# Patient Record
Sex: MALE | Race: OTHER | NOT HISPANIC OR LATINO | Employment: PART TIME | URBAN - METROPOLITAN AREA
[De-identification: names, ages, dates, MRNs, and addresses within clinical notes are randomized per-mention and may not be internally consistent; named-entity substitution may affect disease eponyms.]

---

## 2017-01-18 ENCOUNTER — APPOINTMENT (OUTPATIENT)
Dept: LAB | Facility: HOSPITAL | Age: 62
End: 2017-01-18
Attending: INTERNAL MEDICINE
Payer: COMMERCIAL

## 2017-01-18 DIAGNOSIS — B20 HUMAN IMMUNODEFICIENCY VIRUS (HIV) DISEASE (HCC): ICD-10-CM

## 2017-01-18 LAB
ALBUMIN SERPL BCP-MCNC: 3.8 G/DL (ref 3.5–5)
ALP SERPL-CCNC: 45 U/L (ref 46–116)
ALT SERPL W P-5'-P-CCNC: 40 U/L (ref 12–78)
ANION GAP SERPL CALCULATED.3IONS-SCNC: 8 MMOL/L (ref 4–13)
AST SERPL W P-5'-P-CCNC: 23 U/L (ref 5–45)
BASOPHILS # BLD AUTO: 0.03 THOUSANDS/ΜL (ref 0–0.1)
BASOPHILS NFR BLD AUTO: 1 % (ref 0–1)
BILIRUB SERPL-MCNC: 0.88 MG/DL (ref 0.2–1)
BUN SERPL-MCNC: 17 MG/DL (ref 5–25)
CALCIUM SERPL-MCNC: 8.8 MG/DL (ref 8.3–10.1)
CHLORIDE SERPL-SCNC: 104 MMOL/L (ref 100–108)
CO2 SERPL-SCNC: 28 MMOL/L (ref 21–32)
CREAT SERPL-MCNC: 1.07 MG/DL (ref 0.6–1.3)
EOSINOPHIL # BLD AUTO: 0.05 THOUSAND/ΜL (ref 0–0.61)
EOSINOPHIL NFR BLD AUTO: 1 % (ref 0–6)
ERYTHROCYTE [DISTWIDTH] IN BLOOD BY AUTOMATED COUNT: 12 % (ref 11.6–15.1)
GFR SERPL CREATININE-BSD FRML MDRD: >60 ML/MIN/1.73SQ M
GLUCOSE SERPL-MCNC: 86 MG/DL (ref 65–140)
HCT VFR BLD AUTO: 43.8 % (ref 36.5–49.3)
HGB BLD-MCNC: 16.3 G/DL (ref 12–17)
LYMPHOCYTES # BLD AUTO: 2.18 THOUSANDS/ΜL (ref 0.6–4.47)
LYMPHOCYTES NFR BLD AUTO: 47 % (ref 14–44)
MCH RBC QN AUTO: 35.4 PG (ref 26.8–34.3)
MCHC RBC AUTO-ENTMCNC: 37.2 G/DL (ref 31.4–37.4)
MCV RBC AUTO: 95 FL (ref 82–98)
MONOCYTES # BLD AUTO: 0.25 THOUSAND/ΜL (ref 0.17–1.22)
MONOCYTES NFR BLD AUTO: 5 % (ref 4–12)
NEUTROPHILS # BLD AUTO: 2.15 THOUSANDS/ΜL (ref 1.85–7.62)
NEUTS SEG NFR BLD AUTO: 46 % (ref 43–75)
NRBC BLD AUTO-RTO: 0 /100 WBCS
PLATELET # BLD AUTO: 110 THOUSANDS/UL (ref 149–390)
PMV BLD AUTO: 11.2 FL (ref 8.9–12.7)
POTASSIUM SERPL-SCNC: 4.2 MMOL/L (ref 3.5–5.3)
PROT SERPL-MCNC: 6.7 G/DL (ref 6.4–8.2)
RBC # BLD AUTO: 4.6 MILLION/UL (ref 3.88–5.62)
SODIUM SERPL-SCNC: 140 MMOL/L (ref 136–145)
WBC # BLD AUTO: 4.66 THOUSAND/UL (ref 4.31–10.16)

## 2017-01-18 PROCEDURE — 80053 COMPREHEN METABOLIC PANEL: CPT

## 2017-01-18 PROCEDURE — 85025 COMPLETE CBC W/AUTO DIFF WBC: CPT

## 2017-01-18 PROCEDURE — 36415 COLL VENOUS BLD VENIPUNCTURE: CPT

## 2017-02-16 ENCOUNTER — GENERIC CONVERSION - ENCOUNTER (OUTPATIENT)
Dept: OTHER | Facility: OTHER | Age: 62
End: 2017-02-16

## 2017-02-20 ENCOUNTER — GENERIC CONVERSION - ENCOUNTER (OUTPATIENT)
Dept: OTHER | Facility: OTHER | Age: 62
End: 2017-02-20

## 2017-02-20 ENCOUNTER — ALLSCRIPTS OFFICE VISIT (OUTPATIENT)
Dept: OTHER | Facility: CLINIC | Age: 62
End: 2017-02-20

## 2017-03-13 ENCOUNTER — APPOINTMENT (OUTPATIENT)
Dept: LAB | Facility: HOSPITAL | Age: 62
End: 2017-03-13
Payer: COMMERCIAL

## 2017-03-13 DIAGNOSIS — R19.7 DIARRHEA: ICD-10-CM

## 2017-03-13 PROCEDURE — 89055 LEUKOCYTE ASSESSMENT FECAL: CPT

## 2017-03-13 PROCEDURE — 87329 GIARDIA AG IA: CPT

## 2017-03-13 PROCEDURE — 87899 AGENT NOS ASSAY W/OPTIC: CPT

## 2017-03-13 PROCEDURE — 87045 FECES CULTURE AEROBIC BACT: CPT

## 2017-03-13 PROCEDURE — 87046 STOOL CULTR AEROBIC BACT EA: CPT

## 2017-03-13 PROCEDURE — 87493 C DIFF AMPLIFIED PROBE: CPT

## 2017-03-13 PROCEDURE — 87206 SMEAR FLUORESCENT/ACID STAI: CPT

## 2017-03-13 PROCEDURE — 87177 OVA AND PARASITES SMEARS: CPT

## 2017-03-13 PROCEDURE — 87209 SMEAR COMPLEX STAIN: CPT

## 2017-03-13 PROCEDURE — 87015 SPECIMEN INFECT AGNT CONCNTJ: CPT

## 2017-03-14 LAB — C DIFF TOX GENS STL QL NAA+PROBE: NORMAL

## 2017-03-16 LAB
BACTERIA STL CULT: NORMAL
BACTERIA STL CULT: NORMAL

## 2017-03-17 LAB
CRYPTOSP STL QL ACID FAST STN: NORMAL
G LAMBLIA AG STL QL IA: NEGATIVE
I BELLI SPEC QL ACID FAST MOD KINY STN: NORMAL

## 2017-03-18 LAB — O+P STL CONC: NORMAL

## 2017-03-20 ENCOUNTER — ALLSCRIPTS OFFICE VISIT (OUTPATIENT)
Dept: OTHER | Facility: CLINIC | Age: 62
End: 2017-03-20

## 2017-03-24 LAB — WBC SPEC QL GRAM STN: NORMAL

## 2017-04-18 ENCOUNTER — APPOINTMENT (OUTPATIENT)
Dept: LAB | Facility: HOSPITAL | Age: 62
End: 2017-04-18
Attending: UROLOGY
Payer: COMMERCIAL

## 2017-04-18 ENCOUNTER — TRANSCRIBE ORDERS (OUTPATIENT)
Dept: ADMINISTRATIVE | Facility: HOSPITAL | Age: 62
End: 2017-04-18

## 2017-04-18 DIAGNOSIS — N40.0 BENIGN PROSTATIC HYPERPLASIA, PRESENCE OF LOWER URINARY TRACT SYMPTOMS UNSPECIFIED, UNSPECIFIED MORPHOLOGY: ICD-10-CM

## 2017-04-18 DIAGNOSIS — N20.9 URINARY CALCULUS, UNSPECIFIED: Primary | ICD-10-CM

## 2017-04-18 DIAGNOSIS — N20.9 URINARY CALCULUS, UNSPECIFIED: ICD-10-CM

## 2017-04-18 LAB
BUN SERPL-MCNC: 14 MG/DL (ref 5–25)
CREAT SERPL-MCNC: 0.94 MG/DL (ref 0.6–1.3)
GFR SERPL CREATININE-BSD FRML MDRD: >60 ML/MIN/1.73SQ M
PSA SERPL-MCNC: 2.4 NG/ML (ref 0–4)

## 2017-04-18 PROCEDURE — 82565 ASSAY OF CREATININE: CPT

## 2017-04-18 PROCEDURE — G0103 PSA SCREENING: HCPCS

## 2017-04-18 PROCEDURE — 84520 ASSAY OF UREA NITROGEN: CPT

## 2017-04-18 PROCEDURE — 36415 COLL VENOUS BLD VENIPUNCTURE: CPT

## 2017-04-25 ENCOUNTER — HOSPITAL ENCOUNTER (OUTPATIENT)
Dept: RADIOLOGY | Facility: HOSPITAL | Age: 62
Discharge: HOME | End: 2017-04-25
Attending: UROLOGY
Payer: COMMERCIAL

## 2017-04-25 ENCOUNTER — TRANSCRIBE ORDERS (OUTPATIENT)
Dept: RADIOLOGY | Facility: HOSPITAL | Age: 62
End: 2017-04-25

## 2017-04-25 DIAGNOSIS — N40.0 BENIGN PROSTATIC HYPERPLASIA, PRESENCE OF LOWER URINARY TRACT SYMPTOMS UNSPECIFIED, UNSPECIFIED MORPHOLOGY: Primary | ICD-10-CM

## 2017-04-25 DIAGNOSIS — N40.0 BENIGN PROSTATIC HYPERPLASIA, PRESENCE OF LOWER URINARY TRACT SYMPTOMS UNSPECIFIED, UNSPECIFIED MORPHOLOGY: ICD-10-CM

## 2017-04-25 PROCEDURE — 74000 HB X-RAY EXAM OF ABDOMEN (SINGLE ANTEROPOSTERIOR VIEW): CPT

## 2017-04-26 ENCOUNTER — APPOINTMENT (OUTPATIENT)
Dept: LAB | Facility: HOSPITAL | Age: 62
End: 2017-04-26
Payer: COMMERCIAL

## 2017-04-26 DIAGNOSIS — B20 HUMAN IMMUNODEFICIENCY VIRUS (HIV) DISEASE (HCC): ICD-10-CM

## 2017-04-26 LAB
ALBUMIN SERPL BCP-MCNC: 3.9 G/DL (ref 3.5–5)
ALP SERPL-CCNC: 44 U/L (ref 46–116)
ALT SERPL W P-5'-P-CCNC: 43 U/L (ref 12–78)
ANION GAP SERPL CALCULATED.3IONS-SCNC: 5 MMOL/L (ref 4–13)
AST SERPL W P-5'-P-CCNC: 28 U/L (ref 5–45)
BASOPHILS # BLD AUTO: 0.01 THOUSANDS/ΜL (ref 0–0.1)
BASOPHILS NFR BLD AUTO: 0 % (ref 0–1)
BILIRUB SERPL-MCNC: 0.84 MG/DL (ref 0.2–1)
BUN SERPL-MCNC: 15 MG/DL (ref 5–25)
CALCIUM SERPL-MCNC: 8.9 MG/DL (ref 8.3–10.1)
CHLORIDE SERPL-SCNC: 105 MMOL/L (ref 100–108)
CO2 SERPL-SCNC: 29 MMOL/L (ref 21–32)
CREAT SERPL-MCNC: 1.08 MG/DL (ref 0.6–1.3)
EOSINOPHIL # BLD AUTO: 0.05 THOUSAND/ΜL (ref 0–0.61)
EOSINOPHIL NFR BLD AUTO: 1 % (ref 0–6)
ERYTHROCYTE [DISTWIDTH] IN BLOOD BY AUTOMATED COUNT: 12.3 % (ref 11.6–15.1)
GFR SERPL CREATININE-BSD FRML MDRD: >60 ML/MIN/1.73SQ M
GLUCOSE SERPL-MCNC: 87 MG/DL (ref 65–140)
HCT VFR BLD AUTO: 43.7 % (ref 36.5–49.3)
HGB BLD-MCNC: 16.3 G/DL (ref 12–17)
LYMPHOCYTES # BLD AUTO: 1.87 THOUSANDS/ΜL (ref 0.6–4.47)
LYMPHOCYTES NFR BLD AUTO: 50 % (ref 14–44)
MCH RBC QN AUTO: 35.3 PG (ref 26.8–34.3)
MCHC RBC AUTO-ENTMCNC: 37.3 G/DL (ref 31.4–37.4)
MCV RBC AUTO: 95 FL (ref 82–98)
MONOCYTES # BLD AUTO: 0.26 THOUSAND/ΜL (ref 0.17–1.22)
MONOCYTES NFR BLD AUTO: 7 % (ref 4–12)
NEUTROPHILS # BLD AUTO: 1.59 THOUSANDS/ΜL (ref 1.85–7.62)
NEUTS SEG NFR BLD AUTO: 42 % (ref 43–75)
NRBC BLD AUTO-RTO: 0 /100 WBCS
PLATELET # BLD AUTO: 114 THOUSANDS/UL (ref 149–390)
PMV BLD AUTO: 12.4 FL (ref 8.9–12.7)
POTASSIUM SERPL-SCNC: 4.3 MMOL/L (ref 3.5–5.3)
PROT SERPL-MCNC: 6.7 G/DL (ref 6.4–8.2)
RBC # BLD AUTO: 4.62 MILLION/UL (ref 3.88–5.62)
SODIUM SERPL-SCNC: 139 MMOL/L (ref 136–145)
WBC # BLD AUTO: 3.78 THOUSAND/UL (ref 4.31–10.16)

## 2017-04-26 PROCEDURE — 85025 COMPLETE CBC W/AUTO DIFF WBC: CPT

## 2017-04-26 PROCEDURE — 36415 COLL VENOUS BLD VENIPUNCTURE: CPT

## 2017-04-26 PROCEDURE — 80053 COMPREHEN METABOLIC PANEL: CPT

## 2017-05-09 ENCOUNTER — GENERIC CONVERSION - ENCOUNTER (OUTPATIENT)
Dept: OTHER | Facility: OTHER | Age: 62
End: 2017-05-09

## 2017-05-15 ENCOUNTER — GENERIC CONVERSION - ENCOUNTER (OUTPATIENT)
Dept: OTHER | Facility: OTHER | Age: 62
End: 2017-05-15

## 2017-05-17 ENCOUNTER — ALLSCRIPTS OFFICE VISIT (OUTPATIENT)
Dept: OTHER | Facility: CLINIC | Age: 62
End: 2017-05-17

## 2017-07-16 DIAGNOSIS — B20 HUMAN IMMUNODEFICIENCY VIRUS (HIV) DISEASE (HCC): ICD-10-CM

## 2017-07-16 DIAGNOSIS — Z11.3 ENCOUNTER FOR SCREENING FOR INFECTIONS WITH PREDOMINANTLY SEXUAL MODE OF TRANSMISSION: ICD-10-CM

## 2017-09-25 ENCOUNTER — GENERIC CONVERSION - ENCOUNTER (OUTPATIENT)
Dept: OTHER | Facility: OTHER | Age: 62
End: 2017-09-25

## 2017-10-16 DIAGNOSIS — Z11.3 ENCOUNTER FOR SCREENING FOR INFECTIONS WITH PREDOMINANTLY SEXUAL MODE OF TRANSMISSION: ICD-10-CM

## 2017-10-16 DIAGNOSIS — I10 ESSENTIAL (PRIMARY) HYPERTENSION: ICD-10-CM

## 2017-10-16 DIAGNOSIS — B20 HUMAN IMMUNODEFICIENCY VIRUS (HIV) DISEASE (HCC): ICD-10-CM

## 2017-12-11 ENCOUNTER — GENERIC CONVERSION - ENCOUNTER (OUTPATIENT)
Dept: OTHER | Facility: OTHER | Age: 62
End: 2017-12-11

## 2018-01-10 ENCOUNTER — TRANSCRIBE ORDERS (OUTPATIENT)
Dept: ADMINISTRATIVE | Facility: HOSPITAL | Age: 63
End: 2018-01-10

## 2018-01-10 DIAGNOSIS — R07.9 CHEST PAIN, UNSPECIFIED TYPE: Primary | ICD-10-CM

## 2018-01-10 NOTE — PROGRESS NOTES
Assessment    1  Routine screening for STI (sexually transmitted infection) (V74 5) (Z11 3)   2  HIV disease (042) (B20)    Plan    1  (1) CBC/PLT/DIFF; Status:Active - Retrospective Authorization; Requested for:03Dig6007;    2  (1) COMPREHENSIVE METABOLIC PANEL; Status:Active - Retrospective Authorization; Requested for:96Qdu7457;    3  (1) HEP C ANTIBODY; Status:Active - Retrospective Authorization; Requested   for:78Xfw6558;    4  (1) HIV-1 RNA QUANTITATIVE; [Do Not Release]; Status:Active - Retrospective   Authorization; Requested for:10Yvh7178;    5  (1) RPR; Status:Active - Retrospective Authorization; Requested for:63Mnx3851;    6  (1) T LYMPH SUBSET (CD4); Status:Active - Retrospective Authorization; Requested   for:82Ufj3841;    7  (1) URINALYSIS (will reflex a microscopy if leukocytes, occult blood, protein or nitrites are   not within normal limits); Status:Active - Retrospective Authorization; Requested   for:15Pkx2937;    8  Follow-up visit in 3 months Evaluation and Treatment  Follow-up  Status: Hold For -   Scheduling  Requested for: 23TFI1117    9  (1) LIPID PANEL, FASTING; Status:Active - Retrospective Authorization; Requested   for:52Msj3409;     10  (1) CHLAMYDIA/GC AMPLIFIED DNA, PCR; Source:Urine, Unspecified Source;    Status:Active - Retrospective Authorization; Requested for:94Tfv5275;     Discussion/Summary    HIV-doing well on Tivicay and Truvada with an undetectable viral load, and a CD4 count over 700  If able to make the transition by his insurance, we'll transition the Truvada to [place]  Otherwise continue ART, recheck labs in 2 months and follow-up in 3 months  Likely transition to every 6 month follow-up if undetectable next visit  Education   general HIV education  adherence  prevention care  Chief Complaint  Patient here for a f/u visit  History of Present Illness  Routine follow-up for HIV  Doing well on Tivicay injury about with 100% adherence   The patient feels well      Pain Assessment   the patient states they do not have pain  Abuse And Domestic Violence Screen    Yes, the patient is safe at home  The patient states no one is hurting them  Depression And Suicide Screen  No, the patient has not had thoughts of hurting themself  No, the patient has not felt depressed in the past 7 days  no fever no night sweats no cough no shortness of breath no thrush no nausea no vomiting no diarrhea      Active Problems    1  Arthritis (716 90) (M19 90)   2  GERD (gastroesophageal reflux disease) (530 81) (K21 9)   3  HIV disease (042) (B20)   4  Hyperlipidemia (272 4) (E78 5)   5  Hypertension (401 9) (I10)   6  Kidney stones (592 0) (N20 0)   7  Routine screening for STI (sexually transmitted infection) (V74 5) (Z11 3)   8  Skin macule (709 8) (L98 8)   9  Skin rash (782 1) (R21)   10  Viral syndrome (079 99) (B34 9)    Past Medical History    1  History of Bipolar disorder (296 80) (F31 9)   2  History of fatigue (V13 89) (Z87 898)   3  History of vertigo (V12 49) (Z87 898)   4  History of HIV positive (V08) (Z23)    Family History  Mother    1  No pertinent family history    Social History    · Former smoker (V15 82) (D49 840)   · No alcohol use   · No drug use   · Non-smoker (V49 89) (Z78 9)    Current Meds   1  Aspirin 81 MG Oral Tablet Delayed Release; Take 1 tablet daily; Therapy: 60FGG6994 to (Evaluate:13Jun2016)  Requested for: 25BGS5296; Last   Rx:15Mar2016 Ordered   2  Fluticasone Propionate 50 MCG/ACT Nasal Suspension; Use 1 spray in each nostril   twice a day; Therapy: 71KTX9270 to (Evaluate:14Jan2016)  Requested for: 76Zji9457; Last   Rx:16Oct2015 Ordered   3  Lisinopril 20 MG Oral Tablet; Take 1 tablet by mouth  daily; Therapy: 02EQJ8455 to (Evaluate:06Jun2016)  Requested for: 14GFZ6821; Last   Rx:08Mar2016 Ordered   4  Metoprolol Tartrate 50 MG Oral Tablet; Take 1 tablet by mouth  twice a day;    Therapy: 93NNK1832 to (Evaluate:75Ral4957)  Requested for: 31BVW7463; Last   Rx:08Mar2016 Ordered   5  Omeprazole 20 MG Oral Capsule Delayed Release; Take 1 capsule by mouth  daily; Therapy: 78OSD9053 to (Evaluate:06Jun2016)  Requested for: 74OGY2439; Last   Rx:08Mar2016 Ordered   6  Pravastatin Sodium 80 MG Oral Tablet; Take 1 tablet by mouth  daily; Therapy: 17VBK7897 to (Evaluate:06Jun2016)  Requested for: 75XZU9081; Last   Rx:08Mar2016 Ordered   7  Tivicay 50 MG Oral Tablet; Take 1 tablet daily; Therapy: 89NYY7094 to (Evaluate:06Jun2016)  Requested for: 69MQR2147; Last   Rx:08Mar2016 Ordered   8  Truvada 200-300 MG Oral Tablet; TAKE 1 TABLET DAILY; Therapy: 69LAP5986 to (Evaluate:06Jun2016)  Requested for: 80HHG0615; Last   Rx:08Mar2016 Ordered   9  Vitamin D 1000 UNIT Oral Tablet; Therapy: (Recorded:63Puv8403) to Recorded    Allergies    1  Sulfa Drugs    Vitals  Signs [Data Includes: Current Encounter]   Recorded: 14JOK1117 10:18AM   Temperature: 97 2 F  Heart Rate: 58  Respiration: 18  Systolic: 150  Diastolic: 70  Height: 6 ft 2 in  Weight: 214 lb 4 58 oz  BMI Calculated: 27 51  BSA Calculated: 2 24  O2 Saturation: 98  Pain Scale: 0    Physical Exam    Constitutional   General appearance: No acute distress, well appearing and well nourished  Ears, Nose, Mouth, and Throat   Oropharynx: Normal with no erythema, edema, exudate or lesions  Pulmonary   Respiratory effort: No increased work of breathing or signs of respiratory distress  Auscultation of lungs: Clear to auscultation  Cardiovascular   Auscultation of heart: Normal rate and rhythm, normal S1 and S2, without murmurs  Examination of extremities for edema and/or varicosities: Normal     Abdomen   Abdomen: Non-tender, no masses  Liver and spleen: No hepatomegaly or splenomegaly  Lymphatic   Palpation of lymph nodes in neck: No lymphadenopathy         Future Appointments    Date/Time Provider Specialty Site   06/27/2016 02:00 PM Robinson Cordero[address]  [facility]     Signatures   Electronically signed by : Tyler Aquino MD; May  9 2016 10:52AM EST                       (Author)

## 2018-01-10 NOTE — PROGRESS NOTES
Assessment    1  HIV disease (042) (B20)   2  Encounter for preventive health examination (V70 0) (Z00 00)    Plan    1  Descovy 200-25 MG Oral Tablet; Take 1 tablet daily   2  (1) CBC/PLT/DIFF; Status:Active; Requested for:16Oct2017;    3  (1) COMPREHENSIVE METABOLIC PANEL; Status:Active; Requested for:16Oct2017;    4  (1) HIV-1 RNA QUANTITATIVE; [Do Not Release]; Status:Active; Requested for:16Oct2017;    5  (1) RPR; Status:Active; Requested for:16Oct2017;    6  (1) T LYMPH SUBSET (CD4); Status:Active; Requested for:16Oct2017;    7  (1) URINALYSIS (will reflex a microscopy if leukocytes, occult blood, protein or nitrites are not within   normal limits); Status:Active; Requested for:16Oct2017;    8  Follow-up visit in 3 months Evaluation and Treatment  Follow-up  Status: Complete  Done:   80WPZ6960    9  (1) LIPID PANEL, FASTING; Status:Active; Requested for:16Oct2017;     10  (1) CHLAMYDIA/GC AMPLIFIED DNA, PCR; Source:Urine, Unspecified Source; Status:Active; Requested for:16Oct2017;     Discussion/Summary    Intervention Diet Prescription   Energy 2100 kcal   24kcal /88kg   Protein 90g   1 1g /88kg   Fluid 2100ml   24ml /88kg 2g Na  Estimated Intake: 2000kcal 85g Protein   His current intake is meeting estimated nutrition needs  Goal #1 - Improve/Maintain  healthy eating for HIV  Goal initiated  Time Frame For Accomplishment: By next follow-up  Intervention Nutrition Education Provided  Person Educated: patient   Topics Discussed: healthy eating for HIV   Barriers To Learning: none  Readiness to Learn: Receptive  Teaching Method: verbal   Evaluation Of Learning: verbalized/demonstrated understanding       History of Present Illness  Assessment: Clinical Data/Client History HIV - Yes  His socio-economic status includes  cooks and eats out  [facility]  Living Environment - He has access to refrigerator, stove and microwave     His functional status is  ambulatory, able to food shop and prepares own meals  His activity level is  regular exercise and pt  walks on a almost daily basis  He eats breakfast at  Home made Smoothie  He eats lunch at  [employer] or salad, water  He eats dinner at  Home cooked meal (low fat, low cholesterol), water  He snacks Fruit  His appetite is  good   He does not take supplements  He has problems with  Reviewed Oral Health ?s w/ pt  he has been to the [address] [place] [address] w/in the past year, and has f/u appt  next month, no problems reported  Nutrition Diagnosis   Problem related to no nutrition diagnosis at this time  Pt  has lost ~ 10 kg w/in the past year, good po intake & appetitie, Alb  3 9  Active Problems    1  Arthritis (716 90) (M19 90)   2  Constipation (564 00) (K59 00)   3  Encounter for screening colonoscopy (V76 51) (Z12 11)   4  GERD without esophagitis (530 81) (K21 9)   5  HIV disease (042) (B20)   6  Hyperlipidemia (272 4) (E78 5)   7  Hypertension (401 9) (I10)   8  Kidney stones (592 0) (N20 0)   9  Routine screening for STI (sexually transmitted infection) (V74 5) (Z11 3)   10  Screening for cancer (V76 9) (Z12 9)    Past Medical History    1  History of Bipolar disorder (296 80) (F31 9)   2  History of diarrhea (V12 79) (Z87 898)   3  History of fatigue (V13 89) (Z87 898)   4  History of vertigo (V12 49) (Z87 898)   5  History of viral infection (V12 09) (Z86 19)   6  History of HIV positive (V08) (Z21)   7  History of Skin macule (709 8) (L98 8)   8  History of Skin rash (782 1) (R21)    Family History  Mother    1  No pertinent family history    Social History    · Former smoker (V15 82) (K38 508)   · No alcohol use   · No drug use   · Non-smoker (V49 89) (Z78 9)    Current Meds   1  Aspirin 81 MG Oral Tablet Delayed Release; Take 1 tablet daily; Therapy: 84GRU5505 to (Evaluate:88Vvv2126)  Requested for: 20Mar2017; Last Rx:13Jun2016   Ordered   2  Famotidine 20 MG Oral Tablet; TAKE 1 TABLET AT BEDTIME;    Therapy: 67OPR0514 to (Magdi Short)  Requested for: 20Mar2017; Last Rx:85Btz3174   Ordered   3  Fluticasone Propionate 50 MCG/ACT Nasal Suspension; Use 1 spray in each nostril twice a day; Therapy: 38DFW1029 to (Evaluate:18Jun2017)  Requested for: 20Mar2017; Last Rx:20Mar2017   Ordered   4  Lisinopril 20 MG Oral Tablet; Take 1 tablet by mouth  daily; Therapy: 46MHD4334 to (Wil Conner)  Requested for: 20Mar2017; Last Rx:77Juq4072   Ordered   5  Metoprolol Succinate ER 25 MG Oral Tablet Extended Release 24 Hour; Take 1 tablet by mouth  daily; Therapy: 32HVP2362 to (Wil Conner)  Requested for: 20Mar2017; Last Rx:11Rfv2059   Ordered   6  Pravastatin Sodium 80 MG Oral Tablet; Take 1 tablet by mouth  daily; Therapy: 34CML2933 to (Evaluate:18Jun2017)  Requested for: 20Mar2017; Last Rx:20Mar2017   Ordered   7  Tivicay 50 MG Oral Tablet; Take 1 tablet by mouth  daily; Therapy: 22RHZ6009 to (Evaluate:92Xkb6974)  Requested for: 20Mar2017; Last Rx:12Oct2016   Ordered   8  Urocit-K 15 15 MEQ (1620 MG) Oral Tablet Extended Release (Potassium Citrate ER); Therapy: 22BWC1616 to Recorded   9  Vitamin D 1000 UNIT Oral Tablet; Therapy: (Recorded:20Mar2017) to Recorded    Allergies    1   Sulfa Drugs    Vitals  Signs   Recorded: 27OZL7855 04:26PM   Temperature: 98 2 F  Heart Rate: 67  Respiration: 18  Systolic: 326  Diastolic: 78  Height: 6 ft 2 in  Weight: 195 lb 5 22 oz  BMI Calculated: 25 08  BSA Calculated: 2 15  O2 Saturation: 98  Vitals   Recorded: 82MMX5396 62:05PJ   Systolic: 763  Diastolic: 78  Temperature: 98 2 F  Heart Rate: 67  Respiration: 18  Height: 6 ft 2 in  Weight: 195 lb 5 22 oz  BMI Calculated: 25 08  BSA Calculated: 2 15  O2 Saturation: 98  Recorded: 52DMR2751 29:92XY   Systolic: 684  Diastolic: 69  Temperature: 97 9 F  Heart Rate: 67  Respiration: 18  Height: 6 ft 2 in  Weight: 201 lb 7 98 oz  BMI Calculated: 25 87  BSA Calculated: 2 18  O2 Saturation: 98  Recorded: 13Omj3191 09:58AM Systolic: 721  Diastolic: 76  Temperature: 97 6 F  Heart Rate: 68  Respiration: 18  Height: 6 ft 2 in  Weight: 199 lb 8 24 oz  BMI Calculated: 25 62  BSA Calculated: 2 17  O2 Saturation: 98  Recorded: 14XRS0798 08:89OZ   Systolic: 394  Diastolic: 78  Temperature: 97 5 F  Heart Rate: 65  Respiration: 18  Height: 6 ft 2 in  Weight: 199 lb   BMI Calculated: 25 55  BSA Calculated: 2 17  O2 Saturation: 98  Recorded: 28EBF0942 25:22WN   Systolic: 606  Diastolic: 79  Temperature: 98 5 F  Heart Rate: 70  Respiration: 18  Height: 6 ft 2 in  Weight: 188 lb   BMI Calculated: 24 14  BSA Calculated: 2 12  Recorded: 74GPR0614 26:75MM   Systolic: 977  Diastolic: 78  Temperature: 97 6 F  Heart Rate: 67  Respiration: 18  Height: 6 ft 2 in  Weight: 188 lb 11 41 oz  BMI Calculated: 24 23  BSA Calculated: 2 12  O2 Saturation: 98  Recorded: 51URW2845 37:66SF   Systolic: 847  Diastolic: 79  Temperature: 97 5 F  Heart Rate: 81  Respiration: 18  Height: 6 ft 2 in  Weight: 145 lb 0 99 oz  BMI Calculated: 18 62  BSA Calculated: 1 9  Pain Scale: 0  O2 Saturation: 98  Recorded: 93XNC8771 05:78PU   Systolic: 734  Diastolic: 70  Temperature: 97 2 F  Heart Rate: 58  Respiration: 18  Height: 6 ft 2 in  Weight: 214 lb 4 58 oz  BMI Calculated: 27 51  BSA Calculated: 2 24  Pain Scale: 0  O2 Saturation: 98  Recorded: 48QVB5355 25:57VE   Systolic: 611  Diastolic: 78  Temperature: 96 2 F  Heart Rate: 68  Respiration: 18  Height: 6 ft 2 in  Weight: 96 lb 2 98 oz  BMI Calculated: 12 35  BSA Calculated: 1 59  Pain Scale: 0  O2 Saturation: 98  Recorded: 32TXX6454 54:26ZQ   Systolic: 098  Diastolic: 64  Temperature: 98 F  Heart Rate: 59  Respiration: 18  Height: 6 ft 2 in  Weight: 211 lb 3 18 oz  BMI Calculated: 27 12  BSA Calculated: 2 22  Pain Scale: 0  O2 Saturation: 98  Recorded: 49OWY3578 21:80CJ   Systolic: 005  Diastolic: 76  Temperature: 97 6 F  Heart Rate: 62  Respiration: 18  Height: 6 ft 2 in  Weight: 216 lb   BMI Calculated: 27 73  BSA Calculated: 2 25  Pain Scale: 0  O2 Saturation: 98  Recorded: 98EOR3806 50:02JU   Systolic: 636  Diastolic: 60  Temperature: 98 5 F  Heart Rate: 68  Respiration: 20  Height: 6 ft 2 in  Weight: 210 lb 6 oz  BMI Calculated: 27 01  BSA Calculated: 2 22  O2 Saturation: 98  Recorded: 00TRP3752 63:73LN   Systolic: 647  Diastolic: 68  Heart Rate: 74  Height: 6 ft 2 in  Weight: 214 lb   BMI Calculated: 27 48  BSA Calculated: 2 24  Pain Scale: 7 5  O2 Saturation: 98  Recorded: 62JDV1882 97:62CO   Systolic: 730  Diastolic: 76  Temperature: 96 8 F  Heart Rate: 98  Respiration: 18  Height: 6 ft 2 in  Weight: 214 lb   BMI Calculated: 27 48  BSA Calculated: 2 24  O2 Saturation: 98  Recorded: 23XLT8374 11:78HS   Systolic: 025  Diastolic: 56  Temperature: 100 8 F  Heart Rate: 111  Respiration: 18  Height: 6 ft 2 in  Weight: 214 lb 6 oz  BMI Calculated: 27 52  BSA Calculated: 2 24  O2 Saturation: 95    Future Appointments    Date/Time Provider Specialty Site   08/16/2017 04:30 PM Lamont Connolly MD Infectious Disease [facility] AT [place]   09/18/2017 01:30 PM Monisha Santiago, [address] AT [address] [facility]   Electronically signed by : LORENZO Alfred,BCO,BUBBA; May 18 2017  9:03AM EST                       (Author)

## 2018-01-12 NOTE — PROGRESS NOTES
Assessment   1  HIV disease (042) (B20)    Plan   1  Renew: Truvada 200-300 MG Oral Tablet; Take 1 tablet by mouth  daily  2  (1) CBC/PLT/DIFF; Status:Active; Requested for:04Ufv9108;   3  (1) COMPREHENSIVE METABOLIC PANEL; Status:Active; Requested for:43Nst6271;   4  (1) HIV-1 RNA QUANTITATIVE; [Do Not Release]; Status:Active; Requested for:92Zvc5041;     5  (1) T LYMPH SUBSET (CD4); Status:Active; Requested for:89Xvo4757;   6  (1) URINALYSIS (will reflex a microscopy if leukocytes, occult blood, protein or nitrites are   not within normal limits); Status:Active; Requested for:27Zmi7456;    7  (1) LIPID PANEL, FASTING; Status:Active; Requested for:91Aqm0041;    8  (1) CHLAMYDIA/GC AMPLIFIED DNA, PCR; Source:Urine, Unspecified Source;   Status:Active; Requested for:81Cnf3682;     Discussion/Summary    HIV-patient now with another lip and his viral load  CD4 count remains very high in the 700s  I discussed with the patient importance of adherence, but the patient insists she is not missing doses  I discussed with him in detail taking other supplements with unknown drug interactions  I recommend he stop the new supplement, and to move all his other medications to the morning, and continue the ART in the evening  Recheck labs in 2 months and follow-up in 3 months  If the viral load keeps rising we'll check a HIV genotype  Possible side effects of new medications were reviewed with the patient/guardian today  The treatment plan was reviewed with the patient/guardian  The patient/guardian understands and agrees with the treatment plan     Education   general HIV education  adherence  prevention care  Chief Complaint  Patient here for an HIV f/u  Patient needs refills      History of Present Illness  Routine follow-up for HIV  Patient claims 100% adherence with Tivicay and Truvada  He denies any notable side effects  He recently started a new supplement that is a route from the [facility]       Pain Assessment   the patient states they do not have pain  Abuse And Domestic Violence Screen    Yes, the patient is safe at home  The patient states no one is hurting them  Depression And Suicide Screen  No, the patient has not had thoughts of hurting themself  No, the patient has not felt depressed in the past 7 days  no fever no lethargy no depression no night sweats no weight loss no cough no shortness of breath no thrush no nausea no vomiting no diarrhea      Active Problems   1  Arthritis (716 90) (M19 90)  2  Constipation (564 00) (K59 00)  3  Encounter for screening colonoscopy (V76 51) (Z12 11)  4  GERD without esophagitis (530 81) (K21 9)  5  HIV disease (042) (B20)  6  Hyperlipidemia (272 4) (E78 5)  7  Hypertension (401 9) (I10)  8  Kidney stones (592 0) (N20 0)  9  Routine screening for STI (sexually transmitted infection) (V74 5) (Z11 3)  10  Screening for cancer (V76 9) (Z12 9)  11  Skin macule (709 8) (L98 8)  12  Skin rash (782 1) (R21)  13  Viral syndrome (079 99) (B34 9)    Past Medical History   1  History of Bipolar disorder (296 80) (F31 9)  2  History of fatigue (V13 89) (Z87 898)  3  History of vertigo (V12 49) (Z87 898)  4  History of HIV positive (V08) (Z23)    Family History  Mother   1  No pertinent family history    Social History    · Former smoker (V15 82) (K79 881)   · No alcohol use   · No drug use   · Non-smoker (V49 89) (Z78 9)    Current Meds  1  Aspirin 81 MG Oral Tablet Delayed Release; Take 1 tablet daily; Therapy: 95VBI1927 to (Evaluate:64Hqa6085)  Requested for: 60Imd5333; Last   Rx:13Jun2016 Ordered  2  Famotidine 20 MG Oral Tablet; TAKE 1 TABLET AT BEDTIME; Therapy: 15MDM3847 to (Sherly Coker)  Requested for: 02Ezb1570; Last   Rx:50Dpc0314 Ordered  3  Fluticasone Propionate 50 MCG/ACT Nasal Suspension; Use 1 spray in each nostril   twice a day; Therapy: 09JHD4714 to (Evaluate:14Jan2016)  Requested for: 66Pqy1340; Last   Rx:16Oct2015 Ordered  4   Lisinopril 20 MG Oral Tablet; Take 1 tablet by mouth  daily; Therapy: 79TQD6035 to (Evaluate:18Dbc9984)  Requested for: 06NPU0254; Last   Rx:21Nov2016; Status: ACTIVE - Renewal Denied Ordered  5  Metoprolol Succinate ER 25 MG Oral Tablet Extended Release 24 Hour; Take 1 tablet by   mouth  daily; Therapy: 92GIA5648 to (22-85-39-05)  Requested for: 86GYQ8434; Last   Rx:86Nci0976 Ordered  6  Pravastatin Sodium 80 MG Oral Tablet; Take 1 tablet by mouth  daily; Therapy: 24MFK0107 to (Allison Mckenzie)  Requested for: 89TMB5807; Last   Rx:87Enh3235 Ordered  7  Tivicay 50 MG Oral Tablet; Take 1 tablet by mouth  daily; Therapy: 22YSF4748 to (Gisela Goldberg)  Requested for: 12Oct2016; Last   Rx:12Oct2016 Ordered  8  Truvada 200-300 MG Oral Tablet; Take 1 tablet by mouth  daily; Therapy: 04JLK6481 to (Evaluate:10Jan2017)  Requested for: 12Oct2016; Last   Rx:12Oct2016 Ordered  9  Vitamin D 1000 UNIT Oral Tablet; Therapy: (Recorded:10Cei9652) to Recorded    Allergies   1  Sulfa Drugs    Vitals  Signs   Recorded: 20Feb2017 09:58AM   Temperature: 97 6 F  Heart Rate: 68  Respiration: 18  Systolic: 860  Diastolic: 76  Height: 6 ft 2 in  Weight: 199 lb 8 24 oz  BMI Calculated: 25 62  BSA Calculated: 2 17  O2 Saturation: 98    Physical Exam    Constitutional   General appearance: No acute distress, well appearing and well nourished  Ears, Nose, Mouth, and Throat   Oropharynx: Normal with no erythema, edema, exudate or lesions  Pulmonary   Respiratory effort: No increased work of breathing or signs of respiratory distress  Auscultation of lungs: Clear to auscultation  Cardiovascular   Auscultation of heart: Normal rate and rhythm, normal S1 and S2, without murmurs  Examination of extremities for edema and/or varicosities: Normal     Abdomen   Abdomen: Non-tender, no masses  Liver and spleen: No hepatomegaly or splenomegaly  Lymphatic   Palpation of lymph nodes in neck: No lymphadenopathy         Future Appointments    Date/Time Provider Specialty Site   03/20/2017 01:30 PM Jesus Alberto Chavez, [facility] AT [facility]     Signatures   Electronically signed by : Ondina Rodriguez MD; Feb 20 2017 10:44AM EST                       (Author)    Electronically signed by : Ondina Rodriguez MD; Feb 20 2017 10:45AM EST                       (Author)

## 2018-01-12 NOTE — PROGRESS NOTES
Patient Health Assessment    Date:            09/25/2017  Blood Pressure:  131/81  Pulse:           66  Age:             61  Weight:          200 lbs  Height/Length:   6' 2"  Body Mass Index: 25 7  Provider:        Jeanine_CYNTHIA_RIVERA  Clinic:          [address]: Allergies    Tobacco User    High Blood Pressure    Mental Disorders    Alcohol/Drug Abuse  Medications: Aspirin    METOPROLOL   TAB 25MG ER 25    Omeprazole    Pravastatin Sodium    Vitamin D    Truvada    Tivicay    Urocit  Allergies:      Sulfa Drugs  Since Last Visit: Medical Alert: No Change    Medications: No Change    Allergies:        No Change  Pain Scale Type: Numeric Pain ScalePain Level: 5  Description: Patient states hes been in pain for the past two weeks  Sensitivity to cold and hot and pressure  No throbbing  Not taken any pain  meds  MDR  Location: ()Tooth    ()Other  Quality:  ()Dull    ()Sharp    ()Throbbing    ()Unable to assess  Duration: ()< 24 hours    ()> 24 hours    ()1-3 days    ()4-6 days    ()7-14 days    ()14-21 days    ()21-30 days    ()Over 30 days      S:Pt presented as emergency with sensitivity to cold/hot and pain on biting  on the back molars  Pt not sure if its #18 or 19  O:Took PA  No clear pathology noted  Clinical exam shows recession of roots  on #19 existing crown  Old amalgam filling on #18 and craze/fracture line  starting on the distal groove and extending all the way to the distal  margin/wall of the tooth  Percussion/palpation was normal for all teeth  Endo ice for #18 was normal  Tooth sloth used and #18 was painful on biting on the distal lingual and mesial   lingual cusps of the tooth leading to crack tooth symptoms  Pt said it was the   same pain he feels when chewing  A: #18 distal craze/fracture    P:Explained to pt that we could removed the amalgam filling, trace the  craze/fracture and possible restore with composite   Explained that depending on   how far the fracture extends the tooth might need rc, crown or it might not  even be restorable  Pt agrees with the treatment plan  Tx plan signed  Pt  left in good health  NV: #18 MODBEAU    J  Cornelia Healy    ----- Signed on Monday, September 25, 2017 at 10:29:23 AM  -----  ----- Provider: Elías Diamond Dentist -- Clinic: [facility] -----

## 2018-01-12 NOTE — PROGRESS NOTES
Patient Health Assessment    Date:            12/05/2016  Blood Pressure:  110/73  Pulse:           66  Age:             61  Weight:          208 lbs  Height/Length:   6' 2"  Body Mass Index: 26 7  Provider:        [email or web address]  Clinic:          [address] [place] [address]  Medical Alert:  Allergies    Tobacco User    High Blood Pressure    Mental Disorders    Alcohol/Drug Abuse  Medications: Aspirin    METOPROLOL   TAB 25MG ER 25    Omeprazole    Pravastatin Sodium    Vitamin D    Truvada    Tivicay    Urocit  Allergies:      Sulfa Drugs  Since Last Visit: Medical Alert: No Change    Medications: Change    Allergies:        No Change  Pain Scale Type: Numeric Pain ScalePain Level: 0  Description: pt reports Left cheek dark spot present  scaled, cavitroned, polished and flossed  some moderate calculus lower anteriors and upper molars/ moderate bleeding  moved to room 5 for dr Aylin Díaz exam    ----- Signed on Monday, December 05, 2016 at 11:44:19 AM  -----  ----- Provider: [id number] - Juanis Tucker RDH -- Clinic: [facility] -----

## 2018-01-13 VITALS
HEART RATE: 68 BPM | RESPIRATION RATE: 18 BRPM | BODY MASS INDEX: 25.61 KG/M2 | OXYGEN SATURATION: 98 % | SYSTOLIC BLOOD PRESSURE: 129 MMHG | TEMPERATURE: 97.6 F | DIASTOLIC BLOOD PRESSURE: 76 MMHG | HEIGHT: 74 IN | WEIGHT: 199.52 LBS

## 2018-01-13 NOTE — PROGRESS NOTES
History of Present Illness  [facility]:   He is being seen in follow-up  The patient is being seen regarding wellness screener   Support/Coping: AA/NA meetings several times per week  [facility]'s:         1  I like who I am  Yes, describes me exactly (12)   2  I am not an easy person to get along with    No, doesn't describe me at all (22)   3  I am basically a healthy person    Yes, describes me exactly (32)   4  I give up to easily    No, doesn't describe me at all (42)   5  I have difficulty concentrating    No, doesn't describe me at all (52)   6  I am happy with my family relationships    Yes, describes me exactly (62)   7  I am comfortable being around people    Yes, describes me exactly (72)     8  Walking up a flight of stairs    None (82)   9  Running the length of a football field    None (92)     10  Sleeping    Some (101)   11  Hurting or aching in any part of your body    Some (111)   12  Getting tired easily    None (122)   13  Feeling depressed or sad    None (132)   14  Nervousness    None (142)     15  Socialize with other people (talk or visit with friends or relatives A Lot ()   12  Take part in social, Sikh, or recreation activities (meetings, Sikh, movies, sports, parties)    Cade Manifold A Lot (162)   17   Stay in your home, nursing home, or hospital because of sickness, injury, or other health problem None (172)   [address]     8 = 2   9 = 2   10 = 1   11 = 1   12 = 2   Sum = 8   PHYSICAL HEALTH SCORE 80      1 = 2   4 = 2   5 = 2   13 = 2   14 = 2   Sum = 10   MENTAL HEALTH SCORE 100      2 = 2   6 = 2   7 = 2   15 = 2   16 = 2 Sum = 10   SOCIAL HEALTH SCORE 100  Physical Health score = 80   Mental Health score = 100   Social Health score = 100   Sum = 280   GENERAL HEALTH SCORE 93 333      3 = 2   PERCEIVED HEALTH SCORE 100      1 = 2   2 = 2   4 = 2   6 = 2   7 = 2   Sum = 10   SELF-ESTEEM SCORE 100            2 = 2 and 0   5 = 2 and 0   7 = 2 and 0   10 = 1 and 1   12 = 2 and 0   14 = 2 and 0   Sum = 1   ANXIETY SCORE 8 333      4 = 2 and 0   5 = 2 and 0   10 = 1 and 1   12 = 2 and 0   13 = 2 and 0   Sum = 1   DEPRESSION SCORE 10      4 = 2, 0, 2 and 0   7 = 2 and 0   10 = 1 and 1   12 = 2 and 0   13 = 2 and 0   14 = 2 and 0   Sum = 1   ANXIETY-DEPRESSION ([facility]-AD) SCORE 7 143      11 = 1 and 1   PAIN SCORE 50      17 = 2 and 0   DISABILITY SCORE 0  Physical Exam    Objective: Orientation: oriented to person, oriented to place and oriented to time  Appearance: well developed, well nourished and appearance reflects stated age  Observed mood and affect: appropriate  Harm to self or others: None reported or observed  Substance abuse: None reported or observed  Assessment    1  Routine screening for STI (sexually transmitted infection) (V74 5) (Z11 3)   2  HIV disease (042) (B20)    Plan    1  (1) CBC/PLT/DIFF; Status:Active - Retrospective Authorization; Requested for:04Brt8567;    2  (1) COMPREHENSIVE METABOLIC PANEL; Status:Active - Retrospective Authorization; Requested for:37Xkc0770;    3  (1) HEP C ANTIBODY; Status:Active - Retrospective Authorization; Requested   for:84Sjy3111;    4  (1) HIV-1 RNA QUANTITATIVE; [Do Not Release]; Status:Active - Retrospective   Authorization; Requested for:32Shv2550;    5  (1) RPR; Status:Active - Retrospective Authorization; Requested for:80Ldq5670;    6  (1) T LYMPH SUBSET (CD4); Status:Active - Retrospective Authorization;  Requested   for:73Lkd9766;    7  (1) URINALYSIS (will reflex a microscopy if leukocytes, occult blood, protein or nitrites are   not within normal limits); Status:Active - Retrospective Authorization; Requested   for:32Hhg2604;    8  Follow-up visit in 3 months Evaluation and Treatment  Follow-up  Status: Hold For -   Scheduling  Requested for: 78UJV5903    9  (1) LIPID PANEL, FASTING; Status:Active - Retrospective Authorization; Requested   for:45Lpx1659;     10  (1) CHLAMYDIA/GC AMPLIFIED DNA, PCR; Source:Urine, Unspecified Source;    Status:Active - Retrospective Authorization; Requested for:88Prn2002;     [address] [facility] [facility]: Today, patient presents with no major concerns  Patient will likely benefit from continuing to attend weekly AA/NA meetings for support  The stage of change is maintenance  Behavioral recommendations: 1  F/U with [facility] [employer] [facility] as needed  2  Continue to go to AA/NA meetings for support   Discussion Summary [facility]:   PT was seen for a behavioral health consultation with [facility] [employer] [facility] [employer] [facility] services were reviewed  PT completed his yearly [facility] screener  PT reports that he is still going to AA/NA meetings several times a week  He is close with his sponsor and is also a sponsor himself  As of February, he was 9 yrs sober  PT states that he enjoys sober living but recognizes that he needs to continue going to meetings to maintain his sobriety  [facility] [employer] [facility] supported PT in his decision and encouraged him to reach out to his sponsor or [facility] if any issues arise in the future        Future Appointments    Date/Time Provider Specialty Site   08/01/2016 10:15 AM Radha Greer MD Internal Medicine [facility]   06/27/2016 02:00 PM Nabil Mckeon [facility] Family Medicine [facility]     Signatures   Electronically signed by : Yoly Otto MS; May  9 2016  2:04PM EST                       (Author)

## 2018-01-13 NOTE — PROGRESS NOTES
History of Present Illness    Assessment:  Met w/pt  for f/u  His weight is stable, pt  reports good appetite& po intake  REviewed Oral Health ?s  He goes to [address] [place] [address] regularly & has a f/u appt  He does not have a   Offered info about Exercise classes, pt  declined, stating he prefers walking for exercise  He is also asking about "Mei powder", explained to him that i do not know & he needs to be careful not to take any supplements that would interfere with his meds  Nutrition Diagnosis Continue Previous Nutrition Diagnosis   Intervention Continue Current Regimen   Monitor And Evaluation Goal #1 - , Goal #1 is extended  Reviewed Oral Health ?s, pt  has no OH problems, up to date with his dental appts  Active Problems    1  Arthritis (716 90) (M19 90)   2  Constipation (564 00) (K59 00)   3  Encounter for screening colonoscopy (V76 51) (Z12 11)   4  GERD without esophagitis (530 81) (K21 9)   5  HIV disease (042) (B20)   6  Hyperlipidemia (272 4) (E78 5)   7  Hypertension (401 9) (I10)   8  Kidney stones (592 0) (N20 0)   9  Routine screening for STI (sexually transmitted infection) (V74 5) (Z11 3)   10  Screening for cancer (V76 9) (Z12 9)   11  Skin macule (709 8) (L98 8)   12  Skin rash (782 1) (R21)   13  Viral syndrome (079 99) (B34 9)    Past Medical History    1  History of Bipolar disorder (296 80) (F31 9)   2  History of fatigue (V13 89) (Z87 898)   3  History of vertigo (V12 49) (Z87 898)   4  History of HIV positive (V08) (Z23)    Family History  Mother    1  No pertinent family history    Social History    · Former smoker (V15 82) (G49 142)   · No alcohol use   · No drug use   · Non-smoker (V49 89) (Z78 9)    Current Meds   1  Aspirin 81 MG Oral Tablet Delayed Release; Take 1 tablet daily; Therapy: 11LMO9145 to (Evaluate:22Qeh9557)  Requested for: 65Bwc5104; Last Rx:13Jun2016   Ordered   2  Famotidine 20 MG Oral Tablet; TAKE 1 TABLET AT BEDTIME;    Therapy: 18Bxm7391 to (Evaluate:02Sep2016)  Requested for: 86Nau8306; Last Rx:01Brd3109   Ordered   3  Fluticasone Propionate 50 MCG/ACT Nasal Suspension; Use 1 spray in each nostril twice a day; Therapy: 51UUX3346 to (Evaluate:14Jan2016)  Requested for: 12Sep2016; Last Rx:16Oct2015   Ordered   4  Lisinopril 20 MG Oral Tablet; Take 1 tablet by mouth  daily; Therapy: 76YAU7521 to (Evaluate:60Jhx1037)  Requested for: 44SFK8555; Last Rx:21Nov2016;   Status: ACTIVE - Renewal Denied Ordered   5  Metoprolol Succinate ER 25 MG Oral Tablet Extended Release 24 Hour; Take 1 tablet by mouth  daily; Therapy: 10YEE5122 to (Maral Stanford)  Requested for: 48CBP8705; Last Rx:30Nov2016   Ordered   6  Pravastatin Sodium 80 MG Oral Tablet; Take 1 tablet by mouth  daily; Therapy: 47OQB7392 to (Shar Long)  Requested for: 04BRP0885; Last Rx:85Bsy0841   Ordered   7  Tivicay 50 MG Oral Tablet; Take 1 tablet by mouth  daily; Therapy: 80UZN2859 to ([phone])  Requested for: 12Oct2016; Last Rx:12Oct2016   Ordered   8  Truvada 200-300 MG Oral Tablet; Take 1 tablet by mouth  daily; Therapy: 72JYG5817 to (Evaluate:26Khm0437)  Requested for: 25Jyv4248; Last Rx:44Kmu3836   Ordered   9  Vitamin D 1000 UNIT Oral Tablet; Therapy: (Recorded:12Sep2016) to Recorded    Allergies    1   Sulfa Drugs    Future Appointments    Date/Time Provider Specialty Site   05/17/2017 04:15 PM Jazz Campa MD Internal Medicine [facility] AT [facility]   03/20/2017 01:30 PM Nabil De La Torre [facility] ACS AT [facility]     Signatures   Electronically signed by : JOELLE Nieto,ROSE,BAUTISTA; Feb 20 2017  2:31PM EST                       (Author)

## 2018-01-14 VITALS
OXYGEN SATURATION: 98 % | WEIGHT: 195.33 LBS | DIASTOLIC BLOOD PRESSURE: 78 MMHG | HEIGHT: 74 IN | BODY MASS INDEX: 25.07 KG/M2 | HEART RATE: 67 BPM | SYSTOLIC BLOOD PRESSURE: 119 MMHG | TEMPERATURE: 98.2 F | RESPIRATION RATE: 18 BRPM

## 2018-01-14 NOTE — PROGRESS NOTES
Assessment    1  HIV disease (042) (B20)    Plan    1  Follow-up visit in 6 months Evaluation and Treatment  Follow-up  Status: Hold For -   Scheduling  Requested for: 29Vkd5586   2  (1) CBC/PLT/DIFF; Status:Active; Requested for:17Jbb9276;    3  (1) COMPREHENSIVE METABOLIC PANEL; Status:Active; Requested for:82Trb6219;    4  (1) HIV-1 RNA QUANTITATIVE; [Do Not Release]; Status:Active; Requested   for:09Ikv9534;    5  (1) HIV-1 RNA QUANTITATIVE; Status:Canceled;    6  (1) T LYMPH SUBSET (CD4); Status:Active; Requested for:80Cfe9945;    7  (1) T LYMPH SUBSET (CD4); Status:Canceled; Discussion/Summary    HIV-doing well on Tivicay and Truvada with an undetectable viral load and CD4 count of greater than 500  We'll recheck labs in 5 months and follow-up in 6 months  Continue current ART for now  Will likely transition to Triumeq or change the Truvada 2 Descovy when the insurance approves  Syphilis-status post treatment with penicillin Ã3 doses  Follow-up RPR nonreactive  Education   general HIV education  adherence  prevention care  Chief Complaint  Patient here for a f/u visit  History of Present Illness  Routine follow-up for HIV  Patient claims 100% with Tivicay and Truvada  He feels well overall with no notable side effects to medications  Pain Assessment   the patient states they do not have pain  Abuse And Domestic Violence Screen    Yes, the patient is safe at home  The patient states no one is hurting them  Depression And Suicide Screen  No, the patient has not had thoughts of hurting themself  No, the patient has not felt depressed in the past 7 days  no fever no depression no night sweats no weight loss no cough no shortness of breath no thrush no nausea no vomiting no diarrhea      Active Problems    1  Arthritis (716 90) (M19 90)   2  GERD without esophagitis (530 81) (K21 9)   3  HIV disease (042) (B20)   4  Hyperlipidemia (272 4) (E78 5)   5   Hypertension (401 9) (I10)   6  Kidney stones (592 0) (N20 0)   7  Routine screening for STI (sexually transmitted infection) (V74 5) (Z11 3)   8  Skin macule (709 8) (L98 8)   9  Skin rash (782 1) (R21)   10  Viral syndrome (079 99) (B34 9)    Past Medical History    1  History of Bipolar disorder (296 80) (F31 9)   2  History of fatigue (V13 89) (Z87 898)   3  History of vertigo (V12 49) (Z87 898)   4  History of HIV positive (V08) (Z23)    Family History  Mother    1  No pertinent family history    Social History    · Former smoker (V15 82) (G39 967)   · No alcohol use   · No drug use   · Non-smoker (V49 89) (Z78 9)    Current Meds   1  Aspirin 81 MG Oral Tablet Delayed Release; Take 1 tablet daily; Therapy: 29EMX7157 to (Evaluate:11Sep2016)  Requested for: 73GYO7782; Last   Rx:13Jun2016 Ordered   2  Fluticasone Propionate 50 MCG/ACT Nasal Suspension; Use 1 spray in each nostril   twice a day; Therapy: 15FJI4249 to (Evaluate:14Jan2016)  Requested for: 07HCT4101; Last   Rx:16Oct2015 Ordered   3  Lisinopril 20 MG Oral Tablet; Take 1 tablet by mouth  daily; Therapy: 12AAC3703 to (Evaluate:06Jun2016)  Requested for: 25ZON3228; Last   Rx:08Mar2016 Ordered   4  Metoprolol Succinate ER 50 MG Oral Tablet Extended Release 24 Hour; take 1 tablet by   mouth daily; Therapy: 10YVZ1822 to (Evaluate:98Ifa8477)  Requested for: 01JLG0170; Last   Rx:92Ylq0055 Ordered   5  Pravastatin Sodium 80 MG Oral Tablet; Take 1 tablet by mouth  daily; Therapy: 52WZC7918 to (Evaluate:06Jun2016)  Requested for: 45KYF1315; Last   Rx:08Mar2016 Ordered   6  Tivicay 50 MG Oral Tablet; Take 1 tablet by mouth  daily; Therapy: 01GTJ3098 to (Evaluate:00Rho2617)  Requested for: 81PKF9201; Last   Rx:30Aof1126 Ordered   7  Truvada 200-300 MG Oral Tablet; Take 1 tablet by mouth  daily; Therapy: 23DXB3013 to (Evaluate:40Dbw5449)  Requested for: 01QPR2484; Last   Rx:62Hon6406 Ordered   8  Vitamin D 1000 UNIT Oral Tablet;    Therapy: (Recorded:82Ldx7046) to Recorded    Allergies    1  Sulfa Drugs    Vitals  Signs   Recorded: 37WXB6111 49:04XA   Systolic: 683  Diastolic: 78  Heart Rate: 67  Respiration: 18  Temperature: 97 6 F  O2 Saturation: 98  Height: 6 ft 2 in  Weight: 188 lb 11 41 oz  BMI Calculated: 24 23  BSA Calculated: 2 12    Physical Exam    Constitutional   General appearance: No acute distress, well appearing and well nourished  Ears, Nose, Mouth, and Throat   Oropharynx: Normal with no erythema, edema, exudate or lesions  Pulmonary   Respiratory effort: No increased work of breathing or signs of respiratory distress  Auscultation of lungs: Clear to auscultation  Cardiovascular   Auscultation of heart: Normal rate and rhythm, normal S1 and S2, without murmurs  Examination of extremities for edema and/or varicosities: Normal     Abdomen   Abdomen: Non-tender, no masses  Liver and spleen: No hepatomegaly or splenomegaly  Lymphatic   Palpation of lymph nodes in neck: No lymphadenopathy         Future Appointments    Date/Time Provider Specialty Site   01/30/2017 11:30 AM Elías Murphy, [facility] Family Medicine [facility] AT [facility]     Signatures   Electronically signed by : Juhi Lopez MD; Aug  1 2016 10:47AM EST                       (Author)

## 2018-01-14 NOTE — PROGRESS NOTES
Assessment    1  HIV disease (042) (B20)   2  GERD without esophagitis (530 81) (K21 9)   3  Hypertension (401 9) (I10)   4  Hyperlipidemia (272 4) (E78 5)    Plan  Health Maintenance    · Aspirin 81 MG Oral Tablet Delayed Release; Take 1 tablet daily   · Fluticasone Propionate 50 MCG/ACT Nasal Suspension; Use 1 spray in each  nostril twice a day  HIV disease    · Tivicay 50 MG Oral Tablet; Take 1 tablet by mouth  daily   · Truvada 200-300 MG Oral Tablet; Take 1 tablet by mouth  daily   · 1 - Nasir Campa MD  (Infectious Disease) Physician Referral  Consult  Status: Hold For  - Scheduling  Requested for: [id number]  Care Summary provided  : Yes  Hyperlipidemia    · Pravastatin Sodium 80 MG Oral Tablet; Take 1 tablet by mouth  daily  Hypertension    · Metoprolol Succinate ER 50 MG Oral Tablet Extended Release 24 Hour; take 1  tablet by mouth daily   · Lisinopril 20 MG Oral Tablet; Take 1 tablet by mouth  daily  Unlinked    · Vitamin D 1000 UNIT Oral Tablet    Discussion/Summary    1  HIV - Last CD4 stable, viral load undetectable  Patient to continue Tivicay/Truvada  Stressed adherence and safe sex  Patient to continue routine f/u with Dr Milton Durham  2  GERD - Patient is ok to stop Omeprazole for now  Patient is well educated on GERD prevention and trigger foods and is making great strides with his healthy eating to avoid these habits  Discussed how patient can start Famotidine instead of Omeprazole if his GERD returns  Patient will keep clinic informed of his symptoms  Also reminded patient that he can meet with RD at anytime to discuss his nutrition further  3  HTN - Patient should not stop BP medications abruptly but can work on decreasing the dose to see how his BP responds  WIll start by changing from Metoprolol Tartrate 50mg BID to Metoprolol Succinate 50mg daily  Will try to reduce to Succinate 25mg next month and see how BP is affected   Patient has home arm cuff and will keep a log for CRNP to review in August  He will bring the log to his follow up for Dr Kim Easley as he refuses routine BP checks at clinic due to high co-pays  Reviewed s/s of MI/CVA and instructed patient to seek medical care immediately if these or red flag headache symptoms occur  Patient verbalized understanding  4  Hyperlipidemia - stable on Pravastatin 80mg  Will consider dose reduction with next lipid panel which is due now  Patient is completing next week after he returns from vacation  Possible side effects of new medications were reviewed with the patient/guardian today  The treatment plan was reviewed with the patient/guardian  The patient/guardian understands and agrees with the treatment plan   The patient was counseled regarding diagnostic results, instructions for management, risk factor reductions, prognosis, patient and family education, impressions, risks and benefits of treatment options, importance of compliance with treatment  total time of encounter was 40 minutes and 30 minutes was spent counseling  Counseling   Patient reports there are no people in their life who should be tested for HIV  Education   general HIV education  adherence  prevention care  Chief Complaint  Patient here for a f/u visit  Patient is here today for follow up of chronic conditions described in HPI  History of Present Illness  The patient presents for routine follow up with ARINA  He reports that he is relieved that his viral load is finally suppressed again  Reports that he is staying with q3 month follow up with Dr Kim Easley because of his recent viral blips  He is wondering if he would be able to get off the Omeprazole  He reports that he's lost 20 lbs now that he is eating healthier and walking on a routine basis  He feels that with his healthier lifestyle he's cut back on a lot of the "bad" foods he used to enjoy and feels that his acid is probably no longer an issue   He also heard that recent research showed Omeprazole could cause dementia and that has him worried  He would also like to know if he could cut back on his BP medication for the same reason  This makes him nervous but he feels like taking fewer medications would be better for him  Pain Assessment   the patient states they do not have pain  Abuse And Domestic Violence Screen    Yes, the patient is safe at home  The patient states no one is hurting them  Depression And Suicide Screen  No, the patient has not had thoughts of hurting themself  No, the patient has not felt depressed in the past 7 days  The patient is being seen for a routine clinic follow-up of HIV infection  The patient is currently asymptomatic  No associated symptoms are reported  Current treatment includes antiretroviral regimen  By report, there is good compliance with treatment, good tolerance of treatment and good symptom control  (Tivicay/Truvada - no missed or late doses)     CD4: 852  VL: <20  Time spent: 10 minutes  Strength: no side effects  Weakness: patient dislikes overall pill burden  Plan of Action: continue to look at new meds as they become available and simplify HAART as able  SUBSTANCE ABUSE: not using ETOH  not using drugs  SMOKING: He is not a current smoker  SEXUALLY ACTIVE: He is not sexually active  HOUSING: He has stable housing  There are 2 people living in the household (including children)  The household income is $2,900  HEALTH MAINTENANCE: His last eye exam was 2/5/2015  Review of Systems    Constitutional: No fever or chills, feels well, no tiredness, no recent weight gain or weight loss  Eyes: No complaints of eye pain, no red eyes, no discharge from eyes, no itchy eyes  ENT: no complaints of earache, no hearing loss, no nosebleeds, no nasal discharge, no sore throat, no hoarseness  Cardiovascular: No complaints of slow heart rate, no fast heart rate, no chest pain, no palpitations, no leg claudication, no lower extremity  Respiratory: No complaints of shortness of breath, no wheezing, no cough, no SOB on exertion, no orthopnea or PND  Gastrointestinal: No complaints of abdominal pain, no constipation, no nausea or vomiting, no diarrhea or bloody stools  Genitourinary: elevated PSA - being followed by Dr [facility] his urologist, but as noted in HPI  Musculoskeletal: No complaints of arthralgia, no myalgias, no joint swelling or stiffness, no limb pain or swelling  Integumentary: No complaints of skin rash or skin lesions, no itching, no skin wound, no dry skin  Neurological: No compliants of headache, no confusion, no convulsions, no numbness or tingling, no dizziness or fainting, no limb weakness, no difficulty walking  Psychiatric: Is not suicidal, no sleep disturbances, no anxiety or depression, no change in personality, no emotional problems  Endocrine: No complaints of proptosis, no hot flashes, no muscle weakness, no erectile dysfunction, no deepening of the voice, no feelings of weakness  Hematologic/Lymphatic: No complaints of swollen glands, no swollen glands in the neck, does not bleed easily, no easy bruising  ROS reviewed  Active Problems    1  Arthritis (716 90) (M19 90)   2  GERD without esophagitis (530 81) (K21 9)   3  HIV disease (042) (B20)   4  Hyperlipidemia (272 4) (E78 5)   5  Hypertension (401 9) (I10)   6  Kidney stones (592 0) (N20 0)   7  Routine screening for STI (sexually transmitted infection) (V74 5) (Z11 3)   8  Skin macule (709 8) (L98 8)   9  Skin rash (782 1) (R21)   10  Viral syndrome (079 99) (B34 9)    Past Medical History    1  History of Bipolar disorder (296 80) (F31 9)   2  History of fatigue (V13 89) (Z87 898)   3  History of vertigo (V12 49) (Z87 898)   4  History of HIV positive (V08) (Z21)    The active problems and past medical history were reviewed and updated today  Surgical History    The surgical history was reviewed and updated today         Family History  Mother    1  No pertinent family history    The family history was reviewed and updated today  Social History    · Former smoker (V15 82) (Z30 904)   · No alcohol use   · No drug use   · Non-smoker (V49 89) (Z78 9)  The social history was reviewed and updated today  The social history was reviewed and is unchanged  Current Meds   1  Aspirin 81 MG Oral Tablet Delayed Release; Take 1 tablet daily; Therapy: 92ZPT1593 to (Evaluate:68Vkv2222)  Requested for: 13Jun2016; Last   Rx:13Jun2016 Ordered   2  Fluticasone Propionate 50 MCG/ACT Nasal Suspension; Use 1 spray in each nostril   twice a day; Therapy: 52KLT1313 to (Evaluate:14Jan2016)  Requested for: 56Dzu9606; Last   Rx:16Oct2015 Ordered   3  Lisinopril 20 MG Oral Tablet; Take 1 tablet by mouth  daily; Therapy: 16UBO0483 to (Evaluate:06Jun2016)  Requested for: 46JXB6956; Last   Rx:08Mar2016 Ordered   4  Pravastatin Sodium 80 MG Oral Tablet; Take 1 tablet by mouth  daily; Therapy: 93YWG1995 to (Evaluate:06Jun2016)  Requested for: 28HJJ1396; Last   Rx:08Mar2016 Ordered   5  Tivicay 50 MG Oral Tablet; Take 1 tablet by mouth  daily; Therapy: 04POJ9569 to (Evaluate:08Aug2016)  Requested for: 46IDA0827; Last   Rx:95Ftx0454 Ordered   6  Truvada 200-300 MG Oral Tablet; Take 1 tablet by mouth  daily; Therapy: 39KZW1252 to (Evaluate:08Aug2016)  Requested for: 77EWV1229; Last   Rx:81Ktm2237 Ordered   7  Vitamin D 1000 UNIT Oral Tablet; Therapy: (Recorded:34Yzy6242) to Recorded    The medication list was reviewed and updated today  Allergies    1   Sulfa Drugs    Vitals  Signs [Data Includes: Current Encounter]   Recorded: 51FRZ2468 12:17PM   Temperature: 97 5 F  Heart Rate: 81  Respiration: 18  Systolic: 328  Diastolic: 79  Height: 6 ft 2 in  Weight: 145 lb 0 99 oz  BMI Calculated: 18 62  BSA Calculated: 1 9  O2 Saturation: 98  Pain Scale: 0    Physical Exam    Constitutional   General appearance: No acute distress, well appearing and well nourished  Pulmonary   Respiratory effort: No increased work of breathing or signs of respiratory distress  Auscultation of lungs: Clear to auscultation  Cardiovascular   Auscultation of heart: Normal rate and rhythm, normal S1 and S2, without murmurs  Abdomen   Abdomen: Non-tender, no masses  Liver and spleen: No hepatomegaly or splenomegaly  Lymphatic   Palpation of lymph nodes in neck: No lymphadenopathy  Psychiatric   Orientation to person, place, and time: Normal     Mood and affect: Normal        Attending Note  Collaborating Physician: I agree with the Advanced Practitioner note        Future Appointments    Date/Time Provider Specialty Site   08/01/2016 10:15 AM Shane Madera MD [facility] [address] [place] [address]     Signatures   Electronically signed by : Joel Gomez; Jul 8 2016  4:03PM EST                       (Author)    Electronically signed by : De Barker DO; Jul 11 2016 11:17AM EST                       (Author)

## 2018-01-14 NOTE — PROGRESS NOTES
Assessment    1  HIV disease (042) (B20)   2  Hypertension (401 9) (I10)   3  Hyperlipidemia (272 4) (E78 5)   4  GERD without esophagitis (530 81) (K21 9)   5  Kidney stones (592 0) (N20 0)   6  History of diarrhea (V12 79) (Z87 898)   7  History of viral infection (V12 09) (Z86 19)    Plan  GERD without esophagitis    · Famotidine 20 MG Oral Tablet; TAKE 1 TABLET AT BEDTIME  Health Maintenance    · Aspirin 81 MG Oral Tablet Delayed Release; Take 1 tablet daily   · Fluticasone Propionate 50 MCG/ACT Nasal Suspension; Use 1 spray in each  nostril twice a day  HIV disease    · Tivicay 50 MG Oral Tablet; Take 1 tablet by mouth  daily   · Truvada 200-300 MG Oral Tablet; Take 1 tablet by mouth  daily  Hyperlipidemia    · Pravastatin Sodium 80 MG Oral Tablet; Take 1 tablet by mouth  daily  Hypertension    · Lisinopril 20 MG Oral Tablet; Take 1 tablet by mouth  daily   · Metoprolol Succinate ER 25 MG Oral Tablet Extended Release 24 Hour; Take 1  tablet by mouth  daily  Unlinked    · Vitamin D 1000 UNIT Oral Tablet    Discussion/Summary    1  HIV - Reviewed recent January 2017 lab results with the patient  His CD4 was stable and VL = 110  Patient will continue Tivicay/Truvada  Had good conversation with patient about his concerns for the viral blip  Encouraged him to continue his dedication to living a healthy lifestyle and completing his lab work on time for monitoring  Encouraged him to continue to be adherent to his medication  Patient is due for follow up labs in early May so that the results are received in time for his 5/17/17 specialty visit  Patient will continue routine f/u with Dr Andrea Su  2  Hyperlipidemia - stable, Patient is due for follow up lipid panel in 3 months  Recommended to patient that we decrease his dose of Pravastatin to 40mg if next lipid panel is stable  He's made excellent dietary changes and has increased his physical activity which is likely contributing to his improved lipids   Patient agreed, verbalized understanding  3  HTN - stable  CRNP reviewed patient's home blood pressure log which showed stable results at patient goal of <140/90  Patient will continue Lisinopril and Metoprolol  4  GERD - stable  Patient to continue Famotidine  5  Kidney Stones- stable  Patient to continue f/u with urologist PRN  6  Diarrhea/Abdominal Pain - Resolved  All stool study results received and all were negative  Patient continues to eat light and stay hydrated while he recovers from a stomach virus  He has not had a fever in 7 days  He will continue to report changes in his symptoms to CRNP  Possible side effects of new medications were reviewed with the patient/guardian today  The treatment plan was reviewed with the patient/guardian  The patient/guardian understands and agrees with the treatment plan   The patient was counseled regarding diagnostic results, instructions for management, risk factor reductions, prognosis, patient and family education, impressions, risks and benefits of treatment options, importance of compliance with treatment  total time of encounter was 60 minutes and 30 minutes was spent counseling  Counseling   Patient reports there are no people in their life who should be tested for HIV  Education   general HIV education  adherence  prevention care  Chief Complaint  Patient here for a f/u visit  Patient is here today for follow up of chronic conditions described in HPI  History of Present Illness  The patient presents for routine primary care follow up with ARINA  He reports that since his last visit he has been doing well  He reports 100% adherence to HAART with no missed or late doses  He denies side effects  He has been having some issues with the finance of all his medication refills due to having to meet a deductible but he's worked through this issue and has not gone without new bottles       He reports that he continues to monitor his blood pressure and is pleased to have more consistent results  He continues to follow a more healthy diet as well and has been trying to increase his exercise  He reports that his GERD has been stable since changing from Omeprazole to Famotidine  The patient admits that his only frustration has been his low viral load blips  He is very committed to his HIV treatment and is not missing doses  He wonders what other factors, such as stress and diet, could be contributing to this number  He also reports that he just got over a stomach virus  He had abdominal pain, fever, nausea, and diarrhea for 4 days  He had called [facility] to notify and was instructed to rest, use the BRAT diet and to stay hydrated  He followed all recommendations and is feeling much better today  He also competed stool studies and will review the results with ARINA today  Pain Assessment   the patient states they do not have pain  Abuse And Domestic Violence Screen    Yes, the patient is safe at home  The patient states no one is hurting them  Depression And Suicide Screen  No, the patient has not had thoughts of hurting themself  No, the patient has not felt depressed in the past 7 days  The patient is being seen for a routine clinic follow-up of HIV infection  The patient is currently asymptomatic  No associated symptoms are reported  Current treatment includes antiretroviral regimen  By report, there is good compliance with treatment, good tolerance of treatment and good symptom control  (tivicay/truvada - no missed or late doses)     CD4: 750  VL: 110  Time spent: 5 minutes  Strength: no side effects  Weakness: admits to high stress  Plan of Action: work on managing stress and avoiding stressful situations  SUBSTANCE ABUSE: not using ETOH  not using drugs  SMOKING: He is not a current smoker  SEXUALLY ACTIVE: He is not sexually active  HOUSING: He has stable housing   There are 2 people living in the household (including children)  The household income is $$60,000  HEALTH MAINTENANCE: His last dental exam was 12/1/2016  His last eye exam was 6/1/2016  Review of Systems    Constitutional: No fever or chills, feels well, no tiredness, no recent weight gain or weight loss  Eyes: wears glasses, last eye exam in 6/2016, but No complaints of eye pain, no red eyes, no discharge from eyes, no itchy eyes  ENT: no complaints of earache, no hearing loss, no nosebleeds, no nasal discharge, no sore throat, no hoarseness  Cardiovascular: No complaints of slow heart rate, no fast heart rate, no chest pain, no palpitations, no leg claudication, no lower extremity  Respiratory: No complaints of shortness of breath, no wheezing, no cough, no SOB on exertion, no orthopnea or PND  Gastrointestinal: recent stomach virus with abdominal pain, nausea, and diarrhea is resolved, but No complaints of abdominal pain, no constipation, no nausea or vomiting, no diarrhea or bloody stools  Genitourinary: No complaints of dysuria, no incontinence, no hesitancy, no nocturia, no genital lesion, no testicular pain  Musculoskeletal: No complaints of arthralgia, no myalgias, no joint swelling or stiffness, no limb pain or swelling  Integumentary: No complaints of skin rash or skin lesions, no itching, no skin wound, no dry skin  Neurological: No compliants of headache, no confusion, no convulsions, no numbness or tingling, no dizziness or fainting, no limb weakness, no difficulty walking  Psychiatric: increased work stress, but Is not suicidal, no sleep disturbances, no anxiety or depression, no change in personality, no emotional problems  Endocrine: No complaints of proptosis, no hot flashes, no muscle weakness, no erectile dysfunction, no deepening of the voice, no feelings of weakness  Hematologic/Lymphatic: No complaints of swollen glands, no swollen glands in the neck, does not bleed easily, no easy bruising       ROS reviewed  Active Problems    1  Arthritis (716 90) (M19 90)   2  Constipation (564 00) (K59 00)   3  Encounter for screening colonoscopy (V76 51) (Z12 11)   4  GERD without esophagitis (530 81) (K21 9)   5  HIV disease (042) (B20)   6  Hyperlipidemia (272 4) (E78 5)   7  Hypertension (401 9) (I10)   8  Kidney stones (592 0) (N20 0)   9  Routine screening for STI (sexually transmitted infection) (V74 5) (Z11 3)   10  Screening for cancer (V76 9) (Z12 9)    Past Medical History    1  History of Bipolar disorder (296 80) (F31 9)   2  History of diarrhea (V12 79) (Z87 898)   3  History of fatigue (V13 89) (Z87 898)   4  History of vertigo (V12 49) (Z87 898)   5  History of viral infection (V12 09) (Z86 19)   6  History of HIV positive (V08) (Z21)   7  History of Skin macule (709 8) (L98 8)   8  History of Skin rash (782 1) (R21)    The active problems and past medical history were reviewed and updated today  Surgical History    The surgical history was reviewed and updated today  Family History  Mother    1  No pertinent family history    The family history was reviewed and updated today  Social History    · Former smoker (V15 82) (X32 382)   · No alcohol use   · No drug use   · Non-smoker (V49 89) (Z78 9)  The social history was reviewed and updated today  The social history was reviewed and is unchanged  Current Meds   1  Aspirin 81 MG Oral Tablet Delayed Release; Take 1 tablet daily; Therapy: 91ARW6904 to (Evaluate:43Xdn7221)  Requested for: 54Mag8413; Last   Rx:13Jun2016 Ordered   2  Famotidine 20 MG Oral Tablet; TAKE 1 TABLET AT BEDTIME; Therapy: 39QDW0447 to (Luanne Holiday)  Requested for: 44Iik4521; Last   Rx:22Ech5372 Ordered   3  Fluticasone Propionate 50 MCG/ACT Nasal Suspension; Use 1 spray in each nostril   twice a day; Therapy: 62XEE3757 to (Evaluate:14Jan2016)  Requested for: 96Taj6966; Last   Rx:69Yqo8284 Ordered   4  Lisinopril 20 MG Oral Tablet;  Take 1 tablet by mouth  daily; Therapy: 03UNZ2954 to (Nia Boye)  Requested for: 35Svq4062; Last   Rx:90Nim2695 Ordered   5  Metoprolol Succinate ER 25 MG Oral Tablet Extended Release 24 Hour; Take 1 tablet by   mouth  daily; Therapy: 87RID6271 to (Nia Boast)  Requested for: 31Ylf8626; Last   Rx:83Awi6611 Ordered   6  Pravastatin Sodium 80 MG Oral Tablet; Take 1 tablet by mouth  daily; Therapy: 87DCR4291 to (Gage Batters)  Requested for: 32UTN4697; Last   Rx:25Aql0364 Ordered   7  Tivicay 50 MG Oral Tablet; Take 1 tablet by mouth  daily; Therapy: 69JQW7050 to ([facility])  Requested for: 12Oct2016; Last   Rx:12Oct2016 Ordered   8  Truvada 200-300 MG Oral Tablet; Take 1 tablet by mouth  daily; Therapy: 21PYA4410 to (Evaluate:38Tcq9264)  Requested for: 20Feb2017; Last   Rx:81Ede5144 Ordered   9  Vitamin D 1000 UNIT Oral Tablet; Therapy: (Recorded:20Mar2017) to Recorded    The medication list was reviewed and updated today  Allergies    1  Sulfa Drugs    Vitals  Signs   Recorded: 20Mar2017 01:37PM   Temperature: 97 9 F  Heart Rate: 67  Respiration: 18  Systolic: 185  Diastolic: 69  Height: 6 ft 2 in  Weight: 201 lb 7 98 oz  BMI Calculated: 25 87  BSA Calculated: 2 18  O2 Saturation: 98    Physical Exam    Constitutional   General appearance: No acute distress, well appearing and well nourished  Ears, Nose, Mouth, and Throat   Oropharynx: Normal with no erythema, edema, exudate or lesions  Pulmonary   Respiratory effort: No increased work of breathing or signs of respiratory distress  Auscultation of lungs: Clear to auscultation  Cardiovascular   Auscultation of heart: Normal rate and rhythm, normal S1 and S2, without murmurs  Examination of extremities for edema and/or varicosities: Normal     Musculoskeletal   Gait and station: Normal     Neurologic   Cranial nerves: Cranial nerves 2-12 intact      Psychiatric   Orientation to person, place, and time: Normal     Mood and affect: Normal        Results/Data  (1) [place] justin, STOOL [id number] 04:41PM [facility] Order Number: [id number]     Test Name Result Flag Reference   CRYPT None seen  None seen   ISOSPORA SMEAR, STOOL None seen  None seen   Performed at:  [address] [place] [address]  [id number]  : Juanis Alberto MD, Phone:  [phone] [id number] 04:41PM [address] [place] [address] Order Number: [id number]     Test Name Result Flag Reference   GIARDIA LAMBLIA Negative  Negative   Performed at:  [address] [place] [address]  [id number]  : Juanis Alberto MD, Phone:  [phone]     (1) OVA AND PARASITES EXAM 31AQU7612 04:41PM [address] [place] [address] Order Number: [id number]     Test Name Result Flag Reference   O&P CONC  EXAM      No ova, cysts, or parasites seen     One negative specimen does not rule out the possibility of a  parasitic infection  Performed at:  [address] [place] [address]  [id number]  : Juanis Alberto MD, Phone:  [phone]     (1) STOOL CULTURE [id number] 04:41PM [address] [place] [address] Order Number: [id number]     Test Name Result Flag Reference   CLINICAL REPORT (Report)     Test:        Stool culture  Specimen Type:   Stool  Specimen Date:   3/13/2017 4:41 PM  Result Date:    3/16/2017 8:16 AM  Result Status:   Final result  Resulting Lab:   [place] [address]            Tel: [phone]      CULTURE                                       ------------------                                   No Salmonella, Shigella or Campylobacter isolated      Shiga Toxin 1 NOT detected, Shiga Toxin 2 NOT detected     (1) C  DIFFICILE TOXIN BY PCR [id number] 03:23PM [facility] Order Number: [id number]     Test Name Result Flag Reference   C  DIFFICILE TOXIN BY PCR   NEGATIVE for C difficle toxin by PCR  NEGATIVE for C difficle toxin by PCR  (1) CBC/PLT/DIFF 57OCL1823 10:56AM [place] Order Number: [id number]  TW Order Number: [id number]     Test Name Result Flag Reference   WBC COUNT 4 66 Thousand/uL  4 31-10 16   RBC COUNT 4 60 Million/uL  3 88-5 62   HEMOGLOBIN 16 3 g/dL  12 0-17 0   HEMATOCRIT 43 8 %  36 5-49 3   MCV 95 fL  82-98   MCH 35 4 pg H 26 8-34 3   MCHC 37 2 g/dL  31 4-37 4   RDW 12 0 %  11 6-15 1   MPV 11 2 fL  8 9-12 7   PLATELET COUNT 153 Thousands/uL L 149-390   nRBC AUTOMATED 0 /100 WBCs     NEUTROPHILS RELATIVE PERCENT 46 %  43-75   LYMPHOCYTES RELATIVE PERCENT 47 % H 14-44   MONOCYTES RELATIVE PERCENT 5 %  4-12   EOSINOPHILS RELATIVE PERCENT 1 %  0-6   BASOPHILS RELATIVE PERCENT 1 %  0-1   NEUTROPHILS ABSOLUTE COUNT 2 15 Thousands/?L  1 85-7 62   LYMPHOCYTES ABSOLUTE COUNT 2 18 Thousands/?L  0 60-4 47   MONOCYTES ABSOLUTE COUNT 0 25 Thousand/?L  0 17-1 22   EOSINOPHILS ABSOLUTE COUNT 0 05 Thousand/?L  0 00-0 61   BASOPHILS ABSOLUTE COUNT 0 03 Thousands/?L  0 00-0 10   - Patient Instructions: This bloodwork is non-fasting  Please drink two glasses of water morning of bloodwork  - Patient Instructions: This bloodwork is non-fasting  Please drink two glasses of water morning of bloodwork  - Patient Instructions: This bloodwork is non-fasting  Please drink two glasses of water morning of bloodwork  - Patient Instructions: This bloodwork is non-fasting  Please drink two glasses of water morning of bloodwork  (1) COMPREHENSIVE METABOLIC PANEL 76FQP8526 94:85LX [place] Order Number: [id number]  TW Order Number: [id number]     Test Name Result Flag Reference   GLUCOSE,RANDM 86 mg/dL     If the patient is fasting, the ADA then defines impaired fasting glucose as > 100 mg/dL and diabetes as > or equal to 123 mg/dL     SODIUM 140 mmol/L  136-145 POTASSIUM 4 2 mmol/L  3 5-5 3   CHLORIDE 104 mmol/L  100-108   CARBON DIOXIDE 28 mmol/L  21-32   ANION GAP (CALC) 8 mmol/L  4-13   BLOOD UREA NITROGEN 17 mg/dL  5-25   CREATININE 1 07 mg/dL  0 60-1 30   Standardized to IDMS reference method   CALCIUM 8 8 mg/dL  8 3-10 1   BILI, TOTAL 0 88 mg/dL  0 20-1 00   ALK PHOSPHATAS 45 U/L L    ALT (SGPT) 40 U/L  12-78   AST(SGOT) 23 U/L  5-45   ALBUMIN 3 8 g/dL  3 5-5 0   TOTAL PROTEIN 6 7 g/dL  6 4-8 2   eGFR Non-African American      >60 0 ml/min/1 73sq [facility] Disease Education Program recommendations are as follows:  GFR calculation is accurate only with a steady state creatinine  Chronic Kidney disease less than 60 ml/min/1 73 sq  meters  Kidney failure less than 15 ml/min/1 73 sq  meters  (1) LIPID PANEL, FASTING [id number] 07:51AM Gayathri Benavides Order Number: [id number]   Order Number: [id number] Order Number: [id number]     Test Name Result Flag Reference   CHOLESTEROL 118 mg/dL     HDL,DIRECT 42 mg/dL  40-60   Specimen collection should occur prior to Metamizole administration due to the potential for falsely depressed results  LDL CHOLESTEROL CALCULATED 66 mg/dL  0-100   Triglyceride:         Normal              <150 mg/dl       Borderline High    150-199 mg/dl       High               200-499 mg/dl       Very High          >499 mg/dl  Cholesterol:         Desirable        <200 mg/dl      Borderline High  200-239 mg/dl      High             >239 mg/dl  HDL Cholesterol:        High    >59 mg/dL      Low     <41 mg/dL  LDL CALCULATED:    This screening LDL is a calculated result  It does not have the accuracy of the Direct Measured LDL in the monitoring of patients with hyperlipidemia and/or statin therapy  Direct Measure LDL (QVS639) must be ordered separately in these patients     TRIGLYCERIDES 50 mg/dL  <=150   Specimen collection should occur prior to N-Acetylcysteine or Metamizole administration due to the potential for falsely depressed results  Attending Note  Collaborating Physician: I agree with the Advanced Practitioner note        Future Appointments    Date/Time Provider Specialty Site   05/17/2017 04:15 PM Lamonte Pitts MD Infectious Disease [facility] AT [facility]   09/18/2017 01:30 PM Nabil Velez [facility] AT [address] [facility]   Electronically signed by : Josiah Kennedy; Mar 21 2017  8:19AM EST                       (Author)    Electronically signed by : Michelle Cobos DO; Mar 21 2017 11:07AM EST                       (Author)

## 2018-01-15 NOTE — PROGRESS NOTES
Assessment    1  HIV disease (042) (B20)    Plan    1  Follow-up visit in 3 months Evaluation and Treatment  Follow-up  Status: Hold For -   Scheduling  Requested for: 22XCK3518   2  (1) CBC/PLT/DIFF; Status:Active; Requested for:16Oct2017;    3  (1) COMPREHENSIVE METABOLIC PANEL; Status:Active; Requested for:16Oct2017;    4  (1) HIV-1 RNA QUANTITATIVE; [Do Not Release]; Status:Active; Requested for:16Oct2017;      5  (1) RPR; Status:Active; Requested for:16Oct2017;    6  (1) T LYMPH SUBSET (CD4); Status:Active; Requested for:16Oct2017;    7  (1) URINALYSIS (will reflex a microscopy if leukocytes, occult blood, protein or nitrites are   not within normal limits); Status:Active; Requested for:16Oct2017;     8  (1) LIPID PANEL, FASTING; Status:Active; Requested for:16Oct2017;     9  (1) CHLAMYDIA/GC AMPLIFIED DNA, PCR; Source:Urine, Unspecified Source;   Status:Active; Requested for:16Oct2017;     Discussion/Summary    HIV - patient with VL from 4/29/17 was 30 copies/mL from 110 in January  CD4 count remains very high in the 700s  Pt without missed doses of his tivicay and truvada  Recheck labs in 2 months and follow-up in 3 months  Given the improvement in VL with grossly unchanged CD4, continue and recheck values in 2 months with 3 month f/u  Discussed with the patient the possibility of changing the Truvada to Descovy  We'll proceed with this change  If the viral load increases by the next visit, we'll change to Triumeq  Possible side effects of new medications were reviewed with the patient/guardian today  The treatment plan was reviewed with the patient/guardian  The patient/guardian understands and agrees with the treatment plan   The patient was counseled regarding diagnostic results, instructions for management, prognosis, risks and benefits of treatment options, importance of compliance with treatment  Education   general HIV education  adherence  prevention care        Chief Complaint  Patient here for an HIV f/u  Patient c/o of allergies  Patient SNPS #16  Patient needs refills  History of Present Illness  intentional weight loss caused by increased exercise (walking) and eating better  He's taking truvada and tivicay without any missed doses  He explains he is no longer taking any supplements such as from the [address] [place] [address] Simparel as previously taking  Pain Assessment   the patient states they do not have pain  Abuse And Domestic Violence Screen    Yes, the patient is safe at home  The patient states no one is hurting them  Depression And Suicide Screen  No, the patient has not had thoughts of hurting themself  No, the patient has not felt depressed in the past 7 days  The patient is being seen for a routine clinic follow-up of HIV infection  no fever no lethargy no depression no night sweats weight loss no decreased appetite   The patient presents with complaints of cough  His symptoms are reportedly caused by allergen contact  no shortness of breath no skin lesions no mouth sores no thrush no nausea no vomiting no diarrhea no headache no visual changes Associated symptoms:  no skin erythema, no gum irritation, no decreased libido, no difficulty concentrating, no decreased memory, no dyspepsia, no paresthesias, no muscle weakness and no enlarging lymph nodes  Review of Systems    Constitutional: recent weight loss, but no fever, no recent weight gain and no chills  Eyes: no eyesight problems  Cardiovascular: no chest pain and no palpitations  Respiratory: cough and dry cough  , but no shortness of breath  Gastrointestinal: no nausea and no vomiting  Genitourinary: no dysuria, no genital lesions and no incontinence  Integumentary: no rashes and no skin lesions  Neurological: no headache, no numbness and no tingling  Active Problems    1  Arthritis (716 90) (M19 90)   2  Constipation (564 00) (K59 00)   3  Encounter for screening colonoscopy (V76 51) (Z12 11)   4   GERD without esophagitis (530 81) (K21 9)   5  HIV disease (042) (B20)   6  Hyperlipidemia (272 4) (E78 5)   7  Hypertension (401 9) (I10)   8  Kidney stones (592 0) (N20 0)   9  Routine screening for STI (sexually transmitted infection) (V74 5) (Z11 3)   10  Screening for cancer (V76 9) (Z12 9)    Past Medical History    1  History of Bipolar disorder (296 80) (F31 9)   2  History of diarrhea (V12 79) (Z87 898)   3  History of fatigue (V13 89) (Z87 898)   4  History of vertigo (V12 49) (Z87 898)   5  History of viral infection (V12 09) (Z86 19)   6  History of HIV positive (V08) (Z21)   7  History of Skin macule (709 8) (L98 8)   8  History of Skin rash (782 1) (R21)    Family History  Mother    1  No pertinent family history    Social History    · Former smoker (V15 82) (K32 375)   · No alcohol use   · No drug use   · Non-smoker (V49 89) (Z78 9)    Current Meds   1  Aspirin 81 MG Oral Tablet Delayed Release; Take 1 tablet daily; Therapy: 37EAF0721 to (Evaluate:11Vkw8009)  Requested for: 20Mar2017; Last   Rx:13Jun2016 Ordered   2  Famotidine 20 MG Oral Tablet; TAKE 1 TABLET AT BEDTIME; Therapy: 53NYJ4810 to (Bucky Avina)  Requested for: 20Mar2017; Last   Rx:90Esl0750 Ordered   3  Fluticasone Propionate 50 MCG/ACT Nasal Suspension; Use 1 spray in each nostril   twice a day; Therapy: 58TNM1371 to (Evaluate:18Jun2017)  Requested for: 20Mar2017; Last   Rx:20Mar2017 Ordered   4  Lisinopril 20 MG Oral Tablet; Take 1 tablet by mouth  daily; Therapy: 81WLM7373 to (Derik Howard)  Requested for: 20Mar2017; Last   Rx:37Xhp1505 Ordered   5  Metoprolol Succinate ER 25 MG Oral Tablet Extended Release 24 Hour; Take 1 tablet by   mouth  daily; Therapy: 74DVT4833 to (Derik Howard)  Requested for: 20Mar2017; Last   Rx:32Vjl0044 Ordered   6  Pravastatin Sodium 80 MG Oral Tablet; Take 1 tablet by mouth  daily; Therapy: 37ONF6174 to (Evaluate:18Jun2017)  Requested for: 20Mar2017; Last   Rx:20Mar2017 Ordered   7  Tivicay 50 MG Oral Tablet; Take 1 tablet by mouth  daily; Therapy: 51XGP3704 to (Evaluate:65Flf4839)  Requested for: 20Mar2017; Last   Rx:12Oct2016 Ordered   8  Truvada 200-300 MG Oral Tablet; Take 1 tablet by mouth  daily; Therapy: 67NNH2105 to (Evaluate:94Hsj3084)  Requested for: 20Mar2017; Last   Rx:00Kvq6878 Ordered   9  Urocit-K 15 15 MEQ (1620 MG) Oral Tablet Extended Release; Therapy: 26TYI7511 to Recorded   10  Vitamin D 1000 UNIT Oral Tablet; Therapy: (Recorded:20Mar2017) to Recorded    Allergies    1  Sulfa Drugs    Vitals  Signs   Recorded: 36UDO7227 04:26PM   Temperature: 98 2 F  Heart Rate: 67  Respiration: 18  Systolic: 140  Diastolic: 78  Height: 6 ft 2 in  Weight: 195 lb 5 22 oz  BMI Calculated: 25 08  BSA Calculated: 2 15  O2 Saturation: 98    Physical Exam    Constitutional   General appearance: No acute distress, well appearing and well nourished  Eyes   Pupils and irises: Equal, round and reactive to light  Ears, Nose, Mouth, and Throat   Oropharynx: Normal with no erythema, edema, exudate or lesions  multiple teeth fillings  Pulmonary   Respiratory effort: No increased work of breathing or signs of respiratory distress  Auscultation of lungs: Clear to auscultation  Cardiovascular   Auscultation of heart: Normal rate and rhythm, normal S1 and S2, without murmurs  Examination of extremities for edema and/or varicosities: Normal     Abdomen   Abdomen: Non-tender, no masses  Lymphatic   Palpation of lymph nodes in neck: No lymphadenopathy  Skin   Skin and subcutaneous tissue: Normal without rashes or lesions      Psychiatric   Orientation to person, place, and time: Normal     Mood and affect: Normal        Attending Note  Attending Note: I interviewed and examined the patient, the staff discussed the patient on the day of the visit, I discussed the case with the Resident and reviewed the Resident's note, I supervised the Resident and I agree with the Resident management plan as it was presented to me  Level of Participation: I was present in clinic and examined the patient  I agree with the Resident's note        Future Appointments    Date/Time Provider Specialty Site   09/18/2017 01:30 PM Varinder Kaufman, [address] [place] [address] AT [facility]     Signatures   Electronically signed by : Jacob Mena MD; May 17 2017  4:59PM EST                       (Author)

## 2018-01-16 NOTE — PROGRESS NOTES
Patient Health Assessment    Date:            05/23/2016  Blood Pressure:  109/75  Pulse:           55  Age:             60  Weight:          208 lbs  Height/Length:   6' 2"  Body Mass Index: 26 7  Provider:        [email or web address]  Clinic[facility]          [address]  Medical Alert:  Allergies    Tobacco User    High Blood Pressure    Mental Disorders    Alcohol/Drug Abuse  Medications: Aspirin    Metoprolol Tartrate    Omeprazole    Pravastatin Sodium    Vitamin D    Truvada    Tivicay    Urocit  Allergies:      Sulfa Drugs  Since Last Visit: Medical Alert: No Change    Medications: No Change    Allergies:        No Change  Pain Scale Type: Numeric Pain ScalePain Level: 0  Description: no dental pain  scaled, cavitroned, polished and flossed- light to moderate calculus  dr Wan Sin exam=  nv= 6 mth    ----- Signed on Monday, May 23, 2016 at 12:27:45 PM  -----  ----- Provider: [id number] - Silvana Stephenson RDH -- Clinic: [facility] -----

## 2018-01-16 NOTE — PROGRESS NOTES
Assessment    1  HIV disease (042) (B20)   2  GERD without esophagitis (530 81) (K21 9)   3  Hypertension (401 9) (I10)   4  Constipation (564 00) (K59 00)    Plan    1  Famotidine 20 MG Oral Tablet; TAKE 1 TABLET AT BEDTIME    2  Metoprolol Succinate ER 25 MG Oral Tablet Extended Release 24 Hour; TAKE 1   TABLET DAILY    Discussion/Summary  Discussion Summary:   1  HIV - Last CD4 stable, viral load undetectable  Patient to continue Tivicay/Truvada  Stressed adherence and safe sex  Patient to continue routine f/u with Dr Hetal Christianson  2  HTN - CRNP reviewed blood glucose log which is now scanned into patient's miscellaneous folder  Will reduce Metoprolol Tartrate 50mg to Metoprolol Tartrate 25mg as many of the patient's SBP are <120  Patient will continue daily BP checks and will continue to keep log  He will return in one month for CRNP to review  He hopes to get off BP medication completely but at this point it is likely he will need to stay on some kind of antihypertensive  He will continue to eat healthier and avoid salt and will continue to increase his exercise  Patient will seek medical care immediately for s/s of MI/CVA and red flag headaches  3  GERD - Will start patient on Famotidine and trial for 1 month  If improved with order 90-day supply from pharmacy  If patient doesn't feel relief he would like to go back to Omeprazole  4  Constipation - Encouraged patient to meet with RD as he is eating a lot of high fiber food and may need to work on balancing fiber  Patient is ok to utilize Colace as needed but encouraged patient to avoid excess enemas  Patient feels that it is time he pursue a colonoscopy, will place order today  Will also refer to GI if problem is ongoing  Patient will return in one month to discuss further       Counseling Documentation With Imm: The patient was counseled regarding diagnostic results, instructions for management, risk factor reductions, patient and family education, risks and benefits of treatment options, importance of compliance with treatment  total time of encounter was 30 minutes and 25 minutes was spent counseling  Medication SE Review and Pt Understands Tx: Possible side effects of new medications were reviewed with the patient/guardian today  The treatment plan was reviewed with the patient/guardian  The patient/guardian understands and agrees with the treatment plan      Chief Complaint  Chief Complaint Free Text Note Form: Patient here concerning his bp medication  History of Present Illness  HPI: The patient presents for blood pressure follow up  He brought his blood pressure log for CRNP to review  He has been taking Metoprolol Tartrate 50mg and would like to decrease the dose  He also reports that stopping his Omeprazole was a mistake  He is interested in restarting and is willing to try and H2 blocker instead of a PPI  He also reports that lately he's had significant constipation  He has been taking Colace TID but admits that he's needed several enemas  He cannot identify a reason why this is happening, as his new diet is very healthy and high in fruits and vegetables  He doesn't know what food may be causing it  Hospital Based Practices Required Assessment:   Pain Assessment   the patient states they do not have pain  Abuse And Domestic Violence Screen    Yes, the patient is safe at home  The patient states no one is hurting them  Depression And Suicide Screen  No, the patient has not had thoughts of hurting themself  No, the patient has not felt depressed in the past 7 days  Review of Systems  Focused-Male:   Constitutional: no fever or chills, feels well, no tiredness, no recent weight loss or weight gain  Cardiovascular: HTN, but as noted in HPI     Gastrointestinal: GERD, but as noted in HPI    The patient presents with complaints of gradual onset of intermittent episodes of moderate constipation His symptoms are possibly caused by dietary change (Worse in the past 3 months)  ROS Reviewed:   ROS reviewed  Active Problems    1  Arthritis (716 90) (M19 90)   2  GERD without esophagitis (530 81) (K21 9)   3  HIV disease (042) (B20)   4  Hyperlipidemia (272 4) (E78 5)   5  Hypertension (401 9) (I10)   6  Kidney stones (592 0) (N20 0)   7  Routine screening for STI (sexually transmitted infection) (V74 5) (Z11 3)   8  Skin macule (709 8) (L98 8)   9  Skin rash (782 1) (R21)   10  Viral syndrome (079 99) (B34 9)    Past Medical History    1  History of Bipolar disorder (296 80) (F31 9)   2  History of fatigue (V13 89) (Z87 898)   3  History of vertigo (V12 49) (Z87 898)   4  History of HIV positive (V08) (Z21)  Active Problems And Past Medical History Reviewed: The active problems and past medical history were reviewed and updated today  Family History  Mother    1  No pertinent family history  Family History Reviewed: The family history was reviewed and updated today  Social History    · Former smoker (V15 82) (E68 895)   · No alcohol use   · No drug use   · Non-smoker (V49 89) (Z78 9)  Social History Reviewed: The social history was reviewed and updated today  The social history was reviewed and is unchanged  Surgical History  Surgical History Reviewed: The surgical history was reviewed and updated today  Current Meds   1  Aspirin 81 MG Oral Tablet Delayed Release; Take 1 tablet daily; Therapy: 17FNJ0577 to (Evaluate:11Sep2016)  Requested for: 17YWP1043; Last   Rx:13Jun2016 Ordered   2  Fluticasone Propionate 50 MCG/ACT Nasal Suspension; Use 1 spray in each nostril   twice a day; Therapy: 42LNK1334 to (Evaluate:14Jan2016)  Requested for: 52SDF2776; Last   Rx:16Oct2015 Ordered   3  Lisinopril 20 MG Oral Tablet; Take 1 tablet by mouth  daily; Therapy: 29QNY0467 to (Evaluate:06Jun2016)  Requested for: 09LQI9379; Last   Rx:08Mar2016 Ordered   4   Metoprolol Succinate ER 50 MG Oral Tablet Extended Release 24 Hour; take 1 tablet by   mouth daily; Therapy: 93LGI5201 to (Evaluate:05Aug2016)  Requested for: 29PJJ0975; Last   Rx:07Xea4277 Ordered   5  Pravastatin Sodium 80 MG Oral Tablet; Take 1 tablet by mouth  daily; Therapy: 67SJG6746 to (Evaluate:06Jun2016)  Requested for: 78QIL0796; Last   Rx:08Mar2016 Ordered   6  Tivicay 50 MG Oral Tablet; Take 1 tablet by mouth  daily; Therapy: 80AJU8337 to (Evaluate:08Aug2016)  Requested for: 39DLZ6159; Last   Rx:00Hnc5801 Ordered   7  Truvada 200-300 MG Oral Tablet; Take 1 tablet by mouth  daily; Therapy: 25FIR8470 to (Evaluate:08Aug2016)  Requested for: 15CKY9008; Last   Rx:80Dsh2092 Ordered   8  Vitamin D 1000 UNIT Oral Tablet; Therapy: (Recorded:08Jul2016) to Recorded  Medication List Reviewed: The medication list was reviewed and updated today  Allergies    1  Sulfa Drugs    Vitals  Signs   Recorded: 83IQX7139 57:75KN   Systolic: 375  Diastolic: 79  Heart Rate: 70  Respiration: 18  Temperature: 98 5 F  Height: 6 ft 2 in  Weight: 188 lb   BMI Calculated: 24 14  BSA Calculated: 2 12    Physical Exam    Constitutional   General appearance: No acute distress, well appearing and well nourished  Pulmonary   Respiratory effort: No increased work of breathing or signs of respiratory distress  Auscultation of lungs: Clear to auscultation  Cardiovascular   Auscultation of heart: Normal rate and rhythm, normal S1 and S2, without murmurs      Examination of extremities for edema and/or varicosities: Normal     Psychiatric   Orientation to person, place, and time: Normal     Mood and affect: Normal        Results/Data  (1) CBC/PLT/DIFF 53JGW1523 07:[facility] Order Number: [id number]   Order Number: [id number]     Test Name Result Flag Reference   WBC COUNT 3 92 Thousand/uL L 4 31-10 16   RBC COUNT 4 84 Million/uL  3 88-5 62   HEMOGLOBIN 16 7 g/dL  12 0-17 0   HEMATOCRIT 46 2 %  36 5-49 3   MCV 96 fL  82-98   MCH 34 5 pg H 26 8-34 3 MCHC 36 1 g/dL  31 4-37 4   RDW 12 5 %  11 6-15 1   MPV 12 4 fL  8 9-12 7   PLATELET COUNT 331 Thousands/uL L 149-390   nRBC AUTOMATED 0 /100 WBCs     NEUTROPHILS RELATIVE PERCENT 43 %  43-75   LYMPHOCYTES RELATIVE PERCENT 47 % H 14-44   MONOCYTES RELATIVE PERCENT 7 %  4-12   EOSINOPHILS RELATIVE PERCENT 2 %  0-6   BASOPHILS RELATIVE PERCENT 1 %  0-1   NEUTROPHILS ABSOLUTE COUNT 1 68 Thousands/?L L 1 85-7 62   LYMPHOCYTES ABSOLUTE COUNT 1 84 Thousands/?L  0 60-4 47   MONOCYTES ABSOLUTE COUNT 0 28 Thousand/?L  0 17-1 22   EOSINOPHILS ABSOLUTE COUNT 0 09 Thousand/?L  0 00-0 61   BASOPHILS ABSOLUTE COUNT 0 03 Thousands/?L  0 00-0 10     (1) COMPREHENSIVE METABOLIC PANEL 83PMQ1059 22:07XM [facility] Order Number: [id number]  TW Order Number: [id number] Order Number: [id number]     Test Name Result Flag Reference   GLUCOSE,RANDM 99 mg/dL     If the patient is fasting, the ADA then defines impaired fasting glucose as > 100 mg/dL and diabetes as > or equal to 123 mg/dL  SODIUM 144 mmol/L  136-145   POTASSIUM 4 0 mmol/L  3 5-5 3   CHLORIDE 106 mmol/L  100-108   CARBON DIOXIDE 34 mmol/L H 21-32   ANION GAP (CALC) 4 mmol/L  4-13   BLOOD UREA NITROGEN 12 mg/dL  5-25   CREATININE 1 12 mg/dL  0 60-1 30   Standardized to IDMS reference method   CALCIUM 9 1 mg/dL  8 3-10 1   BILI, TOTAL 0 74 mg/dL  0 20-1 00   ALK PHOSPHATAS 44 U/L L    ALT (SGPT) 53 U/L  12-78   AST(SGOT) 31 U/L  5-45   ALBUMIN 3 9 g/dL  3 5-5 0   TOTAL PROTEIN 6 9 g/dL  6 4-8 2   eGFR Non-African American      >60 0 ml/min/1 73sq m   [facility] Disease Education Program recommendations are as follows:  GFR calculation is accurate only with a steady state creatinine  Chronic Kidney disease less than 60 ml/min/1 73 sq  meters  Kidney failure less than 15 ml/min/1 73 sq  meters  Attending Note  Collaborating Physician Note: Collaborating Physician: I agree with the Advanced Practitioner note  Future Appointments    Date/Time Provider Specialty Site   02/06/2017 10:00 AM Raul Cassidy MD Internal Medicine [facility] AT [facility]   09/12/2016 01:30 PM Jany Robles, [address] [place] [address] ACS AT [facility]   01/30/2017 11:30 AM Jany Robles, [address] [facility] AT [address] [place] [id number]   Electronically signed by : Garrett Delgado;  Aug  3 2016  4:54PM EST                       (Author)    Electronically signed by : Gaston Pearl DO; Aug  3 2016  7:38PM EST                       (Author)

## 2018-01-16 NOTE — PROGRESS NOTES
Assessment    1  Screening for cancer (V76 9) (Z12 9)   2  Encounter for screening colonoscopy (V76 51) (Z12 11)   3  HIV disease (042) (B20)    Plan    1  COLONOSCOPY; Status:Active; Requested for:68Lds5776;     2  Famotidine 20 MG Oral Tablet; TAKE 1 TABLET AT BEDTIME    3  Aspirin 81 MG Oral Tablet Delayed Release; Take 1 tablet daily   4  Fluticasone Propionate 50 MCG/ACT Nasal Suspension; Use 1 spray in each   nostril twice a day    5  Tivicay 50 MG Oral Tablet; Take 1 tablet by mouth  daily   6  Truvada 200-300 MG Oral Tablet; Take 1 tablet by mouth  daily   7  1 - Ender MD, Sherryle Forester  (Infectious Disease) Physician Referral  Consult  Status: Hold For   - Scheduling  Requested for: 13Sep2016  Care Summary provided  : Yes    8  Pravastatin Sodium 80 MG Oral Tablet; Take 1 tablet by mouth  daily    9  Lisinopril 20 MG Oral Tablet; Take 1 tablet by mouth  daily   10  Metoprolol Succinate ER 25 MG Oral Tablet Extended Release 24 Hour; TAKE    1 TABLET DAILY    11  Vitamin D 1000 UNIT Oral Tablet    Discussion/Summary  Discussion Summary:   1  HIV - Last CD4 stable, viral load undetectable  Patient to continue Tivicay/Truvada  He hopes to move to [facility] next year but he reports that his insurance told him it isn't on formulary yet  Stressed adherence  Patient to continue routine f/u with Dr Gerardo Moss  2  HTN - well controlled on Lisinopril 20mg PO daily and Metoprolol Succinate 25mg PO daily  Stressed continued adherence and his ongoing focus on healthy lifestyle choices  Reminded patient to seek medical care immediately for s/s of MI/CVA or for red flag headache symptoms  Patient verbalized understanding     - Order placed for patient's screening colonoscopy to screen for colo-rectal cancer  He will work with the patient navigator on setting this up      Counseling Documentation With Imm: The patient was counseled regarding diagnostic results, instructions for management, risk factor reductions, prognosis, patient and family education, impressions, risks and benefits of treatment options, importance of compliance with treatment  total time of encounter was 20 minutes and 15 minutes was spent counseling  Medication SE Review and Pt Understands Tx: Possible side effects of new medications were reviewed with the patient/guardian today  The treatment plan was reviewed with the patient/guardian  The patient/guardian understands and agrees with the treatment plan      Chief Complaint  Chief Complaint Free Text Note Form: Patient here for a bp f/u      History of Present Illness  HPI: The patient presents for follow up on blood pressure  A few months ago he wanted to try to get off of or down to one antihypertensive  He was previously taking Metoprolol Tartrate 50mg BID and Lisinopril 20mg daily  He was able to get down to Metoprolol Succinate 25mg PO daily and Lisinopril 20mg PO daily  He has continued to keep a detailed blood pressure log and feel that he is stable at this time  He doesn't want to make any additional changes for now and plans to continue working on healthy eating and exercise  He also reports that he is ready for a follow up colonoscopy as he hasn't had one in a long time  Hospital Based Practices Required Assessment:   Pain Assessment   the patient states they do not have pain  Abuse And Domestic Violence Screen    Yes, the patient is safe at home  The patient states no one is hurting them  Depression And Suicide Screen  No, the patient has not had thoughts of hurting themself  No, the patient has not felt depressed in the past 7 days  Review of Systems  Focused-Male:   Constitutional: no fever or chills, feels well, no tiredness, no recent weight loss or weight gain  Cardiovascular: HTN, well controlled on Lisinopril and Metoprolol Succinate, but as noted in HPI  ROS Reviewed:   ROS reviewed  Active Problems    1  Arthritis (716 90) (M19 90)   2  Constipation (564 00) (K59 00)   3  GERD without esophagitis (530 81) (K21 9)   4  HIV disease (042) (B20)   5  Hyperlipidemia (272 4) (E78 5)   6  Hypertension (401 9) (I10)   7  Kidney stones (592 0) (N20 0)   8  Routine screening for STI (sexually transmitted infection) (V74 5) (Z11 3)   9  Skin macule (709 8) (L98 8)   10  Skin rash (782 1) (R21)   11  Viral syndrome (079 99) (B34 9)    Past Medical History    1  History of Bipolar disorder (296 80) (F31 9)   2  History of fatigue (V13 89) (Z87 898)   3  History of vertigo (V12 49) (Z87 898)   4  History of HIV positive (V08) (Z21)  Active Problems And Past Medical History Reviewed: The active problems and past medical history were reviewed and updated today  Family History  Mother    1  No pertinent family history  Family History Reviewed: The family history was reviewed and updated today  Social History    · Former smoker (V15 82) (P57 742)   · No alcohol use   · No drug use   · Non-smoker (V49 89) (Z78 9)  Social History Reviewed: The social history was reviewed and updated today  The social history was reviewed and is unchanged  Surgical History  Surgical History Reviewed: The surgical history was reviewed and updated today  Current Meds   1  Aspirin 81 MG Oral Tablet Delayed Release; Take 1 tablet daily; Therapy: 27FYQ5412 to (Evaluate:11Usz5567)  Requested for: 97ACP1627; Last   Rx:13Jun2016 Ordered   2  Famotidine 20 MG Oral Tablet; TAKE 1 TABLET AT BEDTIME; Therapy: 58AYC1434 to (Dahiana Hopkins)  Requested for: 31Ueo8158; Last   Rx:62Zvz3627 Ordered   3  Fluticasone Propionate 50 MCG/ACT Nasal Suspension; Use 1 spray in each nostril   twice a day; Therapy: 76HZE9909 to (Evaluate:14Jan2016)  Requested for: 65MTG9023; Last   Rx:16Oct2015 Ordered   4  Lisinopril 20 MG Oral Tablet; Take 1 tablet by mouth  daily; Therapy: 28VXX0108 to (Selin Soda)  Requested for: 85LMQ1460; Last   Rx:78Jlx0531 Ordered   5   Metoprolol Succinate ER 25 MG Oral Tablet Extended Release 24 Hour; TAKE 1 TABLET   DAILY; Therapy: 19VXN1881 to (Prema Primaddy)  Requested for: 24Qcl1476; Last   Rx:04Tpn9376 Ordered   6  Pravastatin Sodium 80 MG Oral Tablet; Take 1 tablet by mouth  daily; Therapy: 60KWJ1844 to (Raphael Blanton)  Requested for: 98EYM8621; Last   Rx:13Ngp9006 Ordered   7  Tivicay 50 MG Oral Tablet; Take 1 tablet by mouth  daily; Therapy: 09EWG2050 to (Marlinda Hien)  Requested for: 19Fvh2319; Last   Rx:20Dpx5552 Ordered   8  Truvada 200-300 MG Oral Tablet; Take 1 tablet by mouth  daily; Therapy: 95RDV6726 to (Marlinda Hien)  Requested for: 10Aug2016; Last   Rx:96Phf3863 Ordered   9  Vitamin D 1000 UNIT Oral Tablet; Therapy: (Recorded:11Qix9457) to Recorded  Medication List Reviewed: The medication list was reviewed and updated today  Allergies    1  Sulfa Drugs    Vitals  Signs   Recorded: 51BTI8519 11:84JY   Systolic: 489  Diastolic: 78  Heart Rate: 65  Respiration: 18  Temperature: 97 5 F  O2 Saturation: 98  Height: 6 ft 2 in  Weight: 199 lb   BMI Calculated: 25 55  BSA Calculated: 2 17          Physical Exam    Constitutional   General appearance: No acute distress, well appearing and well nourished      Psychiatric   Orientation to person, place, and time: Normal     Mood and affect: Normal        Results/Data  (1) CBC/PLT/DIFF 14CGA8787 07:51AM EnderLamar Severe Order Number: [id number]   Order Number: [id number]     Test Name Result Flag Reference   WBC COUNT 3 92 Thousand/uL L 4 31-10 16   RBC COUNT 4 84 Million/uL  3 88-5 62   HEMOGLOBIN 16 7 g/dL  12 0-17 0   HEMATOCRIT 46 2 %  36 5-49 3   MCV 96 fL  82-98   MCH 34 5 pg H 26 8-34 3   MCHC 36 1 g/dL  31 4-37 4   RDW 12 5 %  11 6-15 1   MPV 12 4 fL  8 9-12 7   PLATELET COUNT 908 Thousands/uL L 149-390   nRBC AUTOMATED 0 /100 WBCs     NEUTROPHILS RELATIVE PERCENT 43 %  43-75   LYMPHOCYTES RELATIVE PERCENT 47 % H 14-44   MONOCYTES RELATIVE PERCENT 7 %  4-12 EOSINOPHILS RELATIVE PERCENT 2 %  0-6   BASOPHILS RELATIVE PERCENT 1 %  0-1   NEUTROPHILS ABSOLUTE COUNT 1 68 Thousands/?L L 1 85-7 62   LYMPHOCYTES ABSOLUTE COUNT 1 84 Thousands/?L  0 60-4 47   MONOCYTES ABSOLUTE COUNT 0 28 Thousand/?L  0 17-1 22   EOSINOPHILS ABSOLUTE COUNT 0 09 Thousand/?L  0 00-0 61   BASOPHILS ABSOLUTE COUNT 0 03 Thousands/?L  0 00-0 10     (1) COMPREHENSIVE METABOLIC PANEL 95NJC7802 67:63GT Gayathri Benavides Order Number: [id number]   Order Number: [id number] Order Number: [id number]     Test Name Result Flag Reference   GLUCOSE,RANDM 99 mg/dL     If the patient is fasting, the ADA then defines impaired fasting glucose as > 100 mg/dL and diabetes as > or equal to 123 mg/dL  SODIUM 144 mmol/L  136-145   POTASSIUM 4 0 mmol/L  3 5-5 3   CHLORIDE 106 mmol/L  100-108   CARBON DIOXIDE 34 mmol/L H 21-32   ANION GAP (CALC) 4 mmol/L  4-13   BLOOD UREA NITROGEN 12 mg/dL  5-25   CREATININE 1 12 mg/dL  0 60-1 30   Standardized to IDMS reference method   CALCIUM 9 1 mg/dL  8 3-10 1   BILI, TOTAL 0 74 mg/dL  0 20-1 00   ALK PHOSPHATAS 44 U/L L    ALT (SGPT) 53 U/L  12-78   AST(SGOT) 31 U/L  5-45   ALBUMIN 3 9 g/dL  3 5-5 0   TOTAL PROTEIN 6 9 g/dL  6 4-8 2   eGFR Non-African American      >60 0 ml/min/1 73sq [facility] Disease Education Program recommendations are as follows:  GFR calculation is accurate only with a steady state creatinine  Chronic Kidney disease less than 60 ml/min/1 73 sq  meters  Kidney failure less than 15 ml/min/1 73 sq  meters  Attending Note  Collaborating Physician Note: Collaborating Physician: I agree with the Advanced Practitioner note        Future Appointments    Date/Time Provider Specialty Site   02/06/2017 10:00 AM David Rinaldi MD Internal Medicine [facility]   03/20/2017 01:30 PM Manjit De La Fuente, [address] [place] [address] ACS AT [facility]     Signatures   Electronically signed by : Alexy Patton Lissa Romero; Sep 13 2016  8:19AM EST                       (Author)    Electronically signed by : Farrah Peña DO; Sep 13 2016 12:28PM EST                       (Author)

## 2018-01-16 NOTE — PROGRESS NOTES
Assessment    1  HIV disease (042) (B20)   2  Hyperlipidemia (272 4) (E78 5)   3  Hypertension (401 9) (I10)    Discussion/Summary    Intervention Diet Prescription   Energy 2150 kcal   25kcal /86kg   Protein 90g   1 1g /86kg   Fluid 2150ml   25ml /86kg 2g Na   Snack/Supplement Recommendations: avoid chocolate, ice cream Estimated Intake: 2200kcal 85g Protein   His current intake is meeting estimated nutrition needs  Goal initiated  Time Frame For Accomplishment: By next follow-up  Intervention Nutrition Education Provided  Person Educated: patient   Topics Discussed: healthy eating for HIV, continue to avoid fats & fried foods   Barriers To Learning: none  Readiness to Learn: Receptive  Teaching Method: verbal   Evaluation Of Learning: verbalized/demonstrated understanding       History of Present Illness  Assessment: Clinical Data/Client History HIV - Yes  His socio-economic status includes  cooks and eats out  He lives in [facility] house  Living Environment - He has access to refrigerator, stove and microwave  His functional status is  able to food shop, prepares own meals and & his partner cooks, they also go out to eat  His activity level is  normal and Pt  belongs to gym, knows he needs to go more often  He eats breakfast at  [facility]: fresh green veggies, fruit, nuts, flaxseed meal, "healthy"  He eats lunch at  [facility], water  He eats dinner at  [employer] or chicken, water  He snacks Mostly fruit, admits he loves sweets: chocolate, ice cream    His appetite is  good   He does not take supplements  Nutrition Diagnosis   Problem related to no nutrition diagnosis at this time  Pt  eating much more healthy, weight steady, Alb  3 9  Active Problems    1  Arthritis (716 90) (M19 90)   2  GERD (gastroesophageal reflux disease) (530 81) (K21 9)   3  HIV disease (042) (B20)   4  Hyperlipidemia (272 4) (E78 5)   5  Hypertension (401 9) (I10)   6   Kidney stones (592 0) (N20 0)   7  Routine screening for STI (sexually transmitted infection) (V74 5) (Z11 3)   8  Skin macule (709 8) (L98 8)   9  Skin rash (782 1) (R21)   10  Viral syndrome (079 99) (B34 9)    Past Medical History    1  History of Bipolar disorder (296 80) (F31 9)   2  History of fatigue (V13 89) (Z87 898)   3  History of vertigo (V12 49) (Z87 898)   4  History of HIV positive (V08) (Z23)    Family History  Mother    1  No pertinent family history    Social History    · Former smoker (V15 82) (L03 922)   · No alcohol use   · No drug use   · Non-smoker (V49 89) (Z78 9)    Current Meds   1  Aspirin 81 MG Oral Tablet Delayed Release; Take 1 tablet daily; Therapy: 70ARO9770 to (Evaluate:13Jun2016)  Requested for: 32UML7125; Last Rx:15Mar2016   Ordered   2  Fluticasone Propionate 50 MCG/ACT Nasal Suspension; Use 1 spray in each nostril twice a day; Therapy: 91PFU7999 to (Evaluate:14Jan2016)  Requested for: 19Zho8653; Last Rx:16Oct2015   Ordered   3  Lisinopril 20 MG Oral Tablet; Take 1 tablet by mouth  daily; Therapy: 72EAK7024 to (Evaluate:06Jun2016)  Requested for: 72YAF5935; Last Rx:08Mar2016   Ordered   4  Metoprolol Tartrate 50 MG Oral Tablet; Take 1 tablet by mouth  twice a day; Therapy: 29EUP0951 to (Evaluate:06Jun2016)  Requested for: 05YPH4620; Last Rx:08Mar2016   Ordered   5  Omeprazole 20 MG Oral Capsule Delayed Release; Take 1 capsule by mouth  daily; Therapy: 57IEJ9127 to (Evaluate:06Jun2016)  Requested for: 00NDA1675; Last Rx:08Mar2016   Ordered   6  Pravastatin Sodium 80 MG Oral Tablet; Take 1 tablet by mouth  daily; Therapy: 92OGT7725 to (Evaluate:06Jun2016)  Requested for: 48AQU5128; Last Rx:08Mar2016   Ordered   7  Tivicay 50 MG Oral Tablet; Take 1 tablet daily; Therapy: 45GLK6923 to (Evaluate:06Jun2016)  Requested for: 23MKD6913; Last Rx:08Mar2016   Ordered   8  Truvada 200-300 MG Oral Tablet; TAKE 1 TABLET DAILY;    Therapy: 73KEH8741 to (Evaluate:06Jun2016)  Requested for: 96BFX0317; Last Rx:08Mar2016   Ordered   9  Vitamin D 1000 UNIT Oral Tablet; Therapy: (Recorded:86Uyj3787) to Recorded    Allergies    1   Sulfa Drugs    Vitals  Vitals [Data Includes: All]   Recorded: 58WAF8115 88:92IZ   Systolic: 028  Diastolic: 70  Temperature: 97 2 F  Heart Rate: 58  Respiration: 18  Height: 6 ft 2 in  Weight: 214 lb 4 58 oz  BMI Calculated: 27 51  BSA Calculated: 2 24  Pain Scale: 0  O2 Saturation: 98  Recorded: 40ZKV6913 15:13WY   Systolic: 958  Diastolic: 78  Temperature: 96 2 F  Heart Rate: 68  Respiration: 18  Height: 6 ft 2 in  Weight: 96 lb 2 98 oz  BMI Calculated: 12 35  BSA Calculated: 1 59  Pain Scale: 0  O2 Saturation: 98  Recorded: 00MZU0161 45:35XL   Systolic: 726  Diastolic: 64  Temperature: 98 F  Heart Rate: 59  Respiration: 18  Height: 6 ft 2 in  Weight: 211 lb 3 18 oz  BMI Calculated: 27 12  BSA Calculated: 2 22  Pain Scale: 0  O2 Saturation: 98  Recorded: 26MHY4194 54:88BB   Systolic: 981  Diastolic: 76  Temperature: 97 6 F  Heart Rate: 62  Respiration: 18  Height: 6 ft 2 in  Weight: 216 lb   BMI Calculated: 27 73  BSA Calculated: 2 25  Pain Scale: 0  O2 Saturation: 98  Recorded: 84TUV6123 25:50YS   Systolic: 678  Diastolic: 60  Temperature: 98 5 F  Heart Rate: 68  Respiration: 20  Height: 6 ft 2 in  Weight: 210 lb 6 oz  BMI Calculated: 27 01  BSA Calculated: 2 22  O2 Saturation: 98  Recorded: 54JSU2354 89:06PF   Systolic: 121  Diastolic: 68  Heart Rate: 74  Height: 6 ft 2 in  Weight: 214 lb   BMI Calculated: 27 48  BSA Calculated: 2 24  Pain Scale: 7 5  O2 Saturation: 98  Recorded: 33VAL6711 61:82PA   Systolic: 513  Diastolic: 76  Temperature: 96 8 F  Heart Rate: 98  Respiration: 18  Height: 6 ft 2 in  Weight: 214 lb   BMI Calculated: 27 48  BSA Calculated: 2 24  O2 Saturation: 98  Recorded: 78LND8567 79:97YP   Systolic: 095  Diastolic: 56  Temperature: 100 8 F  Heart Rate: 111  Respiration: 18  Height: 6 ft 2 in  Weight: 214 lb 6 oz  BMI Calculated: 27 52  BSA Calculated: 2 24  O2 Saturation: 95    Future Appointments    Date/Time Provider Specialty Site   08/01/2016 10:15 AM Maranda Stephenson MD Internal Medicine [facility]   06/27/2016 02:00 PM Aleida Mora, [address]     Signatures   Electronically signed by : KARLIE KhanKIQSHELBY,TPG,PATRIC; May  9 2016 11:25AM EST                       (Author)

## 2018-01-18 NOTE — PROGRESS NOTES
History of Present Illness  [facility]:   He is being seen in follow-up  The patient is being seen regarding wellness check in   Support/Coping: going to [employer] regularly  Physical Exam    Objective: Orientation: oriented to person, oriented to place and oriented to time  Appearance: well developed, well nourished and appearance reflects stated age  Observed mood and affect: euthymic, but appropriate  Harm to self or others: None reported or observed  Substance abuse: None reported or observed  Assessment    1  HIV disease (042) (B20)    Plan    1  Truvada 200-300 MG Oral Tablet; Take 1 tablet by mouth  daily   2  (1) CBC/PLT/DIFF; Status:Active; Requested for:66Zct8302;    3  (1) COMPREHENSIVE METABOLIC PANEL; Status:Active; Requested for:63Ryk3466;    4  (1) HIV-1 RNA QUANTITATIVE; [Do Not Release]; Status:Active; Requested for:44Jak7249;      5  (1) T LYMPH SUBSET (CD4); Status:Active; Requested for:86Kqu1402;    6  (1) URINALYSIS (will reflex a microscopy if leukocytes, occult blood, protein or nitrites are   not within normal limits); Status:Active; Requested for:85Vnm2620;     7  (1) LIPID PANEL, FASTING; Status:Active; Requested for:11Ind6414;     8  (1) CHLAMYDIA/GC AMPLIFIED DNA, PCR; Source:Urine, Unspecified Source;   Status:Active; Requested for:10Grb5530;     Discussion/Summary  [facility]: Today, patient presents with no major concerns  Patient will likely benefit from continued maintenance of wellbeing  The stage of change is maintenance  Behavioral recommendations: 1  F/U with [facility] as needed  2  Continue with maintenance of wellbeing  Discussion Summary [facility]:   PT was seen for his behavioral health consultation with [facility] services were reviewed  PT denies any major concerns  He was happy to tell [facility] that he is celebrating 10 years of sobriety on 2/28/17  [facility] encouraged PT to continue using positive coping skills to maintain his wellness  Future Appointments    Date/Time Provider Specialty Site   05/17/2017 04:15 PM Maddy Ventura MD Internal Medicine [facility] AT [facility]   03/20/2017 01:30 PM Ellis Sen [address] [place] [address] Family Medicine [facility] AT [facility]     Signatures   Electronically signed by : Benoit Salcedo Brendan 87; Feb 20 2017 11:57AM EST                       (Author)

## 2018-01-22 ENCOUNTER — HOSPITAL ENCOUNTER (OUTPATIENT)
Dept: NON INVASIVE DIAGNOSTICS | Facility: HOSPITAL | Age: 63
Discharge: HOME | End: 2018-01-22
Payer: COMMERCIAL

## 2018-01-22 VITALS
TEMPERATURE: 97.9 F | DIASTOLIC BLOOD PRESSURE: 69 MMHG | HEIGHT: 74 IN | BODY MASS INDEX: 25.86 KG/M2 | HEART RATE: 67 BPM | SYSTOLIC BLOOD PRESSURE: 109 MMHG | OXYGEN SATURATION: 98 % | WEIGHT: 201.5 LBS | RESPIRATION RATE: 18 BRPM

## 2018-01-22 DIAGNOSIS — R07.9 CHEST PAIN, UNSPECIFIED TYPE: ICD-10-CM

## 2018-01-22 PROCEDURE — 93017 CV STRESS TEST TRACING ONLY: CPT

## 2018-01-23 LAB
CHEST PAIN STATEMENT: NORMAL
MAX DIASTOLIC BP: 82 MMHG
MAX HEART RATE: 160 BPM
MAX PREDICTED HEART RATE: 159 BPM
MAX. SYSTOLIC BP: 174 MMHG
PROTOCOL NAME: NORMAL
TARGET HR FORMULA: NORMAL
TEST INDICATION: NORMAL
TIME IN EXERCISE PHASE: NORMAL

## 2018-01-23 NOTE — PROGRESS NOTES
Patient Health Assessment    Date:            12/11/2017  Blood Pressure:  124/75  Pulse:           71  Age:             62  Weight:          200 lbs  Height/Length:   6' 2"  Body Mass Index: 25 7  Provider:        MIGUELITO  Clinic:          [address]: Allergies    Tobacco User    High Blood Pressure    Mental Disorders    Alcohol/Drug Abuse    HIV/AIDS  Medications: Aspirin    METOPROLOL   TAB 25MG ER 25    Omeprazole    Pravastatin Sodium    Vitamin D    Truvada    Tivicay    Urocit  Allergies:      Sulfa Drugs  Since Last Visit: Medical Alert: No Change    Medications: No Change    Allergies:        No Change  Pain Scale Type: Numeric Pain ScalePain Level: 0  Description: Pt present for #18 f/u after sedative IRM restoration was placed  at [facility], no changes  Pt reports no symptoms regarding #18  IRM  restoration intact  assessed #18 clinically, restorable with good prognosis  Pt   informed that amalgam may be necessary to restore due to possible issues  with tooth isolation  Pt was reluctant towards amalgam due to concerns with  mercury, discussed the pro's and con's b/w composite and amalgam including  there is no scientific evidence that proves patients are harmed by amalgam   Pt informed that RCT and crown may still be necessary if crack is still  present  Pt understood  Pt does not want any epi in local anesthesia  Pt left  ambulatory and alert      NV: #18 MODB resin (1 Hour), Pt requested no epi in local anesthesia    BH/CELE    ----- Signed on Monday, December 11, 2017 at 12:11:47 PM  -----  ----- Provider: ELBA - Resident Two, Dentist -- Clinic: [facility] -----

## 2018-05-01 ENCOUNTER — TRANSCRIBE ORDERS (OUTPATIENT)
Dept: RADIOLOGY | Facility: HOSPITAL | Age: 63
End: 2018-05-01

## 2018-05-01 ENCOUNTER — HOSPITAL ENCOUNTER (OUTPATIENT)
Dept: RADIOLOGY | Facility: HOSPITAL | Age: 63
Discharge: HOME | End: 2018-05-01
Attending: UROLOGY
Payer: COMMERCIAL

## 2018-05-01 DIAGNOSIS — N20.0 CALCULUS OF KIDNEY: ICD-10-CM

## 2018-05-01 DIAGNOSIS — N40.0 BENIGN PROSTATIC HYPERPLASIA, UNSPECIFIED WHETHER LOWER URINARY TRACT SYMPTOMS PRESENT: ICD-10-CM

## 2018-05-01 DIAGNOSIS — N20.0 CALCULUS OF KIDNEY: Primary | ICD-10-CM

## 2018-05-01 PROCEDURE — 74018 RADEX ABDOMEN 1 VIEW: CPT

## 2019-04-30 ENCOUNTER — HOSPITAL ENCOUNTER (OUTPATIENT)
Dept: RADIOLOGY | Facility: HOSPITAL | Age: 64
Discharge: HOME | End: 2019-04-30
Payer: COMMERCIAL

## 2019-04-30 ENCOUNTER — TRANSCRIBE ORDERS (OUTPATIENT)
Dept: RADIOLOGY | Facility: HOSPITAL | Age: 64
End: 2019-04-30

## 2019-04-30 DIAGNOSIS — N20.0 CALCULUS OF KIDNEY: Primary | ICD-10-CM

## 2019-04-30 DIAGNOSIS — N20.0 CALCULUS OF KIDNEY: ICD-10-CM

## 2019-04-30 PROCEDURE — 74018 RADEX ABDOMEN 1 VIEW: CPT

## 2020-01-13 ENCOUNTER — TELEPHONE (OUTPATIENT)
Dept: GASTROENTEROLOGY | Facility: AMBULARY SURGERY CENTER | Age: 65
End: 2020-01-13

## 2020-01-13 NOTE — TELEPHONE ENCOUNTER
01/13/20  Screened by: Carline Nelson    Referring Provider     : If patient answers NO to medical questions, then schedule procedure  If patient answers YES to medical questions, then schedule office appointment  Previous Colonoscopy no  Date and Facility of last colonoscopy? [facility]  # [phone]      Pre- Screening: There is no height or weight on file to calculate BMI  Has patient been referred for a routine screening Colonoscopy? yes  Is the patient between 39-70 years old? yes    SCHEDULING STAFF   If the patient is between 45yrs-49yrs, please advise patient to confirm benefits/coverage with their insurance company for a routine screening colonoscopy, some insurance carriers will only cover at [phone] or older   If the patient is over 76years old, please schedule an office visit   If the patient had a previous colonoscopy send to the procedure  before continuing    Have you been diagnosed with a bleeding disorder or anemia? no    Do you take Other blood thinning medications no    Have you been diagnosed with Diabetes or are you taking any   Diabetic medications? no    Do you have any of the following symptoms? NO     Have you had a coronary or vascular stent within the last year? no    Have you had a heart attack or stroke in the last 6 months? no    Have you had intestinal surgery in the last 3 months? no    Do you have problems with: Sleep Apnea    Do you use: CPAP/BiPAP    Have you been hospitalized in the last Month? no    Have you had chest pain (angina) or breathing problems  (COPD) in the last 3 months? no    Do you have any difficulty walking up a flight of stairs? no    Have you had Kidney failure or insufficiency? no    Have you had heart valve surgery? no    Are you confined to a wheelchair?  no

## 2020-01-15 ENCOUNTER — PREP FOR PROCEDURE (OUTPATIENT)
Dept: GASTROENTEROLOGY | Facility: MEDICAL CENTER | Age: 65
End: 2020-01-15

## 2020-01-15 ENCOUNTER — TELEPHONE (OUTPATIENT)
Dept: GASTROENTEROLOGY | Facility: MEDICAL CENTER | Age: 65
End: 2020-01-15

## 2020-01-15 DIAGNOSIS — Z12.11 SCREENING FOR COLON CANCER: Primary | ICD-10-CM

## 2020-01-15 NOTE — TELEPHONE ENCOUNTER
Pt is scheduled with dr Cheyanne Vargas on 2/24/20 at [facility] for oa colon, I went over miralax prep with pt and mailed out to pt home  Patient is aware she will need a  to and from   She will get a call the day before with an exact time for arrival   Pt is  and has the phone number to call and set this up

## 2020-01-15 NOTE — TELEPHONE ENCOUNTER
Pt called info link-complaint about not getting through for 2 days, assured pt surgery scheduler will call this week

## 2020-01-16 ENCOUNTER — PREP FOR PROCEDURE (OUTPATIENT)
Dept: GASTROENTEROLOGY | Facility: MEDICAL CENTER | Age: 65
End: 2020-01-16

## 2020-01-16 DIAGNOSIS — Z12.11 SCREENING FOR COLON CANCER: Primary | ICD-10-CM

## 2020-01-30 ENCOUNTER — HOSPITAL ENCOUNTER (EMERGENCY)
Facility: HOSPITAL | Age: 65
Discharge: HOME | End: 2020-01-30
Attending: EMERGENCY MEDICINE | Admitting: EMERGENCY MEDICINE
Payer: COMMERCIAL

## 2020-01-30 VITALS
OXYGEN SATURATION: 99 % | TEMPERATURE: 97.6 F | HEART RATE: 78 BPM | DIASTOLIC BLOOD PRESSURE: 86 MMHG | RESPIRATION RATE: 18 BRPM | SYSTOLIC BLOOD PRESSURE: 142 MMHG | WEIGHT: 215.83 LBS | BODY MASS INDEX: 27.71 KG/M2

## 2020-01-30 DIAGNOSIS — F41.8 ANXIETY ABOUT HEALTH: Primary | ICD-10-CM

## 2020-01-30 DIAGNOSIS — R03.0 ELEVATED BLOOD PRESSURE READING: ICD-10-CM

## 2020-01-30 LAB
ATRIAL RATE: 77 BPM
P AXIS: 64 DEGREES
PR INTERVAL: 168 MS
QRS AXIS: 1 DEGREES
QRSD INTERVAL: 90 MS
QT INTERVAL: 350 MS
QTC INTERVAL: 396 MS
T WAVE AXIS: 23 DEGREES
VENTRICULAR RATE: 77 BPM

## 2020-01-30 PROCEDURE — 99283 EMERGENCY DEPT VISIT LOW MDM: CPT | Performed by: EMERGENCY MEDICINE

## 2020-01-30 PROCEDURE — 93010 ELECTROCARDIOGRAM REPORT: CPT | Performed by: INTERNAL MEDICINE

## 2020-01-30 PROCEDURE — 99283 EMERGENCY DEPT VISIT LOW MDM: CPT

## 2020-01-30 PROCEDURE — 93005 ELECTROCARDIOGRAM TRACING: CPT

## 2020-01-31 NOTE — ED ATTENDING ATTESTATION
1/30/2020  IRaul MD, saw and evaluated the patient  I have discussed the patient with the resident/non-physician practitioner and agree with the resident's/non-physician practitioner's findings, Plan of Care, and MDM as documented in the resident's/non-physician practitioner's note, except where noted  All available labs and Radiology studies were reviewed  I was present for key portions of any procedure(s) performed by the resident/non-physician practitioner and I was immediately available to provide assistance  At this point I agree with the current assessment done in the Emergency Department  I have conducted an independent evaluation of this patient a history and physical is as follows:    61 YO male with Hx of HTN states URI symptoms, took Claritin-D and was concerned that this increased his blood pressure  Pt states he has had palpitations and tachycardia which has resolved  Pt denies CP/SOB/F/C/N/V/D/C, no dysuria, burning on urination or blood in urine  Gen: Pt is in NAD  HEENT: Head is atraumatic, EOM's intact, neck has FROM  Chest: CTAB, non-tender  Heart: RRR  Abdomen: Soft, NT/ND  Musculoskeletal: FROM in all extremities  Skin: No rash, no ecchymosis  Neuro: Awake, alert, oriented x4; Cranial nerves II-XII intact  Psych: Normal affect    MDM - Pt with palpitations earlier, currently improved  Possibly reaction from Claritin-D  Will check ECG        ED Course         Critical Care Time  Procedures

## 2020-01-31 NOTE — ED PROVIDER NOTES
History  Chief Complaint   Patient presents with    Anxiety     States fatigue after work today, "anxiety" took home /97, states inadvertently took claritin D today "and I already have high blood pressure" reports feeling anxious, "breathing issues" shortness of breath  HPI    62 y o  M presents to the ED for evaluation of anxiety  Patient states he has had cold like symptoms for the past two weeks  He has had rhinorrhea, sore throat, which are now resolved (two days ago)  He says he has had a lot of sinus congestion states he took Claritin D with pseudoephendrine, took one dose, at 1:30 PM  States he felt exhausted anxious after work, palpitations, 4 PM, took BP at home and it was 147/97  States he was concerned so he came to ED for evaluation  He has no chest pain nausea vomiting headaches vision changes abd pain, changes to urine or sob  He otherwise feels like his normal self  Concerned pseudoephedrine caused hypertension and that he needs a reversal      None       Past Medical History:   Diagnosis Date    HIV (human immunodeficiency virus infection) ([facility] [address] )     Hyperlipidemia     Hypertension        Past Surgical History:   Procedure Laterality Date    HERNIA REPAIR         History reviewed  No pertinent family history  I have reviewed and agree with the history as documented  Social History     Tobacco Use    Smoking status: Never Smoker    Smokeless tobacco: Never Used   Substance Use Topics    Alcohol use: Not Currently    Drug use: Not Currently        Review of Systems   Constitutional: Negative for chills, fatigue and fever  HENT: Positive for congestion  Negative for nosebleeds and sore throat  Eyes: Negative for redness and visual disturbance  Respiratory: Negative for shortness of breath and wheezing  Cardiovascular: Negative for chest pain and palpitations  Gastrointestinal: Negative for abdominal pain and diarrhea  Endocrine: Negative for polyuria  Genitourinary: Negative for difficulty urinating and testicular pain  Musculoskeletal: Negative for back pain and neck stiffness  Skin: Negative for rash and wound  Neurological: Negative for seizures, speech difficulty and headaches  Psychiatric/Behavioral: Negative for dysphoric mood and hallucinations  All other systems reviewed and are negative  Physical Exam  ED Triage Vitals [01/30/20 1942]   Temperature Pulse Respirations Blood Pressure SpO2   97 6 °F (36 4 °C) 78 18 (!) 177/94 99 %      Temp Source Heart Rate Source Patient Position - Orthostatic VS BP Location FiO2 (%)   Oral Monitor Lying Right arm --      Pain Score       No Pain             Orthostatic Vital Signs  Vitals:    01/30/20 1942 01/30/20 2141   BP: (!) 177/94 142/86   Pulse: 78    Patient Position - Orthostatic VS: Lying        Physical Exam   Constitutional: He is oriented to person, place, and time  He appears well-developed and well-nourished  HENT:   Head: Normocephalic and atraumatic  Right Ear: External ear normal    Left Ear: External ear normal    Mouth/Throat: Oropharynx is clear and moist    Eyes: Conjunctivae and EOM are normal    Neck: Normal range of motion  Cardiovascular: Normal rate, regular rhythm, normal heart sounds and intact distal pulses  Pulmonary/Chest: Effort normal and breath sounds normal  He has no wheezes  He exhibits no tenderness  Abdominal: Soft  Bowel sounds are normal  There is no tenderness  There is no guarding  Musculoskeletal: Normal range of motion  Neurological: He is alert and oriented to person, place, and time  No cranial nerve deficit or sensory deficit  He exhibits normal muscle tone  Coordination normal    Skin: Skin is warm and dry  No rash noted  Psychiatric: He has a normal mood and affect  Nursing note and vitals reviewed        ED Medications  Medications - No data to display    Diagnostic Studies  Results Reviewed     None                 No orders to display Procedures  Procedures      ED Course  ED Course as of Jan 30 2359   Thu Jan 30, 2020   2152 ECG 12 Lead Documentation  Date/Time: today/date: 1/30/2020    ECG reviewed by me, the ED Provider: yes    Patient location:  ED   Previous ECG:  no  Rate:  ECG rate assessment: normal    Rhythm: sinus rhythm    Ectopy:  : none    QRS axis:  Normal  Intervals: normal   Q waves: None   ST segments:  Normal  T waves: normal      Impression: Normal EKG            MDM    20-year-old male who presents to ED for evaluation of anxiety about his elevated blood pressure  The patient this time does not have any signs of hypertensive emergency or even urgency  The patient is well-appearing, EKG was normal sinus rhythm  The patient is currently asymptomatic completely  The patient was discharged with primary care follow-up  The patient was instructed to follow up as documented  Strict return precautions were discussed with the patient and the patient was instructed to return to the emergency department immediately if symptoms worsen  The patient/patient family member acknowledged and were in agreement with plan  Disposition  Final diagnoses:   Anxiety about health   Elevated blood pressure reading     Time reflects when diagnosis was documented in both MDM as applicable and the Disposition within this note     Time User Action Codes Description Comment    1/30/2020  9:49 PM Alix Solar Add [F41 8] Anxiety about health     1/30/2020  9:49 PM Alix Solar Add [R03 0] Elevated blood pressure reading       ED Disposition     ED Disposition Condition Date/Time Comment    Discharge Stable Thu Jan 30, 2020  9:48 PM Angelica Ramirez discharge to home/self care              Follow-up Information     Follow up With Specialties Details Why [address] [place] [address] Nurse Practitioner Schedule an appointment as soon as possible for a visit in 3 days For follow up regarding your symptoms and recheck [address]  [address] [facility] 3  [phone]            There are no discharge medications for this patient  No discharge procedures on file  ED Provider  Attending physically available and evaluated Gregory Goodman I managed the patient along with the ED Attending      Electronically Signed by         Segun Mckenna MD  01/30/20 9322

## 2020-02-06 ENCOUNTER — TRANSCRIBE ORDERS (OUTPATIENT)
Dept: ADMINISTRATIVE | Facility: HOSPITAL | Age: 65
End: 2020-02-06

## 2020-02-06 DIAGNOSIS — Z13.820 SPECIAL SCREENING FOR OSTEOPOROSIS: Primary | ICD-10-CM

## 2020-02-12 ENCOUNTER — HOSPITAL ENCOUNTER (OUTPATIENT)
Dept: BONE DENSITY | Facility: MEDICAL CENTER | Age: 65
Discharge: HOME | End: 2020-02-12
Payer: COMMERCIAL

## 2020-02-12 DIAGNOSIS — B20 HIV DISEASE (HCC): ICD-10-CM

## 2020-02-12 DIAGNOSIS — Z13.820 SPECIAL SCREENING FOR OSTEOPOROSIS: ICD-10-CM

## 2020-02-12 PROCEDURE — 77080 DXA BONE DENSITY AXIAL: CPT

## 2020-02-24 ENCOUNTER — ANESTHESIA (OUTPATIENT)
Dept: GASTROENTEROLOGY | Facility: MEDICAL CENTER | Age: 65
End: 2020-02-24

## 2020-02-24 ENCOUNTER — ANESTHESIA EVENT (OUTPATIENT)
Dept: GASTROENTEROLOGY | Facility: MEDICAL CENTER | Age: 65
End: 2020-02-24

## 2020-02-24 ENCOUNTER — HOSPITAL ENCOUNTER (OUTPATIENT)
Dept: GASTROENTEROLOGY | Facility: MEDICAL CENTER | Age: 65
Setting detail: OUTPATIENT SURGERY
Discharge: HOME | End: 2020-02-24
Attending: INTERNAL MEDICINE | Admitting: INTERNAL MEDICINE
Payer: COMMERCIAL

## 2020-02-24 VITALS
TEMPERATURE: 97.6 F | HEART RATE: 62 BPM | RESPIRATION RATE: 18 BRPM | DIASTOLIC BLOOD PRESSURE: 73 MMHG | SYSTOLIC BLOOD PRESSURE: 133 MMHG | HEIGHT: 74 IN | BODY MASS INDEX: 25.48 KG/M2 | WEIGHT: 198.5 LBS | OXYGEN SATURATION: 99 %

## 2020-02-24 DIAGNOSIS — Z12.11 SCREENING FOR COLON CANCER: ICD-10-CM

## 2020-02-24 PROCEDURE — 88305 TISSUE EXAM BY PATHOLOGIST: CPT | Performed by: PATHOLOGY

## 2020-02-24 RX ORDER — ONDANSETRON 2 MG/ML
4 INJECTION INTRAMUSCULAR; INTRAVENOUS ONCE AS NEEDED
Status: DISCONTINUED | OUTPATIENT
Start: 2020-02-24 | End: 2020-02-28 | Stop reason: HOSPADM

## 2020-02-24 RX ORDER — LORATADINE 10 MG/1
10 TABLET ORAL DAILY
Status: ON HOLD | COMMUNITY
End: 2022-01-25

## 2020-02-24 RX ORDER — FLUTICASONE PROPIONATE 50 MCG
1 SPRAY, SUSPENSION (ML) NASAL 2 TIMES DAILY
COMMUNITY
Start: 2015-05-18

## 2020-02-24 RX ORDER — PRAVASTATIN SODIUM 80 MG/1
80 TABLET ORAL DAILY
COMMUNITY
Start: 2012-01-11

## 2020-02-24 RX ORDER — FAMOTIDINE 20 MG/1
1 TABLET, FILM COATED ORAL
COMMUNITY
Start: 2016-08-03

## 2020-02-24 RX ORDER — METOPROLOL TARTRATE 50 MG/1
50 TABLET, FILM COATED ORAL 2 TIMES DAILY
Status: ON HOLD | COMMUNITY
End: 2022-01-25

## 2020-02-24 RX ORDER — POTASSIUM CITRATE 15 MEQ/1
15 TABLET, EXTENDED RELEASE ORAL 2 TIMES DAILY
COMMUNITY
Start: 2017-05-05 | End: 2021-05-06 | Stop reason: SDUPTHER

## 2020-02-24 RX ORDER — ASPIRIN 81 MG/1
81 TABLET ORAL DAILY
Status: ON HOLD | COMMUNITY
Start: 2015-05-18 | End: 2022-01-25

## 2020-02-24 RX ORDER — PROPOFOL 10 MG/ML
INJECTION, EMULSION INTRAVENOUS AS NEEDED
Status: DISCONTINUED | OUTPATIENT
Start: 2020-02-24 | End: 2020-02-24 | Stop reason: SURG

## 2020-02-24 RX ORDER — LISINOPRIL 20 MG/1
20 TABLET ORAL DAILY
COMMUNITY
Start: 2015-05-18

## 2020-02-24 RX ORDER — SODIUM CHLORIDE 9 MG/ML
125 INJECTION, SOLUTION INTRAVENOUS CONTINUOUS
Status: DISCONTINUED | OUTPATIENT
Start: 2020-02-24 | End: 2020-02-28 | Stop reason: HOSPADM

## 2020-02-24 RX ORDER — OMEPRAZOLE 20 MG/1
20 CAPSULE, DELAYED RELEASE ORAL DAILY
Status: ON HOLD | COMMUNITY
End: 2020-10-13 | Stop reason: ALTCHOICE

## 2020-02-24 RX ORDER — METOPROLOL SUCCINATE 25 MG/1
1 TABLET, EXTENDED RELEASE ORAL DAILY
COMMUNITY
Start: 2016-07-06 | End: 2022-07-14

## 2020-02-24 RX ORDER — CHLORAL HYDRATE 500 MG
2 CAPSULE ORAL DAILY
COMMUNITY

## 2020-02-24 RX ADMIN — PROPOFOL 30 MG: 10 INJECTION, EMULSION INTRAVENOUS at 11:21

## 2020-02-24 RX ADMIN — PROPOFOL 50 MG: 10 INJECTION, EMULSION INTRAVENOUS at 11:14

## 2020-02-24 RX ADMIN — Medication 40 MG: at 11:30

## 2020-02-24 RX ADMIN — SODIUM CHLORIDE 125 ML/HR: 0.9 INJECTION, SOLUTION INTRAVENOUS at 09:51

## 2020-02-24 RX ADMIN — PROPOFOL 30 MG: 10 INJECTION, EMULSION INTRAVENOUS at 11:24

## 2020-02-24 RX ADMIN — LIDOCAINE HYDROCHLORIDE 40 MG: 20 INJECTION, SOLUTION INTRAVENOUS at 11:14

## 2020-02-24 RX ADMIN — PROPOFOL 30 MG: 10 INJECTION, EMULSION INTRAVENOUS at 11:28

## 2020-02-24 RX ADMIN — PROPOFOL 30 MG: 10 INJECTION, EMULSION INTRAVENOUS at 11:37

## 2020-02-24 RX ADMIN — PROPOFOL 30 MG: 10 INJECTION, EMULSION INTRAVENOUS at 11:18

## 2020-02-24 RX ADMIN — PROPOFOL 20 MG: 10 INJECTION, EMULSION INTRAVENOUS at 11:41

## 2020-02-24 RX ADMIN — PROPOFOL 30 MG: 10 INJECTION, EMULSION INTRAVENOUS at 11:32

## 2020-02-24 NOTE — H&P
History and Physical -  [facility]  Gregory Goodman 62 y o  male MRN: [id number]    HPI: Gregory Goodman is a 62y o  year old male who presents with screening colonoscopy - last 11 years ago  NO symptoms   No family h/o poyps/ cancer  Review of Systems    Historical Information   Past Medical History:   Diagnosis Date    HIV (human immunodeficiency virus infection) ([address] )     Hyperlipidemia     Hypertension     PONV (postoperative nausea and vomiting)      Past Surgical History:   Procedure Laterality Date    COLONOSCOPY      HERNIA REPAIR       Social History   Social History     Substance and Sexual Activity   Alcohol Use Not Currently     Social History     Substance and Sexual Activity   Drug Use Not Currently     Social History     Tobacco Use   Smoking Status Never Smoker   Smokeless Tobacco Never Used     History reviewed  No pertinent family history  Meds/Allergies       (Not in a hospital admission)    Allergies   Allergen Reactions    Sulfa Antibiotics        Objective     /80   Pulse 71   Temp 97 6 °F (36 4 °C) (Temporal)   Resp 18   Ht 6' 2" (1 88 m)   Wt 90 kg (198 lb 8 oz)   SpO2 98%   BMI 25 49 kg/m²       PHYSICAL EXAM    Gen: NAD  CV: RRR  CHEST: Clear  ABD: soft, NT/ND  EXT: no edema  Neuro: AAO      ASSESSMENT/PLAN:  This is a 62y o  year old male here for colonoscopy for screening  PLAN:   Procedure: colonoscoyp

## 2020-02-24 NOTE — DISCHARGE INSTRUCTIONS
Colonoscopy   WHAT YOU NEED TO KNOW:   A colonoscopy is a procedure to examine the inside of your colon (intestine) with a scope  Polyps or tissue growths may have been removed during your colonoscopy  It is normal to feel bloated and to have some abdominal discomfort  You should be passing gas  If you have hemorrhoids or you had polyps removed, you may have a small amount of bleeding  DISCHARGE INSTRUCTIONS:   Seek care immediately if:   · You have a large amount of bright red blood in your bowel movements  · Your abdomen is hard and firm and you have severe pain  · You have sudden trouble breathing  Contact your healthcare provider if:   · You develop a rash or hives  · You have a fever within 24 hours of your procedure  · You have not had a bowel movement for 3 days after your procedure  · You have questions or concerns about your condition or care  Activity:   · Do not lift, strain, or run  for 3 days after your procedure  · Rest after your procedure  You have been given medicine to relax you  Do not  drive or make important decisions until the day after your procedure  Return to your normal activity as directed  · Relieve gas and discomfort from bloating  by lying on your right side with a heating pad on your abdomen  You may need to take short walks to help the gas move out  Eat small meals until bloating is relieved  If you had polyps removed: For 7 days after your procedure:  · Do not  take aspirin  · Do not  go on long car rides  Help prevent constipation:   · Eat a variety of healthy foods  Healthy foods include fruit, vegetables, whole-grain breads, low-fat dairy products, beans, lean meat, and fish  Ask if you need to be on a special diet  Your healthcare provider may recommend that you eat high-fiber foods such as cooked beans  Fiber helps you have regular bowel movements  · Drink liquids as directed    Adults should drink between 9 and 13 eight-ounce cups of liquid every day  Ask what amount is best for you  For most people, good liquids to drink are water, juice, and milk  · Exercise as directed  Talk to your healthcare provider about the best exercise plan for you  Exercise can help prevent constipation, decrease your blood pressure and improve your health  Follow up with your healthcare provider as directed:  Write down your questions so you remember to ask them during your visits  © 2017 [address] Information is for End User's use only and may not be sold, redistributed or otherwise used for commercial purposes  All illustrations and images included in CareNotes® are the copyrighted property of [employer]  or [employer]  The above information is an  only  It is not intended as medical advice for individual conditions or treatments  Talk to your doctor, nurse or pharmacist before following any medical regimen to see if it is safe and effective for you  Colorectal Polyps   WHAT YOU NEED TO KNOW:   What are colorectal polyps? Colorectal polyps are small growths of tissue in the lining of the colon and rectum  Most polyps are hyperplastic polyps and are usually benign (noncancerous)  Certain types of polyps, called adenomatous polyps, may turn into cancer  What increases my risk of colorectal polyps? The exact cause of colorectal polyps is unknown  The following may increase your risk:  · Older age    · A diet of foods high in fat and low in fiber     · Family history of polyps    · Intestinal diseases, such as Crohn's disease or ulcerative colitis    · An unhealthy lifestyle, such as physical inactivity, smoking, or drinking alcohol    · Obesity  What are the signs and symptoms of colorectal polyps? · Blood in your bowel movement or bleeding from the rectum    · Change in bowel movement habits, such as diarrhea and constipation    · Abdominal pain  How are colorectal polyps diagnosed?   You should have fecal blood screening once a year for colorectal disease if you are over 48years old  You should be screened earlier if you have an intestinal disease or a family history of polyps or colorectal cancer  During this screening, a sample of your bowel movement is checked for blood, which may be an early sign of colorectal polyps or cancer  You may also need any of the following tests:  · Digital rectal exam:  Your healthcare provider will examine your anus and use a finger to check your rectum for polyps  · Barium enema: A barium enema is an x-ray of the colon  A tube is put into your anus, and a liquid called barium is put through the tube  Barium is used so that caregivers can see your colon better on the x-ray film  · Virtual colonoscopy: This is a CT scan that takes pictures of the inside of your colon and rectum  A small, flexible tube is put into your rectum and air or carbon dioxide (gas) is used to expand your colon  This lets healthcare providers clearly see your colon and any polyps on a monitor  · Colonoscopy or sigmoidoscopy: These procedures help your healthcare provider see the inside of your colon using a flexible tube with a small light and camera on the end  During a sigmoidoscopy, your healthcare provider will only look at rectum and lower colon  During a colonoscopy, healthcare providers will look at the full length of your colon  Healthcare providers may remove a small amount of tissue from the colon for a biopsy  How are colorectal polyps treated? · Polyp removal:  Polyps may be removed during your sigmoidoscopy or colonoscopy  · Polypectomy: This is surgery to remove your polyps  You may need laparoscopic or open surgery, depending on the type, size, and number of polyps that you have  Laparoscopy is done by inserting a small, flexible scope into incisions made on your abdomen  Open surgery is done by making a larger incision on your abdomen     What are the risks of colorectal polyps? You may bleed during a colonoscopy procedure  Your bowel may be perforated (torn) when polyps are removed  This may lead to an open abdominal surgery  During surgery, you may bleed too much or get an infection  Adenomatous polyps that are not removed may turn into cancer and become more difficult to treat  Where can I find support and more information? · [address] ([facility])  [address] , [place] [address]  Phone: [phone]  Web Address: Giselle Francisco  [facility] [employer]  When should I contact my healthcare provider? · You have a fever  · You have chills, a cough, or feel weak and achy  · You have abdominal pain that does not go away or gets worse after you take medicine  · Your abdomen is swollen  · You are losing weight without trying  · You have questions or concerns about your condition or care  When should I seek immediate care or call 911? · You have sudden shortness of breath  · You have a fast heart rate, fast breathing, or are too dizzy to stand up  · You have severe abdominal pain  · You see blood in your bowel movement  CARE AGREEMENT:   You have the right to help plan your care  Learn about your health condition and how it may be treated  Discuss treatment options with your caregivers to decide what care you want to receive  You always have the right to refuse treatment  The above information is an  only  It is not intended as medical advice for individual conditions or treatments  Talk to your doctor, nurse or pharmacist before following any medical regimen to see if it is safe and effective for you  © 2017 [address] Information is for End User's use only and may not be sold, redistributed or otherwise used for commercial purposes   All illustrations and images included in CareNotes® are the copyrighted property of A [employer]  or [employer]

## 2020-02-24 NOTE — ANESTHESIA PREPROCEDURE EVALUATION
Review of Systems/Medical History  Patient summary reviewed  Chart reviewed  History of anesthetic complications PONV    Cardiovascular  Hyperlipidemia, Hypertension ,    Pulmonary  Negative pulmonary ROS        GI/Hepatic    GERD , Bowel prep            Endo/Other  Negative endo/other ROS      GYN       Hematology    Immunocompromised state HIV/AIDS,    Musculoskeletal  Negative musculoskeletal ROS        Neurology  Negative neurology ROS      Psychology   Negative psychology ROS              Physical Exam    Airway    Mallampati score: II  TM Distance: >3 FB  Neck ROM: full     Dental   No notable dental hx     Cardiovascular  Rhythm: regular, Rate: normal, Cardiovascular exam normal    Pulmonary  Pulmonary exam normal Breath sounds clear to auscultation,     Other Findings        Anesthesia Plan  ASA Score- 3     Anesthesia Type- IV sedation with anesthesia with ASA Monitors  Additional Monitors:   Airway Plan:         Plan Factors-    Induction- intravenous  Postoperative Plan-     Informed Consent- Anesthetic plan and risks discussed with patient

## 2020-05-05 ENCOUNTER — HOSPITAL ENCOUNTER (OUTPATIENT)
Dept: RADIOLOGY | Facility: HOSPITAL | Age: 65
Discharge: HOME | End: 2020-05-05
Attending: UROLOGY
Payer: COMMERCIAL

## 2020-05-05 ENCOUNTER — TRANSCRIBE ORDERS (OUTPATIENT)
Dept: RADIOLOGY | Facility: HOSPITAL | Age: 65
End: 2020-05-05

## 2020-05-05 DIAGNOSIS — N20.0 CALCULUS OF KIDNEY: Primary | ICD-10-CM

## 2020-05-05 DIAGNOSIS — N40.1 ENLARGED PROSTATE WITH URINARY OBSTRUCTION: ICD-10-CM

## 2020-05-05 DIAGNOSIS — N20.0 CALCULUS OF KIDNEY: ICD-10-CM

## 2020-05-05 DIAGNOSIS — N13.8 ENLARGED PROSTATE WITH URINARY OBSTRUCTION: ICD-10-CM

## 2020-05-05 PROCEDURE — 74018 RADEX ABDOMEN 1 VIEW: CPT

## 2020-06-17 ENCOUNTER — TELEPHONE (OUTPATIENT)
Dept: UROLOGY | Facility: MEDICAL CENTER | Age: 65
End: 2020-06-17

## 2020-07-16 ENCOUNTER — TELEPHONE (OUTPATIENT)
Dept: UROLOGY | Facility: CLINIC | Age: 65
End: 2020-07-16

## 2020-07-16 ENCOUNTER — OFFICE VISIT (OUTPATIENT)
Dept: UROLOGY | Facility: CLINIC | Age: 65
End: 2020-07-16
Payer: COMMERCIAL

## 2020-07-16 VITALS
WEIGHT: 206 LBS | BODY MASS INDEX: 26.44 KG/M2 | DIASTOLIC BLOOD PRESSURE: 80 MMHG | SYSTOLIC BLOOD PRESSURE: 126 MMHG | HEIGHT: 74 IN | TEMPERATURE: 97.5 F

## 2020-07-16 DIAGNOSIS — N40.2 PROSTATE NODULE: Primary | ICD-10-CM

## 2020-07-16 DIAGNOSIS — Z80.42 FAMILY HISTORY OF PROSTATE CANCER IN FATHER: ICD-10-CM

## 2020-07-16 DIAGNOSIS — R97.20 PSA ELEVATION: ICD-10-CM

## 2020-07-16 DIAGNOSIS — B20 HIV INFECTION, UNSPECIFIED SYMPTOM STATUS (HCC): ICD-10-CM

## 2020-07-16 PROBLEM — Z21 HIV INFECTION (HCC): Status: ACTIVE | Noted: 2020-07-16

## 2020-07-16 PROCEDURE — 99245 OFF/OP CONSLTJ NEW/EST HI 55: CPT | Performed by: UROLOGY

## 2020-07-16 RX ORDER — ALPRAZOLAM 1 MG/1
1 TABLET ORAL ONCE
Qty: 1 TABLET | Refills: 0 | Status: SHIPPED | OUTPATIENT
Start: 2020-07-16 | End: 2020-07-17

## 2020-07-16 NOTE — TELEPHONE ENCOUNTER
Patient seen today 7/16/20 and is scheduled for an MRI on 8/3/20 and is asking to be prescribed Xanax  Pt can be reached at [phone]  Thanks

## 2020-07-16 NOTE — PROGRESS NOTES
UROLOGY NEW CONSULT NOTE     CHIEF COMPLAINT   Anastasia Beaver is a 62 y o  male with a complaint of   Chief Complaint   Patient presents with    Elevated PSA       History of Present Illness:     62 y o  male, partner of another patient of mine, who presents as a 2nd opinion  The patient has had a history of PSAs in the mid 2 range with PSA last year in the mid threes now with PSA of 4 7 and recheck of 4 8 in May and June of this year  He has a family history of prostate cancer in his father, who was treated with brachytherapy  He was recommended to undergo up from biopsy by his outside urologist   Documentation of his rectal exam demonstrates a normal 15 gland prostate  Patient denies urinary symptoms of concern  Denies gross hematuria, urinary frequency or urgency, urinary hesitancy, weak stream   He denies weight loss, fevers or chills, bony pain  He is an avid bicycle rider      Past Medical History:     Past Medical History:   Diagnosis Date    HIV (human immunodeficiency virus infection) ([facility] [address]     Hyperlipidemia     Hypertension     PONV (postoperative nausea and vomiting)        PAST SURGICAL HISTORY:     Past Surgical History:   Procedure Laterality Date    COLONOSCOPY      HERNIA REPAIR         CURRENT MEDICATIONS:     Current Outpatient Medications   Medication Sig Dispense Refill    aspirin (Aspirin 81) 81 mg EC tablet Take 81 mg by mouth daily      Cholecalciferol 25 MCG (1000 UT) CHEW Chew 1 tablet daily      cyanocobalamin (VITAMIN B-12) 500 MCG tablet Take 1,000 mcg by mouth daily      dolutegravir (TIVICAY) 50 MG TABS Take 50 mg by mouth daily      emtricitabine-tenofovir AF (Descovy) 200-25 MG tablet Take 25 mg by mouth daily      famotidine (PEPCID) 20 mg tablet Take 1 tablet by mouth      fluticasone (FLONASE) 50 mcg/act nasal spray 1 spray into each nostril 2 (two) times a day      lisinopril (ZESTRIL) 20 mg tablet Take 20 mg by mouth daily      loratadine (CLARITIN) 10 mg tablet Take 10 mg by mouth daily      metoprolol succinate (TOPROL-XL) 25 mg 24 hr tablet Take 1 tablet by mouth daily      metoprolol tartrate (LOPRESSOR) 50 mg tablet Take 50 mg by mouth 2 (two) times a day      Omega-3 1000 MG CAPS Take 2 g by mouth daily      omeprazole (PriLOSEC) 20 mg delayed release capsule Take 20 mg by mouth daily      Potassium Citrate ER 15 MEQ (1620 MG) TBCR Take 15 mEq by mouth 2 (two) times a day      pravastatin (PRAVACHOL) 80 mg tablet Take 80 mg by mouth daily       No current facility-administered medications for this visit          ALLERGIES:     Allergies   Allergen Reactions    Sulfa Antibiotics        SOCIAL HISTORY:     Social History     Socioeconomic History    Marital status: Significant Other     Spouse name: Not on file    Number of children: Not on file    Years of education: Not on file    Highest education level: Not on file   Occupational History    Not on file   Social Needs    Financial resource strain: Not on file    Food insecurity:     Worry: Not on file     Inability: Not on file    Transportation needs:     Medical: Not on file     Non-medical: Not on file   Tobacco Use    Smoking status: Never Smoker    Smokeless tobacco: Never Used   Substance and Sexual Activity    Alcohol use: Not Currently    Drug use: Not Currently    Sexual activity: Not on file   Lifestyle    Physical activity:     Days per week: Not on file     Minutes per session: Not on file    Stress: Not on file   Relationships    Social connections:     Talks on phone: Not on file     Gets together: Not on file     Attends Restorationism service: Not on file     Active member of club or organization: Not on file     Attends meetings of clubs or organizations: Not on file     Relationship status: Not on file    Intimate partner violence:     Fear of current or ex partner: Not on file     Emotionally abused: Not on file     Physically abused: Not on file     Forced sexual activity: Not on file   Other Topics Concern    Not on file   Social History Narrative    Not on file       SOCIAL HISTORY:   No family history on file  REVIEW OF SYSTEMS:     Review of Systems   Constitutional: Negative  Respiratory: Negative  Cardiovascular: Negative  Gastrointestinal: Negative  Genitourinary: Negative  Musculoskeletal: Negative  Skin: Negative  Psychiatric/Behavioral: Negative  PHYSICAL EXAM:     /80   Temp 97 5 °F (36 4 °C)   Ht 6' 2" (1 88 m)   Wt 93 4 kg (206 lb)   BMI 26 45 kg/m²     General:  Healthy appearing male in no acute distress  They have a normal affect  There is not appear to be any gross neurologic defects or abnormalities  HEENT:  Normocephalic, atraumatic  Neck is supple without any palpable lymphadenopathy  Cardiovascular:  Patient has normal palpable distal radial pulses  There is no significant peripheral edema  No JVD is noted  Respiratory:  Patient has unlabored respirations  There is no audible wheeze or rhonchi  Abdomen:  Abdomen is    With healed right inguinal hernia  Abdomen is soft and nontender  There is no tympany  Inguinal and umbilical hernia are not appreciated  Genitourinary: no penile lesions or discharge, no testicular masses or tenderness, no hernias,   45 g prostate with large 2 cm indurated nodule towards the right base    Musculoskeletal:  Patient does not have significant CVA tenderness in the  flank with palpation or percussion  They full range of motion in all 4 extremities  Strength in all 4 extremities appears congruent  Patient is able to ambulate without assistance or difficulty  Dermatologic:  Patient has no skin abnormalities or rashes        LABS:     CBC:   Lab Results   Component Value Date    WBC 3 78 (L) 04/26/2017    HGB 16 3 04/26/2017    HCT 43 7 04/26/2017    MCV 95 04/26/2017     (L) 04/26/2017       BMP:   Lab Results   Component Value Date    GLUCOSE 99 12/16/2015    CALCIUM 8 9 04/26/2017     12/16/2015    K 4 3 04/26/2017    CO2 29 04/26/2017     04/26/2017    BUN 15 04/26/2017    CREATININE 1 08 04/26/2017     Lab Results   Component Value Date    PSA 2 4 04/18/2017    PSA 2 9 07/13/2016    PSA 3 3 04/12/2016   PSA 5/2020 4 8    ASSESSMENT:     62 y o  male with   HIV, elevated PSA, family history of prostate cancer, prostate nodule    PLAN:      patient has an elevated PSA, family history of prostate cancer and a prostate nodule  He will certainly need to undergo a biopsy for tissue diagnosis at some point time  Given his immunocompromise, I think that pre biopsy information to target any concerns and help minimize the need for multiple procedures is extremely prudent  I have recommended upfront multiparametric MRI to assess the prostate and any occult lesions or concerns  Patient agrees  Once the MRI is complete, I will contact him we will decide how we will move forward with biopsy  Transrectal, transperineal, and office based fusion were all discussed  Patient's significant other, Lenore Phillips, was present for the discussion

## 2020-07-17 RX ORDER — ALPRAZOLAM 1 MG/1
1 TABLET ORAL ONCE
Qty: 1 TABLET | Refills: 0 | Status: SHIPPED | OUTPATIENT
Start: 2020-07-17 | End: 2020-10-13 | Stop reason: HOSPADM

## 2020-07-17 NOTE — TELEPHONE ENCOUNTER
Spoke with patient  Advised script per Dr Polly Melendez for Xanax sent to [facility][1]  Per patient, he does not use [facility] any more, requesting script go to [facility]  Please resend script to [facility]

## 2020-07-27 ENCOUNTER — APPOINTMENT (OUTPATIENT)
Dept: LAB | Facility: HOSPITAL | Age: 65
End: 2020-07-27
Attending: UROLOGY
Payer: COMMERCIAL

## 2020-07-27 DIAGNOSIS — N40.2 PROSTATE NODULE: ICD-10-CM

## 2020-07-27 DIAGNOSIS — R97.20 PSA ELEVATION: ICD-10-CM

## 2020-07-27 LAB
ANION GAP SERPL CALCULATED.3IONS-SCNC: 5 MMOL/L (ref 4–13)
BUN SERPL-MCNC: 16 MG/DL (ref 5–25)
CALCIUM SERPL-MCNC: 9.1 MG/DL (ref 8.3–10.1)
CHLORIDE SERPL-SCNC: 104 MMOL/L (ref 100–108)
CO2 SERPL-SCNC: 31 MMOL/L (ref 21–32)
CREAT SERPL-MCNC: 1.08 MG/DL (ref 0.6–1.3)
GFR SERPL CREATININE-BSD FRML MDRD: 73 ML/MIN/1.73SQ M
GLUCOSE SERPL-MCNC: 107 MG/DL (ref 65–140)
POTASSIUM SERPL-SCNC: 4.6 MMOL/L (ref 3.5–5.3)
SODIUM SERPL-SCNC: 140 MMOL/L (ref 136–145)

## 2020-07-27 PROCEDURE — 80048 BASIC METABOLIC PNL TOTAL CA: CPT

## 2020-07-27 PROCEDURE — 36415 COLL VENOUS BLD VENIPUNCTURE: CPT

## 2020-08-03 ENCOUNTER — HOSPITAL ENCOUNTER (OUTPATIENT)
Dept: RADIOLOGY | Age: 65
Discharge: HOME | End: 2020-08-03
Payer: COMMERCIAL

## 2020-08-03 DIAGNOSIS — R97.20 PSA ELEVATION: ICD-10-CM

## 2020-08-03 DIAGNOSIS — N40.2 PROSTATE NODULE: ICD-10-CM

## 2020-08-03 PROCEDURE — A9585 GADOBUTROL INJECTION: HCPCS | Performed by: UROLOGY

## 2020-08-03 PROCEDURE — 76377 3D RENDER W/INTRP POSTPROCES: CPT

## 2020-08-03 PROCEDURE — 72197 MRI PELVIS W/O & W/DYE: CPT

## 2020-08-03 RX ADMIN — GADOBUTROL 9 ML: 604.72 INJECTION INTRAVENOUS at 09:25

## 2020-08-07 ENCOUNTER — TELEPHONE (OUTPATIENT)
Dept: UROLOGY | Facility: MEDICAL CENTER | Age: 65
End: 2020-08-07

## 2020-08-07 NOTE — TELEPHONE ENCOUNTER
Pt managed by [facility], MRI prostate results with significant findings available for review

## 2020-08-19 ENCOUNTER — TELEPHONE (OUTPATIENT)
Dept: UROLOGY | Facility: CLINIC | Age: 65
End: 2020-08-19

## 2020-08-19 NOTE — TELEPHONE ENCOUNTER
Patient managed by Dr Jason Bowser at the [facility] office  Patient seen 07/16/2020 for history PSA  And family history of prostate cancer  Multiparametric MRI performed on 08/03/2020 demonstrating PI-RADSv2 Category 3  Dr Jason Bowser offered transrectal, transperineal or office based fusion biopsy  Patient contacted office today  stating he had conversation with Dr Jason Bowser about the biopsy instead of performing the biopsy through the rectum it could be done " through the front "  Patient states "[facility] does not perform the biopsy through the front and Dr Jason Bowser was going to investigate other hospitals who perform this procedure "  Discussed with patient will send encounter to Dr Jason Bowser then call patient back

## 2020-08-25 NOTE — TELEPHONE ENCOUNTER
Patient called and said he was talking to Dr Isabel Whitaker and he would like to discuss biopsy procedure  Patient is asking if Dr Isabel Whitaker could cherie him back when he has a chance  Please call patient back at [phone]

## 2020-08-26 ENCOUNTER — TELEPHONE (OUTPATIENT)
Dept: UROLOGY | Facility: CLINIC | Age: 65
End: 2020-08-26

## 2020-08-26 NOTE — TELEPHONE ENCOUNTER
I called the patient to review his options for trans perineal biopsy verses a fusion guided prostate biopsy given his recent PIRADS 3 lesion  Patient was not interested in further discussion with me and is only interested in Dr Omar Zhang recommendations at this time  Reports he is currently investigating a provider who can perform a transperineal fusion guided biopsy, which [facility] currently does not offer  Patient insistent on direct discussion with Dr Pebbles Mccann

## 2020-08-26 NOTE — TELEPHONE ENCOUNTER
Patient ready to schedule transperineal prostate biopsy  This is the preferred method given his HIV and immunocompromised  He has not been able to locate provider that would be able to perform a transperineal fusion style biopsy  We will saturate the abnormal area seen on MRI

## 2020-09-10 NOTE — TELEPHONE ENCOUNTER
Patient called back to speak with the MA, regarding codes for the procedure   Patient can be reached at [phone]

## 2020-09-14 ENCOUNTER — TELEPHONE (OUTPATIENT)
Dept: UROLOGY | Facility: MEDICAL CENTER | Age: 65
End: 2020-09-14

## 2020-09-14 NOTE — TELEPHONE ENCOUNTER
I spoke to the nicolan to scheduled his TransP BX at the [facility] 10/13/2020 with Dr Pebbles Mccann     -patient originally spoke to [facility]  Because the orders received were for MRI Fusion rather than the correct TransP orders     -instructions given verbally and mailed  -procedure codes included in mailing  -patient will stop his Aspirin 7 days prior  -HOP Multi Plan - per Severiano Holder at [facility] [phone] no authorization is required for CPT 31627 as an out patient hospital OR procedure   Ref# [id number]

## 2020-09-17 ENCOUNTER — TRANSCRIBE ORDERS (OUTPATIENT)
Dept: ADMINISTRATIVE | Facility: HOSPITAL | Age: 65
End: 2020-09-17

## 2020-09-17 ENCOUNTER — HOSPITAL ENCOUNTER (OUTPATIENT)
Dept: RADIOLOGY | Facility: HOSPITAL | Age: 65
Discharge: HOME | End: 2020-09-17
Payer: COMMERCIAL

## 2020-09-17 DIAGNOSIS — R76.12 POSITIVE QUANTIFERON-TB GOLD TEST: Primary | ICD-10-CM

## 2020-09-17 DIAGNOSIS — R76.12 POSITIVE QUANTIFERON-TB GOLD TEST: ICD-10-CM

## 2020-09-17 PROCEDURE — 71046 X-RAY EXAM CHEST 2 VIEWS: CPT

## 2020-10-12 ENCOUNTER — ANESTHESIA EVENT (OUTPATIENT)
Dept: GASTROENTEROLOGY | Facility: HOSPITAL | Age: 65
End: 2020-10-12
Payer: COMMERCIAL

## 2020-10-12 RX ORDER — SODIUM CHLORIDE 9 MG/ML
125 INJECTION, SOLUTION INTRAVENOUS CONTINUOUS
Status: CANCELLED | OUTPATIENT
Start: 2020-10-12

## 2020-10-12 NOTE — TELEPHONE ENCOUNTER
Patient called with questions on his procedure with [facility] help all questions answered  Patient wanted to know which schedule to follow and how long procedure would be more or less

## 2020-10-13 ENCOUNTER — ANESTHESIA (OUTPATIENT)
Dept: GASTROENTEROLOGY | Facility: HOSPITAL | Age: 65
End: 2020-10-13
Payer: COMMERCIAL

## 2020-10-13 ENCOUNTER — HOSPITAL ENCOUNTER (OUTPATIENT)
Facility: HOSPITAL | Age: 65
Setting detail: OUTPATIENT SURGERY
Discharge: HOME | End: 2020-10-13
Attending: UROLOGY | Admitting: UROLOGY
Payer: COMMERCIAL

## 2020-10-13 VITALS
OXYGEN SATURATION: 98 % | HEART RATE: 64 BPM | SYSTOLIC BLOOD PRESSURE: 117 MMHG | HEIGHT: 74 IN | RESPIRATION RATE: 18 BRPM | DIASTOLIC BLOOD PRESSURE: 74 MMHG | BODY MASS INDEX: 25.67 KG/M2 | WEIGHT: 200 LBS | TEMPERATURE: 98 F

## 2020-10-13 VITALS — HEART RATE: 70 BPM

## 2020-10-13 DIAGNOSIS — R97.20 ELEVATED PROSTATE SPECIFIC ANTIGEN (PSA): ICD-10-CM

## 2020-10-13 DIAGNOSIS — R97.20 PSA ELEVATION: ICD-10-CM

## 2020-10-13 DIAGNOSIS — N40.2 PROSTATE NODULE: ICD-10-CM

## 2020-10-13 DIAGNOSIS — Z80.42 FAMILY HISTORY OF PROSTATE CANCER IN FATHER: Primary | ICD-10-CM

## 2020-10-13 PROBLEM — E78.5 HYPERLIPIDEMIA: Status: ACTIVE | Noted: 2020-10-13

## 2020-10-13 PROBLEM — I10 HTN (HYPERTENSION): Status: ACTIVE | Noted: 2020-10-13

## 2020-10-13 PROCEDURE — 88344 IMHCHEM/IMCYTCHM EA MLT ANTB: CPT | Performed by: PATHOLOGY

## 2020-10-13 PROCEDURE — NC001 PR NO CHARGE: Performed by: UROLOGY

## 2020-10-13 PROCEDURE — 55700 PR BIOPSY OF PROSTATE,NEEDLE/PUNCH: CPT | Performed by: UROLOGY

## 2020-10-13 RX ORDER — SODIUM CHLORIDE 9 MG/ML
INJECTION, SOLUTION INTRAVENOUS CONTINUOUS PRN
Status: DISCONTINUED | OUTPATIENT
Start: 2020-10-13 | End: 2020-10-13

## 2020-10-13 RX ORDER — PHENAZOPYRIDINE HYDROCHLORIDE 100 MG/1
100 TABLET, FILM COATED ORAL 3 TIMES DAILY PRN
Qty: 20 TABLET | Refills: 0 | Status: SHIPPED | OUTPATIENT
Start: 2020-10-13

## 2020-10-13 RX ORDER — LIDOCAINE HYDROCHLORIDE 10 MG/ML
INJECTION, SOLUTION EPIDURAL; INFILTRATION; INTRACAUDAL; PERINEURAL AS NEEDED
Status: DISCONTINUED | OUTPATIENT
Start: 2020-10-13 | End: 2020-10-13

## 2020-10-13 RX ORDER — TRAMADOL HYDROCHLORIDE 50 MG/1
50 TABLET ORAL EVERY 6 HOURS PRN
Qty: 5 TABLET | Refills: 0 | Status: SHIPPED | OUTPATIENT
Start: 2020-10-13

## 2020-10-13 RX ORDER — TRAMADOL HYDROCHLORIDE 50 MG/1
50 TABLET ORAL EVERY 6 HOURS PRN
Status: DISCONTINUED | OUTPATIENT
Start: 2020-10-13 | End: 2020-10-13 | Stop reason: HOSPADM

## 2020-10-13 RX ORDER — METOPROLOL TARTRATE 50 MG/1
50 TABLET, FILM COATED ORAL ONCE
Status: COMPLETED | OUTPATIENT
Start: 2020-10-13 | End: 2020-10-13

## 2020-10-13 RX ORDER — LIDOCAINE HYDROCHLORIDE 10 MG/ML
INJECTION, SOLUTION EPIDURAL; INFILTRATION; INTRACAUDAL; PERINEURAL AS NEEDED
Status: DISCONTINUED | OUTPATIENT
Start: 2020-10-13 | End: 2020-10-13 | Stop reason: HOSPADM

## 2020-10-13 RX ORDER — PROPOFOL 10 MG/ML
INJECTION, EMULSION INTRAVENOUS AS NEEDED
Status: DISCONTINUED | OUTPATIENT
Start: 2020-10-13 | End: 2020-10-13

## 2020-10-13 RX ORDER — METOPROLOL TARTRATE 5 MG/5ML
INJECTION INTRAVENOUS AS NEEDED
Status: DISCONTINUED | OUTPATIENT
Start: 2020-10-13 | End: 2020-10-13

## 2020-10-13 RX ORDER — TRAMADOL HYDROCHLORIDE 50 MG/1
50 TABLET ORAL EVERY 6 HOURS PRN
Qty: 5 TABLET | Refills: 0 | Status: SHIPPED | OUTPATIENT
Start: 2020-10-13 | End: 2020-10-13 | Stop reason: SDUPTHER

## 2020-10-13 RX ORDER — KETAMINE HCL IN NACL, ISO-OSM 100MG/10ML
SYRINGE (ML) INJECTION AS NEEDED
Status: DISCONTINUED | OUTPATIENT
Start: 2020-10-13 | End: 2020-10-13

## 2020-10-13 RX ADMIN — PROPOFOL 50 MG: 10 INJECTION, EMULSION INTRAVENOUS at 11:50

## 2020-10-13 RX ADMIN — PROPOFOL 100 MG: 10 INJECTION, EMULSION INTRAVENOUS at 11:39

## 2020-10-13 RX ADMIN — Medication 30 MG: at 11:36

## 2020-10-13 RX ADMIN — PROPOFOL 50 MG: 10 INJECTION, EMULSION INTRAVENOUS at 11:59

## 2020-10-13 RX ADMIN — SODIUM CHLORIDE: 0.9 INJECTION, SOLUTION INTRAVENOUS at 11:33

## 2020-10-13 RX ADMIN — METOPROLOL TARTRATE 50 MG: 50 TABLET, FILM COATED ORAL at 11:30

## 2020-10-13 RX ADMIN — CEFTRIAXONE SODIUM 1000 MG: 10 INJECTION, POWDER, FOR SOLUTION INTRAVENOUS at 11:32

## 2020-10-13 RX ADMIN — METOROPROLOL TARTRATE 2.5 MG: 5 INJECTION, SOLUTION INTRAVENOUS at 11:47

## 2020-10-13 RX ADMIN — LIDOCAINE HYDROCHLORIDE 50 MG: 10 INJECTION, SOLUTION EPIDURAL; INFILTRATION; INTRACAUDAL; PERINEURAL at 11:39

## 2020-10-13 RX ADMIN — PROPOFOL 50 MG: 10 INJECTION, EMULSION INTRAVENOUS at 11:42

## 2020-10-13 RX ADMIN — PROPOFOL 50 MG: 10 INJECTION, EMULSION INTRAVENOUS at 11:45

## 2020-11-02 ENCOUNTER — OFFICE VISIT (OUTPATIENT)
Dept: UROLOGY | Facility: CLINIC | Age: 65
End: 2020-11-02
Payer: COMMERCIAL

## 2020-11-02 VITALS
HEIGHT: 74 IN | SYSTOLIC BLOOD PRESSURE: 130 MMHG | DIASTOLIC BLOOD PRESSURE: 80 MMHG | BODY MASS INDEX: 26.44 KG/M2 | WEIGHT: 206 LBS | TEMPERATURE: 97.1 F

## 2020-11-02 DIAGNOSIS — Z80.42 FAMILY HISTORY OF PROSTATE CANCER IN FATHER: ICD-10-CM

## 2020-11-02 DIAGNOSIS — N40.2 PROSTATE NODULE: Primary | ICD-10-CM

## 2020-11-02 DIAGNOSIS — R93.89 ABNORMAL MRI: ICD-10-CM

## 2020-11-02 DIAGNOSIS — C61 PROSTATE CANCER (HCC): ICD-10-CM

## 2020-11-02 PROBLEM — R97.20 PSA ELEVATION: Status: RESOLVED | Noted: 2020-07-16 | Resolved: 2020-11-02

## 2020-11-02 PROCEDURE — 99215 OFFICE O/P EST HI 40 MIN: CPT | Performed by: UROLOGY

## 2021-01-19 ENCOUNTER — IMMUNIZATIONS (OUTPATIENT)
Dept: FAMILY MEDICINE CLINIC | Facility: HOSPITAL | Age: 66
End: 2021-01-19

## 2021-01-19 DIAGNOSIS — Z23 ENCOUNTER FOR IMMUNIZATION: Primary | ICD-10-CM

## 2021-01-19 PROCEDURE — 0011A SARS-COV-2 / COVID-19 MRNA VACCINE (MODERNA) 100 MCG: CPT

## 2021-01-19 PROCEDURE — 91301 SARS-COV-2 / COVID-19 MRNA VACCINE (MODERNA) 100 MCG: CPT

## 2021-02-15 ENCOUNTER — IMMUNIZATIONS (OUTPATIENT)
Dept: FAMILY MEDICINE CLINIC | Facility: HOSPITAL | Age: 66
End: 2021-02-15

## 2021-02-15 DIAGNOSIS — Z23 ENCOUNTER FOR IMMUNIZATION: Primary | ICD-10-CM

## 2021-02-15 PROCEDURE — 91301 SARS-COV-2 / COVID-19 MRNA VACCINE (MODERNA) 100 MCG: CPT

## 2021-02-15 PROCEDURE — 0012A SARS-COV-2 / COVID-19 MRNA VACCINE (MODERNA) 100 MCG: CPT

## 2021-04-29 ENCOUNTER — APPOINTMENT (OUTPATIENT)
Dept: LAB | Facility: HOSPITAL | Age: 66
End: 2021-04-29
Attending: UROLOGY
Payer: COMMERCIAL

## 2021-04-29 DIAGNOSIS — C61 PROSTATE CANCER (HCC): ICD-10-CM

## 2021-04-29 DIAGNOSIS — Z80.42 FAMILY HISTORY OF PROSTATE CANCER IN FATHER: ICD-10-CM

## 2021-04-29 DIAGNOSIS — R93.89 ABNORMAL MRI: ICD-10-CM

## 2021-04-29 DIAGNOSIS — N40.2 PROSTATE NODULE: ICD-10-CM

## 2021-04-29 LAB — PSA SERPL-MCNC: 4.3 NG/ML (ref 0–4)

## 2021-04-29 PROCEDURE — 36415 COLL VENOUS BLD VENIPUNCTURE: CPT

## 2021-04-29 PROCEDURE — 84153 ASSAY OF PSA TOTAL: CPT

## 2021-05-05 DIAGNOSIS — N20.0 NEPHROLITHIASIS: Primary | ICD-10-CM

## 2021-05-05 NOTE — TELEPHONE ENCOUNTER
Patient's pharmacist left a message on the Medication Refill voice mail line requesting a new prescription for Potassium Citrate 15mEq to [facility]  Quantity was not specified

## 2021-05-06 RX ORDER — POTASSIUM CITRATE 15 MEQ/1
1 TABLET, EXTENDED RELEASE ORAL 2 TIMES DAILY WITH MEALS
Qty: 180 TABLET | Refills: 0 | Status: SHIPPED | OUTPATIENT
Start: 2021-05-06 | End: 2021-05-13

## 2021-05-06 NOTE — TELEPHONE ENCOUNTER
Script for the requested medication was queued and forwarded to the Advanced Practitioner covering the [address] location for approval

## 2021-05-07 ENCOUNTER — TRANSCRIBE ORDERS (OUTPATIENT)
Dept: ADMINISTRATIVE | Facility: HOSPITAL | Age: 66
End: 2021-05-07

## 2021-05-13 ENCOUNTER — OFFICE VISIT (OUTPATIENT)
Dept: UROLOGY | Facility: CLINIC | Age: 66
End: 2021-05-13
Payer: COMMERCIAL

## 2021-05-13 VITALS
SYSTOLIC BLOOD PRESSURE: 142 MMHG | BODY MASS INDEX: 25.93 KG/M2 | WEIGHT: 202 LBS | DIASTOLIC BLOOD PRESSURE: 80 MMHG | HEIGHT: 74 IN

## 2021-05-13 DIAGNOSIS — C61 PROSTATE CANCER (HCC): Primary | ICD-10-CM

## 2021-05-13 DIAGNOSIS — N20.0 NEPHROLITHIASIS: ICD-10-CM

## 2021-05-13 PROCEDURE — 99213 OFFICE O/P EST LOW 20 MIN: CPT | Performed by: UROLOGY

## 2021-05-13 RX ORDER — POTASSIUM CITRATE 10 MEQ/1
10 TABLET, EXTENDED RELEASE ORAL DAILY
Qty: 90 TABLET | Refills: 3 | Status: SHIPPED | OUTPATIENT
Start: 2021-05-13 | End: 2022-04-12 | Stop reason: SDUPTHER

## 2021-05-13 NOTE — PROGRESS NOTES
UROLOGY FOLLOWUP NOTE     CHIEF COMPLAINT   Svetlana Galan is a 60 y o  male with a complaint of   Chief Complaint   Patient presents with    Prostate Cancer       History of Present Illness:     60 y o  male, partner of another patient of mine, who presents as a 2nd opinion  The patient has had a history of PSAs in the mid 2 range with PSA last year in the mid threes now with PSA of 4 7 and recheck of 4 8 in May and June of this year  He has a family history of prostate cancer in his father, who was treated with brachytherapy  He was recommended to undergo up from biopsy by his outside urologist   Documentation of his rectal exam demonstrates a normal 15 gland prostate  He is an avid bicycle rider  Patient underwent initialy upfront multiparametric MRI of the prostate which demonstrated a right posterior lesion  Given immunocompromised/HIV status, the patient was very interested in perineal fusion  Unfortunate this could not be offered  Patient ultimately opted to undergo transperineal approach with saturation of the right side  Very low risk [place] 6 diagnosed, agreed to active surveillance  Returns at interval for PSA review      Lab Results   Component Value Date    PSA 4 3 (H) 04/29/2021    PSA 2 4 04/18/2017    PSA 2 9 07/13/2016   PSA 5/2020 4 8      Past Medical History:     Past Medical History:   Diagnosis Date    HIV (human immunodeficiency virus infection) ([facility] [address] )     Hyperlipidemia     Hypertension     PONV (postoperative nausea and vomiting)        PAST SURGICAL HISTORY:     Past Surgical History:   Procedure Laterality Date    COLONOSCOPY      HERNIA REPAIR      [place] BIOPSY OF PROSTATE,NEEDLE/PUNCH N/A 10/13/2020    Procedure: TRANSPERINEAL PROSTATE BIOPSY;  Surgeon: Stephenie Mesa MD;  [facility];  Service: Urology       CURRENT MEDICATIONS:     Current Outpatient Medications   Medication Sig Dispense Refill    aspirin (Aspirin 81) 81 mg EC tablet Take 81 mg by mouth daily      Cholecalciferol 25 MCG (1000 UT) CHEW Chew 1 tablet daily      cyanocobalamin (VITAMIN B-12) 500 MCG tablet Take 1,000 mcg by mouth daily      dolutegravir (TIVICAY) 50 MG TABS Take 50 mg by mouth daily      emtricitabine-tenofovir AF (Descovy) 200-25 MG tablet Take 25 mg by mouth daily      famotidine (PEPCID) 20 mg tablet Take 1 tablet by mouth      fluticasone (FLONASE) 50 mcg/act nasal spray 1 spray into each nostril 2 (two) times a day      lisinopril (ZESTRIL) 20 mg tablet Take 20 mg by mouth daily      loratadine (CLARITIN) 10 mg tablet Take 10 mg by mouth daily      metoprolol succinate (TOPROL-XL) 25 mg 24 hr tablet Take 1 tablet by mouth daily      metoprolol tartrate (LOPRESSOR) 50 mg tablet Take 50 mg by mouth 2 (two) times a day      Omega-3 1000 MG CAPS Take 2 g by mouth daily      phenazopyridine (PYRIDIUM) 100 mg tablet Take 1 tablet (100 mg total) by mouth 3 (three) times a day as needed for bladder spasms 20 tablet 0    pravastatin (PRAVACHOL) 80 mg tablet Take 80 mg by mouth daily      traMADol (ULTRAM) 50 mg tablet Take 1 tablet (50 mg total) by mouth every 6 (six) hours as needed for moderate pain for up to 5 doses 5 tablet 0    potassium citrate (Urocit-K 10) 10 mEq Take 1 tablet (10 mEq total) by mouth daily 90 tablet 3     No current facility-administered medications for this visit          ALLERGIES:     Allergies   Allergen Reactions    Sulfa Antibiotics        SOCIAL HISTORY:     Social History     Socioeconomic History    Marital status: Single     Spouse name: None    Number of children: None    Years of education: None    Highest education level: None   Occupational History    None   Social Needs    Financial resource strain: None    Food insecurity     Worry: None     Inability: None    Transportation needs     Medical: None     Non-medical: None   Tobacco Use    Smoking status: Never Smoker    Smokeless tobacco: Never Used   Substance and Sexual Activity    Alcohol use: Not Currently    Drug use: Not Currently    Sexual activity: None   Lifestyle    Physical activity     Days per week: None     Minutes per session: None    Stress: None   Relationships    Social connections     Talks on phone: None     Gets together: None     Attends Episcopal service: None     Active member of club or organization: None     Attends meetings of clubs or organizations: None     Relationship status: None    Intimate partner violence     Fear of current or ex partner: None     Emotionally abused: None     Physically abused: None     Forced sexual activity: None   Other Topics Concern    None   Social History Narrative    None       SOCIAL HISTORY:   History reviewed  No pertinent family history  REVIEW OF SYSTEMS:     Review of Systems   Constitutional: Negative  Respiratory: Negative  Cardiovascular: Negative  Gastrointestinal: Negative  Genitourinary: Positive for enuresis (Solitary episode of dribbling with cough, now resolved)  Musculoskeletal: Negative  Skin: Negative  Psychiatric/Behavioral: Negative  PHYSICAL EXAM:     /80   Ht 6' 2" (1 88 m)   Wt 91 6 kg (202 lb)   BMI 25 94 kg/m²     General:  Healthy appearing male in no acute distress  They have a normal affect  There is not appear to be any gross neurologic defects or abnormalities  HEENT:  Normocephalic, atraumatic  Neck is supple without any palpable lymphadenopathy  Cardiovascular:  Patient has normal palpable distal radial pulses  There is no significant peripheral edema  No JVD is noted  Respiratory:  Patient has unlabored respirations  There is no audible wheeze or rhonchi  Abdomen:  Abdomen is soft and nontender  There is no tympany  Inguinal and umbilical hernia are not appreciated    :   Circumcised phallus, orthotopic meatus, testicles are smooth and descended, rectal exam demonstrates some small amount of induration on the right side of the prostate consistent with prior cancer diagnosis  Musculoskeletal:  Patient does not have significant CVA tenderness in the  flank with palpation or percussion  They full range of motion in all 4 extremities  Strength in all 4 extremities appears congruent  Patient is able to ambulate without assistance or difficulty  Dermatologic:  Patient has no skin abnormalities or rashes  LABS:     CBC:   Lab Results   Component Value Date    WBC 3 78 (L) 2017    HGB 16 3 2017    HCT 43 7 2017    MCV 95 2017     (L) 2017       BMP:   Lab Results   Component Value Date    GLUCOSE 99 2015    CALCIUM 9 1 2020     2015    K 4 6 2020    CO2 31 2020     2020    BUN 16 2020    CREATININE 1 08 2020     Lab Results   Component Value Date    PSA 4 3 (H) 2021    PSA 2 4 2017    PSA 2 9 2016   PSA 2020 4 8    IMAGIN/3/20  MULTIPARAMETRIC MRI OF THE PROSTATE WITH AND WITHOUT CONTRAST-WITH 3-D POSTPROCESSING      INDICATION:   N40 2: Nodular prostate without lower urinary tract symptoms  R97 20: Elevated prostate specific antigen (PSA)  52-year-old male with elevated PSA, family history of prostate cancer and 2 cm nodule towards the right base on physical exam   History of negative prostate biopsy at an outside institution      COMPARISON: None     PSA LEVEL: 4 8 ng/ml  (2020)  PRIORS: 2 4 on 2017; 2 9 on 2016  PRIOR BIOPSY DATE: 2020  BIOPSY RESULTS: Reportedly negative      TECHNIQUE: The following pulse sequences were obtained:  Small field-of-view axial T1-weighted and multiplanar T2-weighted images; DWI axial and ADC map; large field of view axial T2 weighted images; T1w in-phase and opposed-phase axials of entire pelvis   and dynamic 3D T1w axial before and during IV contrast injection    Imaging performed on 3 0T MRI      CONTRAST:  Gadobutrol (Gadavist) 9 mL of gadobutrol injection (MULTI-DOSE)      TECHNICAL LIMITATIONS: None      FINDINGS:     PROSTATE:     Size (AP x TRV x CC): 5 4 x 4 7 x 4 7 cm  Prostate volume: 60 mL  PSA density: 0 08 ng/mL2  POST-BIOPSY HEMORRHAGE:  None  PERIPHERAL ZONE:  Diffuse effacement by enlarged transition zone extruded TZ nodule extending into the right posterolateral PZ at the base, described in more detail below  No foci of restricted diffusion  TRANSITION ZONE: Moderate BPH  Atypical nodule in the right posterior TZ at the base, described in more detail below       CENTRAL ZONE: Normal     ANTERIOR FIBROMUSCULAR STROMA:  Normal        FOCAL LESIONS:       LESION: 1       Size: 0 8 x 1 0 x 0 9 cm (AP x TRV x CC), measured on: Series 4, image 16; series 10, image 22  Location: Incompletely encapsulated extruded nodule in the posterior transition zone, protruding into the right posterolateral peripheral zone, near the base  T2-weighted images: Score 2: Circumscribed hypointense or heterogeneous encapsulated nodule(s)(BPH)  Diffusion-weighted images: Score 4: Focal markedly hypointense on ADC and markedly hyperintense on high b-value DWI; less than 1 5 cm in greatest dimension  Dynamic post-contrast images: (-) Lesion that does not enhance early compared to the surrounding prostate or enhances diffusely so that the margins of the enhancing area do not correspond to a finding on T2-weighted images and/or DWI  PI-RADS Assessment Category: 3, Intermediate (presence of clinically significant cancer equivocal       Extra-prostatic extension (EPE): Does not abut capsule      SEMINAL VESICLES:  Unremarkable     URINARY BLADDER: Mild bladder wall trabeculation    Otherwise unremarkable      LYMPH NODES: No pelvic lymphadenopathy      BONES: Normal marrow signal  No suspicious bony lesion      OTHER:       Vessels:  Normal       Bowel:  Unremarkable         IMPRESSION:     1  PI-RADSv2 Category 3 - Intermediate (the presence of clinically significant cancer is equivocal): 0 8 x 1 0 x 0 9 cm atypical nodule in the right posterior transition zone near the base  2  No extraprostatic tumor, seminal vesicle invasion, pelvic lymphadenopathy, or pelvic osseous metastatic disease  3  Calculated prostate volume of 60 mL      Note: Clinically significant cancer is defined on pathology/histology as [place] score greater than or equal to 7, and/or volume of greater than or equal to 0 5 mL, and/or extraprostatic extension  PATHOLOGY:     10/13/20  Final Diagnosis    A  Prostate, right posterior medial, biopsy:     - Prostatic adenocarcinoma, acinar type, Chaitanya score 3 + 3 = 6, Prognostic Grade Group 1, continuously       involving 15% of 1 of 2 cores      Comment:  Results of multiplex immunostaining (, p63, P504S) help support the above diagnosis      B  Prostate, right posterolateral, biopsy:     - Prostatic tissue with focal atrophy      - No high grade prostatic intraepithelial neoplasia (HGPIN) or carcinoma identified      Comment:  Results of multiplex immunostaining (, p63, P504S) help support the above diagnosis      C  Prostate, right base, biopsy:     - No significant pathologic abnormalities  - No high grade prostatic intraepithelial neoplasia (HGPIN) or carcinoma identified      D  Prostate, left posterior lateral, biopsy:     - No significant pathologic abnormalities  - No high grade prostatic intraepithelial neoplasia (HGPIN) or carcinoma identified      E  Prostate, left posterior medial, biopsy:     - No significant pathologic abnormalities  - No high grade prostatic intraepithelial neoplasia (HGPIN) or carcinoma identified      F  Prostate, left base, biopsy:     - No significant pathologic abnormalities  - No high grade prostatic intraepithelial neoplasia (HGPIN) or carcinoma identified      G    Prostate, left anterior lateral, biopsy:     - No significant pathologic abnormalities  - No high grade prostatic intraepithelial neoplasia (HGPIN) or carcinoma identified      Comment:  Results of multiplex immunostaining (, p63, P504S) help support the above diagnosis      H  Prostate, left anterior medial, biopsy:     - No significant pathologic abnormalities  - No high grade prostatic intraepithelial neoplasia (HGPIN) or carcinoma identified      I  Prostate, right anterior medial, biopsy:     - No significant pathologic abnormalities  - No high grade prostatic intraepithelial neoplasia (HGPIN) or carcinoma identified      J   Prostate, right anterior lateral, biopsy:     - No significant pathologic abnormalities  - No high grade prostatic intraepithelial neoplasia (HGPIN) or carcinoma identified  ASSESSMENT:     60 y o  male with newly diagnosed very low risk [place] 3+3=6 prostate cancer in the right posterior medial transperineal prostate biopsy, consistent with patient's multiparametric MRI    PLAN:      patient will undergo PSA in 3 months for trend  Will need multiparametric MRI in the fall leading into 1 year confirmatory biopsy    We now have the luxury of transperineal fusion which we would likely lean on for this patient with his prior  positive MRI

## 2021-07-19 ENCOUNTER — APPOINTMENT (OUTPATIENT)
Dept: LAB | Facility: HOSPITAL | Age: 66
End: 2021-07-19
Payer: COMMERCIAL

## 2021-07-19 DIAGNOSIS — B20 HUMAN IMMUNODEFICIENCY VIRUS (HIV) DISEASE (HCC): ICD-10-CM

## 2021-07-19 LAB
ALBUMIN SERPL BCP-MCNC: 4.1 G/DL (ref 3.5–5)
ALP SERPL-CCNC: 51 U/L (ref 46–116)
ALT SERPL W P-5'-P-CCNC: 38 U/L (ref 12–78)
ANION GAP SERPL CALCULATED.3IONS-SCNC: 9 MMOL/L (ref 4–13)
AST SERPL W P-5'-P-CCNC: 31 U/L (ref 5–45)
BILIRUB SERPL-MCNC: 1.01 MG/DL (ref 0.2–1)
BUN SERPL-MCNC: 15 MG/DL (ref 5–25)
CALCIUM SERPL-MCNC: 9.2 MG/DL (ref 8.3–10.1)
CHLORIDE SERPL-SCNC: 106 MMOL/L (ref 100–108)
CHOLEST SERPL-MCNC: 139 MG/DL (ref 50–200)
CO2 SERPL-SCNC: 29 MMOL/L (ref 21–32)
CREAT SERPL-MCNC: 1.09 MG/DL (ref 0.6–1.3)
GFR SERPL CREATININE-BSD FRML MDRD: 71 ML/MIN/1.73SQ M
GLUCOSE P FAST SERPL-MCNC: 106 MG/DL (ref 65–99)
HDLC SERPL-MCNC: 42 MG/DL
LDLC SERPL CALC-MCNC: 82 MG/DL (ref 0–100)
NONHDLC SERPL-MCNC: 97 MG/DL
POTASSIUM SERPL-SCNC: 4.6 MMOL/L (ref 3.5–5.3)
PROT SERPL-MCNC: 7.1 G/DL (ref 6.4–8.2)
SODIUM SERPL-SCNC: 144 MMOL/L (ref 136–145)
TRIGL SERPL-MCNC: 74 MG/DL

## 2021-07-19 PROCEDURE — 36415 COLL VENOUS BLD VENIPUNCTURE: CPT

## 2021-07-19 PROCEDURE — 80061 LIPID PANEL: CPT

## 2021-07-19 PROCEDURE — 80053 COMPREHEN METABOLIC PANEL: CPT

## 2021-08-18 ENCOUNTER — APPOINTMENT (OUTPATIENT)
Dept: LAB | Facility: HOSPITAL | Age: 66
End: 2021-08-18
Attending: UROLOGY
Payer: COMMERCIAL

## 2021-08-18 DIAGNOSIS — C61 PROSTATE CANCER (HCC): ICD-10-CM

## 2021-08-18 LAB — PSA SERPL-MCNC: 4.9 NG/ML (ref 0–4)

## 2021-08-18 PROCEDURE — 84153 ASSAY OF PSA TOTAL: CPT

## 2021-08-18 PROCEDURE — 36415 COLL VENOUS BLD VENIPUNCTURE: CPT

## 2021-11-10 ENCOUNTER — TELEPHONE (OUTPATIENT)
Dept: UROLOGY | Facility: MEDICAL CENTER | Age: 66
End: 2021-11-10

## 2021-11-10 DIAGNOSIS — C61 PROSTATE CANCER (HCC): Primary | ICD-10-CM

## 2021-11-10 RX ORDER — ALPRAZOLAM 1 MG/1
1 TABLET ORAL ONCE
Qty: 1 TABLET | Refills: 0 | Status: SHIPPED | OUTPATIENT
Start: 2021-11-10 | End: 2022-01-25 | Stop reason: HOSPADM

## 2021-11-11 ENCOUNTER — APPOINTMENT (OUTPATIENT)
Dept: LAB | Facility: HOSPITAL | Age: 66
End: 2021-11-11
Attending: UROLOGY
Payer: MEDICARE

## 2021-11-11 DIAGNOSIS — C61 PROSTATE CANCER (HCC): ICD-10-CM

## 2021-11-11 LAB
ANION GAP SERPL CALCULATED.3IONS-SCNC: 7 MMOL/L (ref 4–13)
BUN SERPL-MCNC: 17 MG/DL (ref 5–25)
CALCIUM SERPL-MCNC: 9.2 MG/DL (ref 8.3–10.1)
CHLORIDE SERPL-SCNC: 108 MMOL/L (ref 100–108)
CO2 SERPL-SCNC: 30 MMOL/L (ref 21–32)
CREAT SERPL-MCNC: 1.14 MG/DL (ref 0.6–1.3)
GFR SERPL CREATININE-BSD FRML MDRD: 67 ML/MIN/1.73SQ M
GLUCOSE SERPL-MCNC: 108 MG/DL (ref 65–140)
POTASSIUM SERPL-SCNC: 4.2 MMOL/L (ref 3.5–5.3)
SODIUM SERPL-SCNC: 145 MMOL/L (ref 136–145)

## 2021-11-11 PROCEDURE — 80048 BASIC METABOLIC PNL TOTAL CA: CPT

## 2021-11-11 PROCEDURE — 36415 COLL VENOUS BLD VENIPUNCTURE: CPT

## 2021-11-15 ENCOUNTER — TELEPHONE (OUTPATIENT)
Dept: OTHER | Facility: OTHER | Age: 66
End: 2021-11-15

## 2021-11-18 ENCOUNTER — HOSPITAL ENCOUNTER (OUTPATIENT)
Dept: RADIOLOGY | Age: 66
Discharge: HOME | End: 2021-11-18
Payer: MEDICARE

## 2021-11-18 DIAGNOSIS — C61 PROSTATE CANCER (HCC): ICD-10-CM

## 2021-11-18 PROCEDURE — 72197 MRI PELVIS W/O & W/DYE: CPT

## 2021-11-18 PROCEDURE — G1004 CDSM NDSC: HCPCS

## 2021-11-18 PROCEDURE — 76377 3D RENDER W/INTRP POSTPROCES: CPT

## 2021-11-18 PROCEDURE — A9585 GADOBUTROL INJECTION: HCPCS | Performed by: UROLOGY

## 2021-11-18 RX ADMIN — GADOBUTROL 9 ML: 604.72 INJECTION INTRAVENOUS at 12:33

## 2021-11-24 ENCOUNTER — TELEPHONE (OUTPATIENT)
Dept: OTHER | Facility: OTHER | Age: 66
End: 2021-11-24

## 2021-11-30 ENCOUNTER — HOSPITAL ENCOUNTER (OUTPATIENT)
Dept: ULTRASOUND IMAGING | Facility: HOSPITAL | Age: 66
Discharge: HOME | End: 2021-11-30
Payer: MEDICARE

## 2021-11-30 ENCOUNTER — APPOINTMENT (OUTPATIENT)
Dept: LAB | Facility: HOSPITAL | Age: 66
End: 2021-11-30
Payer: MEDICARE

## 2021-11-30 DIAGNOSIS — B20 HUMAN IMMUNODEFICIENCY VIRUS (HIV) DISEASE (HCC): ICD-10-CM

## 2021-11-30 DIAGNOSIS — R89.9 ABNORMAL PLEURAL FLUID: ICD-10-CM

## 2021-11-30 DIAGNOSIS — R89.9 ABNORMAL LABORATORY TEST: ICD-10-CM

## 2021-11-30 LAB
ALBUMIN SERPL BCP-MCNC: 3.9 G/DL (ref 3.5–5)
ALP SERPL-CCNC: 49 U/L (ref 46–116)
ALT SERPL W P-5'-P-CCNC: 70 U/L (ref 12–78)
ANION GAP SERPL CALCULATED.3IONS-SCNC: 9 MMOL/L (ref 4–13)
AST SERPL W P-5'-P-CCNC: 38 U/L (ref 5–45)
BILIRUB SERPL-MCNC: 0.86 MG/DL (ref 0.2–1)
BUN SERPL-MCNC: 18 MG/DL (ref 5–25)
CALCIUM SERPL-MCNC: 9.1 MG/DL (ref 8.3–10.1)
CHLORIDE SERPL-SCNC: 105 MMOL/L (ref 100–108)
CO2 SERPL-SCNC: 27 MMOL/L (ref 21–32)
CREAT SERPL-MCNC: 1.14 MG/DL (ref 0.6–1.3)
GFR SERPL CREATININE-BSD FRML MDRD: 67 ML/MIN/1.73SQ M
GLUCOSE SERPL-MCNC: 105 MG/DL (ref 65–140)
POTASSIUM SERPL-SCNC: 4.2 MMOL/L (ref 3.5–5.3)
PROT SERPL-MCNC: 7 G/DL (ref 6.4–8.2)
SODIUM SERPL-SCNC: 141 MMOL/L (ref 136–145)

## 2021-11-30 PROCEDURE — 76700 US EXAM ABDOM COMPLETE: CPT

## 2021-11-30 PROCEDURE — 36415 COLL VENOUS BLD VENIPUNCTURE: CPT

## 2021-11-30 PROCEDURE — 86701 HIV-1ANTIBODY: CPT

## 2021-11-30 PROCEDURE — 80053 COMPREHEN METABOLIC PANEL: CPT

## 2021-11-30 PROCEDURE — 86361 T CELL ABSOLUTE COUNT: CPT

## 2021-11-30 PROCEDURE — 87536 HIV-1 QUANT&REVRSE TRNSCRPJ: CPT

## 2021-11-30 PROCEDURE — 86702 HIV-2 ANTIBODY: CPT

## 2021-11-30 PROCEDURE — 87522 HEPATITIS C REVRS TRNSCRPJ: CPT

## 2021-12-01 LAB
BASOPHILS # BLD AUTO: 0 X10E3/UL (ref 0–0.2)
BASOPHILS NFR BLD AUTO: 1 %
CD3+CD4+ CELLS # BLD: 731 /UL (ref 359–1519)
CD3+CD4+ CELLS NFR BLD: 40.6 % (ref 30.8–58.5)
EOSINOPHIL # BLD AUTO: 0.1 X10E3/UL (ref 0–0.4)
EOSINOPHIL NFR BLD AUTO: 2 %
ERYTHROCYTE [DISTWIDTH] IN BLOOD BY AUTOMATED COUNT: 12.8 % (ref 11.6–15.4)
HCT VFR BLD AUTO: 47.2 % (ref 37.5–51)
HCV RNA SERPL NAA+PROBE-ACNC: NORMAL IU/ML
HGB BLD-MCNC: 16.3 G/DL (ref 13–17.7)
HIV1 RNA # SERPL NAA+PROBE: <20 COPIES/ML
HIV1 RNA SERPL NAA+PROBE-LOG#: NORMAL LOG10COPY/ML
IMM GRANULOCYTES # BLD: 0 X10E3/UL (ref 0–0.1)
IMM GRANULOCYTES NFR BLD: 0 %
LYMPHOCYTES # BLD AUTO: 1.8 X10E3/UL (ref 0.7–3.1)
LYMPHOCYTES NFR BLD AUTO: 42 %
MCH RBC QN AUTO: 34.9 PG (ref 26.6–33)
MCHC RBC AUTO-ENTMCNC: 34.5 G/DL (ref 31.5–35.7)
MCV RBC AUTO: 101 FL (ref 79–97)
MONOCYTES # BLD AUTO: 0.3 X10E3/UL (ref 0.1–0.9)
MONOCYTES NFR BLD AUTO: 8 %
NEUTROPHILS # BLD AUTO: 2 X10E3/UL (ref 1.4–7)
NEUTROPHILS NFR BLD AUTO: 47 %
PLATELET # BLD AUTO: 125 X10E3/UL (ref 150–450)
RBC # BLD AUTO: 4.67 X10E6/UL (ref 4.14–5.8)
TEST INFORMATION: NORMAL
WBC # BLD AUTO: 4.3 X10E3/UL (ref 3.4–10.8)

## 2021-12-02 LAB — HIV 1+2 AB+HIV1 P24 AG SERPL QL IA: REACTIVE

## 2021-12-03 LAB
HIV 2 AB SERPL QL IA: NEGATIVE
HIV1 AB SERPL QL IA: POSITIVE
SL AMB NOTE:: ABNORMAL

## 2022-01-07 ENCOUNTER — TELEPHONE (OUTPATIENT)
Dept: OTHER | Facility: OTHER | Age: 67
End: 2022-01-07

## 2022-01-07 NOTE — TELEPHONE ENCOUNTER
Called and spoke with patient  Patient asked if he needed to give himself a fleets enema prior to his biopsy  Patient made aware to give Fleets about 2 hours before scheduled time

## 2022-01-10 ENCOUNTER — HOSPITAL ENCOUNTER (OUTPATIENT)
Dept: NON INVASIVE DIAGNOSTICS | Facility: HOSPITAL | Age: 67
Discharge: HOME | End: 2022-01-10
Attending: UROLOGY
Payer: MEDICARE

## 2022-01-10 ENCOUNTER — APPOINTMENT (OUTPATIENT)
Dept: LAB | Facility: HOSPITAL | Age: 67
End: 2022-01-10
Attending: UROLOGY
Payer: MEDICARE

## 2022-01-10 DIAGNOSIS — R39.89 SUSPECTED UTI: ICD-10-CM

## 2022-01-10 DIAGNOSIS — R97.20 ELEVATED PROSTATE SPECIFIC ANTIGEN (PSA): ICD-10-CM

## 2022-01-10 LAB
ALBUMIN SERPL BCP-MCNC: 4.1 G/DL (ref 3.5–5)
ALP SERPL-CCNC: 51 U/L (ref 46–116)
ALT SERPL W P-5'-P-CCNC: 54 U/L (ref 12–78)
ANION GAP SERPL CALCULATED.3IONS-SCNC: 11 MMOL/L (ref 4–13)
AST SERPL W P-5'-P-CCNC: 31 U/L (ref 5–45)
ATRIAL RATE: 58 BPM
BASOPHILS # BLD AUTO: 0.04 THOUSANDS/ΜL (ref 0–0.1)
BASOPHILS NFR BLD AUTO: 1 % (ref 0–1)
BILIRUB SERPL-MCNC: 1.31 MG/DL (ref 0.2–1)
BUN SERPL-MCNC: 15 MG/DL (ref 5–25)
CALCIUM SERPL-MCNC: 9 MG/DL (ref 8.3–10.1)
CHLORIDE SERPL-SCNC: 105 MMOL/L (ref 100–108)
CO2 SERPL-SCNC: 27 MMOL/L (ref 21–32)
CREAT SERPL-MCNC: 1.09 MG/DL (ref 0.6–1.3)
EOSINOPHIL # BLD AUTO: 0.08 THOUSAND/ΜL (ref 0–0.61)
EOSINOPHIL NFR BLD AUTO: 2 % (ref 0–6)
ERYTHROCYTE [DISTWIDTH] IN BLOOD BY AUTOMATED COUNT: 11.8 % (ref 11.6–15.1)
GFR SERPL CREATININE-BSD FRML MDRD: 70 ML/MIN/1.73SQ M
GLUCOSE P FAST SERPL-MCNC: 104 MG/DL (ref 65–99)
HCT VFR BLD AUTO: 47.7 % (ref 36.5–49.3)
HGB BLD-MCNC: 17.3 G/DL (ref 12–17)
IMM GRANULOCYTES # BLD AUTO: 0.01 THOUSAND/UL (ref 0–0.2)
IMM GRANULOCYTES NFR BLD AUTO: 0 % (ref 0–2)
LYMPHOCYTES # BLD AUTO: 2.24 THOUSANDS/ΜL (ref 0.6–4.47)
LYMPHOCYTES NFR BLD AUTO: 49 % (ref 14–44)
MCH RBC QN AUTO: 35.4 PG (ref 26.8–34.3)
MCHC RBC AUTO-ENTMCNC: 36.3 G/DL (ref 31.4–37.4)
MCV RBC AUTO: 98 FL (ref 82–98)
MONOCYTES # BLD AUTO: 0.34 THOUSAND/ΜL (ref 0.17–1.22)
MONOCYTES NFR BLD AUTO: 7 % (ref 4–12)
NEUTROPHILS # BLD AUTO: 1.87 THOUSANDS/ΜL (ref 1.85–7.62)
NEUTS SEG NFR BLD AUTO: 41 % (ref 43–75)
NRBC BLD AUTO-RTO: 0 /100 WBCS
P AXIS: 37 DEGREES
PLATELET # BLD AUTO: 127 THOUSANDS/UL (ref 149–390)
PMV BLD AUTO: 11.4 FL (ref 8.9–12.7)
POTASSIUM SERPL-SCNC: 4.2 MMOL/L (ref 3.5–5.3)
PR INTERVAL: 166 MS
PROT SERPL-MCNC: 7.7 G/DL (ref 6.4–8.2)
QRS AXIS: -8 DEGREES
QRSD INTERVAL: 82 MS
QT INTERVAL: 410 MS
QTC INTERVAL: 402 MS
RBC # BLD AUTO: 4.89 MILLION/UL (ref 3.88–5.62)
SODIUM SERPL-SCNC: 143 MMOL/L (ref 136–145)
T WAVE AXIS: 16 DEGREES
VENTRICULAR RATE: 58 BPM
WBC # BLD AUTO: 4.58 THOUSAND/UL (ref 4.31–10.16)

## 2022-01-10 PROCEDURE — 87086 URINE CULTURE/COLONY COUNT: CPT

## 2022-01-10 PROCEDURE — 93005 ELECTROCARDIOGRAM TRACING: CPT

## 2022-01-10 PROCEDURE — 93010 ELECTROCARDIOGRAM REPORT: CPT

## 2022-01-10 PROCEDURE — 85025 COMPLETE CBC W/AUTO DIFF WBC: CPT

## 2022-01-10 PROCEDURE — 80053 COMPREHEN METABOLIC PANEL: CPT

## 2022-01-10 PROCEDURE — 36415 COLL VENOUS BLD VENIPUNCTURE: CPT

## 2022-01-11 LAB — BACTERIA UR CULT: NORMAL

## 2022-01-18 ENCOUNTER — TELEPHONE (OUTPATIENT)
Dept: OTHER | Facility: OTHER | Age: 67
End: 2022-01-18

## 2022-01-18 ENCOUNTER — TELEPHONE (OUTPATIENT)
Dept: UROLOGY | Facility: CLINIC | Age: 67
End: 2022-01-18

## 2022-01-18 DIAGNOSIS — C61 PROSTATE CANCER (HCC): Primary | ICD-10-CM

## 2022-01-18 RX ORDER — CIPROFLOXACIN 500 MG/1
500 TABLET, FILM COATED ORAL 2 TIMES DAILY
Qty: 2 TABLET | Refills: 0 | Status: SHIPPED | OUTPATIENT
Start: 2022-01-18 | End: 2022-01-19

## 2022-01-18 NOTE — TELEPHONE ENCOUNTER
Called and left voicemail message for patient that Cipro was sent to his requested pharmacy for the day of his procedure  He was advised to take one pill in the am and one in the pm on the procedure day  Also aware he will be receiving IV abx during procedure  Office phone number provided for patient if he has any additional questions

## 2022-01-18 NOTE — TELEPHONE ENCOUNTER
Patient stated he is getting a procedure done next week  Patient is requesting all medication prior to procedure be sent to [address] [place] [address]  [phone] on Monday 1/24/2022  Patient is requesting a call back from the office once this is complete

## 2022-01-18 NOTE — TELEPHONE ENCOUNTER
Called and spoke to pt stating     "My name is Mcneil Gowers     I am calling in regrades to your prep instructions for your appointment at the [facility] [address]  on 1/25/22 with Dr Salvador Pope  under IV  please make sure to stop all blood thinners (including multivitamins) (3 days for Eliquis) 7 day prior to you appointment  Nothing to eat after midnight the day before the procedure   And please make sure you have a    Pt will need to use an enema with in 2 hour of the appointment  If you have any questions please call [phone] and ask for Mcneil Gowers "    he expressed understanding of instructions

## 2022-01-18 NOTE — TELEPHONE ENCOUNTER
rx sent- He will take the cipro BID the day of biopsy  He will also receive an IV abx during procedure

## 2022-01-21 NOTE — TELEPHONE ENCOUNTER
Call placed to patient  He wanted to make sure he is supposed to take 1 pill of Cipro  at home prior to his procedure on the morning of 1/25/2022  Assured patient that he was correct

## 2022-01-25 ENCOUNTER — ANESTHESIA EVENT (OUTPATIENT)
Dept: GASTROENTEROLOGY | Facility: HOSPITAL | Age: 67
End: 2022-01-25
Payer: MEDICARE

## 2022-01-25 ENCOUNTER — HOSPITAL ENCOUNTER (OUTPATIENT)
Facility: HOSPITAL | Age: 67
Setting detail: OUTPATIENT SURGERY
Discharge: HOME | End: 2022-01-25
Attending: UROLOGY | Admitting: UROLOGY
Payer: MEDICARE

## 2022-01-25 ENCOUNTER — ANESTHESIA (OUTPATIENT)
Dept: GASTROENTEROLOGY | Facility: HOSPITAL | Age: 67
End: 2022-01-25
Payer: MEDICARE

## 2022-01-25 VITALS
OXYGEN SATURATION: 99 % | TEMPERATURE: 96.3 F | HEART RATE: 60 BPM | RESPIRATION RATE: 20 BRPM | DIASTOLIC BLOOD PRESSURE: 89 MMHG | SYSTOLIC BLOOD PRESSURE: 127 MMHG

## 2022-01-25 DIAGNOSIS — R97.20 ELEVATED PROSTATE SPECIFIC ANTIGEN (PSA): ICD-10-CM

## 2022-01-25 PROCEDURE — 88344 IMHCHEM/IMCYTCHM EA MLT ANTB: CPT | Performed by: PATHOLOGY

## 2022-01-25 PROCEDURE — NC001 PR NO CHARGE: Performed by: UROLOGY

## 2022-01-25 PROCEDURE — G0416 PROSTATE BIOPSY, ANY MTHD: HCPCS | Performed by: PATHOLOGY

## 2022-01-25 PROCEDURE — 76942 ECHO GUIDE FOR BIOPSY: CPT | Performed by: UROLOGY

## 2022-01-25 PROCEDURE — 55700 PR BIOPSY OF PROSTATE,NEEDLE/PUNCH: CPT | Performed by: UROLOGY

## 2022-01-25 RX ORDER — PROPOFOL 10 MG/ML
INJECTION, EMULSION INTRAVENOUS AS NEEDED
Status: DISCONTINUED | OUTPATIENT
Start: 2022-01-25 | End: 2022-01-25

## 2022-01-25 RX ORDER — DOCUSATE SODIUM 100 MG/1
100 CAPSULE, LIQUID FILLED ORAL 3 TIMES DAILY PRN
COMMUNITY

## 2022-01-25 RX ORDER — LIDOCAINE HYDROCHLORIDE 10 MG/ML
INJECTION, SOLUTION EPIDURAL; INFILTRATION; INTRACAUDAL; PERINEURAL AS NEEDED
Status: DISCONTINUED | OUTPATIENT
Start: 2022-01-25 | End: 2022-01-25 | Stop reason: HOSPADM

## 2022-01-25 RX ORDER — SODIUM CHLORIDE 9 MG/ML
INJECTION, SOLUTION INTRAVENOUS CONTINUOUS PRN
Status: DISCONTINUED | OUTPATIENT
Start: 2022-01-25 | End: 2022-01-25

## 2022-01-25 RX ORDER — CIPROFLOXACIN 500 MG/1
500 TABLET, FILM COATED ORAL EVERY 12 HOURS SCHEDULED
COMMUNITY

## 2022-01-25 RX ORDER — BUPIVACAINE HYDROCHLORIDE 5 MG/ML
INJECTION, SOLUTION EPIDURAL; INTRACAUDAL AS NEEDED
Status: DISCONTINUED | OUTPATIENT
Start: 2022-01-25 | End: 2022-01-25 | Stop reason: HOSPADM

## 2022-01-25 RX ORDER — MULTIVITAMIN
1 CAPSULE ORAL DAILY
COMMUNITY

## 2022-01-25 RX ADMIN — SODIUM CHLORIDE: 0.9 INJECTION, SOLUTION INTRAVENOUS at 10:20

## 2022-01-25 RX ADMIN — PROPOFOL 50 MG: 10 INJECTION, EMULSION INTRAVENOUS at 11:22

## 2022-01-25 RX ADMIN — PROPOFOL 20 MG: 10 INJECTION, EMULSION INTRAVENOUS at 11:15

## 2022-01-25 RX ADMIN — DEXTROSE 1000 MG: 50 INJECTION, SOLUTION INTRAVENOUS at 09:58

## 2022-01-25 RX ADMIN — PROPOFOL 50 MG: 10 INJECTION, EMULSION INTRAVENOUS at 11:07

## 2022-01-25 RX ADMIN — PROPOFOL 50 MG: 10 INJECTION, EMULSION INTRAVENOUS at 11:12

## 2022-01-25 RX ADMIN — PROPOFOL 50 MG: 10 INJECTION, EMULSION INTRAVENOUS at 11:17

## 2022-01-25 RX ADMIN — SODIUM CHLORIDE: 0.9 INJECTION, SOLUTION INTRAVENOUS at 11:18

## 2022-01-25 RX ADMIN — PROPOFOL 30 MG: 10 INJECTION, EMULSION INTRAVENOUS at 11:09

## 2022-01-25 RX ADMIN — PROPOFOL 100 MG: 10 INJECTION, EMULSION INTRAVENOUS at 11:02

## 2022-01-25 NOTE — H&P
UROLOGY PREOPERATIVE H&P NOTE     CHIEF COMPLAINT   Ebbie Kena is a 73 y o  male with a complaint of   No chief complaint on file  History of Present Illness:     73 y o  male, partner of another patient of mine, who presents as a 2nd opinion  The patient has had a history of PSAs in the mid 2 range with PSA last year in the mid threes now with PSA of 4 7 and recheck of 4 8 in May and June of this year  He has a family history of prostate cancer in his father, who was treated with brachytherapy  He was recommended to undergo up from biopsy by his outside urologist   Documentation of his rectal exam demonstrates a normal 15 gland prostate  He is an avid bicycle rider  Patient underwent initialy upfront multiparametric MRI of the prostate which demonstrated a right posterior lesion  Given immunocompromised/HIV status, the patient was very interested in perineal fusion  Unfortunate this could not be offered  Patient ultimately opted to undergo transperineal approach with saturation of the right side  Very low risk Chaitanya 6 diagnosed, agreed to active surveillance  Lab Results   Component Value Date    PSA 4 9 (H) 08/18/2021    PSA 4 3 (H) 04/29/2021    PSA 2 4 04/18/2017   PSA 5/2020 4 8    Now presents for confirmatory biopsy after negative recent mpMRI  Past Medical History:     Past Medical History:   Diagnosis Date    HIV (human immunodeficiency virus infection) ([facility] [address] )     Hyperlipidemia     Hypertension     PONV (postoperative nausea and vomiting)        PAST SURGICAL HISTORY:     Past Surgical History:   Procedure Laterality Date    COLONOSCOPY      HERNIA REPAIR      VT BIOPSY OF PROSTATE,NEEDLE/PUNCH N/A 10/13/2020    Procedure: TRANSPERINEAL PROSTATE BIOPSY;  Surgeon: So Palacios MD;  Location: [facility];  Service: Urology       CURRENT MEDICATIONS:     No current facility-administered medications for this encounter       Current Outpatient Medications   Medication Sig Dispense Refill    ALPRAZolam (XANAX) 1 mg tablet Take 1 tablet (1 mg total) by mouth 1 (one) time for 1 dose Take tablet thirty minutes prior to MRI 1 tablet 0    aspirin (Aspirin 81) 81 mg EC tablet Take 81 mg by mouth daily      Cholecalciferol 25 MCG (1000 UT) CHEW Chew 1 tablet daily      cyanocobalamin (VITAMIN B-12) 500 MCG tablet Take 1,000 mcg by mouth daily      dolutegravir (TIVICAY) 50 MG TABS Take 50 mg by mouth daily      emtricitabine-tenofovir AF (Descovy) 200-25 MG tablet Take 25 mg by mouth daily      famotidine (PEPCID) 20 mg tablet Take 1 tablet by mouth      fluticasone (FLONASE) 50 mcg/act nasal spray 1 spray into each nostril 2 (two) times a day      lisinopril (ZESTRIL) 20 mg tablet Take 20 mg by mouth daily      loratadine (CLARITIN) 10 mg tablet Take 10 mg by mouth daily      metoprolol succinate (TOPROL-XL) 25 mg 24 hr tablet Take 1 tablet by mouth daily      metoprolol tartrate (LOPRESSOR) 50 mg tablet Take 50 mg by mouth 2 (two) times a day      Omega-3 1000 MG CAPS Take 2 g by mouth daily      phenazopyridine (PYRIDIUM) 100 mg tablet Take 1 tablet (100 mg total) by mouth 3 (three) times a day as needed for bladder spasms 20 tablet 0    potassium citrate (Urocit-K 10) 10 mEq Take 1 tablet (10 mEq total) by mouth daily 90 tablet 3    pravastatin (PRAVACHOL) 80 mg tablet Take 80 mg by mouth daily      traMADol (ULTRAM) 50 mg tablet Take 1 tablet (50 mg total) by mouth every 6 (six) hours as needed for moderate pain for up to 5 doses 5 tablet 0       ALLERGIES:     Allergies   Allergen Reactions    Sulfa Antibiotics        SOCIAL HISTORY:     Social History     Socioeconomic History    Marital status: Single     Spouse name: Not on file    Number of children: Not on file    Years of education: Not on file    Highest education level: Not on file   Occupational History    Not on file   Tobacco Use    Smoking status: Never Smoker    Smokeless tobacco: Never Used Substance and Sexual Activity    Alcohol use: Not Currently    Drug use: Not Currently    Sexual activity: Not on file   Other Topics Concern    Not on file   Social History Narrative    Not on file     Social Determinants of Health     Financial Resource Strain: Not on file   Food Insecurity: Not on file   Transportation Needs: Not on file   Physical Activity: Not on file   Stress: Not on file   Social Connections: Not on file   Intimate Partner Violence: Not on file   Housing Stability: Not on file       SOCIAL HISTORY:   No family history on file  REVIEW OF SYSTEMS:     Review of Systems   Constitutional: Negative  Respiratory: Negative  Cardiovascular: Negative  Gastrointestinal: Negative  Genitourinary: Negative for enuresis  Musculoskeletal: Negative  Skin: Negative  Psychiatric/Behavioral: Negative  PHYSICAL EXAM:     Vital signs pending    General:  Healthy appearing male in no acute distress  They have a normal affect  There is not appear to be any gross neurologic defects or abnormalities  HEENT:  Normocephalic, atraumatic  Neck is supple without any palpable lymphadenopathy  Cardiovascular:  Patient has normal palpable distal radial pulses  There is no significant peripheral edema  No JVD is noted  Respiratory:  Patient has unlabored respirations  There is no audible wheeze or rhonchi  Abdomen:  Abdomen is soft and nontender  There is no tympany  Inguinal and umbilical hernia are not appreciated  :   Circumcised phallus, orthotopic meatus, testicles are smooth and descended, rectal exam demonstrates some small amount of induration on the right side of the prostate consistent with prior cancer diagnosis  Musculoskeletal:  Patient does not have significant CVA tenderness in the  flank with palpation or percussion  They full range of motion in all 4 extremities  Strength in all 4 extremities appears congruent    Patient is able to ambulate without assistance or difficulty  Dermatologic:  Patient has no skin abnormalities or rashes  LABS:     CBC:   Lab Results   Component Value Date    WBC 4 58 01/10/2022    HGB 17 3 (H) 01/10/2022    HCT 47 7 01/10/2022    MCV 98 01/10/2022     (L) 01/10/2022       BMP:   Lab Results   Component Value Date    GLUCOSE 99 2015    CALCIUM 9 0 01/10/2022     2015    K 4 2 01/10/2022    CO2 27 01/10/2022     01/10/2022    BUN 15 01/10/2022    CREATININE 1 09 01/10/2022     Lab Results   Component Value Date    PSA 4 9 (H) 2021    PSA 4 3 (H) 2021    PSA 2 4 2017   PSA 2020 4 8    IMAGIN/18/21  MULTIPARAMETRIC MRI OF THE PROSTATE WITH AND WITHOUT CONTRAST-WITH 3-D POSTPROCESSING      INDICATION:  C61: Malignant neoplasm of prostate      COMPARISON:  MR prostate 8/3/2020     PSA LEVEL: 4 9 ng/ml  2021  PRIOR BIOPSY DATE: 10/13/2020  BIOPSY RESULTS: A   Prostate, right posterior medial, biopsy:     - Prostatic adenocarcinoma, acinar type, Chaitanya score 3 + 3 = 6, Prognostic Grade Group 1, continuously       involving 15% of 1 of 2 cores      TECHNIQUE: The following pulse sequences were obtained:  Small field-of-view axial T1-weighted and multiplanar T2-weighted images; DWI axial and ADC map; large field of view axial T2 weighted images; T1w in-phase and opposed-phase axials of entire pelvis   and dynamic 3D T1w axial before and during IV contrast injection  Imaging performed on 3 0T MRI      CONTRAST:  Gadobutrol (Gadavist) 9 mL of Gadobutrol injection (SINGLE-DOSE)     TECHNICAL LIMITATIONS: None      FINDINGS:     PROSTATE:     Size: 5 5 x 4 6 x 4 7 cm = 64 4 cc  Post-biopsy hemorrhage:  None  Central gland enlargement (BPH): Moderate      Focal lesions - No dominant lesion  Heterogeneous peripheral zone    Findings of BPH with a mostly encapsulated OR a homogenous circumscribed nodule without encapsulation OR a homogenous hypointense area between nodules  PI-RADSv2 1 Category 2 - Low   (clinically significant cancer unlikely)  Previously described lesion in the right posterior transition zone is not visualized      SEMINAL VESICLES: Right seminal vesicle is smaller than the left  Otherwise unremarkable      Note: Clinically significant cancer is defined on pathology/histology as [place] score greater than or equal to 7, and/or volume of greater than or equal to 0 5 mL, and/or extraprostatic extension      URINARY BLADDER: Unremarkable      LYMPH NODES: No pelvic lymphadenopathy      BONES: No suspicious osseous lesion         IMPRESSION:     1  PI-RADSv2 1 Category 2 - Low (clinically significant cancer is unlikely to be present)  Previously described lesion in the right posterior transition zone is not visualized      2  No extraprostatic tumor, seminal vesicle invasion, pelvic lymphadenopathy, or pelvic osseous metastatic disease      3  Calculated prostate volume of 64 4 cc      Prostate gland boundaries were segmented using 3D advanced post-processing on an independent [employer] system workstation with active physician participation  8/3/20  MULTIPARAMETRIC MRI OF THE PROSTATE WITH AND WITHOUT CONTRAST-WITH 3-D POSTPROCESSING      INDICATION:   N40 2: Nodular prostate without lower urinary tract symptoms  R97 20: Elevated prostate specific antigen (PSA)  81-year-old male with elevated PSA, family history of prostate cancer and 2 cm nodule towards the right base on physical exam   History of negative prostate biopsy at an outside institution      COMPARISON: None     PSA LEVEL: 4 8 ng/ml  (5/27/2020)  PRIORS: 2 4 on 4/18/2017; 2 9 on 7/13/2016  PRIOR BIOPSY DATE: 5/27/2020    BIOPSY RESULTS: Reportedly negative      TECHNIQUE: The following pulse sequences were obtained:  Small field-of-view axial T1-weighted and multiplanar T2-weighted images; DWI axial and ADC map; large field of view axial T2 weighted images; T1w in-phase and opposed-phase axials of entire pelvis   and dynamic 3D T1w axial before and during IV contrast injection  Imaging performed on 3 0T MRI      CONTRAST:  Gadobutrol (Gadavist) 9 mL of gadobutrol injection (MULTI-DOSE)      TECHNICAL LIMITATIONS: None      FINDINGS:     PROSTATE:     Size (AP x TRV x CC): 5 4 x 4 7 x 4 7 cm  Prostate volume: 60 mL  PSA density: 0 08 ng/mL2  POST-BIOPSY HEMORRHAGE:  None  PERIPHERAL ZONE:  Diffuse effacement by enlarged transition zone extruded TZ nodule extending into the right posterolateral PZ at the base, described in more detail below  No foci of restricted diffusion  TRANSITION ZONE: Moderate BPH  Atypical nodule in the right posterior TZ at the base, described in more detail below       CENTRAL ZONE: Normal     ANTERIOR FIBROMUSCULAR STROMA:  Normal        FOCAL LESIONS:       LESION: 1       Size: 0 8 x 1 0 x 0 9 cm (AP x TRV x CC), measured on: Series 4, image 16; series 10, image 22  Location: Incompletely encapsulated extruded nodule in the posterior transition zone, protruding into the right posterolateral peripheral zone, near the base  T2-weighted images: Score 2: Circumscribed hypointense or heterogeneous encapsulated nodule(s)(BPH)  Diffusion-weighted images: Score 4: Focal markedly hypointense on ADC and markedly hyperintense on high b-value DWI; less than 1 5 cm in greatest dimension  Dynamic post-contrast images: (-) Lesion that does not enhance early compared to the surrounding prostate or enhances diffusely so that the margins of the enhancing area do not correspond to a finding on T2-weighted images and/or DWI  PI-RADS Assessment Category: 3, Intermediate (presence of clinically significant cancer equivocal       Extra-prostatic extension (EPE): Does not abut capsule      SEMINAL VESICLES:  Unremarkable     URINARY BLADDER: Mild bladder wall trabeculation    Otherwise unremarkable      LYMPH NODES: No pelvic lymphadenopathy      BONES: Normal marrow signal  No suspicious bony lesion      OTHER:       Vessels:  Normal       Bowel:  Unremarkable         IMPRESSION:     1  PI-RADSv2 Category 3 - Intermediate (the presence of clinically significant cancer is equivocal): 0 8 x 1 0 x 0 9 cm atypical nodule in the right posterior transition zone near the base  2  No extraprostatic tumor, seminal vesicle invasion, pelvic lymphadenopathy, or pelvic osseous metastatic disease  3  Calculated prostate volume of 60 mL      Note: Clinically significant cancer is defined on pathology/histology as Chaitanya score greater than or equal to 7, and/or volume of greater than or equal to 0 5 mL, and/or extraprostatic extension  PATHOLOGY:     10/13/20  Final Diagnosis    A  Prostate, right posterior medial, biopsy:     - Prostatic adenocarcinoma, acinar type, Chaitanya score 3 + 3 = 6, Prognostic Grade Group 1, continuously       involving 15% of 1 of 2 cores      Comment:  Results of multiplex immunostaining (, p63, P504S) help support the above diagnosis      B  Prostate, right posterolateral, biopsy:     - Prostatic tissue with focal atrophy      - No high grade prostatic intraepithelial neoplasia (HGPIN) or carcinoma identified      Comment:  Results of multiplex immunostaining (, p63, P504S) help support the above diagnosis      C  Prostate, right base, biopsy:     - No significant pathologic abnormalities  - No high grade prostatic intraepithelial neoplasia (HGPIN) or carcinoma identified      D  Prostate, left posterior lateral, biopsy:     - No significant pathologic abnormalities  - No high grade prostatic intraepithelial neoplasia (HGPIN) or carcinoma identified      E  Prostate, left posterior medial, biopsy:     - No significant pathologic abnormalities  - No high grade prostatic intraepithelial neoplasia (HGPIN) or carcinoma identified      F    Prostate, left base, biopsy:     - No significant pathologic abnormalities  - No high grade prostatic intraepithelial neoplasia (HGPIN) or carcinoma identified      G  Prostate, left anterior lateral, biopsy:     - No significant pathologic abnormalities  - No high grade prostatic intraepithelial neoplasia (HGPIN) or carcinoma identified      Comment:  Results of multiplex immunostaining (, p63, P504S) help support the above diagnosis      H  Prostate, left anterior medial, biopsy:     - No significant pathologic abnormalities  - No high grade prostatic intraepithelial neoplasia (HGPIN) or carcinoma identified      I  Prostate, right anterior medial, biopsy:     - No significant pathologic abnormalities  - No high grade prostatic intraepithelial neoplasia (HGPIN) or carcinoma identified      J   Prostate, right anterior lateral, biopsy:     - No significant pathologic abnormalities  - No high grade prostatic intraepithelial neoplasia (HGPIN) or carcinoma identified  ASSESSMENT:     73 y o  male with newly diagnosed very low risk Chaitanya 3+3=6 prostate cancer in the right posterior medial transperineal prostate biopsy, consistent with patient's multiparametric MRI    PLAN:     Confirmatory biopsy, transperineal approach after recent negative PIRADs-2 mpMRI  Risks and benefits discussed and described, informed consent signed

## 2022-01-25 NOTE — DISCHARGE INSTRUCTIONS
Prostate Biopsy   WHAT YOU NEED TO KNOW:   What do I need to know about a prostate biopsy? A prostate biopsy is a procedure to remove samples of tissue from your prostate gland  The prostate is a male sex gland that makes fluid found in semen  It is located just below the bladder  After the samples are removed, they are sent to a lab and tested for cancer  How do I prepare for a prostate biopsy? · Your healthcare provider will talk to you about how to prepare for this procedure  Your provider may tell you to shower the night before your surgery  He or she may tell you to use a certain soap to help prevent a surgical site infection  Your provider may tell you not to eat or drink anything after midnight on the day of your surgery  · Tell your healthcare provider about all your current medicines  You will need to stop taking blood thinners several days before your procedure  Examples of blood thinners include warfarin and NSAIDs  You may also need to stop taking herbal supplements before your procedure  Your provider will tell you if you need to stop any other medicine for the procedure, and when to stop  · You may be given an antibiotic to help prevent a bacterial infection  You may need to give yourself an enema (liquid medicine put in your rectum) to help empty your bowel before your procedure  · Your provider will tell you if he or she plans to use MRI pictures during the biopsy  Metal can cause serious injury during an MRI  Tell your provider if you have any metal in or on your body  What will happen during a prostate biopsy? · You may need to lie on your side with your knees pulled up toward your chest  Numbing cream or gel may be put into your rectum or injected near your prostate  You may instead be given general anesthesia to keep you asleep and free from pain during the procedure  A biopsy sample may be taken through your rectum, urethra, or perineum   The perineum is the area between your scrotum and rectum  Most of the time, biopsy samples are taken through the rectum  · Your healthcare provider may insert a small ultrasound probe into your rectum  The ultrasound shows pictures of your prostate on a monitor, and is used to help guide the biopsy needle  Your provider may instead use MRI pictures to guide the placement  He or she will push the biopsy needle through the wall of your rectum and into your prostate gland  · Your provider will remove 6 to 12 samples of tissue from several areas of your prostate gland  The samples will be sent to a lab and tested for cancer  What should I expect after a prostate biopsy? · You may feel soreness at the biopsy site  · You may need to take antibiotics for up to 2 days after your procedure to help prevent an infection  · You may have some bleeding from your rectum  You may also have blood in your urine, bowel movements, or semen  What are the risks of a prostate biopsy? You may bleed more than expected or get an infection in your urinary tract or prostate gland  The infection may spread to your blood and the rest of your body  Your bladder may not empty completely when you urinate  You may need a catheter to help empty your bladder for a short period of time  Cancer cells may be missed during your biopsy procedure  You may need another prostate biopsy to check for cancer again  CARE AGREEMENT:   You have the right to help plan your care  Learn about your health condition and how it may be treated  Discuss treatment options with your healthcare providers to decide what care you want to receive  You always have the right to refuse treatment  The above information is an  only  It is not intended as medical advice for individual conditions or treatments  Talk to your doctor, nurse or pharmacist before following any medical regimen to see if it is safe and effective for you    © Copyright [employer] 2021 Information is for End User's use only and may not be sold, redistributed or otherwise used for commercial purposes   All illustrations and images included in CareNotes® are the copyrighted property of [employer]  or [employer] Martinez

## 2022-01-25 NOTE — OP NOTE
PERATIVE REPORT  PATIENT NAME: Delisa Liu    :  1955  MRN: [id number]  Pt Location: [facility] 01    SURGERY DATE: 2022    Surgeon(s) and Role:     * Elkin Mtz MD - Primary    Preop Diagnosis:  Elevated prostate specific antigen (PSA) [R97 20]    Post-Op Diagnosis Codes:     * Elevated prostate specific antigen (PSA) [R97 20]    Procedure(s) (LRB):  TRANSPERINEAL ULTRASOUND GUIDED BIOPSY PROSTATE (N/A)    Specimen(s):  ID Type Source Tests Collected by Time Destination   1 : Right Posterior Lateral Tissue Prostate TISSUE EXAM Elkin Mtz MD 2022 1110    2 : Right Posterior Medial Tissue Prostate TISSUE EXAM Elkin Mtz MD 2022 1111    3 : Right Base Tissue Prostate TISSUE EXAM Elkin Mtz MD 2022 1111    4 : Left Posterior Medial Tissue Prostate TISSUE EXAM Elkin Mtz MD 2022 1111    5 : Left Posterior Lateral Tissue Prostate TISSUE EXAM Elkin Mtz MD 2022 1111    6 : Left Base Tissue Prostate TISSUE EXAM Elkin Mtz MD 2022 1111    7 : Left Anterior Lateral Tissue Prostate TISSUE EXAM Elkin Mtz MD 2022 1111    8 : Left Anterior Medial Tissue Prostate TISSUE EXAM Elkin Mtz MD 2022 1111    9 : Right Anterior Lateral Tissue Prostate TISSUE EXAM Elkin Mtz MD 2022 1111    10 : Right Anterior Medial Tissue Prostate TISSUE EXAM Elkin Mtz MD 2022 1111        Estimated Blood Loss:   Minimal    Drains:  * No LDAs found *    Anesthesia Type:   IV Sedation with Anesthesia    Operative Indications:  Elevated prostate specific antigen (PSA) [R97 20]      Operative Findings:  1  Standard transperineal biopsy    Complications:   None    Procedure and Technique:  Procedure was transperineal biopsy utilizing the Precision Point perineal access device    Delisa Liu is a 73 y o  male with low risk prostate cancer history on surveillance   Recent repeat mpMRI was negative  The patient was counseled regarding their options and ultimately opted to proceed with transperineal prostate biopsy  Risks and benefits of the procedure were described and the patient signed an informed consent  On 1/25/2022, the patient proceeded to the operating room  They were laid supine on the operating room table and a pre-procedure time out was performed with all parties present and in agreement of the procedure planned and laterality  Patient received intravenous antibiotics in the form of ceftriaxone and sequential compression devices were placed on bilateral lower extremities  They were then induced with a general LMA anesthetic  He was placed in dorsal lithotomy with care to pad all pressure points  His perineum and genitalia were prepped with a ChloraPrep solution  2% viscous lidocaine was placed per urethra  Sterile Iodoband was placed over the perineum above the rectum  Side fire biplanar transrectal ultrasound was performed and measurements of the prostate gland were taken as above  In the midportion of the left and right prostate, a nishant was denoted in the perineal skin  Skin wheal was elevated with 5 cc of 1% lidocaine/0 5% Marcaine plain on either side  The [employer] device was then introduced into the left perineal subcutaneous tissue  We visualized the tip of the needle  Spinal needle was introduced through the subcutaneous fat and into the pelvic floor muscle where a perineal pudendal block was performed with additional 5 cc of 1% lidocaine  This was repeated on the patient's right side  On the right side of the prostate, we performed serial biopsies of the right posterior medial peripheral zone, right posterior lateral peripheral zone and moving up the anterior horn to the right anterior lateral and right anteromedial zone  Separate specimen was taken from the right-sided prostate base    Several areas had multiple cores taken     The PrecisionPoint device was repositioned into the left perineal stab  The biopsies were undertaken in the same fashion on the left side  Left posterior medial, left posterior lateral, left anterior lateral, left anteromedial and left base  The transplant rectal ultrasound probe was removed  The Iodoband was retracted and there was some urethral bleeding  Some gentle pressure was placed on the perineum for approximately 5 minutes  At the completion of the procedure, the patient was taken out of dorsal lithotomy, extubated and transferred to PACU in good condition     Patient Disposition:  PACU       SIGNATURE: Candelaria Garcia MD  DATE: January 25, 2022  TIME: 11:23 AM

## 2022-01-25 NOTE — ANESTHESIA PREPROCEDURE EVALUATION
Procedure:  TRANSPERINEAL ULTRASOUND GUIDED BIOPSY PROSTATE (N/A Perineum)    Relevant Problems   CARDIO   (+) HTN (hypertension)   (+) Hyperlipidemia      /RENAL   (+) Prostate nodule      Other   (+) HIV infection (HCC)             Anesthesia Plan  ASA Score- 2     Anesthesia Type- IV sedation with anesthesia with ASA Monitors  Additional Monitors:   Airway Plan:     Comment: IV sedation,  standard ASA monitors  Risks and benefits discussed with patient; patient consented and agrees to proceed  I saw and evaluated the patient  If seen with CRNA, we have discussed the anesthetic plan and I am in agreement that the plan is appropriate for the patient          Plan Factors-    Chart reviewed  Existing labs reviewed  Induction- intravenous  Postoperative Plan-     Informed Consent- Anesthetic plan and risks discussed with patient  I personally reviewed this patient with the CRNA  Discussed and agreed on the Anesthesia Plan with the MILLER Cunha

## 2022-01-27 ENCOUNTER — TELEPHONE (OUTPATIENT)
Dept: UROLOGY | Facility: CLINIC | Age: 67
End: 2022-01-27

## 2022-01-27 NOTE — TELEPHONE ENCOUNTER
----- Message from Dee Pitts MD sent at 1/27/2022 10:14 AM EST -----  Can cancel 2/1/22 appt and reschedule 6mos with me with PSA prior

## 2022-01-27 NOTE — TELEPHONE ENCOUNTER
Called and spoke with patient  Patient made aware of his pathology results being negative for cancer  Patient scheduled 07/19/2022 at [phone] with MD at the [facility] office  Patient made aware to obtain PSA prior to visit

## 2022-01-27 NOTE — TELEPHONE ENCOUNTER
----- Message from Lloyd Chang MD sent at 1/27/2022 10:14 AM EST -----  Can cancel 2/1/22 appt and reschedule 6mos with me with PSA prior

## 2022-03-11 ENCOUNTER — OFFICE VISIT (OUTPATIENT)
Dept: GASTROENTEROLOGY | Facility: MEDICAL CENTER | Age: 67
End: 2022-03-11
Payer: MEDICARE

## 2022-03-11 VITALS
DIASTOLIC BLOOD PRESSURE: 86 MMHG | SYSTOLIC BLOOD PRESSURE: 149 MMHG | HEART RATE: 70 BPM | WEIGHT: 197.8 LBS | BODY MASS INDEX: 25.4 KG/M2 | TEMPERATURE: 98.5 F

## 2022-03-11 DIAGNOSIS — D69.6 PLATELETS DECREASED (HCC): ICD-10-CM

## 2022-03-11 DIAGNOSIS — R00.2 PALPITATIONS: ICD-10-CM

## 2022-03-11 DIAGNOSIS — K76.0 NON-ALCOHOLIC FATTY LIVER DISEASE: Primary | ICD-10-CM

## 2022-03-11 DIAGNOSIS — K21.9 GASTROESOPHAGEAL REFLUX DISEASE, UNSPECIFIED WHETHER ESOPHAGITIS PRESENT: ICD-10-CM

## 2022-03-11 PROCEDURE — 99214 OFFICE O/P EST MOD 30 MIN: CPT | Performed by: INTERNAL MEDICINE

## 2022-03-11 PROCEDURE — 1124F ACP DISCUSS-NO DSCNMKR DOCD: CPT | Performed by: INTERNAL MEDICINE

## 2022-03-11 NOTE — PROGRESS NOTES
Outpatient Follow up  [address]  [address]  [facility] [address]  Kvng Larson MD  Ph : [phone]  Fax : [phone]  Mobile : [phone]  Email : [email or web address]  Also available on [employer] Text    Jasmin Campbell 73 y o  male MRN: [id number]    PCP: ARINA Bynum  Referring: No referring provider defined for this encounter  Jasmin Campbell presented for a follow up visit  My recommendations are included  Please do not hesitate to contact me with any questions you may have  ASSESSMENT AND PLAN:      No problem-specific Assessment & Plan notes found for this encounter  Diagnoses and all orders for this visit:    Non-alcoholic fatty liver disease  -     US elastography; Future    Gastroesophageal reflux disease, unspecified whether esophagitis present  -     EGD; Future    Platelets decreased ([address] )    Palpitations  -     Ambulatory Referral to Cardiac Electrophysiology; Future      28-year-old gentleman with a history of GERD symptoms and abdominal discomfort in the epigastric region and left upper quadrant  Family history of esophageal cancer in his father  Experiencing palpitations  History of HIV which is under control  Recent diagnosis of fatty liver on imaging  1  US elastography   2  EGD  3  If US elastography showed concern for fibrosis then consider liver biopsy  4  Cardiac evaluation  Dr Jordan Wiggins for his palpitations  5  Colonoscopy done in 2020 showed small polyp  Repeat colonoscopy was recommended in 5 years  ______________________________________________________________________    SUBJECTIVE:  74 y/o with h/o recent abdominal discomfort in the epigastric area and the left side  It is a dull discomfort  The discomfort is waxing and waning  He has been experiencing some acid reflux for some years and worse with certain foods especially acidic foods and tomato sauce  He has excessive burping  He has been using a wedge  His father had esophageal cancer  No dysphagia  No weight loss  He does not have a sore throat  Occasional coughing  No clearing the throat  He does snore  No diarrhea/ constipation/ bleeding  Was recently diagnosed with fatty liver and has thrombocytopenia (long standing)  Has not had that work up done  No family h/o liver issues or thrombocytopenia  He has HIV and is under control  REVIEW OF SYSTEMS IS OTHERWISE NEGATIVE  Historical Information   Past Medical History:   Diagnosis Date    GERD (gastroesophageal reflux disease)     HIV (human immunodeficiency virus infection) ([place] [address] )     Hyperlipidemia     Hypertension     Kidney stone     Non-alcoholic fatty liver disease     PONV (postoperative nausea and vomiting)     [facility] )     Sleep apnea      Past Surgical History:   Procedure Laterality Date    COLONOSCOPY      HERNIA REPAIR      RI BIOPSY OF PROSTATE,NEEDLE,TRANSPERINEAL N/A 1/25/2022    Procedure: TRANSPERINEAL ULTRASOUND GUIDED BIOPSY PROSTATE;  Surgeon: Matthew Jean MD;  Location: [facility];  Service: Urology    RI BIOPSY  [facility] N/A 10/13/2020    Procedure: TRANSPERINEAL PROSTATE BIOPSY;  Surgeon: Matthew Jean MD;  Location: [facility];  Service: Urology     Social History   Social History     Substance and Sexual Activity   Alcohol Use Not Currently     Social History     Substance and Sexual Activity   Drug Use Not Currently     Social History     Tobacco Use   Smoking Status Never Smoker   Smokeless Tobacco Never Used     History reviewed  No pertinent family history      Meds/Allergies       Current Outpatient Medications:     cyanocobalamin (VITAMIN B-12) 500 MCG tablet    docusate sodium (COLACE) 100 mg capsule    dolutegravir (TIVICAY) 50 MG TABS    emtricitabine-tenofovir AF (Descovy) 200-25 MG tablet    famotidine (PEPCID) 20 mg tablet    lisinopril (ZESTRIL) 20 mg tablet   metoprolol succinate (TOPROL-XL) 25 mg 24 hr tablet    Multiple Vitamin (multivitamin) capsule    Omega-3 1000 MG CAPS    potassium citrate (Urocit-K 10) 10 mEq    pravastatin (PRAVACHOL) 80 mg tablet    Cholecalciferol 25 MCG (1000 UT) CHEW    ciprofloxacin (CIPRO) 500 mg tablet    fluticasone (FLONASE) 50 mcg/act nasal spray    phenazopyridine (PYRIDIUM) 100 mg tablet    traMADol (ULTRAM) 50 mg tablet    Allergies   Allergen Reactions    Sulfa Antibiotics            Objective     Blood pressure 149/86, pulse 70, temperature 98 5 °F (36 9 °C), temperature source Probe, weight 89 7 kg (197 lb 12 8 oz)  Body mass index is 25 4 kg/m²  PHYSICAL EXAM:      Physical Exam    Lab Results:   No visits with results within 1 Day(s) from this visit     Latest known visit with results is:   Admission on 01/25/2022, Discharged on 01/25/2022   Component Date Value    Case Report 01/25/2022                      Value:Surgical Pathology Report                         Case: [id number]                                   Authorizing Provider:  So Palacios MD   Collected:           01/25/2022 1110              Ordering Location:     [address] [place] [address]      Received:            01/25/2022 [facility]                                                           Pathologist:           Raquel Concepcion MD                                                         Specimens:   A) - Prostate, Right Posterior Lateral                                                              B) - Prostate, Right Posterior Medial                                                               C) - Prostate, Right Base                                                                           D) - Prostate, Left Posterior Medial                                                                E) - Prostate, Left Posterior Lateral F) - Prostate, Left Base                                                                            G) - Prostate, Left Anterior Lateral                                                                H) - Prostate, Left Anterior Medial                                                                 I) - Prostate, Right Anterior Lateral                                                               J) - Prostate, Right Anterior Medial                                                       Final Diagnosis 01/25/2022                      Value: This result contains rich text formatting which cannot be displayed here   Additional Information 01/25/2022                      Value: This result contains rich text formatting which cannot be displayed here  [place] Description 01/25/2022                      Value: This result contains rich text formatting which cannot be displayed here   Clinical Information 01/25/2022                      Value:Prostate CA Active surveillance         Radiology Results:   No results found  Answers for HPI/ROS submitted by the patient on 3/4/2022  Chronicity: new  Onset: more than 1 month ago  Onset quality: gradual  Frequency: daily  Progression since onset: waxing and waning  Pain location: LUQ  Pain - numeric: 2/10  Pain quality: dull  Radiates to: does not radiate  anorexia: No  arthralgias: No  belching: Yes  constipation: No  diarrhea: No  dysuria: No  fever: No  flatus: No  frequency: No  headaches: No  hematochezia: No  hematuria: No  melena: No  myalgias: No  nausea:  No  weight loss: No  vomiting: No  Aggravated by: eating  Relieved by: sitting up  Diagnostic workup: ultrasound

## 2022-03-11 NOTE — PATIENT INSTRUCTIONS
1  US elastography - call [phone] to schedule  2  EGD  3  If US elastography showed concern for fibrosis then consider liver biopsy by endoscopic ultrasound  4  Cardiac evaluation  Dr Jermain House  GERD (Gastroesophageal Reflux Disease)   AMBULATORY CARE:   Gastroesophageal reflux disease (GERD)  is reflux that happens more than 2 times a week for a few weeks  Reflux means acid and food in your stomach back up into your esophagus  GERD can cause other health problems over time if it is not treated  Common causes of GERD:  GERD often happens because the lower muscle (sphincter) of the esophagus does not close properly  The sphincter normally opens to let food into the stomach  It then closes to keep food and stomach acid in the stomach  If the sphincter does not close properly, stomach acid and food back up (reflux) into the esophagus  The following may increase your risk for GERD:  · Certain foods such as spicy foods, chocolate, foods that contain caffeine, peppermint, and fried foods    · Hiatal hernia    · Certain medicines such as calcium channel blockers (used to treat high blood pressure), allergy medicines, sedatives, or antidepressants    · Pregnancy, obesity, or scleroderma    · Lying down after a meal    · Drinking alcohol or smoking cigarettes    Signs and symptoms:   · Heartburn (burning pain in your chest)    · Pain after meals that spreads to your neck, jaw, or shoulder    · Pain that gets better when you change positions    · Bitter or acid taste in your mouth    · A dry cough    · Trouble swallowing or pain with swallowing    · Hoarseness or a sore throat    · Burping or hiccups    · Feeling full soon after you start eating    Call your local emergency number (911 in the [facility]) if:   · You have severe chest pain and sudden trouble breathing  Seek care immediately if:   · You have trouble breathing after you vomit  · You have trouble swallowing, or pain with swallowing      · Your bowel movements are black, bloody, or tarry-looking  · Your vomit looks like coffee grounds or has blood in it  Call your doctor or gastroenterologist if:   · You feel full and cannot burp or vomit  · You vomit large amounts, or you vomit often  · You are losing weight without trying  · Your symptoms get worse or do not improve with treatment  · You have questions or concerns about your condition or care  Treatment for GERD:   · Medicines  are used to decrease stomach acid  Medicine may also be used to help your lower esophageal sphincter and stomach contract (tighten) more  · Surgery  is done to wrap the upper part of the stomach around the esophageal sphincter  This will strengthen the sphincter and prevent reflux  Manage GERD:       · Do not have foods or drinks that may increase heartburn  These include chocolate, peppermint, fried or fatty foods, drinks that contain caffeine, or carbonated drinks (soda)  Other foods include spicy foods, onions, tomatoes, and tomato-based foods  Do not have foods or drinks that can irritate your esophagus, such as citrus fruits, juices, and alcohol  · Do not eat large meals  When you eat a lot of food at one time, your stomach needs more acid to digest it  Eat 6 small meals each day instead of 3 large meals, and eat slowly  Do not eat meals 2 to 3 hours before bedtime  · Elevate the head of your bed  Place 6-inch blocks under the head of your bed frame  You may also use more than one pillow under your head and shoulders while you sleep  · Maintain a healthy weight  If you are overweight, weight loss may help relieve symptoms of GERD  · Do not smoke  Smoking weakens the lower esophageal sphincter and increases the risk of GERD  Ask your healthcare provider for information if you currently smoke and need help to quit  E-cigarettes or smokeless tobacco still contain nicotine   Talk to your healthcare provider before you use these products  · Do not put pressure on your abdomen  Pressure pushes acid up into your esophagus  Do not wear clothing that is tight around your waist  Do not bend over  Bend at the knees if you need to pick something up  Follow up with your doctor or gastroenterologist as directed:  Write down your questions so you remember to ask them during your visits  © Copyright [employer] 2022 Information is for End User's use only and may not be sold, redistributed or otherwise used for commercial purposes  All illustrations and images included in CareNotes® are the copyrighted property of A D A M , Inc  or [facility] [employer]   The above information is an  only  It is not intended as medical advice for individual conditions or treatments  Talk to your doctor, nurse or pharmacist before following any medical regimen to see if it is safe and effective for you  Scheduled date ofEGD  (as of today): 06/02/2022  Physician performing: Dr Alice Hussein  Location of procedure:  [place]  Bowel prep reviewed with patient: n/a  Instructions reviewed with patient by: MA  Clearances: n/a

## 2022-03-11 NOTE — H&P (VIEW-ONLY)
Outpatient Follow up  [address]  [address]  [facility] [address]  Ruthie Nelson MD  Ph : [phone]  Fax : [phone]  Mobile : [phone]  Email : [email or web address]  Also available on [employer]    Olinda Orozco 73 y o  male MRN: [id number]    PCP: ARINA Willams  Referring: No referring provider defined for this encounter  Olinda Orozco presented for a follow up visit  My recommendations are included  Please do not hesitate to contact me with any questions you may have  ASSESSMENT AND PLAN:      No problem-specific Assessment & Plan notes found for this encounter  Diagnoses and all orders for this visit:    Non-alcoholic fatty liver disease  -     US elastography; Future    Gastroesophageal reflux disease, unspecified whether esophagitis present  -     EGD; Future    Platelets decreased ([address] )    Palpitations  -     Ambulatory Referral to Cardiac Electrophysiology; Future      72-year-old gentleman with a history of GERD symptoms and abdominal discomfort in the epigastric region and left upper quadrant  Family history of esophageal cancer in his father  Experiencing palpitations  History of HIV which is under control  Recent diagnosis of fatty liver on imaging  1  US elastography   2  EGD  3  If US elastography showed concern for fibrosis then consider liver biopsy  4  Cardiac evaluation  Dr Lizarraga Feeling for his palpitations  5  Colonoscopy done in 2020 showed small polyp  Repeat colonoscopy was recommended in 5 years  ______________________________________________________________________    SUBJECTIVE:  72 y/o with h/o recent abdominal discomfort in the epigastric area and the left side  It is a dull discomfort  The discomfort is waxing and waning  He has been experiencing some acid reflux for some years and worse with certain foods especially acidic foods and tomato sauce  He has excessive burping  [facility]            (For Outpatient Use Only) Initial Admit Date: 10/7/2020   Inpt/Obs Admit Date: Inpt: 10/7/20 / Obs: N/A   Discharge Date:    Diana Middleton:  [de-identified]   MRN: [de-identified]   CSN: [id number]   CEID: [id number]        QBD Group Number:  Insurance Type:    Subscriber Name:  Subscriber :    Subscriber ID:  Pt Rel to Subscriber:    Hospital Account Financial Class: Medicare    October 10, 2020 He has been using a wedge  His father had esophageal cancer  No dysphagia  No weight loss  He does not have a sore throat  Occasional coughing  No clearing the throat  He does snore  No diarrhea/ constipation/ bleeding  Was recently diagnosed with fatty liver and has thrombocytopenia (long standing)  Has not had that work up done  No family h/o liver issues or thrombocytopenia  He has HIV and is under control  REVIEW OF SYSTEMS IS OTHERWISE NEGATIVE  Historical Information   Past Medical History:   Diagnosis Date    GERD (gastroesophageal reflux disease)     HIV (human immunodeficiency virus infection) ([place] [address] )     Hyperlipidemia     Hypertension     Kidney stone     Non-alcoholic fatty liver disease     PONV (postoperative nausea and vomiting)     Prostate cancer ([place] [address] )     Sleep apnea      Past Surgical History:   Procedure Laterality Date    COLONOSCOPY      HERNIA REPAIR      OR BIOPSY OF PROSTATE,NEEDLE,TRANSPERINEAL N/A 1/25/2022    Procedure: TRANSPERINEAL ULTRASOUND GUIDED BIOPSY PROSTATE;  Surgeon: Lilly Hussein MD;  Location: [facility];  Service: Urology    OR BIOPSY  [facility] N/A 10/13/2020    Procedure: TRANSPERINEAL PROSTATE BIOPSY;  Surgeon: Lilly Hussein MD;  Location: [facility];  Service: Urology     Social History   Social History     Substance and Sexual Activity   Alcohol Use Not Currently     Social History     Substance and Sexual Activity   Drug Use Not Currently     Social History     Tobacco Use   Smoking Status Never Smoker   Smokeless Tobacco Never Used     History reviewed  No pertinent family history      Meds/Allergies       Current Outpatient Medications:     cyanocobalamin (VITAMIN B-12) 500 MCG tablet    docusate sodium (COLACE) 100 mg capsule    dolutegravir (TIVICAY) 50 MG TABS    emtricitabine-tenofovir AF (Descovy) 200-25 MG tablet    famotidine (PEPCID) 20 mg tablet    lisinopril (ZESTRIL) 20 mg tablet   metoprolol succinate (TOPROL-XL) 25 mg 24 hr tablet    Multiple Vitamin (multivitamin) capsule    Omega-3 1000 MG CAPS    potassium citrate (Urocit-K 10) 10 mEq    pravastatin (PRAVACHOL) 80 mg tablet    Cholecalciferol 25 MCG (1000 UT) CHEW    ciprofloxacin (CIPRO) 500 mg tablet    fluticasone (FLONASE) 50 mcg/act nasal spray    phenazopyridine (PYRIDIUM) 100 mg tablet    traMADol (ULTRAM) 50 mg tablet    Allergies   Allergen Reactions    Sulfa Antibiotics            Objective     Blood pressure 149/86, pulse 70, temperature 98 5 °F (36 9 °C), temperature source Probe, weight 89 7 kg (197 lb 12 8 oz)  Body mass index is 25 4 kg/m²  PHYSICAL EXAM:      Physical Exam    Lab Results:   No visits with results within 1 Day(s) from this visit     Latest known visit with results is:   Admission on 01/25/2022, Discharged on 01/25/2022   Component Date Value    Case Report 01/25/2022                      Value:Surgical Pathology Report                         Case: [id number]                                   Authorizing Provider:  Alycia Aleman MD   Collected:           01/25/2022 1110              Ordering Location:     [address] [place] [address]      Received:            01/25/2022 5 [facility]                                                           Pathologist:           Marianne Antonio MD                                                         Specimens:   A) - Prostate, Right Posterior Lateral                                                              B) - Prostate, Right Posterior Medial                                                               C) - Prostate, Right Base                                                                           D) - Prostate, Left Posterior Medial                                                                E) - Prostate, Left Posterior Lateral F) - Prostate, Left Base                                                                            G) - Prostate, Left Anterior Lateral                                                                H) - Prostate, Left Anterior Medial                                                                 I) - Prostate, Right Anterior Lateral                                                               J) - Prostate, Right Anterior Medial                                                       Final Diagnosis 01/25/2022                      Value: This result contains rich text formatting which cannot be displayed here   Additional Information 01/25/2022                      Value: This result contains rich text formatting which cannot be displayed here  Henrietta Reilly Description 01/25/2022                      Value: This result contains rich text formatting which cannot be displayed here   Clinical Information 01/25/2022                      Value:Prostate CA Active surveillance         Radiology Results:   No results found  Answers for HPI/ROS submitted by the patient on 3/4/2022  Chronicity: new  Onset: more than 1 month ago  Onset quality: gradual  Frequency: daily  Progression since onset: waxing and waning  Pain location: LUQ  Pain - numeric: 2/10  Pain quality: dull  Radiates to: does not radiate  anorexia: No  arthralgias: No  belching: Yes  constipation: No  diarrhea: No  dysuria: No  fever: No  flatus: No  frequency: No  headaches: No  hematochezia: No  hematuria: No  melena: No  myalgias: No  nausea:  No  weight loss: No  vomiting: No  Aggravated by: eating  Relieved by: sitting up  Diagnostic workup: ultrasound

## 2022-03-14 ENCOUNTER — APPOINTMENT (OUTPATIENT)
Dept: LAB | Facility: HOSPITAL | Age: 67
End: 2022-03-14
Payer: MEDICARE

## 2022-03-14 DIAGNOSIS — Z00.00 ROUTINE GENERAL MEDICAL EXAMINATION AT A HEALTH CARE FACILITY: ICD-10-CM

## 2022-03-14 DIAGNOSIS — B20 HUMAN IMMUNODEFICIENCY VIRUS (HIV) DISEASE (HCC): ICD-10-CM

## 2022-03-14 PROCEDURE — 87536 HIV-1 QUANT&REVRSE TRNSCRPJ: CPT

## 2022-03-14 PROCEDURE — 36415 COLL VENOUS BLD VENIPUNCTURE: CPT

## 2022-03-15 LAB
HIV1 RNA # SERPL NAA+PROBE: <20 COPIES/ML
HIV1 RNA SERPL NAA+PROBE-LOG#: NORMAL LOG10COPY/ML

## 2022-03-21 ENCOUNTER — HOSPITAL ENCOUNTER (OUTPATIENT)
Dept: ULTRASOUND IMAGING | Facility: HOSPITAL | Age: 67
Discharge: HOME | End: 2022-03-21
Attending: INTERNAL MEDICINE
Payer: MEDICARE

## 2022-03-21 DIAGNOSIS — K76.0 NON-ALCOHOLIC FATTY LIVER DISEASE: ICD-10-CM

## 2022-03-21 PROCEDURE — 76981 USE PARENCHYMA: CPT

## 2022-03-25 NOTE — RESULT ENCOUNTER NOTE
Inform patient via [email or web address]  Please review the pathology/lab result of further discussion  Copied from [email or web address] message :     Melvin Trujillo,     Your ultrasound elastography showed no significant fibrosis  No significant fatty liver was seen either      Best regards,     Venancio Mackay MD

## 2022-03-29 ENCOUNTER — TELEPHONE (OUTPATIENT)
Dept: OTHER | Facility: OTHER | Age: 67
End: 2022-03-29

## 2022-03-29 NOTE — TELEPHONE ENCOUNTER
spoke with pt and relayed US results  Pt asked next step, I explained proceeding with EGD  Pt requested earlier date of EGD with different provider

## 2022-03-29 NOTE — TELEPHONE ENCOUNTER
Kvng Gonzalez ([facility]) [phone] (M)     Is requesting a call from Dr Yrn Bruner MD [facility] [address] [facility] [address] [facility]AP[facility] 3/21/2022 [address]

## 2022-03-30 ENCOUNTER — TELEPHONE (OUTPATIENT)
Dept: GASTROENTEROLOGY | Facility: CLINIC | Age: 67
End: 2022-03-30

## 2022-03-30 NOTE — TELEPHONE ENCOUNTER
Patients GI provider:  Dr Caitlyn Gross    Number to return call: [phone]    Reason for call: Pt calling asking if he can be scheduled for a sooner time on 4/08/2022 due to ride restrictions, if possible      Scheduled procedure/appointment date if applicable: Procedure: 4/56/1366

## 2022-04-02 ENCOUNTER — HOSPITAL ENCOUNTER (EMERGENCY)
Facility: HOSPITAL | Age: 67
Discharge: HOME | End: 2022-04-02
Attending: EMERGENCY MEDICINE
Payer: MEDICARE

## 2022-04-02 ENCOUNTER — APPOINTMENT (EMERGENCY)
Dept: RADIOLOGY | Facility: HOSPITAL | Age: 67
End: 2022-04-02
Payer: MEDICARE

## 2022-04-02 VITALS
HEART RATE: 68 BPM | RESPIRATION RATE: 18 BRPM | OXYGEN SATURATION: 98 % | SYSTOLIC BLOOD PRESSURE: 149 MMHG | DIASTOLIC BLOOD PRESSURE: 85 MMHG

## 2022-04-02 DIAGNOSIS — R07.89 ATYPICAL CHEST PAIN: Primary | ICD-10-CM

## 2022-04-02 DIAGNOSIS — R06.02 SOB (SHORTNESS OF BREATH): ICD-10-CM

## 2022-04-02 DIAGNOSIS — R00.2 PALPITATIONS: ICD-10-CM

## 2022-04-02 LAB
ANION GAP SERPL CALCULATED.3IONS-SCNC: 7 MMOL/L (ref 4–13)
BASOPHILS # BLD AUTO: 0.03 THOUSANDS/ΜL (ref 0–0.1)
BASOPHILS NFR BLD AUTO: 1 % (ref 0–1)
BUN SERPL-MCNC: 14 MG/DL (ref 5–25)
CALCIUM SERPL-MCNC: 8.9 MG/DL (ref 8.3–10.1)
CARDIAC TROPONIN I PNL SERPL HS: 3 NG/L
CHLORIDE SERPL-SCNC: 106 MMOL/L (ref 100–108)
CO2 SERPL-SCNC: 30 MMOL/L (ref 21–32)
CREAT SERPL-MCNC: 1.06 MG/DL (ref 0.6–1.3)
EOSINOPHIL # BLD AUTO: 0.06 THOUSAND/ΜL (ref 0–0.61)
EOSINOPHIL NFR BLD AUTO: 1 % (ref 0–6)
ERYTHROCYTE [DISTWIDTH] IN BLOOD BY AUTOMATED COUNT: 11.7 % (ref 11.6–15.1)
GFR SERPL CREATININE-BSD FRML MDRD: 73 ML/MIN/1.73SQ M
GLUCOSE SERPL-MCNC: 118 MG/DL (ref 65–140)
HCT VFR BLD AUTO: 43.8 % (ref 36.5–49.3)
HGB BLD-MCNC: 16.1 G/DL (ref 12–17)
IMM GRANULOCYTES # BLD AUTO: 0.01 THOUSAND/UL (ref 0–0.2)
IMM GRANULOCYTES NFR BLD AUTO: 0 % (ref 0–2)
LYMPHOCYTES # BLD AUTO: 1.88 THOUSANDS/ΜL (ref 0.6–4.47)
LYMPHOCYTES NFR BLD AUTO: 41 % (ref 14–44)
MCH RBC QN AUTO: 35.8 PG (ref 26.8–34.3)
MCHC RBC AUTO-ENTMCNC: 36.8 G/DL (ref 31.4–37.4)
MCV RBC AUTO: 97 FL (ref 82–98)
MONOCYTES # BLD AUTO: 0.37 THOUSAND/ΜL (ref 0.17–1.22)
MONOCYTES NFR BLD AUTO: 8 % (ref 4–12)
NEUTROPHILS # BLD AUTO: 2.25 THOUSANDS/ΜL (ref 1.85–7.62)
NEUTS SEG NFR BLD AUTO: 49 % (ref 43–75)
NRBC BLD AUTO-RTO: 0 /100 WBCS
PLATELET # BLD AUTO: 113 THOUSANDS/UL (ref 149–390)
PMV BLD AUTO: 11.2 FL (ref 8.9–12.7)
POTASSIUM SERPL-SCNC: 4.3 MMOL/L (ref 3.5–5.3)
RBC # BLD AUTO: 4.5 MILLION/UL (ref 3.88–5.62)
SODIUM SERPL-SCNC: 143 MMOL/L (ref 136–145)
TSH SERPL DL<=0.05 MIU/L-ACNC: 1.91 UIU/ML (ref 0.36–3.74)
WBC # BLD AUTO: 4.6 THOUSAND/UL (ref 4.31–10.16)

## 2022-04-02 PROCEDURE — 36415 COLL VENOUS BLD VENIPUNCTURE: CPT | Performed by: EMERGENCY MEDICINE

## 2022-04-02 PROCEDURE — 80048 BASIC METABOLIC PNL TOTAL CA: CPT | Performed by: EMERGENCY MEDICINE

## 2022-04-02 PROCEDURE — 84443 ASSAY THYROID STIM HORMONE: CPT | Performed by: EMERGENCY MEDICINE

## 2022-04-02 PROCEDURE — 71046 X-RAY EXAM CHEST 2 VIEWS: CPT

## 2022-04-02 PROCEDURE — 85025 COMPLETE CBC W/AUTO DIFF WBC: CPT | Performed by: EMERGENCY MEDICINE

## 2022-04-02 PROCEDURE — 99285 EMERGENCY DEPT VISIT HI MDM: CPT | Performed by: EMERGENCY MEDICINE

## 2022-04-02 PROCEDURE — 99285 EMERGENCY DEPT VISIT HI MDM: CPT

## 2022-04-02 PROCEDURE — 84484 ASSAY OF TROPONIN QUANT: CPT | Performed by: EMERGENCY MEDICINE

## 2022-04-02 PROCEDURE — 93005 ELECTROCARDIOGRAM TRACING: CPT

## 2022-04-02 NOTE — DISCHARGE INSTRUCTIONS
Please follow-up primary care provider and Cardiology  Please return for severely worsening chest pain, significantly worsening shortness of breath, or any other concerning signs or symptoms  Please refer to the following documents for additional instructions and return precautions

## 2022-04-02 NOTE — ED ATTENDING ATTESTATION
4/2/2022  ICara MD, saw and evaluated the patient  I have discussed the patient with the resident/non-physician practitioner and agree with the resident's/non-physician practitioner's findings, Plan of Care, and MDM as documented in the resident's/non-physician practitioner's note, except where noted  All available labs and Radiology studies were reviewed  I was present for key portions of any procedure(s) performed by the resident/non-physician practitioner and I was immediately available to provide assistance  At this point I agree with the current assessment done in the Emergency Department  I have conducted an independent evaluation of this patient a history and physical is as follows:    23-year-old male presents for evaluation of 2-3 weeks of intermittent left-sided chest dullness, shortness of breath feeling he has taken extra deep breath  Patient is currently asymptomatic  Ten systems reviewed otherwise negative  On exam no distress, lungs normal cardiac normal abdomen normal, no lower extremity edema no calf tenderness    Medical decision making;-intermittent chest pain and shortness of breath without history exam findings suggest PE-will do cardiac workup with single EKG/troponin, cardiology follow-up    ED Course         Critical Care Time  Procedures

## 2022-04-02 NOTE — ED PROVIDER NOTES
History  Chief Complaint   Patient presents with    Chest Pain     patient reports intermittent chest pain for the past 2-3 weeks along with SOB  Patient reports the SOB as consistent and not limited to exercise  Appt  with cardiology 04/14 due to palpitations (self diagnosed with verbal consult with  )     50-year-old male history of HIV on antiviral therapy and prostate cancer no known metastasis undergoing surveillance presenting with 2 weeks of chest and lung complaints  Patient reports intermittent history of shortness of breath and chest pressure and palpitations  No reported definitive inciting or provoking events  Patient reports symptoms sometimes occur with exertion and sometimes at rest   Symptoms are relatively persistent and when they do occur they are insidious in onset and mild  Denies any severe pain or shortness of breath  Denies any previous history of blood clots or prolonged travel or estrogen containing medications or any leg pain or swelling or recent surgery  Has appointment cardiology in 2 weeks  Reports here today due to persistence of symptoms  Denies any complaints  Prior to Admission Medications   Prescriptions Last Dose Informant Patient Reported? Taking?    Cholecalciferol 25 MCG (1000 UT) CHEW   Yes No   Sig: Chew 1 tablet daily   Multiple Vitamin (multivitamin) capsule   Yes No   Sig: Take 1 capsule by mouth daily   Omega-3 1000 MG CAPS   Yes No   Sig: Take 2 g by mouth daily   ciprofloxacin (CIPRO) 500 mg tablet   Yes No   Sig: Take 500 mg by mouth every 12 (twelve) hours   cyanocobalamin (VITAMIN B-12) 500 MCG tablet   Yes No   Sig: Take 1,000 mcg by mouth daily   docusate sodium (COLACE) 100 mg capsule   Yes No   Sig: Take 100 mg by mouth 3 (three) times a day as needed for constipation   dolutegravir (TIVICAY) 50 MG TABS   Yes No   Sig: Take 50 mg by mouth daily   emtricitabine-tenofovir AF (Descovy) 200-25 MG tablet   Yes No   Sig: Take 25 mg by mouth daily famotidine (PEPCID) 20 mg tablet   Yes No   Sig: Take 1 tablet by mouth   fluticasone (FLONASE) 50 mcg/act nasal spray   Yes No   Si spray into each nostril 2 (two) times a day   lisinopril (ZESTRIL) 20 mg tablet   Yes No   Sig: Take 20 mg by mouth daily   metoprolol succinate (TOPROL-XL) 25 mg 24 hr tablet   Yes No   Sig: Take 1 tablet by mouth daily   phenazopyridine (PYRIDIUM) 100 mg tablet   No No   Sig: Take 1 tablet (100 mg total) by mouth 3 (three) times a day as needed for bladder spasms   potassium citrate (Urocit-K 10) 10 mEq   No No   Sig: Take 1 tablet (10 mEq total) by mouth daily   pravastatin (PRAVACHOL) 80 mg tablet   Yes No   Sig: Take 80 mg by mouth daily   traMADol (ULTRAM) 50 mg tablet   No No   Sig: Take 1 tablet (50 mg total) by mouth every 6 (six) hours as needed for moderate pain for up to 5 doses      Facility-Administered Medications: None       Past Medical History:   Diagnosis Date    GERD (gastroesophageal reflux disease)     [facility]     Hyperlipidemia     Hypertension     Kidney stone     Non-alcoholic fatty liver disease     PONV (postoperative nausea and vomiting)     [facility] cancer ([facility] )     Sleep apnea        Past Surgical History:   Procedure Laterality Date    COLONOSCOPY      HERNIA REPAIR      OK BIOPSY OF PROSTATE,NEEDLE,TRANSPERINEAL N/A 2022    Procedure: TRANSPERINEAL ULTRASOUND GUIDED BIOPSY PROSTATE;  Surgeon: Morales Ware MD;  Location: [facility];  Service: Urology    OK BIOPSY  [facility] N/A 10/13/2020    Procedure: TRANSPERINEAL PROSTATE BIOPSY;  Surgeon: Morales Ware MD;  Location[facility];  Service: Urology       History reviewed  No pertinent family history  I have reviewed and agree with the history as documented      E-Cigarette/Vaping    E-Cigarette Use Never User      E-Cigarette/Vaping Substances     Social History     Tobacco Use    Smoking status: Never Smoker  Smokeless tobacco: Never Used   Vaping Use    Vaping Use: Never used   Substance Use Topics    Alcohol use: Not Currently    Drug use: Not Currently        Review of Systems   Constitutional: Negative for appetite change, chills, diaphoresis, fever and unexpected weight change  HENT: Negative for congestion and rhinorrhea  Eyes: Negative for photophobia and visual disturbance  Respiratory: Positive for shortness of breath  Negative for cough and chest tightness  Cardiovascular: Positive for chest pain and palpitations  Negative for leg swelling  Gastrointestinal: Negative for abdominal distention, abdominal pain, blood in stool, constipation, diarrhea, nausea and vomiting  Genitourinary: Negative for dysuria and hematuria  Musculoskeletal: Negative for back pain, joint swelling, neck pain and neck stiffness  Skin: Negative for color change, pallor, rash and wound  Neurological: Negative for dizziness, syncope, weakness, light-headedness and headaches  Psychiatric/Behavioral: Negative for agitation  All other systems reviewed and are negative  Physical Exam  ED Triage Vitals [04/02/22 1615]   Temp Pulse Respirations Blood Pressure SpO2   -- 65 18 (!) 200/91 99 %      Temp src Heart Rate Source Patient Position - Orthostatic VS BP Location FiO2 (%)   -- Monitor Sitting Right arm --      Pain Score       5             Orthostatic Vital Signs  Vitals:    04/02/22 1615 04/02/22 1745   BP: (!) 200/91 149/85   Pulse: 65 68   Patient Position - Orthostatic VS: Sitting Lying       Physical Exam  Vitals and nursing note reviewed  Constitutional:       General: He is not in acute distress  Appearance: Normal appearance  He is well-developed  He is not ill-appearing, toxic-appearing or diaphoretic  HENT:      Head: Normocephalic and atraumatic  Nose: Nose normal  No congestion or rhinorrhea        Mouth/Throat:      Mouth: Mucous membranes are moist       Pharynx: Oropharynx is clear  No oropharyngeal exudate or posterior oropharyngeal erythema  Eyes:      General: No scleral icterus  Right eye: No discharge  Left eye: No discharge  Extraocular Movements: Extraocular movements intact  Conjunctiva/sclera: Conjunctivae normal       Pupils: Pupils are equal, round, and reactive to light  Neck:      Vascular: No JVD  Trachea: No tracheal deviation  Cardiovascular:      Rate and Rhythm: Normal rate and regular rhythm  Heart sounds: Normal heart sounds  No murmur heard  No friction rub  No gallop  Comments: Normal rate and regular rhythm  Pulmonary:      Effort: Pulmonary effort is normal  No respiratory distress  Breath sounds: Normal breath sounds  No stridor  No decreased breath sounds, wheezing, rhonchi or rales  Comments: Clear to auscultation bilaterally  Chest:      Chest wall: No tenderness  Abdominal:      General: Bowel sounds are normal  There is no distension  Palpations: Abdomen is soft  Tenderness: There is no abdominal tenderness  There is no right CVA tenderness, left CVA tenderness, guarding or rebound  Comments: Soft, nontender, nondistended  Normal bowel sounds throughout   Musculoskeletal:         General: No swelling, tenderness, deformity or signs of injury  Normal range of motion  Cervical back: Normal range of motion and neck supple  No rigidity  No muscular tenderness  Right lower leg: No tenderness  No edema  Left lower leg: No tenderness  No edema  Comments: No lower extremity swelling or tenderness   Lymphadenopathy:      Cervical: No cervical adenopathy  Skin:     General: Skin is warm and dry  Coloration: Skin is not pale  Findings: No erythema or rash  Neurological:      General: No focal deficit present  Mental Status: He is alert  Mental status is at baseline  Sensory: No sensory deficit  Motor: No weakness or abnormal muscle tone  Coordination: Coordination normal       Gait: Gait normal       Comments: Alert  Strength and sensation grossly intact  Ambulatory without difficulty at baseline   Psychiatric:         Behavior: Behavior normal          Thought Content: Thought content normal          ED Medications  Medications - No data to display    Diagnostic Studies  Results Reviewed     Procedure Component Value Units Date/Time    TSH, 3rd generation with Free T4 reflex [[id number]]  (Normal) Collected: 04/02/22 1634    Lab Status: Final result Specimen: Blood from Arm, Left Updated: 04/02/22 1706     TSH 3RD GENERATON 1 907 uIU/mL     Narrative:      Patients undergoing fluorescein dye angiography may retain small amounts of fluorescein in the body for 48-72 hours post procedure  Samples containing fluorescein can produce falsely depressed TSH values  If the patient had this procedure,a specimen should be resubmitted post fluorescein clearance        Basic metabolic panel [[id number]] Collected: 04/02/22 1634    Lab Status: Final result Specimen: Blood from Arm, Left Updated: 04/02/22 1706     Sodium 143 mmol/L      Potassium 4 3 mmol/L      Chloride 106 mmol/L      CO2 30 mmol/L      ANION GAP 7 mmol/L      BUN 14 mg/dL      Creatinine 1 06 mg/dL      Glucose 118 mg/dL      Calcium 8 9 mg/dL      eGFR 73 ml/min/1 73sq m     Narrative:      [place] guidelines for Chronic Kidney Disease (CKD):     Stage 1 with normal or high GFR (GFR > 90 mL/min/1 73 square meters)    Stage 2 Mild CKD (GFR = 60-89 mL/min/1 73 square meters)    Stage 3A Moderate CKD (GFR = 45-59 mL/min/1 73 square meters)    Stage 3B Moderate CKD (GFR = 30-44 mL/min/1 73 square meters)    Stage 4 Severe CKD (GFR = 15-29 mL/min/1 73 square meters)    Stage 5 End Stage CKD (GFR <15 mL/min/1 73 square meters)  Note: GFR calculation is accurate only with a steady state creatinine    HS Troponin 0hr (reflex protocol) [[id number]]  (Normal) Collected: 04/02/22 1634    Lab Status: Final result Specimen: Blood from Arm, Left Updated: 04/02/22 1705     hs TnI 0hr 3 ng/L     HS Troponin I 2hr [[id number]]     Lab Status: No result Specimen: Blood     CBC and differential [[id number]]  (Abnormal) Collected: 04/02/22 1634    Lab Status: Final result Specimen: Blood from Arm, Left Updated: 04/02/22 1702     WBC 4 60 Thousand/uL      RBC 4 50 Million/uL      Hemoglobin 16 1 g/dL      Hematocrit 43 8 %      MCV 97 fL      MCH 35 8 pg      MCHC 36 8 g/dL      RDW 11 7 %      MPV 11 2 fL      Platelets 540 Thousands/uL      nRBC 0 /100 WBCs      Neutrophils Relative 49 %      Immat GRANS % 0 %      Lymphocytes Relative 41 %      Monocytes Relative 8 %      Eosinophils Relative 1 %      Basophils Relative 1 %      Neutrophils Absolute 2 25 Thousands/µL      Immature Grans Absolute 0 01 Thousand/uL      Lymphocytes Absolute 1 88 Thousands/µL      Monocytes Absolute 0 37 Thousand/µL      Eosinophils Absolute 0 06 Thousand/µL      Basophils Absolute 0 03 Thousands/µL                  XR chest 2 views   ED Interpretation by Calvin Browning MD (04/02 1734)   Primary reviewed: no acute abnormality            Procedures  ECG 12 Lead Documentation Only    Date/Time: 4/2/2022 4:20 PM  Performed by: Pedro Bardales MD  Authorized by: Pedro Bardales MD     Indications / Diagnosis:  CP, SOB  ECG reviewed by me, the ED Provider: yes    Patient location:  ED  Previous ECG:     Previous ECG:  Compared to current    Comparison ECG info:  No longer Brenda Berman    Similarity:  Changes noted    Comparison to cardiac monitor: Yes    Interpretation:     Interpretation: normal    Rate:     ECG rate:  68    ECG rate assessment: normal    Rhythm:     Rhythm: sinus rhythm    Ectopy:     Ectopy: none    QRS:     QRS axis:  Normal    QRS intervals:  Normal  Conduction:     Conduction: normal    ST segments:     ST segments:  Normal  T waves:     T waves: normal            ED Course SBIRT 20yo+      Most Recent Value   SBIRT (22 yo +)    In order to provide better care to our patients, we are screening all of our patients for alcohol and drug use  Would it be okay to ask you these screening questions? No Filed at: 04/02/2022 1642                MDM  Number of Diagnoses or Management Options  Atypical chest pain  Palpitations  SOB (shortness of breath)  Diagnosis management comments: 44-year-old male history of HIV on antiviral therapy and prostate cancer no known metastasis undergoing surveillance presenting with 2 weeks of chest and lung complaints  Atypical cardiac component symptoms for last couple of weeks occasionally during rest and occasionally occurring with exertion but without definitive provoking or inciting factors or any associated diaphoresis, nausea/vomiting, lightheadedness/fainting, radiation of discomfort  Plan for cardiac evaluation and basic labs plus TSH  Reassess  EKG normal sinus rhythm  Labs no acute process  Chest x-ray no acute process  Patient remains asymptomatic  Discussed results recommendations  Advised follow-up primary care provider and Cardiology which patient already has appointment scheduled for  Medication recommendations  Given instructions and return precautions  All questions answered  Patient and significant other at bedside acknowledged understanding of all written and verbal instructions and return precautions  Discharge         Amount and/or Complexity of Data Reviewed  Clinical lab tests: reviewed and ordered  Tests in the radiology section of CPT®: reviewed and ordered  Tests in the medicine section of CPT®: reviewed and ordered  Review and summarize past medical records: yes  Discuss the patient with other providers: yes  Independent visualization of images, tracings, or specimens: yes    Risk of Complications, Morbidity, and/or Mortality  Presenting problems: high  Diagnostic procedures: high  Management options: high    Patient Progress  Patient progress: stable      Disposition  Final diagnoses:   Atypical chest pain   SOB (shortness of breath)   Palpitations     Time reflects when diagnosis was documented in both MDM as applicable and the Disposition within this note     Time User Action Codes Description Comment    4/2/2022  4:29 PM Jasbir Young Add [R07 89] Atypical chest pain     4/2/2022  4:29 PM Jasbir Young Add [R06 02] SOB (shortness of breath)     4/2/2022  4:29 PM Jasbir Young Add [R00 2] Palpitations       ED Disposition     ED Disposition Condition Date/Time Comment    Discharge Stable Sat Apr 2, 2022  4:29 PM Donnamaria Flair discharge to home/self care              Follow-up Information     Follow up With Specialties Details Why Contact Info Additional Neris 90, CRNP Nurse Practitioner Schedule an appointment as soon as possible for a visit in 1 week  [address] [facility] [address] [facility]  [facility] [address]  Cardiology Schedule an appointment as soon as possible for a visit in 1 week  [facility] [id number]  [address] [place], [place], [address] [phone]          Discharge Medication List as of 4/2/2022  5:36 PM      CONTINUE these medications which have NOT CHANGED    Details   Cholecalciferol 25 MCG (1000 UT) CHEW Chew 1 tablet daily, Historical Med      ciprofloxacin (CIPRO) 500 mg tablet Take 500 mg by mouth every 12 (twelve) hours, Historical Med      cyanocobalamin (VITAMIN B-12) 500 MCG tablet Take 1,000 mcg by mouth daily, Historical Med      docusate sodium (COLACE) 100 mg capsule Take 100 mg by mouth 3 (three) times a day as needed for constipation, Historical Med      dolutegravir (TIVICAY) 50 MG TABS Take 50 mg by mouth daily, Starting Mon 5/18/2015, Historical Med      emtricitabine-tenofovir AF (Descovy) 200-25 MG tablet Take 25 mg by mouth daily, Starting Wed 5/17/2017, Historical Med      famotidine (PEPCID) 20 mg tablet Take 1 tablet by mouth, Starting Wed 8/3/2016, Historical Med      fluticasone (FLONASE) 50 mcg/act nasal spray 1 spray into each nostril 2 (two) times a day, Starting Mon 5/18/2015, Historical Med      lisinopril (ZESTRIL) 20 mg tablet Take 20 mg by mouth daily, Starting Mon 5/18/2015, Historical Med      metoprolol succinate (TOPROL-XL) 25 mg 24 hr tablet Take 1 tablet by mouth daily, Starting Wed 7/6/2016, Historical Med      Multiple Vitamin (multivitamin) capsule Take 1 capsule by mouth daily, Historical Med      Omega-3 1000 MG CAPS Take 2 g by mouth daily, Historical Med      phenazopyridine (PYRIDIUM) 100 mg tablet Take 1 tablet (100 mg total) by mouth 3 (three) times a day as needed for bladder spasms, Starting Tue 10/13/2020, Normal      potassium citrate (Urocit-K 10) 10 mEq Take 1 tablet (10 mEq total) by mouth daily, Starting Thu 5/13/2021, Normal      pravastatin (PRAVACHOL) 80 mg tablet Take 80 mg by mouth daily, Starting Wed 1/11/2012, Historical Med      traMADol (ULTRAM) 50 mg tablet Take 1 tablet (50 mg total) by mouth every 6 (six) hours as needed for moderate pain for up to 5 doses, Starting Tue 10/13/2020, Normal           No discharge procedures on file  PDMP Review     None           ED Provider  Attending physically available and evaluated Jose Martinee Merlin  ASHLEY managed the patient along with the ED Attending      Electronically Signed by         Gigi Degroot MD  04/02/22 9932

## 2022-04-03 LAB
ATRIAL RATE: 68 BPM
P AXIS: 66 DEGREES
PR INTERVAL: 172 MS
QRS AXIS: 32 DEGREES
QRSD INTERVAL: 82 MS
QT INTERVAL: 364 MS
QTC INTERVAL: 387 MS
T WAVE AXIS: 66 DEGREES
VENTRICULAR RATE: 68 BPM

## 2022-04-03 PROCEDURE — 93010 ELECTROCARDIOGRAM REPORT: CPT | Performed by: INTERNAL MEDICINE

## 2022-04-04 NOTE — TELEPHONE ENCOUNTER
Patients GI provider:  Dr Scottie Dasilva to return call: [phone]     Reason for call: Pt calling asking if he can be scheduled to a sooner time on 4/8/2022 due to ride restrictions       Scheduled procedure/appointment date if applicable: Apt/procedure   4/8/22

## 2022-04-04 NOTE — TELEPHONE ENCOUNTER
Patient is scheduled for an AM appt and was called to inform we can not guarantee a time but that the endoscopy center will try to accommodate all requests

## 2022-04-07 ENCOUNTER — OFFICE VISIT (OUTPATIENT)
Dept: CARDIOLOGY CLINIC | Facility: CLINIC | Age: 67
End: 2022-04-07
Payer: MEDICARE

## 2022-04-07 VITALS
SYSTOLIC BLOOD PRESSURE: 134 MMHG | BODY MASS INDEX: 24.97 KG/M2 | HEIGHT: 74 IN | DIASTOLIC BLOOD PRESSURE: 82 MMHG | WEIGHT: 194.6 LBS | HEART RATE: 64 BPM

## 2022-04-07 DIAGNOSIS — E78.5 DYSLIPIDEMIA: ICD-10-CM

## 2022-04-07 DIAGNOSIS — R07.2 PRECORDIAL CHEST PAIN: ICD-10-CM

## 2022-04-07 DIAGNOSIS — R00.2 PALPITATIONS: Primary | ICD-10-CM

## 2022-04-07 DIAGNOSIS — I10 ESSENTIAL HYPERTENSION: ICD-10-CM

## 2022-04-07 PROCEDURE — 99204 OFFICE O/P NEW MOD 45 MIN: CPT | Performed by: INTERNAL MEDICINE

## 2022-04-07 NOTE — PROGRESS NOTES
Cardiology Office Note  MD Genevieve Guardado MD [place], DO, MD Michael Solorio DO, Loren Duncan DO, [facility] [place]  ----------------------------------------------------------------  [address] [address]    Mahad Hull 73 y o  male MRN: [id number]  Unit/Bed#:  Encounter: [id number]      History of Present Illness: It was a pleasure to see Mahad Hull in the office today for initial CV evaluation  He has a past medical history of hypertension, dyslipidemia, MING, GERD, non alcoholic fatty liver disease and HIV  He established care with us in April 2022  Patient began to experience symptoms of discomfort in the chest and palpitations starting back in November 2021  The patient follows up with Gastroenterology for some symptoms of abdominal discomfort with radiation into the chest   At that time, he began to experience left-sided discomfort in the chest   The discomfort was described as both an aching and heaviness sensation  It did not radiate and was not associated with other symptoms  Symptoms also occur after he eats  Additionally, he experienced some palpitations which were described as a fluttering in the chest   These would come and go lasting between 1-2 minutes  Both the chest discomfort and palpitations seem to resolve on their own  Neither of the symptoms worsened with physical activity or necessarily improved with rest   And of note he also occasionally experiences some shortness of breath  Does have a family history of coronary disease with his father having had stents placed in his 76s  Personally, he has no history of cardiovascular disease of which she is aware  Currently he is not experiencing any chest pain, pressure, tightness or squeezing denies lightheadedness or dizziness  Denies lower extremity swelling, orthopnea or paroxysmal nocturnal dyspnea      Review of Systems:  Review of Systems Constitutional: Negative for decreased appetite, fever, weight gain and weight loss  HENT: Negative for congestion and sore throat  Eyes: Negative for visual disturbance  Cardiovascular: Positive for chest pain and palpitations  Negative for dyspnea on exertion, leg swelling and near-syncope  Respiratory: Positive for shortness of breath  Negative for cough  Hematologic/Lymphatic: Negative for bleeding problem  Skin: Negative for rash  Musculoskeletal: Negative for myalgias and neck pain  Gastrointestinal: Negative for abdominal pain and nausea  Neurological: Negative for light-headedness and weakness  Psychiatric/Behavioral: Negative for depression         Past Medical History:   Diagnosis Date    GERD (gastroesophageal reflux disease)     [facility]human immunodeficiency virus infection) ([facility] )     Hyperlipidemia     Hypertension     Kidney stone     Non-alcoholic fatty liver disease     PONV (postoperative nausea and vomiting)     Prostate cancer ([place] [address] )     Sleep apnea        Past Surgical History:   Procedure Laterality Date    COLONOSCOPY      HERNIA REPAIR      [place] BIOPSY OF PROSTATE,NEEDLE,TRANSPERINEAL N/A 1/25/2022    Procedure: TRANSPERINEAL ULTRASOUND GUIDED BIOPSY PROSTATE;  Surgeon: Noemi Isaac MD;  [facility];  Service: Urology    [place]OP[place] [address] [place] [address] N/A 10/13/2020    Procedure: TRANSPERINEAL PROSTATE BIOPSY;  Surgeon: Noemi Iasac MD;  [facility];  Service: Urology       Social History     Socioeconomic History    Marital status: Single     Spouse name: Not on file    Number of children: Not on file    Years of education: Not on file    Highest education level: Not on file   Occupational History    Not on file   Tobacco Use    Smoking status: Never Smoker    Smokeless tobacco: Never Used   Vaping Use    Vaping Use: Never used   Substance and Sexual Activity    Alcohol use: Not Currently    Drug use: Not Currently    Sexual activity: Not on file   Other Topics Concern    Not on file   Social History Narrative    Not on file     Social Determinants of Health     Financial Resource Strain: Not on file   Food Insecurity: Not on file   Transportation Needs: Not on file   Physical Activity: Not on file   Stress: Not on file   Social Connections: Not on file   Intimate Partner Violence: Not on file   Housing Stability: Not on file       No family history on file      Allergies   Allergen Reactions    Sulfa Antibiotics          Current Outpatient Medications:     cyanocobalamin (VITAMIN B-12) 500 MCG tablet, Take 1,000 mcg by mouth daily, Disp: , Rfl:     docusate sodium (COLACE) 100 mg capsule, Take 100 mg by mouth 3 (three) times a day as needed for constipation, Disp: , Rfl:     dolutegravir (TIVICAY) 50 MG TABS, Take 50 mg by mouth daily, Disp: , Rfl:     emtricitabine-tenofovir AF (Descovy) 200-25 MG tablet, Take 25 mg by mouth daily, Disp: , Rfl:     famotidine (PEPCID) 20 mg tablet, Take 1 tablet by mouth, Disp: , Rfl:     lisinopril (ZESTRIL) 20 mg tablet, Take 20 mg by mouth daily, Disp: , Rfl:     metoprolol succinate (TOPROL-XL) 25 mg 24 hr tablet, Take 1 tablet by mouth daily, Disp: , Rfl:     Multiple Vitamin (multivitamin) capsule, Take 1 capsule by mouth daily, Disp: , Rfl:     Omega-3 1000 MG CAPS, Take 2 g by mouth daily, Disp: , Rfl:     potassium citrate (Urocit-K 10) 10 mEq, Take 1 tablet (10 mEq total) by mouth daily, Disp: 90 tablet, Rfl: 3    pravastatin (PRAVACHOL) 80 mg tablet, Take 80 mg by mouth daily, Disp: , Rfl:     Cholecalciferol 25 MCG (1000 UT) CHEW, Chew 1 tablet daily, Disp: , Rfl:     ciprofloxacin (CIPRO) 500 mg tablet, Take 500 mg by mouth every 12 (twelve) hours, Disp: , Rfl:     fluticasone (FLONASE) 50 mcg/act nasal spray, 1 spray into each nostril 2 (two) times a day, Disp: , Rfl:     phenazopyridine (PYRIDIUM) 100 mg tablet, Take 1 tablet (100 mg total) by mouth 3 (three) times a day as needed for bladder spasms, Disp: 20 tablet, Rfl: 0    traMADol (ULTRAM) 50 mg tablet, Take 1 tablet (50 mg total) by mouth every 6 (six) hours as needed for moderate pain for up to 5 doses, Disp: 5 tablet, Rfl: 0    Vitals:    22 0906   BP: 134/82   BP Location: Left arm   Patient Position: Sitting   Cuff Size: Adult   Pulse: 64   Weight: 88 3 kg (194 lb 9 6 oz)   Height: 6' 2" (1 88 m)     Body mass index is 24 99 kg/m²  PHYSICAL EXAMINATION:  Gen: Awake, Alert, NAD   Head/eyes: AT/NC, pupils equal and round, Anicteric  ENT: mmm  Neck: Supple, No elevated JVP, trachea midline  Resp: CTA bilaterally no w/r/r  CV: RRR +S1, S2, No m/r/g  Abd: Soft, NT/ND + BS  Ext: no LE edema bilaterally  Neuro: Follows commands, moves all extermities  Psych: Appropriate affect, normal mood, pleasant attitude, non-combative  Skin: warm; no rash, erythema or venous stasis changes on exposed skin    --------------------------------------------------------------------------------  TREADMILL STRESS  Results for orders placed during the hospital encounter of 18    Stress test only, exercise    Narrative  [facility]  [facility]  [place], [address]  [phone]    Stress Electrocardiography during Exercise    Name: Demi Leon  MR #: [id number]  Account #: [de-identified]  Study date: 2018  : 1955  Age: 65 years  Gender: Male  Height: 73 in  Weight: 212 lb  BSA: 2 21 m squared    Allergies: SULFA ANTIBIOTICS    Diagnosis: R07 9 - Chest pain, unspecified    Primary Physician:  Mirtha Stiles  Referring Physician:  Mirtha Stiles  RN:  Yaneth Washington RN BSN  Technician:  [employer]  GROUP:  [facility]  Interpreting Physician:  David Klein DO  Report Prepared By[[email or web address]-identified]  Yaneth Washington RN BSN    CLINICAL QUESTION: Detection of coronary artery disease  HISTORY: The patient is a 65year old  male  Chest pain status: chest pain  Coronary artery disease risk factors: dyslipidemia, hypertension  Cardiovascular history: none significant  Co-morbidity: history of cocaine abuse  Medications: a beta blocker, an ACE inhibitor/ARB, aspirin, and a lipid lowering agent  PHYSICAL EXAM: Baseline physical exam screening: normal lung exam     REST ECG: Normal sinus rhythm  Normal baseline ECG  PROCEDURE: Treadmill exercise testing was performed, using the Sally protocol  Stress electrocardiographic evaluation was performed  SALLY PROTOCOL:  HR bpm SBP mmHg DBP mmHg Symptoms  Baseline 67 119 77 none  Stage 1 96 150 69 none  Stage 2 115 156 68 none  Stage 3 146 174 82 mild dyspnea  Stage 4 160 149 78 dyspnea, fatigue  Recovery 1 94 165 70 subsiding  Recovery 2 89 158 72 none  Recovery 3 86 139 74 none  No medications or fluids given  STRESS SUMMARY: 02 sat at rest 99% and at peak stress 98%  Duration of exercise was 9 min and 30 sec  The patient exercised to protocol stage 4  Maximal work rate was 10 9 METs  Maximal heart rate during stress was 160 bpm ( 101 % of  maximal predicted heart rate)  The heart rate response to stress was normal  There was normal resting blood pressure with an appropriate response to stress  The rate-pressure product for the peak heart rate and blood pressure was [id number]  There was no chest pain during stress  The stress test was terminated due to achievement of maximal (symptom limited) exercise and achievement of target heart rate  The stress ECG was negative for ischemia  There were no stress arrhythmias  or conduction abnormalities  SUMMARY:  -  Stress results: Duration of exercise was 9 min and 30 sec  Target heart rate was achieved  There was no chest pain during stress  -  ECG conclusions: The stress ECG was negative for ischemia  IMPRESSIONS: Normal study after maximal exercise  No chest pain or ischemic ECG changes with exercise   Normal blood pressure and heart rate response to exercise with no evidence of exercise induced hypoxia  Good functional capacity  Prepared and signed by    DO Lakisha Aaron 01/22/2018 15:23:34     --------------------------------------------------------------------------------  NUCLEAR STRESS TEST: No results found for this or any previous visit  No results found for this or any previous visit       --------------------------------------------------------------------------------  CATH:  No results found for this or any previous visit     --------------------------------------------------------------------------------  ECHO:   No results found for this or any previous visit  No results found for this or any previous visit     --------------------------------------------------------------------------------  HOLTER  No results found for this or any previous visit  No results found for this or any previous visit     --------------------------------------------------------------------------------  CAROTIDS  No results found for this or any previous visit      --------------------------------------------------------------------------------  ECGs:  No results found for this visit on 04/07/22       Lab Results   Component Value Date    WBC 4 60 04/02/2022    HGB 16 1 04/02/2022    HCT 43 8 04/02/2022    MCV 97 04/02/2022     (L) 04/02/2022      Lab Results   Component Value Date    SODIUM 143 04/02/2022    K 4 3 04/02/2022     04/02/2022    CO2 30 04/02/2022    BUN 14 04/02/2022    CREATININE 1 06 04/02/2022    GLUC 118 04/02/2022    CALCIUM 8 9 04/02/2022      No results found for: HGBA1C   Lab Results   Component Value Date    CHOL 117 06/16/2015    CHOL 170 08/06/2014     Lab Results   Component Value Date    HDL 42 07/19/2021    HDL 42 07/13/2016    HDL 37 06/16/2015     Lab Results   Component Value Date    LDLCALC 82 07/19/2021    LDLCALC 66 07/13/2016    LDLCALC 63 06/16/2015     Lab Results   Component Value Date    TRIG 74 07/19/2021    TRIG 50 07/13/2016    TRIG 85 06/16/2015     No results found for: CHOLHDL   No results found for: INR, PROTIME     1  Palpitations    2  Essential hypertension    3  Dyslipidemia        IMPRESSION:   Precordial chest pain   Palpitations    Dyspnea on exertion   Hypertension   Dyslipidemia    MING   Exercise stress test is negative for myocardial ischemia, ET 9:30, 101% MPHR, 10 9 METs, January 2018   GERD    Non alcoholic fatty liver disease   History of prostate CA - active, January 2020   History of HIV    PLAN:  It was a pleasure to see Josesito Cabrera in the office today for initial CV evaluation  He is here today due to his episodes of chest discomfort, palpitations and shortness of breath  He has been experiencing the symptoms since November 2021  He does get worsening chest discomfort after eating  He also gets it sometimes just at rest and not necessarily worse with exertion  He does have multiple risk factors including high blood pressure, dyslipidemia and medications/HIV  Blood pressure and heart rate are stable in the office today  Recent ECG was nonischemic  Based on his clinical presentation, I have the following recommendations:    1  Recommend checking exercise nuclear stress test to assess for any evidence of underlying myocardial ischemia  2  Would obtain 2D echocardiogram to assess cardiac structure and function  3  We will check 2 week event recorder to assess for any evidence of arrhythmia given his palpitations  4  Continue statin therapy  Goal LDL is less than 70 mg/dL  5  Continue metoprolol and lisinopril  Patient aware to hold metoprolol dose on the day of stress testing  Otherwise he will continue with both medications as prescribed  6  Recommend heart healthy diet low in sodium and carbohydrate  7  Should he have any worsening of his symptoms, especially in frequency or severity or change in quality, recommend seeking immediate medical attention  8   We will follow up with him after testing to discuss the results  As always, please do not hesitate to call with any questions  Portions of the record may have been created with voice recognition software  Occasional wrong word or "sound a like" substitutions may have occurred due to the inherent limitations of voice recognition software  Read the chart carefully and recognize, using context, where substitutions have occurred        Signed: Brandy Joiner DO, Tressia Standing

## 2022-04-08 ENCOUNTER — HOSPITAL ENCOUNTER (OUTPATIENT)
Dept: GASTROENTEROLOGY | Facility: HOSPITAL | Age: 67
Setting detail: OUTPATIENT SURGERY
Discharge: HOME | End: 2022-04-08
Attending: INTERNAL MEDICINE | Admitting: INTERNAL MEDICINE
Payer: MEDICARE

## 2022-04-08 ENCOUNTER — ANESTHESIA EVENT (OUTPATIENT)
Dept: GASTROENTEROLOGY | Facility: HOSPITAL | Age: 67
End: 2022-04-08

## 2022-04-08 ENCOUNTER — ANESTHESIA (OUTPATIENT)
Dept: GASTROENTEROLOGY | Facility: HOSPITAL | Age: 67
End: 2022-04-08

## 2022-04-08 VITALS
RESPIRATION RATE: 18 BRPM | HEART RATE: 63 BPM | TEMPERATURE: 97.2 F | DIASTOLIC BLOOD PRESSURE: 84 MMHG | OXYGEN SATURATION: 99 % | SYSTOLIC BLOOD PRESSURE: 132 MMHG

## 2022-04-08 DIAGNOSIS — K21.9 GASTROESOPHAGEAL REFLUX DISEASE, UNSPECIFIED WHETHER ESOPHAGITIS PRESENT: ICD-10-CM

## 2022-04-08 PROCEDURE — 43239 EGD BIOPSY SINGLE/MULTIPLE: CPT | Performed by: INTERNAL MEDICINE

## 2022-04-08 PROCEDURE — 88305 TISSUE EXAM BY PATHOLOGIST: CPT | Performed by: PATHOLOGY

## 2022-04-08 RX ORDER — LIDOCAINE HYDROCHLORIDE 10 MG/ML
INJECTION, SOLUTION EPIDURAL; INFILTRATION; INTRACAUDAL; PERINEURAL AS NEEDED
Status: DISCONTINUED | OUTPATIENT
Start: 2022-04-08 | End: 2022-04-08

## 2022-04-08 RX ORDER — SODIUM CHLORIDE 9 MG/ML
INJECTION, SOLUTION INTRAVENOUS CONTINUOUS PRN
Status: DISCONTINUED | OUTPATIENT
Start: 2022-04-08 | End: 2022-04-08

## 2022-04-08 RX ORDER — PROPOFOL 10 MG/ML
INJECTION, EMULSION INTRAVENOUS AS NEEDED
Status: DISCONTINUED | OUTPATIENT
Start: 2022-04-08 | End: 2022-04-08

## 2022-04-08 RX ORDER — FENTANYL CITRATE 50 UG/ML
INJECTION, SOLUTION INTRAMUSCULAR; INTRAVENOUS AS NEEDED
Status: DISCONTINUED | OUTPATIENT
Start: 2022-04-08 | End: 2022-04-08

## 2022-04-08 RX ADMIN — PROPOFOL 25 MG: 10 INJECTION, EMULSION INTRAVENOUS at 09:33

## 2022-04-08 RX ADMIN — SODIUM CHLORIDE: 0.9 INJECTION, SOLUTION INTRAVENOUS at 09:22

## 2022-04-08 RX ADMIN — PROPOFOL 25 MG: 10 INJECTION, EMULSION INTRAVENOUS at 09:37

## 2022-04-08 RX ADMIN — FENTANYL CITRATE 100 MCG: 50 INJECTION INTRAMUSCULAR; INTRAVENOUS at 09:27

## 2022-04-08 RX ADMIN — LIDOCAINE HYDROCHLORIDE 100 MG: 10 INJECTION, SOLUTION EPIDURAL; INFILTRATION; INTRACAUDAL; PERINEURAL at 09:30

## 2022-04-08 RX ADMIN — PROPOFOL 100 MG: 10 INJECTION, EMULSION INTRAVENOUS at 09:30

## 2022-04-08 NOTE — INTERVAL H&P NOTE
H&P reviewed  After examining the patient I find no changes in the patients condition since the H&P had been written      Vitals:    04/08/22 0913   BP: 162/79   Pulse: 68   Resp: 18   Temp: (!) 96 2 °F (35 7 °C)   SpO2: 99%

## 2022-04-08 NOTE — ANESTHESIA PREPROCEDURE EVALUATION
Procedure:  EGD    Relevant Problems   CARDIO   (+) HTN (hypertension)   (+) Hyperlipidemia      GI/HEPATIC   (+) GERD (gastroesophageal reflux disease)   (+) Non-alcoholic fatty liver disease      /RENAL   (+) Prostate nodule        Physical Exam    Airway    Mallampati score: II  TM Distance: >3 FB  Neck ROM: full     Dental   No notable dental hx     Cardiovascular  Cardiovascular exam normal    Pulmonary  Pulmonary exam normal     Other Findings        Anesthesia Plan  ASA Score- 2     Anesthesia Type- IV sedation with anesthesia with ASA Monitors  Additional Monitors:   Airway Plan:           Plan Factors-Exercise tolerance (METS): >4 METS  Patient is not a current smoker  Induction- intravenous  Postoperative Plan-     Informed Consent- Anesthetic plan and risks discussed with patient  I personally reviewed this patient with the CRNA  Discussed and agreed on the Anesthesia Plan with the CRNA  Bao Barbour

## 2022-04-08 NOTE — ANESTHESIA POSTPROCEDURE EVALUATION
Post-Op Assessment Note    CV Status:  Stable    Pain management: adequate     Mental Status:  Lethargic and sleepy   Hydration Status:  Stable   PONV Controlled:  None   Airway Patency:  Patent      Post Op Vitals Reviewed: Yes      Staff: CRNA         No complications documented      /68 (04/08/22 0942)    Temp (!) 97 2 °F (36 2 °C) (04/08/22 0942)    Pulse 66 (04/08/22 0942)   Resp 18 (04/08/22 0942)    SpO2 97 % (04/08/22 0942)

## 2022-04-12 DIAGNOSIS — N20.0 NEPHROLITHIASIS: ICD-10-CM

## 2022-04-12 RX ORDER — POTASSIUM CITRATE 10 MEQ/1
10 TABLET, EXTENDED RELEASE ORAL DAILY
Qty: 90 TABLET | Refills: 3 | Status: SHIPPED | OUTPATIENT
Start: 2022-04-12

## 2022-04-21 NOTE — RESULT ENCOUNTER NOTE
Mr Escobar  gastric and esophageal biopsy results were normal and he has been notified via [address]

## 2022-04-29 ENCOUNTER — CLINICAL SUPPORT (OUTPATIENT)
Dept: CARDIOLOGY CLINIC | Facility: CLINIC | Age: 67
End: 2022-04-29
Payer: MEDICARE

## 2022-04-29 DIAGNOSIS — R00.2 PALPITATIONS: ICD-10-CM

## 2022-04-29 PROCEDURE — 93248 EXT ECG>7D<15D REV&INTERPJ: CPT | Performed by: INTERNAL MEDICINE

## 2022-06-14 ENCOUNTER — DOCUMENTATION (OUTPATIENT)
Dept: CARDIOLOGY CLINIC | Facility: CLINIC | Age: 67
End: 2022-06-14

## 2022-06-14 NOTE — PROGRESS NOTES
Patient's palpitations have resolved  Offered increasing metoprolol to Toprol-XL 50 mg daily, but he feels much improved on his current dose of Toprol-XL 25 mg daily  Continue current dosing for now  Plan for stress test and echocardiogram in the near future  Will follow-up after testing

## 2022-06-29 ENCOUNTER — HOSPITAL ENCOUNTER (OUTPATIENT)
Dept: NON INVASIVE DIAGNOSTICS | Facility: HOSPITAL | Age: 67
Discharge: HOME | End: 2022-06-29
Attending: INTERNAL MEDICINE
Payer: MEDICARE

## 2022-06-29 ENCOUNTER — HOSPITAL ENCOUNTER (OUTPATIENT)
Dept: NUCLEAR MEDICINE | Facility: HOSPITAL | Age: 67
Discharge: HOME | End: 2022-06-29
Attending: INTERNAL MEDICINE
Payer: MEDICARE

## 2022-06-29 VITALS
DIASTOLIC BLOOD PRESSURE: 84 MMHG | WEIGHT: 194 LBS | BODY MASS INDEX: 24.9 KG/M2 | HEART RATE: 63 BPM | SYSTOLIC BLOOD PRESSURE: 132 MMHG | HEIGHT: 74 IN

## 2022-06-29 VITALS — HEIGHT: 74 IN | BODY MASS INDEX: 24.77 KG/M2 | WEIGHT: 193 LBS

## 2022-06-29 DIAGNOSIS — I10 ESSENTIAL HYPERTENSION: ICD-10-CM

## 2022-06-29 DIAGNOSIS — R07.2 PRECORDIAL CHEST PAIN: ICD-10-CM

## 2022-06-29 DIAGNOSIS — R00.2 PALPITATIONS: ICD-10-CM

## 2022-06-29 LAB
AORTIC ROOT: 3 CM
APICAL FOUR CHAMBER EJECTION FRACTION: 63 %
ARRHY DURING EX: NORMAL
ASCENDING AORTA: 3.8 CM
CHEST PAIN STATEMENT: NORMAL
E WAVE DECELERATION TIME: 173 MS
FRACTIONAL SHORTENING: 37 % (ref 28–44)
INTERVENTRICULAR SEPTUM IN DIASTOLE (PARASTERNAL SHORT AXIS VIEW): 1.2 CM
INTERVENTRICULAR SEPTUM: 1.2 CM (ref 0.6–1.1)
LAAS-AP2: 15.9 CM2
LAAS-AP4: 15.9 CM2
LEFT ATRIUM SIZE: 4.5 CM
LEFT INTERNAL DIMENSION IN SYSTOLE: 2.7 CM (ref 2.1–4)
LEFT VENTRICULAR INTERNAL DIMENSION IN DIASTOLE: 4.3 CM (ref 3.5–6)
LEFT VENTRICULAR POSTERIOR WALL IN END DIASTOLE: 0.9 CM
LEFT VENTRICULAR STROKE VOLUME: 55 ML
LVSV (TEICH): 55 ML
MAX DIASTOLIC BP: 84 MMHG
MAX HEART RATE: 151 BPM
MAX HR PERCENT: 97 %
MAX HR: 151 BPM
MAX PREDICTED HEART RATE: 155 BPM
MAX. SYSTOLIC BP: 210 MMHG
MV E'TISSUE VEL-SEP: 9 CM/S
MV PEAK A VEL: 0.77 M/S
MV PEAK E VEL: 67 CM/S
MV STENOSIS PRESSURE HALF TIME: 50 MS
MV VALVE AREA P 1/2 METHOD: 4.4 CM2
NUC STRESS EJECTION FRACTION: 64 %
PROTOCOL NAME: NORMAL
RA PRESSURE ESTIMATED: 3 MMHG
RATE PRESSURE PRODUCT: NORMAL
RIGHT ATRIAL 2D VOLUME: 36 ML
RIGHT ATRIUM AREA SYSTOLE A4C: 14.5 CM2
RIGHT VENTRICLE ID DIMENSION: 4.2 CM
RV PSP: 31 MMHG
SL CV LEFT ATRIUM LENGTH A2C: 5.2 CM
SL CV LV EF: 65
SL CV PED ECHO LEFT VENTRICLE DIASTOLIC VOLUME (MOD BIPLANE) 2D: 82 ML
SL CV PED ECHO LEFT VENTRICLE SYSTOLIC VOLUME (MOD BIPLANE) 2D: 27 ML
SL CV REST NUCLEAR ISOTOPE DOSE: 10.7 MCI
SL CV STRESS NUCLEAR ISOTOPE DOSE: 32.5 MCI
SL CV STRESS RECOVERY BP: NORMAL MMHG
SL CV STRESS RECOVERY HR: 78 BPM
SL CV STRESS RECOVERY O2 SAT: 100 %
SL CV STRESS STAGE REACHED: 4
STRESS ANGINA INDEX: 0
STRESS BASELINE BP: NORMAL MMHG
STRESS BASELINE HR: 58 BPM
STRESS O2 SAT REST: 100 %
STRESS PEAK HR: 151 BPM
STRESS POST ESTIMATED WORKLOAD: 11.7 METS
STRESS POST EXERCISE DUR MIN: 10 MIN
STRESS POST O2 SAT PEAK: 100 %
STRESS POST PEAK BP: 192 MMHG
STRESS/REST PERFUSION RATIO: 0.95
TARGET HR FORMULA: NORMAL
TIME IN EXERCISE PHASE: NORMAL
TR MAX PG: 28 MMHG
TR PEAK VELOCITY: 2.6 M/S
TRICUSPID VALVE PEAK REGURGITATION VELOCITY: 2.63 M/S

## 2022-06-29 PROCEDURE — A9502 TC99M TETROFOSMIN: HCPCS

## 2022-06-29 PROCEDURE — 78452 HT MUSCLE IMAGE SPECT MULT: CPT | Performed by: INTERNAL MEDICINE

## 2022-06-29 PROCEDURE — 93306 TTE W/DOPPLER COMPLETE: CPT

## 2022-06-29 PROCEDURE — 93016 CV STRESS TEST SUPVJ ONLY: CPT | Performed by: INTERNAL MEDICINE

## 2022-06-29 PROCEDURE — G1004 CDSM NDSC: HCPCS

## 2022-06-29 PROCEDURE — 93018 CV STRESS TEST I&R ONLY: CPT | Performed by: INTERNAL MEDICINE

## 2022-06-29 PROCEDURE — 93017 CV STRESS TEST TRACING ONLY: CPT

## 2022-06-29 PROCEDURE — 78452 HT MUSCLE IMAGE SPECT MULT: CPT

## 2022-07-01 ENCOUNTER — TELEMEDICINE (OUTPATIENT)
Dept: GASTROENTEROLOGY | Facility: MEDICAL CENTER | Age: 67
End: 2022-07-01
Payer: MEDICARE

## 2022-07-01 DIAGNOSIS — K21.00 GASTROESOPHAGEAL REFLUX DISEASE WITH ESOPHAGITIS WITHOUT HEMORRHAGE: Primary | ICD-10-CM

## 2022-07-01 DIAGNOSIS — K76.0 NON-ALCOHOLIC FATTY LIVER DISEASE: ICD-10-CM

## 2022-07-01 PROCEDURE — 99213 OFFICE O/P EST LOW 20 MIN: CPT | Performed by: INTERNAL MEDICINE

## 2022-07-01 NOTE — PROGRESS NOTES
Virtual Regular Visit    Verification of patient location:    Patient is located in the following state in which I hold an active license [place]      Assessment/Plan:    Problem List Items Addressed This Visit        Digestive    GERD (gastroesophageal reflux disease) - Primary        Charm Lefort is doing well at this time  His reflux symptoms are well controlled  He is only on Pepcid  Continue with diet control and lifestyle changes  His fatty liver could also be a result of medication effect however his liver chemistries are all normal   He did have a transient hyperbilirubinemia which is likely benign  Follow-up as needed  History of colon polyp  Repeat colonoscopy is recommended in 2025  Reason for visit is   Chief Complaint   Patient presents with    Virtual Regular Visit        Encounter provider Pasquale Stroud MD    Provider located at [address]  [facility] [address]      Recent Visits  Date Type Provider Dept   07/01/22 Telemedicine Pasquale Stroud MD [facility] recent visits within past 7 days and meeting all other requirements  Future Appointments  No visits were found meeting these conditions  Showing future appointments within next 150 days and meeting all other requirements       The patient was identified by name and date of birth  Svetlana Galan was informed that this is a telemedicine visit and that the visit is being conducted through [facility] and patient was informed that this is not a secure, HIPAA-compliant platform  He agrees to proceed     My office door was closed  No one else was in the room  He acknowledged consent and understanding of privacy and security of the video platform  The patient has agreed to participate and understands they can discontinue the visit at any time  Patient is aware this is a billable service       Subjective  74 y/o with h/o recent abdominal discomfort in the epigastric area and the left side  It is a dull discomfort  The discomfort is waxing and waning  He has been experiencing some acid reflux for some years and worse with certain foods especially acidic foods and tomato sauce  He has excessive burping  He has been using a wedge  His father had esophageal cancer  No dysphagia  No weight loss  He does not have a sore throat  Occasional coughing  No clearing the throat  He does snore  No diarrhea/ constipation/ bleeding  Was recently diagnosed with fatty liver and has thrombocytopenia (long standing)  Has not had that work up done  No family h/o liver issues or thrombocytopenia  He has HIV and is under control  He had an endoscopy done which showed salmon-colored tongue extending 1 5 cm proximally at the GE junction  Biopsies however did not show Gorman's esophagus  Gastric trick mucosa appeared normal and biopsies were unremarkable  Mild duodenitis was seen  His US elastography did not show fibrosis  He is overall doing well at this time       HPI     Past Medical History:   Diagnosis Date    GERD (gastroesophageal reflux disease)     [facility]     Hyperlipidemia     Hypertension     Kidney stone     Non-alcoholic fatty liver disease     PONV (postoperative nausea and vomiting)     Prostate cancer ([facility] )     Sleep apnea        Past Surgical History:   Procedure Laterality Date    COLONOSCOPY      HERNIA REPAIR      [place] BIOPSY OF PROSTATE,NEEDLE,TRANSPERINEAL N/A 1/25/2022    Procedure: TRANSPERINEAL ULTRASOUND GUIDED BIOPSY PROSTATE;  Surgeon: Ran Chávez MD;  [facility];  Service: Urology    [place] BIOPSY OF [address] [place] [address] N/A 10/13/2020    Procedure: TRANSPERINEAL PROSTATE BIOPSY;  Surgeon: Ran Chávez MD;  [facility];  Service: Urology       Current Outpatient Medications   Medication Sig Dispense Refill    cyanocobalamin (VITAMIN B-12) 500 MCG tablet Take 1,000 mcg by mouth daily      docusate sodium (COLACE) 100 mg capsule Take 100 mg by mouth 3 (three) times a day as needed for constipation      dolutegravir (TIVICAY) 50 MG TABS Take 50 mg by mouth daily      emtricitabine-tenofovir AF (DESCOVY) 200-25 MG tablet Take 25 mg by mouth daily      famotidine (PEPCID) 20 mg tablet Take 1 tablet by mouth      lisinopril (ZESTRIL) 20 mg tablet Take 20 mg by mouth daily      metoprolol succinate (TOPROL-XL) 25 mg 24 hr tablet Take 1 tablet by mouth daily      Multiple Vitamin (multivitamin) capsule Take 1 capsule by mouth daily      Omega-3 1000 MG CAPS Take 2 g by mouth daily      potassium citrate (Urocit-K 10) 10 mEq Take 1 tablet (10 mEq total) by mouth daily 90 tablet 3    pravastatin (PRAVACHOL) 80 mg tablet Take 80 mg by mouth daily      Cholecalciferol 25 MCG (1000 UT) CHEW Chew 1 tablet daily      ciprofloxacin (CIPRO) 500 mg tablet Take 500 mg by mouth every 12 (twelve) hours      fluticasone (FLONASE) 50 mcg/act nasal spray 1 spray into each nostril 2 (two) times a day      phenazopyridine (PYRIDIUM) 100 mg tablet Take 1 tablet (100 mg total) by mouth 3 (three) times a day as needed for bladder spasms 20 tablet 0    traMADol (ULTRAM) 50 mg tablet Take 1 tablet (50 mg total) by mouth every 6 (six) hours as needed for moderate pain for up to 5 doses 5 tablet 0     No current facility-administered medications for this visit  Allergies   Allergen Reactions    Sulfa Antibiotics        Review of Systems    Video Exam    There were no vitals filed for this visit  Physical Exam     REVIEW OF SYSTEMS:    CONSTITUTIONAL: Denies any fever, chills, rigors, and weight loss  HEENT: No earache or tinnitus  Denies hearing loss or visual disturbances  CARDIOVASCULAR: No chest pain or palpitations  RESPIRATORY: Denies any cough, hemoptysis, shortness of breath or dyspnea on exertion    GASTROINTESTINAL: As noted in the History of Present Illness  GENITOURINARY: No problems with urination  Denies any hematuria or dysuria  NEUROLOGIC: No dizziness or vertigo, denies headaches  MUSCULOSKELETAL: Denies any muscle or joint pain  SKIN: Denies skin rashes or itching  ENDOCRINE: Denies excessive thirst  Denies intolerance to heat or cold  PSYCHOSOCIAL: Denies depression or anxiety  Denies any recent memory loss  PHYSICAL EXAMINATION:  Appearance and vitals taken from home devices    General Appearance:   Alert, cooperative, no distress   HEENT:  Normocephalic, atraumatic, anicteric  Neck supple, symmetrical, trachea midline  Lungs:   Equal chest rise and unlabored breathing, normal effort, no coughing  Abdomen:   No abdominal distension  Skin:   No jaundice, rashes, or lesions on visualized areas  Musculoskeletal:   Normal range of motion visualized  Psych:  Normal affect and normal insight  Neuro:  Alert and appropriate  I spent 12 minutes directly with the patient during this visit    [address] verbally agrees to participate in [employer]  Pt is aware that [employer] could be limited without vital signs or the ability to perform a full hands-on physical [address] understands he or the provider may request at any time to terminate the video visit and request the patient to seek care or treatment in person

## 2022-07-08 DIAGNOSIS — E80.6 HYPERBILIRUBINEMIA: Primary | ICD-10-CM

## 2022-07-14 ENCOUNTER — APPOINTMENT (OUTPATIENT)
Dept: LAB | Facility: HOSPITAL | Age: 67
End: 2022-07-14
Payer: MEDICARE

## 2022-07-14 ENCOUNTER — OFFICE VISIT (OUTPATIENT)
Dept: CARDIOLOGY CLINIC | Facility: CLINIC | Age: 67
End: 2022-07-14
Payer: MEDICARE

## 2022-07-14 VITALS
WEIGHT: 203.4 LBS | BODY MASS INDEX: 26.12 KG/M2 | SYSTOLIC BLOOD PRESSURE: 132 MMHG | DIASTOLIC BLOOD PRESSURE: 80 MMHG | HEART RATE: 68 BPM

## 2022-07-14 DIAGNOSIS — R07.2 PRECORDIAL CHEST PAIN: Primary | ICD-10-CM

## 2022-07-14 DIAGNOSIS — I10 ESSENTIAL HYPERTENSION: ICD-10-CM

## 2022-07-14 DIAGNOSIS — E78.5 DYSLIPIDEMIA: ICD-10-CM

## 2022-07-14 DIAGNOSIS — I47.1 SVT (SUPRAVENTRICULAR TACHYCARDIA) (HCC): ICD-10-CM

## 2022-07-14 DIAGNOSIS — C61 PROSTATE CANCER (HCC): ICD-10-CM

## 2022-07-14 DIAGNOSIS — R00.2 PALPITATIONS: ICD-10-CM

## 2022-07-14 LAB — PSA SERPL-MCNC: 4.6 NG/ML (ref 0–4)

## 2022-07-14 PROCEDURE — 36415 COLL VENOUS BLD VENIPUNCTURE: CPT

## 2022-07-14 PROCEDURE — 99214 OFFICE O/P EST MOD 30 MIN: CPT | Performed by: INTERNAL MEDICINE

## 2022-07-14 PROCEDURE — 84153 ASSAY OF PSA TOTAL: CPT

## 2022-07-14 RX ORDER — METOPROLOL SUCCINATE 50 MG/1
50 TABLET, EXTENDED RELEASE ORAL EVERY EVENING
Qty: 90 TABLET | Refills: 1 | Status: SHIPPED | OUTPATIENT
Start: 2022-07-14 | End: 2022-10-12

## 2022-07-14 NOTE — PROGRESS NOTES
Cardiology Office Note  MD Varinder Hoffman MD Katharyn Plate, DO, MD Jonatan Martines DO, Mick Dodd DO, [facility] [place]  ----------------------------------------------------------------  [facility] [address] [address]    Jaylen Chaves 73 y o  male MRN: [id number]  Unit/Bed#:  Encounter: [id number]      History of Present Illness: It was a pleasure to see Jaylen Chaves in the office today for follow-up CV evaluation  He has a past medical history of hypertension, dyslipidemia, MING, GERD, non alcoholic fatty liver disease and HIV  He established care with us in April 2022  Patient began to experience symptoms of discomfort in the chest and palpitations starting back in November 2021  The patient follows up with Gastroenterology for some symptoms of abdominal discomfort with radiation into the chest   At that time, he began to experience left-sided discomfort in the chest   The discomfort was described as both an aching and heaviness sensation  It did not radiate and was not associated with other symptoms  Symptoms also occur after he eats  Additionally, he experienced some palpitations which were described as a fluttering in the chest   These would come and go lasting between 1-2 minutes  Both the chest discomfort and palpitations seem to resolve on their own  Neither of the symptoms worsened with physical activity or necessarily improved with rest   And of note he also occasionally experiences some shortness of breath  Does have a family history of coronary disease with his father having had stents placed in his 76s  Due to the patient's symptoms, he was sent for event recorder, stress test and echocardiogram   He is here today discuss the results  Since our prior encounter, his symptoms of palpitations have markedly improved  His chest discomfort and shortness of breath have resolved    He has been seen by Gastroenterology regarding his reflux symptoms  With treatment, he has been feeling much improved  Currently he is not experiencing any chest pain, pressure, tightness or squeezing denies lightheadedness or dizziness  Denies lower extremity swelling, orthopnea or paroxysmal nocturnal dyspnea  Review of Systems:  Review of Systems   Constitutional: Negative for decreased appetite, fever, weight gain and weight loss  HENT: Negative for congestion and sore throat  Eyes: Negative for visual disturbance  Cardiovascular: Positive for palpitations  Negative for chest pain, dyspnea on exertion, leg swelling and near-syncope  Respiratory: Negative for cough and shortness of breath  Hematologic/Lymphatic: Negative for bleeding problem  Skin: Negative for rash  Musculoskeletal: Negative for myalgias and neck pain  Gastrointestinal: Negative for abdominal pain and nausea  Neurological: Negative for light-headedness and weakness  Psychiatric/Behavioral: Negative for depression         Past Medical History:   Diagnosis Date    GERD (gastroesophageal reflux disease)     HIV (human immunodeficiency virus infection) ([place] [address] )     Hyperlipidemia     Hypertension     Kidney stone     Non-alcoholic fatty liver disease     PONV (postoperative nausea and vomiting)     Prostate cancer ([place] [address] )     Sleep apnea        Past Surgical History:   Procedure Laterality Date    COLONOSCOPY      HERNIA REPAIR      IA BIOPSY OF PROSTATE,NEEDLE,TRANSPERINEAL N/A 1/25/2022    Procedure: TRANSPERINEAL ULTRASOUND GUIDED BIOPSY PROSTATE;  Surgeon: Paul Thomas MD;  Location[facility];  Service: Urology    IA BIOPSY OF [address] [place] [address] N/A 10/13/2020    Procedure: TRANSPERINEAL PROSTATE BIOPSY;  Surgeon: Paul Thomas MD;  Location: [facility];  Service: Urology       Social History     Socioeconomic History    Marital status: Single     Spouse name: None    Number of children: None    Years of education: None    Highest education level: None   Occupational History    None   Tobacco Use    Smoking status: Never Smoker    Smokeless tobacco: Never Used   Vaping Use    Vaping Use: Never used   Substance and Sexual Activity    Alcohol use: Not Currently    Drug use: Not Currently    Sexual activity: None   Other Topics Concern    None   Social History Narrative    None     Social Determinants of Health     Financial Resource Strain: Not on file   Food Insecurity: Not on file   Transportation Needs: Not on file   Physical Activity: Not on file   Stress: Not on file   Social Connections: Not on file   Intimate Partner Violence: Not on file   Housing Stability: Not on file       History reviewed  No pertinent family history      Allergies   Allergen Reactions    Epinephrine Palpitations and Shortness Of Breath    Sulfa Antibiotics          Current Outpatient Medications:     Cholecalciferol 25 MCG (1000 UT) CHEW, Chew 1 tablet daily, Disp: , Rfl:     cyanocobalamin (VITAMIN B-12) 500 MCG tablet, Take 1,000 mcg by mouth daily, Disp: , Rfl:     docusate sodium (COLACE) 100 mg capsule, Take 100 mg by mouth 3 (three) times a day as needed for constipation, Disp: , Rfl:     dolutegravir (TIVICAY) 50 MG TABS, Take 50 mg by mouth daily, Disp: , Rfl:     emtricitabine-tenofovir AF (DESCOVY) 200-25 MG tablet, Take 25 mg by mouth daily, Disp: , Rfl:     lisinopril (ZESTRIL) 20 mg tablet, Take 20 mg by mouth daily, Disp: , Rfl:     metoprolol succinate (TOPROL-XL) 50 mg 24 hr tablet, Take 1 tablet (50 mg total) by mouth every evening, Disp: 90 tablet, Rfl: 1    Multiple Vitamin (multivitamin) capsule, Take 1 capsule by mouth daily, Disp: , Rfl:     Omega-3 1000 MG CAPS, Take 2 g by mouth daily, Disp: , Rfl:     potassium citrate (Urocit-K 10) 10 mEq, Take 1 tablet (10 mEq total) by mouth daily, Disp: 90 tablet, Rfl: 3    pravastatin (PRAVACHOL) 80 mg tablet, Take 80 mg by mouth daily, Disp: , Rfl:     ciprofloxacin (CIPRO) 500 mg tablet, Take 500 mg by mouth every 12 (twelve) hours, Disp: , Rfl:     famotidine (PEPCID) 20 mg tablet, Take 1 tablet by mouth, Disp: , Rfl:     fluticasone (FLONASE) 50 mcg/act nasal spray, 1 spray into each nostril 2 (two) times a day, Disp: , Rfl:     phenazopyridine (PYRIDIUM) 100 mg tablet, Take 1 tablet (100 mg total) by mouth 3 (three) times a day as needed for bladder spasms, Disp: 20 tablet, Rfl: 0    traMADol (ULTRAM) 50 mg tablet, Take 1 tablet (50 mg total) by mouth every 6 (six) hours as needed for moderate pain for up to 5 doses, Disp: 5 tablet, Rfl: 0    Vitals:    22 0946   BP: 132/80   BP Location: Right arm   Patient Position: Sitting   Cuff Size: Large   Pulse: 68   Weight: 92 3 kg (203 lb 6 4 oz)     Body mass index is 26 12 kg/m²  PHYSICAL EXAMINATION:  Gen: Awake, Alert, NAD   Head/eyes: AT/NC, pupils equal and round, Anicteric  ENT: mmm  Neck: Supple, No elevated JVP, trachea midline  Resp: CTA bilaterally no w/r/r  CV: RRR +S1, S2, No m/r/g  Abd: Soft, NT/ND + BS  Ext: no LE edema bilaterally  Neuro: Follows commands, moves all extermities  Psych: Appropriate affect, happy mood, pleasant attitude, non-combative  Skin: warm; no rash, erythema or venous stasis changes on exposed skin    --------------------------------------------------------------------------------  TREADMILL STRESS  Results for orders placed during the hospital encounter of 18    Stress test only, exercise    Narrative  [address] [place] [address]    [place], [address]  [phone]    Stress Electrocardiography during Exercise    Name: Hector Muhammad  MR #: [id number]  Account #: [de-identified]  Study date: 2018  : 1955  Age: 65 years  Gender: Male  Height: 73 in  Weight: 212 lb  BSA: 2 21 m squared    Allergies: SULFA ANTIBIOTICS    Diagnosis: R07 9 - Chest pain, unspecified    Primary Physician:  Ana Laura Tafoya  Referring Physician:  Wayne Zapata JOHNSON  RN:  Aydin Hidalgo RN BSN  Technician:  Lakshmi Yu  GROUP:  [facility]  Interpreting Physician:  Kiara Carroll DO  Report Prepared By[de-identified]  Aydin Hidalgo RN BSN    CLINICAL QUESTION: Detection of coronary artery disease  HISTORY: The patient is a 65year old  male  Chest pain status: chest pain  Coronary artery disease risk factors: dyslipidemia, hypertension  Cardiovascular history: none significant  Co-morbidity: history of cocaine abuse  Medications: a beta blocker, an ACE inhibitor/ARB, aspirin, and a lipid lowering agent  PHYSICAL EXAM: Baseline physical exam screening: normal lung exam     REST ECG: Normal sinus rhythm  Normal baseline ECG  PROCEDURE: Treadmill exercise testing was performed, using the Servando protocol  Stress electrocardiographic evaluation was performed  SERVANDO PROTOCOL:  HR bpm SBP mmHg DBP mmHg Symptoms  Baseline 67 119 77 none  Stage 1 96 150 69 none  Stage 2 115 156 68 none  Stage 3 146 174 82 mild dyspnea  Stage 4 160 149 78 dyspnea, fatigue  Recovery 1 94 165 70 subsiding  Recovery 2 89 158 72 none  Recovery 3 86 139 74 none  No medications or fluids given  STRESS SUMMARY: 02 sat at rest 99% and at peak stress 98%  Duration of exercise was 9 min and 30 sec  The patient exercised to protocol stage 4  Maximal work rate was 10 9 METs  Maximal heart rate during stress was 160 bpm ( 101 % of  maximal predicted heart rate)  The heart rate response to stress was normal  There was normal resting blood pressure with an appropriate response to stress  The rate-pressure product for the peak heart rate and blood pressure was 33826  There was no chest pain during stress  The stress test was terminated due to achievement of maximal (symptom limited) exercise and achievement of target heart rate  The stress ECG was negative for ischemia  There were no stress arrhythmias  or conduction abnormalities      SUMMARY:  -  Stress results: Duration of exercise was 9 min and 30 sec  Target heart rate was achieved  There was no chest pain during stress  -  ECG conclusions: The stress ECG was negative for ischemia  IMPRESSIONS: Normal study after maximal exercise  No chest pain or ischemic ECG changes with exercise  Normal blood pressure and heart rate response to exercise with no evidence of exercise induced hypoxia  Good functional capacity  Prepared and signed by    Radha Flor DO  Signed 01/22/2018 15:23:34    · Exercise stress test is negative for myocardial ischemia, ET 9:30, 101% MPHR, 10 9 METs, January 2018     --------------------------------------------------------------------------------  NUCLEAR STRESS TEST: No results found for this or any previous visit  No results found for this or any previous visit       --------------------------------------------------------------------------------  CATH:  No results found for this or any previous visit     --------------------------------------------------------------------------------  ECHO:   No results found for this or any previous visit  No results found for this or any previous visit     --------------------------------------------------------------------------------  HOLTER  No results found for this or any previous visit  No results found for this or any previous visit     --------------------------------------------------------------------------------  CAROTIDS  No results found for this or any previous visit      --------------------------------------------------------------------------------  ECGs:  No results found for this visit on 07/14/22       Lab Results   Component Value Date    WBC 4 60 04/02/2022    HGB 16 1 04/02/2022    HCT 43 8 04/02/2022    MCV 97 04/02/2022     (L) 04/02/2022      Lab Results   Component Value Date    SODIUM 143 04/02/2022    K 4 3 04/02/2022     04/02/2022    CO2 30 04/02/2022    BUN 14 04/02/2022    CREATININE 1 06 04/02/2022    GLUC 118 04/02/2022    CALCIUM 8 9 04/02/2022      No results found for: HGBA1C   Lab Results   Component Value Date    CHOL 117 06/16/2015    CHOL 170 08/06/2014     Lab Results   Component Value Date    HDL 42 07/19/2021    HDL 42 07/13/2016    HDL 37 06/16/2015     Lab Results   Component Value Date    LDLCALC 82 07/19/2021    LDLCALC 66 07/13/2016    LDLCALC 63 06/16/2015     Lab Results   Component Value Date    TRIG 74 07/19/2021    TRIG 50 07/13/2016    TRIG 85 06/16/2015     No results found for: CHOLHDL   No results found for: INR, PROTIME     1  Precordial chest pain    2  Palpitations  -     metoprolol succinate (TOPROL-XL) 50 mg 24 hr tablet; Take 1 tablet (50 mg total) by mouth every evening    3  Essential hypertension    4  Dyslipidemia    5  SVT (supraventricular tachycardia) (HCC)  -     metoprolol succinate (TOPROL-XL) 50 mg 24 hr tablet; Take 1 tablet (50 mg total) by mouth every evening        IMPRESSION:   Precordial chest pain  o Exercise nuclear stress test appears negative for myocardial ischemia, ET 10 min, 97% MPHR, 11 7 METs, gated EF 64%, June 2022   Palpitations   o Event recorder w/ SR avg HR 67 bpm, rare APCs, atrial couplets/triplets, rare VPCs, ventricular couplets/triplets, nonsustained VT (x1) 4 beats at 210 bpm, nonsustained PSVT (x13) 14 beats at 190 bpm, triggered events correlated with nonsustained PSVT, April 2022   Dyspnea on exertion  o LVEF 65%, mild MILAN, normal diastolic function, mild RV dilatation, trace AR/MR, mild TR, ascending aortic ectasia 3 8 cm, June 2022   Hypertension   Dyslipidemia    MING   GERD    Non alcoholic fatty liver disease   History of prostate CA - active, January 2020   History of HIV    PLAN:  It was a pleasure to see Rianna Hale in the office today for follow-up CV evaluation  He is here today to review the results of his stress test, echocardiogram and event recorder    The stress test was found to be negative for myocardial ischemia with good overall functional capacity  Echocardiogram demonstrated normal left ventricular systolic function with normal diastolic function  He has mild RV dilatation and aortic ectasia  No significant valvular disease  I have shared with him this news  He has event recorder that demonstrated nonsustained SVT and a single run of nonsustained VT that was only 4 beats  Triggered events however did correlate frequently with the patient's nonsustained PSVT  He has no symptoms concerning for angina and no signs or symptoms of heart failure  He examines to be euvolemic in the office today  Blood pressure and heart rate are currently stable  He can perform greater than 4 Mets on a daily basis without any exertional symptoms and performed 11 7 Mets on his stress test   Based on his clinical presentation, I have the following recommendations:    1  Recommend increasing Toprol-XL to 50 mg daily to help with antihypertensive control and increased control for SVT  2  Continue lisinopril for antihypertensive control  3  Continue statin therapy  Goal LDL is less than 70 mg/dL  Repeat lipid panel in 3-6 months  4  No additional CV testing at this time  5  Should he have worsening of his symptoms, especially in frequency or severity or change in quality and especially with loss of consciousness or significant lightheadedness, recommend seeking immediate medical attention  6  We will follow up with him in 3 months to reassess his symptoms and then in 1 year  As always, please do not hesitate to call with any questions  Portions of the record may have been created with voice recognition software  Occasional wrong word or "sound a like" substitutions may have occurred due to the inherent limitations of voice recognition software  Read the chart carefully and recognize, using context, where substitutions have occurred        Signed: Mari Dominguez DO, Fayrene Acton

## 2022-07-15 ENCOUNTER — APPOINTMENT (OUTPATIENT)
Dept: LAB | Facility: HOSPITAL | Age: 67
End: 2022-07-15
Payer: MEDICARE

## 2022-07-15 DIAGNOSIS — E55.9 VITAMIN D INSUFFICIENCY: ICD-10-CM

## 2022-07-15 DIAGNOSIS — K76.0 NON-ALCOHOLIC FATTY LIVER DISEASE: ICD-10-CM

## 2022-07-15 DIAGNOSIS — Z21 ASYMPTOMATIC HIV INFECTION, WITH NO HISTORY OF HIV-RELATED ILLNESS (HCC): ICD-10-CM

## 2022-07-15 DIAGNOSIS — I10 HYPERTENSION, UNSPECIFIED TYPE: ICD-10-CM

## 2022-07-15 DIAGNOSIS — R79.89 ABNORMAL CBC: ICD-10-CM

## 2022-07-15 DIAGNOSIS — B20 HIV INFECTION, UNSPECIFIED SYMPTOM STATUS (HCC): ICD-10-CM

## 2022-07-15 DIAGNOSIS — E78.2 MIXED HYPERLIPIDEMIA: ICD-10-CM

## 2022-07-15 LAB
25(OH)D3 SERPL-MCNC: 48.4 NG/ML (ref 30–100)
ALBUMIN SERPL BCP-MCNC: 3.9 G/DL (ref 3.5–5)
ALP SERPL-CCNC: 50 U/L (ref 46–116)
ALT SERPL W P-5'-P-CCNC: 51 U/L (ref 12–78)
ANION GAP SERPL CALCULATED.3IONS-SCNC: 6 MMOL/L (ref 4–13)
AST SERPL W P-5'-P-CCNC: 27 U/L (ref 5–45)
BILIRUB SERPL-MCNC: 0.82 MG/DL (ref 0.2–1)
BUN SERPL-MCNC: 15 MG/DL (ref 5–25)
CALCIUM SERPL-MCNC: 9.4 MG/DL (ref 8.3–10.1)
CHLORIDE SERPL-SCNC: 105 MMOL/L (ref 100–108)
CHOLEST SERPL-MCNC: 141 MG/DL
CO2 SERPL-SCNC: 30 MMOL/L (ref 21–32)
CREAT SERPL-MCNC: 1.16 MG/DL (ref 0.6–1.3)
ERYTHROCYTE [DISTWIDTH] IN BLOOD BY AUTOMATED COUNT: 11.7 % (ref 11.6–15.1)
GFR SERPL CREATININE-BSD FRML MDRD: 65 ML/MIN/1.73SQ M
GLUCOSE P FAST SERPL-MCNC: 108 MG/DL (ref 65–99)
HCT VFR BLD AUTO: 45.3 % (ref 36.5–49.3)
HDLC SERPL-MCNC: 42 MG/DL
HGB BLD-MCNC: 16.3 G/DL (ref 12–17)
LDLC SERPL CALC-MCNC: 88 MG/DL (ref 0–100)
MCH RBC QN AUTO: 34.9 PG (ref 26.8–34.3)
MCHC RBC AUTO-ENTMCNC: 36 G/DL (ref 31.4–37.4)
MCV RBC AUTO: 97 FL (ref 82–98)
NONHDLC SERPL-MCNC: 99 MG/DL
PLATELET # BLD AUTO: 135 THOUSANDS/UL (ref 149–390)
PMV BLD AUTO: 11.5 FL (ref 8.9–12.7)
POTASSIUM SERPL-SCNC: 4.9 MMOL/L (ref 3.5–5.3)
PROT SERPL-MCNC: 7.6 G/DL (ref 6.4–8.2)
RBC # BLD AUTO: 4.67 MILLION/UL (ref 3.88–5.62)
SODIUM SERPL-SCNC: 141 MMOL/L (ref 136–145)
TRIGL SERPL-MCNC: 57 MG/DL
WBC # BLD AUTO: 4.45 THOUSAND/UL (ref 4.31–10.16)

## 2022-07-15 PROCEDURE — 36415 COLL VENOUS BLD VENIPUNCTURE: CPT

## 2022-07-15 PROCEDURE — 82306 VITAMIN D 25 HYDROXY: CPT

## 2022-07-15 PROCEDURE — 87536 HIV-1 QUANT&REVRSE TRNSCRPJ: CPT

## 2022-07-15 PROCEDURE — 86361 T CELL ABSOLUTE COUNT: CPT

## 2022-07-15 PROCEDURE — 80053 COMPREHEN METABOLIC PANEL: CPT

## 2022-07-15 PROCEDURE — 85027 COMPLETE CBC AUTOMATED: CPT

## 2022-07-15 PROCEDURE — 80061 LIPID PANEL: CPT

## 2022-07-16 LAB
BASOPHILS # BLD AUTO: 0 X10E3/UL (ref 0–0.2)
BASOPHILS NFR BLD AUTO: 1 %
CD3+CD4+ CELLS # BLD: 777 /UL (ref 359–1519)
CD3+CD4+ CELLS NFR BLD: 40.9 % (ref 30.8–58.5)
EOSINOPHIL # BLD AUTO: 0.1 X10E3/UL (ref 0–0.4)
EOSINOPHIL NFR BLD AUTO: 2 %
ERYTHROCYTE [DISTWIDTH] IN BLOOD BY AUTOMATED COUNT: 12.5 % (ref 11.6–15.4)
HCT VFR BLD AUTO: 46.6 % (ref 37.5–51)
HGB BLD-MCNC: 16.3 G/DL (ref 13–17.7)
IMM GRANULOCYTES # BLD: 0 X10E3/UL (ref 0–0.1)
IMM GRANULOCYTES NFR BLD: 0 %
LYMPHOCYTES # BLD AUTO: 1.9 X10E3/UL (ref 0.7–3.1)
LYMPHOCYTES NFR BLD AUTO: 45 %
MCH RBC QN AUTO: 34.4 PG (ref 26.6–33)
MCHC RBC AUTO-ENTMCNC: 35 G/DL (ref 31.5–35.7)
MCV RBC AUTO: 98 FL (ref 79–97)
MONOCYTES # BLD AUTO: 0.3 X10E3/UL (ref 0.1–0.9)
MONOCYTES NFR BLD AUTO: 7 %
NEUTROPHILS # BLD AUTO: 1.9 X10E3/UL (ref 1.4–7)
NEUTROPHILS NFR BLD AUTO: 45 %
PLATELET # BLD AUTO: 137 X10E3/UL (ref 150–450)
RBC # BLD AUTO: 4.74 X10E6/UL (ref 4.14–5.8)
WBC # BLD AUTO: 4.2 X10E3/UL (ref 3.4–10.8)

## 2022-07-18 LAB
HIV1 RNA # SERPL NAA+PROBE: <20 COPIES/ML
HIV1 RNA SERPL NAA+PROBE-LOG#: NORMAL LOG10COPY/ML

## 2022-07-18 NOTE — PROGRESS NOTES
UROLOGY FOLLOWUP NOTE     CHIEF COMPLAINT   Rachelle Avila is a 73 y o  male with a complaint of   Chief Complaint   Patient presents with    Follow-up    Prostate Cancer       History of Present Illness:     73 y o  male, partner of another patient of mine, who presents as a 2nd opinion  The patient has had a history of PSAs in the mid 2 range with PSA last year in the mid threes now with PSA of 4 7 and recheck of 4 8 in May and June of this year  He has a family history of prostate cancer in his father, who was treated with brachytherapy  He was recommended to undergo up from biopsy by his outside urologist   Documentation of his rectal exam demonstrates a normal 15 gland prostate  He is an avid bicycle rider  Patient underwent initialy upfront multiparametric MRI of the prostate which demonstrated a right posterior lesion  Given immunocompromised/HIV status, the patient was very interested in perineal fusion  Unfortunate this could not be offered  Patient ultimately opted to undergo transperineal approach with saturation of the right side  Very low risk [place] 6 diagnosed, agreed to active surveillance  Returns at interval for PSA review  Patient did recently have COVID which he has now recovered from      Lab Results   Component Value Date    PSA 4 6 (H) 07/14/2022    PSA 4 9 (H) 08/18/2021    PSA 4 3 (H) 04/29/2021   PSA 5/2020 4 8      Past Medical History:     Past Medical History:   Diagnosis Date    GERD (gastroesophageal reflux disease)     HIV (human immunodeficiency virus infection) ([facility] [address] [facility]     Hyperlipidemia     Hypertension     Kidney stone     Non-alcoholic fatty liver disease     PONV (postoperative nausea and vomiting)     Prostate cancer ([facility] [address] [facility]     Sleep apnea        PAST SURGICAL HISTORY:     Past Surgical History:   Procedure Laterality Date    COLONOSCOPY      HERNIA REPAIR      RI BIOPSY OF PROSTATE,NEEDLE,TRANSPERINEAL N/A 1/25/2022    Procedure: TRANSPERINEAL ULTRASOUND GUIDED BIOPSY PROSTATE;  Surgeon: Mickie Franco MD;  Location: [facility];  Service: Urology    IL BIOPSY OF [address] [place] [address] N/A 10/13/2020    Procedure: TRANSPERINEAL PROSTATE BIOPSY;  Surgeon: Mickie Franco MD;  Location: [facility];  Service: Urology       CURRENT MEDICATIONS:     Current Outpatient Medications   Medication Sig Dispense Refill    Cholecalciferol 25 MCG (1000 UT) CHEW Chew 1 tablet daily      cyanocobalamin (VITAMIN B-12) 500 MCG tablet Take 1,000 mcg by mouth daily      docusate sodium (COLACE) 100 mg capsule Take 100 mg by mouth 3 (three) times a day as needed for constipation      dolutegravir (TIVICAY) 50 MG TABS Take 50 mg by mouth daily      emtricitabine-tenofovir AF (DESCOVY) 200-25 MG tablet Take 25 mg by mouth daily      famotidine (PEPCID) 20 mg tablet Take 1 tablet by mouth      lisinopril (ZESTRIL) 20 mg tablet Take 20 mg by mouth daily      metoprolol succinate (TOPROL-XL) 50 mg 24 hr tablet Take 1 tablet (50 mg total) by mouth every evening 90 tablet 1    Multiple Vitamin (multivitamin) capsule Take 1 capsule by mouth daily      Omega-3 1000 MG CAPS Take 2 g by mouth daily      potassium citrate (Urocit-K 10) 10 mEq Take 1 tablet (10 mEq total) by mouth daily 90 tablet 3    pravastatin (PRAVACHOL) 80 mg tablet Take 80 mg by mouth daily      ciprofloxacin (CIPRO) 500 mg tablet Take 500 mg by mouth every 12 (twelve) hours      fluticasone (FLONASE) 50 mcg/act nasal spray 1 spray into each nostril 2 (two) times a day (Patient not taking: Reported on 7/19/2022)      phenazopyridine (PYRIDIUM) 100 mg tablet Take 1 tablet (100 mg total) by mouth 3 (three) times a day as needed for bladder spasms 20 tablet 0    traMADol (ULTRAM) 50 mg tablet Take 1 tablet (50 mg total) by mouth every 6 (six) hours as needed for moderate pain for up to 5 doses 5 tablet 0     No current facility-administered medications for this visit         ALLERGIES: Allergies   Allergen Reactions    Epinephrine Palpitations and Shortness Of Breath    Sulfa Antibiotics        SOCIAL HISTORY:     Social History     Socioeconomic History    Marital status: Single     Spouse name: None    Number of children: None    Years of education: None    Highest education level: None   Occupational History    None   Tobacco Use    Smoking status: Never Smoker    Smokeless tobacco: Never Used   Vaping Use    Vaping Use: Never used   Substance and Sexual Activity    Alcohol use: Not Currently    Drug use: Not Currently    Sexual activity: None   Other Topics Concern    None   Social History Narrative    None     Social Determinants of Health     Financial Resource Strain: Not on file   Food Insecurity: Not on file   Transportation Needs: Not on file   Physical Activity: Not on file   Stress: Not on file   Social Connections: Not on file   Intimate Partner Violence: Not on file   Housing Stability: Not on file       SOCIAL HISTORY:   History reviewed  No pertinent family history  REVIEW OF SYSTEMS:     Review of Systems   Constitutional: Negative  Respiratory: Negative  Cardiovascular: Negative  Gastrointestinal: Negative  Genitourinary: Negative for enuresis  Musculoskeletal: Negative  Skin: Negative  Psychiatric/Behavioral: Negative  PHYSICAL EXAM:     /78 (BP Location: Left arm, Patient Position: Sitting, Cuff Size: Adult)   Pulse 70   Ht 6' 2" (1 88 m)   Wt 90 kg (198 lb 6 4 oz)   BMI 25 47 kg/m²     General:  Healthy appearing male in no acute distress  They have a normal affect  There is not appear to be any gross neurologic defects or abnormalities  HEENT:  Normocephalic, atraumatic  Neck is supple without any palpable lymphadenopathy  Cardiovascular:  Patient has normal palpable distal radial pulses  There is no significant peripheral edema  No JVD is noted  Respiratory:  Patient has unlabored respirations    There is no audible wheeze or rhonchi  Abdomen:  Abdomen is soft and nontender  There is no tympany  Inguinal and umbilical hernia are not appreciated  :   Circumcised phallus, orthotopic meatus, testicles are smooth and descended, patient's rectal exam is stable with some induration along the right lateral sidewall  Musculoskeletal:  Patient does not have significant CVA tenderness in the  flank with palpation or percussion  They full range of motion in all 4 extremities  Strength in all 4 extremities appears congruent  Patient is able to ambulate without assistance or difficulty  Dermatologic:  Patient has no skin abnormalities or rashes  LABS:     CBC:   Lab Results   Component Value Date    WBC 4 45 07/15/2022    WBC 4 2 07/15/2022    HGB 16 3 07/15/2022    HGB 16 3 07/15/2022    HCT 45 3 07/15/2022    HCT 46 6 07/15/2022    MCV 97 07/15/2022    MCV 98 (H) 07/15/2022     (L) 07/15/2022     (L) 07/15/2022       BMP:   Lab Results   Component Value Date    GLUCOSE 99 2015    CALCIUM 9 4 07/15/2022     2015    K 4 9 07/15/2022    CO2 30 07/15/2022     07/15/2022    BUN 15 07/15/2022    CREATININE 1 16 07/15/2022     Lab Results   Component Value Date    PSA 4 6 (H) 2022    PSA 4 9 (H) 2021    PSA 4 3 (H) 2021   PSA 2020 4 8    IMAGIN/18/21  MULTIPARAMETRIC MRI OF THE PROSTATE WITH AND WITHOUT CONTRAST-WITH 3-D POSTPROCESSING      INDICATION:  C61: Malignant neoplasm of prostate      COMPARISON:  MR prostate 8/3/2020     PSA LEVEL: 4 9 ng/ml  2021  PRIOR BIOPSY DATE: 10/13/2020    BIOPSY RESULTS: A   Prostate, right posterior medial, biopsy:     - Prostatic adenocarcinoma, acinar type, [place] score 3 + 3 = 6, Prognostic Grade Group 1, continuously       involving 15% of 1 of 2 cores      TECHNIQUE: The following pulse sequences were obtained:  Small field-of-view axial T1-weighted and multiplanar T2-weighted images; DWI axial and ADC map; large field of view axial T2 weighted images; T1w in-phase and opposed-phase axials of entire pelvis   and dynamic 3D T1w axial before and during IV contrast injection  Imaging performed on 3 0T MRI      CONTRAST:  Gadobutrol (Gadavist) 9 mL of Gadobutrol injection (SINGLE-DOSE)     TECHNICAL LIMITATIONS: None      FINDINGS:     PROSTATE:     Size: 5 5 x 4 6 x 4 7 cm = 64 4 cc  Post-biopsy hemorrhage:  None  Central gland enlargement (BPH): Moderate      Focal lesions - No dominant lesion  Heterogeneous peripheral zone  Findings of BPH with a mostly encapsulated OR a homogenous circumscribed nodule without encapsulation OR a homogenous hypointense area between nodules  PI-RADSv2 1 Category 2 - Low   (clinically significant cancer unlikely)  Previously described lesion in the right posterior transition zone is not visualized      SEMINAL VESICLES: Right seminal vesicle is smaller than the left  Otherwise unremarkable      Note: Clinically significant cancer is defined on pathology/histology as Chaitanya score greater than or equal to 7, and/or volume of greater than or equal to 0 5 mL, and/or extraprostatic extension      URINARY BLADDER: Unremarkable      LYMPH NODES: No pelvic lymphadenopathy      BONES: No suspicious osseous lesion         IMPRESSION:     1  PI-RADSv2 1 Category 2 - Low (clinically significant cancer is unlikely to be present)  Previously described lesion in the right posterior transition zone is not visualized      2  No extraprostatic tumor, seminal vesicle invasion, pelvic lymphadenopathy, or pelvic osseous metastatic disease      3  Calculated prostate volume of 64 4 cc       8/3/20  MULTIPARAMETRIC MRI OF THE PROSTATE WITH AND WITHOUT CONTRAST-WITH 3-D POSTPROCESSING      INDICATION:   N40 2: Nodular prostate without lower urinary tract symptoms  R97 20: Elevated prostate specific antigen (PSA)    29-year-old male with elevated PSA, family history of prostate cancer and 2 cm nodule towards the right base on physical exam   History of negative prostate biopsy at an outside institution      COMPARISON: None     PSA LEVEL: 4 8 ng/ml  (5/27/2020)  PRIORS: 2 4 on 4/18/2017; 2 9 on 7/13/2016  PRIOR BIOPSY DATE: 5/27/2020  BIOPSY RESULTS: Reportedly negative      TECHNIQUE: The following pulse sequences were obtained:  Small field-of-view axial T1-weighted and multiplanar T2-weighted images; DWI axial and ADC map; large field of view axial T2 weighted images; T1w in-phase and opposed-phase axials of entire pelvis   and dynamic 3D T1w axial before and during IV contrast injection  Imaging performed on 3 0T MRI      CONTRAST:  Gadobutrol (Gadavist) 9 mL of gadobutrol injection (MULTI-DOSE)      TECHNICAL LIMITATIONS: None      FINDINGS:     PROSTATE:     Size (AP x TRV x CC): 5 4 x 4 7 x 4 7 cm  Prostate volume: 60 mL  PSA density: 0 08 ng/mL2  POST-BIOPSY HEMORRHAGE:  None  PERIPHERAL ZONE:  Diffuse effacement by enlarged transition zone extruded TZ nodule extending into the right posterolateral PZ at the base, described in more detail below  No foci of restricted diffusion  TRANSITION ZONE: Moderate BPH  Atypical nodule in the right posterior TZ at the base, described in more detail below       CENTRAL ZONE: Normal     ANTERIOR FIBROMUSCULAR STROMA:  Normal        FOCAL LESIONS:       LESION: 1       Size: 0 8 x 1 0 x 0 9 cm (AP x TRV x CC), measured on: Series 4, image 16; series 10, image 22  Location: Incompletely encapsulated extruded nodule in the posterior transition zone, protruding into the right posterolateral peripheral zone, near the base  T2-weighted images: Score 2: Circumscribed hypointense or heterogeneous encapsulated nodule(s)(BPH)  Diffusion-weighted images: Score 4: Focal markedly hypointense on ADC and markedly hyperintense on high b-value DWI; less than 1 5 cm in greatest dimension         Dynamic post-contrast images: (-) Lesion that does not enhance early compared to the surrounding prostate or enhances diffusely so that the margins of the enhancing area do not correspond to a finding on T2-weighted images and/or DWI  PI-RADS Assessment Category: 3, Intermediate (presence of clinically significant cancer equivocal       Extra-prostatic extension (EPE): Does not abut capsule      SEMINAL VESICLES:  Unremarkable     URINARY BLADDER: Mild bladder wall trabeculation  Otherwise unremarkable      LYMPH NODES: No pelvic lymphadenopathy      BONES: Normal marrow signal  No suspicious bony lesion      OTHER:       Vessels:  Normal       Bowel:  Unremarkable         IMPRESSION:     1  PI-RADSv2 Category 3 - Intermediate (the presence of clinically significant cancer is equivocal): 0 8 x 1 0 x 0 9 cm atypical nodule in the right posterior transition zone near the base  2  No extraprostatic tumor, seminal vesicle invasion, pelvic lymphadenopathy, or pelvic osseous metastatic disease  3  Calculated prostate volume of 60 mL      Note: Clinically significant cancer is defined on pathology/histology as Chaitanya score greater than or equal to 7, and/or volume of greater than or equal to 0 5 mL, and/or extraprostatic extension  PATHOLOGY:     1/25/22  Final Diagnosis   A  Prostate, Right Posterior Lateral, needle core biopsy:  - Benign prostate tissue; negative for malignancy and high grade prostatic intraepithelial neoplasia       B  Prostate, Right Posterior Medial, needle core biopsy:  - Benign prostate tissue; negative for malignancy and high grade prostatic intraepithelial neoplasia       C  Prostate, Right Base, needle core biopsy:  - Benign prostate tissue; negative for malignancy and high grade prostatic intraepithelial neoplasia  -- Confirmed by prostate triple immunostain ( and p63 positive; p504s negative)  - Atrophy with acute and chronic inflammation       D   Prostate, Left Posterior Medial, needle core biopsy:  - Atypical small acinar proliferation  -- Prostate triple immunostain ( and p63 questionably negative/non-basal cell staining);        p504s negative)  - Atrophy with acute and chronic inflammation       E  Prostate, Left Posterior Lateral, needle core biopsy:  - Benign prostate tissue; negative for malignancy and high grade prostatic intraepithelial neoplasia  - Atrophy with acute and chronic inflammation       F  Prostate, Left Base, needle core biopsy:  - Benign prostate tissue; negative for malignancy and high grade prostatic intraepithelial neoplasia  - Portion of benign seminal vesicle       G  Prostate, Left Anterior Lateral, needle core biopsy:  - Benign prostate tissue; negative for malignancy and high grade prostatic intraepithelial neoplasia  - Focal acute and chronic inflammation        H  Prostate, Left Anterior Medial, needle core biopsy:  - Benign prostate tissue; negative for malignancy  - Fibromuscular tissue with no prostatic glands       I  Prostate, Right Anterior Lateral, needle core biopsy:  - Benign prostate tissue; negative for malignancy and high grade prostatic intraepithelial neoplasia  -- Confirmed by prostate triple immunostain ( and p63 positive; p504s negative)       J  Prostate, Right Anterior Medial, needle core biopsy:  - Benign prostate tissue; negative for malignancy and high grade prostatic intraepithelial neoplasia  -- Confirmed by prostate triple immunostain ( and p63 patchy positivity; p504s negative)  10/13/20  Final Diagnosis    A  Prostate, right posterior medial, biopsy:     - Prostatic adenocarcinoma, acinar type, [place] score 3 + 3 = 6, Prognostic Grade Group 1, continuously       involving 15% of 1 of 2 cores      Comment:  Results of multiplex immunostaining (, p63, P504S) help support the above diagnosis      B    Prostate, right posterolateral, biopsy:     - Prostatic tissue with focal atrophy      - No high grade prostatic intraepithelial neoplasia (HGPIN) or carcinoma identified      Comment:  Results of multiplex immunostaining (, p63, P504S) help support the above diagnosis      C  Prostate, right base, biopsy:     - No significant pathologic abnormalities  - No high grade prostatic intraepithelial neoplasia (HGPIN) or carcinoma identified      D  Prostate, left posterior lateral, biopsy:     - No significant pathologic abnormalities  - No high grade prostatic intraepithelial neoplasia (HGPIN) or carcinoma identified      E  Prostate, left posterior medial, biopsy:     - No significant pathologic abnormalities  - No high grade prostatic intraepithelial neoplasia (HGPIN) or carcinoma identified      F  Prostate, left base, biopsy:     - No significant pathologic abnormalities  - No high grade prostatic intraepithelial neoplasia (HGPIN) or carcinoma identified      G  Prostate, left anterior lateral, biopsy:     - No significant pathologic abnormalities  - No high grade prostatic intraepithelial neoplasia (HGPIN) or carcinoma identified      Comment:  Results of multiplex immunostaining (, p63, P504S) help support the above diagnosis      H  Prostate, left anterior medial, biopsy:     - No significant pathologic abnormalities  - No high grade prostatic intraepithelial neoplasia (HGPIN) or carcinoma identified      I  Prostate, right anterior medial, biopsy:     - No significant pathologic abnormalities  - No high grade prostatic intraepithelial neoplasia (HGPIN) or carcinoma identified      J   Prostate, right anterior lateral, biopsy:     - No significant pathologic abnormalities  - No high grade prostatic intraepithelial neoplasia (HGPIN) or carcinoma identified       ASSESSMENT:     73 y o  male with newly diagnosed very low risk Chaitanya 3+3=6 prostate cancer in the right posterior medial transperineal prostate biopsy, consistent with patient's multiparametric MRI    PLAN:     PSA remains stable  On active surveillance since 10/2020  Confirmatory transperineal fusion biopsy stable 1/2022, without signs of disease  Has had two mpMRI studies with improvement from PI-RADS 3 to PI-RADS 2 on recheck    Continue surveillance with PSA/GERARD  Patient will have next PSA in October which will be 2 years from his initial diagnosis  Will then see him back in October of 2023 with repeat PSA for rectal exam    Patient inquired about continued use of potassium citrate  Has been stable without recurrent stone disease for many years  We have previously reduce the dose from 15-10 mEq  He is not having side effects  At this point time, would continue the medication unless the patient develops new or concerning side effects  Could consider stopping the medication and performing 24 hour urine if there are ongoing concerns  Could also consider referral to Nephrology for management    At this point, patient comfortable continuing as prescribed

## 2022-07-19 ENCOUNTER — OFFICE VISIT (OUTPATIENT)
Dept: UROLOGY | Facility: CLINIC | Age: 67
End: 2022-07-19
Payer: MEDICARE

## 2022-07-19 VITALS
HEART RATE: 70 BPM | BODY MASS INDEX: 25.46 KG/M2 | SYSTOLIC BLOOD PRESSURE: 130 MMHG | WEIGHT: 198.4 LBS | DIASTOLIC BLOOD PRESSURE: 78 MMHG | HEIGHT: 74 IN

## 2022-07-19 DIAGNOSIS — C61 PROSTATE CANCER (HCC): Primary | ICD-10-CM

## 2022-07-19 PROCEDURE — 99213 OFFICE O/P EST LOW 20 MIN: CPT | Performed by: UROLOGY

## 2022-07-29 ENCOUNTER — TELEPHONE (OUTPATIENT)
Dept: OTHER | Facility: OTHER | Age: 67
End: 2022-07-29

## 2022-07-29 NOTE — TELEPHONE ENCOUNTER
HISTORY OF PRESENT ILLNESS Raquel Dan is a 55 y.o. female. HPI Here for CPE Sees gyn MD for well woman care and mammogram 
Asthma has been quiet. C/o achy stiff hand hand joints x 3 months--aspirin helps Exercises at gym, mostly vegeterian diet, feels well Weight up just a few lbs Less stress Last OV Saw gyn MD 6 mos ago and had mammogram. 
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 
Tapered off Ambien Rarely uses albuterol MDI Sees gyn MD for well woman care. FH of Colon cancer --had colonoscopy last year FH CAD --M and sister (sister  at age 54 r/t hypertensive cardiovascular dz) Walks for exercise and feels well Takes vit d 2000 iu qd 
  
 
 
 
Patient Active Problem List  
 Diagnosis Date Noted  Family history of colon cancer requiring screening colonoscopy 2016  Asthma 2010  
 IBS (irritable bowel syndrome) 2010  Allergic rhinitis 2010  Family hx of colon cancer 2010  Retinal detachment 2010 Current Outpatient Medications Medication Sig Dispense Refill  FLUoxetine (PROZAC) 10 mg capsule Take  by mouth daily.  phenazopyridine (PYRIDIUM) 200 mg tablet Take 1 Tab by mouth three (3) times daily as needed for Pain. 10 Tab 0  
 zolpidem (AMBIEN) 5 mg tablet Take 1 Tab by mouth nightly as needed for Sleep. 90 Tab 0  
 azelastine (ASTELIN) 137 mcg (0.1 %) nasal spray 2 Sprays by Both Nostrils route two (2) times daily (after meals). Indications: SEASONAL ALLERGIC RHINITIS 3 Bottle 1  
 augmented betamethasone dipropionate (DIPROLENE-AF) 0.05 % topical cream Apply  to affected area two (2) times a day. 50 g 0  
 albuterol (PROVENTIL HFA, VENTOLIN HFA, PROAIR HFA) 90 mcg/actuation inhaler Take 1 Puff by inhalation every four (4) hours as needed for Wheezing.  3 Inhaler 1  
 Pt called and believes he may have confused Dr Giovanny Gonzalez with hs last MyChart Message dated 07/27  Pt stated seeing a hepatologist  And he thinks he ment to say hematologist  Can Dr Giovanny Gonzalez please respond to the pt through [address] to advise the correct provider he will refer him to   omega-3 fatty acids (FISH OIL) Cap Take 1,500 mg by mouth daily.  CALCIUM CARBONATE (CALCIUM 500 PO) Take 500 mg by mouth daily (after dinner).  cholecalciferol, vitamin d3, (VITAMIN D) 1,000 unit tablet Take 1,000 Units by mouth daily (after dinner).  multivitamin, stress formula (STRESS TAB) tablet Take 1 Tab by mouth daily (after dinner). Allergies Allergen Reactions  Inapsine [Droperidol] Hives  Naprosyn [Naproxen] Nausea Only Patient states not allergic to medication  Pcn [Penicillins] Rash  Sulfa (Sulfonamide Antibiotics) Rash Lab Results Component Value Date/Time WBC 4.7 03/07/2018 08:57 AM  
 HGB 12.7 03/07/2018 08:57 AM  
 HCT 38.2 03/07/2018 08:57 AM  
 PLATELET 898 40/69/8438 08:57 AM  
 MCV 87 03/07/2018 08:57 AM  
 
Lab Results Component Value Date/Time Hemoglobin A1c 4.7 (L) 03/07/2018 08:57 AM  
 Hemoglobin A1c 4.6 03/15/2010 10:28 AM  
 Glucose 90 03/07/2018 08:57 AM  
 LDL, calculated 121 (H) 03/07/2018 08:57 AM  
 Creatinine 0.64 03/07/2018 08:57 AM  
  
Lab Results Component Value Date/Time Cholesterol, total 180 03/07/2018 08:57 AM  
 HDL Cholesterol 40 03/07/2018 08:57 AM  
 LDL, calculated 121 (H) 03/07/2018 08:57 AM  
 Triglyceride 95 03/07/2018 08:57 AM  
 CHOL/HDL Ratio 4.2 03/15/2010 10:28 AM  
 
Lab Results Component Value Date/Time GFR est non- 03/07/2018 08:57 AM  
 GFR est  03/07/2018 08:57 AM  
 Creatinine 0.64 03/07/2018 08:57 AM  
 BUN 11 03/07/2018 08:57 AM  
 Sodium 142 03/07/2018 08:57 AM  
 Potassium 4.7 03/07/2018 08:57 AM  
 Chloride 101 03/07/2018 08:57 AM  
 CO2 26 03/07/2018 08:57 AM  
  
ROS Physical Exam  
Constitutional: She appears well-developed and well-nourished. Appears stated age Cardiovascular: Normal rate, regular rhythm and normal heart sounds. Exam reveals no gallop and no friction rub. No murmur heard.  
Pulmonary/Chest: Effort normal and breath sounds normal. No respiratory distress. She has no wheezes. Abdominal: Soft. Bowel sounds are normal.  
Musculoskeletal: She exhibits no edema. Neurological: She is alert. Psychiatric: She has a normal mood and affect. Nursing note and vitals reviewed. ASSESSMENT and PLAN Diagnoses and all orders for this visit: 1. Routine general medical examination at a health care facility 
-     CBC W/O DIFF 
-     METABOLIC PANEL, COMPREHENSIVE 
-     LIPID PANEL 
-     TSH 3RD GENERATION 
-     VITAMIN D, 25 HYDROXY Continue healthy lifestyle Will see gyn MD this fall 2. Arthralgia of both hands -     ODIN BY MULTIPLEX FLOW IA, QL 
-     RHEUMATOID FACTOR, QL 
-     SED RATE (ESR) Other orders 
-     augmented betamethasone dipropionate (DIPROLENE-AF) 0.05 % topical cream; Apply  to affected area two (2) times a day. Follow-up Disposition: 
Return in about 1 year (around 3/4/2020) for cpe.

## 2022-08-12 DIAGNOSIS — D69.6 THROMBOCYTOPENIA (HCC): ICD-10-CM

## 2022-08-12 DIAGNOSIS — R16.1 SPLENOMEGALY: Primary | ICD-10-CM

## 2022-08-16 ENCOUNTER — TELEPHONE (OUTPATIENT)
Dept: HEMATOLOGY ONCOLOGY | Facility: CLINIC | Age: 67
End: 2022-08-16

## 2022-09-07 ENCOUNTER — CONSULT (OUTPATIENT)
Dept: HEMATOLOGY ONCOLOGY | Facility: CLINIC | Age: 67
End: 2022-09-07
Payer: MEDICARE

## 2022-09-07 VITALS
RESPIRATION RATE: 18 BRPM | HEART RATE: 65 BPM | WEIGHT: 202 LBS | TEMPERATURE: 96.9 F | BODY MASS INDEX: 25.93 KG/M2 | OXYGEN SATURATION: 97 % | HEIGHT: 74 IN | DIASTOLIC BLOOD PRESSURE: 72 MMHG | SYSTOLIC BLOOD PRESSURE: 118 MMHG

## 2022-09-07 DIAGNOSIS — D75.1 POLYCYTHEMIA: ICD-10-CM

## 2022-09-07 DIAGNOSIS — R16.1 SPLENOMEGALY: ICD-10-CM

## 2022-09-07 DIAGNOSIS — D69.6 THROMBOCYTOPENIA (HCC): Primary | ICD-10-CM

## 2022-09-07 DIAGNOSIS — B20 HIV INFECTION, UNSPECIFIED SYMPTOM STATUS (HCC): ICD-10-CM

## 2022-09-07 DIAGNOSIS — Z11.59 NEED FOR HEPATITIS B SCREENING TEST: ICD-10-CM

## 2022-09-07 DIAGNOSIS — F19.11 HISTORY OF DRUG ABUSE (HCC): ICD-10-CM

## 2022-09-07 PROCEDURE — 99203 OFFICE O/P NEW LOW 30 MIN: CPT | Performed by: INTERNAL MEDICINE

## 2022-09-07 NOTE — PATIENT INSTRUCTIONS
Patient to follow up in office in 3 months, prior to which he should have a CBC with diff, PT, PTT, ESR, CRP, and hep panel checked

## 2022-09-07 NOTE — PROGRESS NOTES
Oncology Outpatient Consult Note  Svetlana Galan 73 y o  male MRN: @ Encounter: [id number]        Date:  9/7/2022    CC:  Splenomegaly    HPI:  Svetlana Galan is seen for initial consultation 9/7/2022 regarding recent imaging showing concern for splenomegaly  Patient is a 67-year-old man with underlying HIV (initially diagnosis more than 15 years ago, on Biktarvy, most recent CD4 count from 7/15/22 777, follows up at [facility]), prostate cancer ([place] score 6 based on biopsy from October 2020, most recent prostate biopsy from Jan 2022 benign, f/u with Dr Rachel Champagne), hypertension, hyperlipidemia, prior drug use (denied any IV drug use, but has used crystal meth, cocaine, and marijuana in the past), and GERD who is presenting to [facility] to discuss about splenomegaly, low platelet count, and "enlarged blood cells " Patient had an ultrasound of the abdomen performed on 11/30/2021, which showed mild splenomegaly, with spleen measuring 12 9 x 2 9 x 5 0 cm  There was also mildly increased hepatic echogenicity suggesting mild hepatic steatosis  Patient was referred to GI physician by his PCP, and elastography was performed which did not show any significant fibrosis, it also showed less than 5% steatosis  Most recent CBC from 07/15/2022 has shown mild thrombocytopenia with platelet count of 163  Review of patient's chart shows that patient has had mild thrombocytopenia since 2014 with platelets ranging in the 110s-130s (lowest seen is a count of 110)  Patient also with high normal MPV, ranging from 11-12  4  Review of labs also show that patient has had intermittent episodes of mild leukopenia with mild neutropenia, along with slightly elevated hemoglobin levels, ranging from 16-17 since 2014  Patient was also concerned about his [facility] being on the slightly higher side (ranging from 34 9-35 8)       Patient denied any abdominal complaints, chest pain, shortness of breath, easy bruising or bleeding  Patient has been compliant with his HIV medication regimen, which consists of Biktarvy  Test Results:    Imaging:     ABDOMEN ULTRASOUND, COMPLETE      INDICATION:   R89 9: Unspecified abnormal finding in specimens from other organs, systems and tissues      COMPARISON: CT abdomen and pelvis 7/24/2013     TECHNIQUE:   Real-time ultrasound of the abdomen was performed with a curvilinear transducer with both volumetric sweeps and still imaging techniques      FINDINGS:  Limited study due to shadowing from bowel gas      PANCREAS:  Portions of the pancreas are obscured by bowel gas  Visualized portions of the pancreas are unremarkable       AORTA AND IVC:  Visualized portions are normal for patient age      LIVER:  Size:  Within normal range  The liver measures 15 9 cm in the midclavicular line  Contour:  Surface contour is smooth  Parenchyma: There is mild diffuse increased echogenicity with smooth echotexture, without significant beam attenuation or loss of periportal echogenicity  No evidence of suspicious mass  Limited imaging of the main portal vein shows it to be patent and hepatopetal      BILIARY:  The gallbladder is normal in caliber  No wall thickening or pericholecystic fluid  No stones or sludge identified  No sonographic Padgett's sign  No intrahepatic biliary dilatation  CBD measures 3 mm  No choledocholithiasis      KIDNEY:   Right kidney measures 12 3 x 4 8 x 5 5 cm  Within normal limits      Left kidney measures 12 9 x 4 5 x 5 6 cm  Within normal limits      SPLEEN:   Measures 12 9 x 12 9 x 5 0 cm  Mildly enlarged  No focal masses      ASCITES:  None      IMPRESSION:     Limited study  1   Mildly increased hepatic echogenicity suggests mild hepatic steatosis    2   Mild splenomegaly        Labs:   Lab Results   Component Value Date    WBC 4 45 07/15/2022    WBC 4 2 07/15/2022    HGB 16 3 07/15/2022    HGB 16 3 07/15/2022    HCT 45 3 07/15/2022    HCT 46 6 07/15/2022 MCV 97 07/15/2022    MCV 98 (H) 07/15/2022     (L) 07/15/2022     (L) 07/15/2022     Lab Results   Component Value Date     12/16/2015    K 4 9 07/15/2022     07/15/2022    CO2 30 07/15/2022    ANIONGAP 6 12/16/2015    BUN 15 07/15/2022    CREATININE 1 16 07/15/2022    GLUCOSE 99 12/16/2015    GLUF 108 (H) 07/15/2022    CALCIUM 9 4 07/15/2022    AST 27 07/15/2022    ALT 51 07/15/2022    ALKPHOS 50 07/15/2022    PROT 7 2 12/16/2015    BILITOT 0 91 12/16/2015    EGFR 65 07/15/2022       Lab Results   Component Value Date    PSA 4 6 (H) 07/14/2022    PSA 4 9 (H) 08/18/2021    PSA 4 3 (H) 04/29/2021       ROS: As stated in history of present illness otherwise her 14 point review of systems today was negative        Active Problems:   Patient Active Problem List   Diagnosis    HIV infection ([facility] )    Family history of prostate cancer in father    Prostate nodule    HTN (hypertension)    Hyperlipidemia    Non-alcoholic fatty liver disease    GERD (gastroesophageal reflux disease)    Palpitations    Splenomegaly    Thrombocytopenia (HCC)    Polycythemia       Past Medical History:   Past Medical History:   Diagnosis Date    GERD (gastroesophageal reflux disease)     [facility] immunodeficiency virus [facility]     Hyperlipidemia     Hypertension     Kidney stone     Non-alcoholic fatty liver disease     PONV (postoperative nausea and vomiting)     Prostate cancer ([facility] )     Sleep apnea        Surgical History:   Past Surgical History:   Procedure Laterality Date    COLONOSCOPY      HERNIA REPAIR      MA BIOPSY OF PROSTATE,NEEDLE,TRANSPERINEAL N/A 1/25/2022    Procedure: TRANSPERINEAL ULTRASOUND GUIDED BIOPSY PROSTATE;  Surgeon: Marge Dixon MD;  [facility];  Service: Urology    MA BIOPSY OF [address] [place] [address] N/A 10/13/2020    Procedure: TRANSPERINEAL PROSTATE BIOPSY;  Surgeon: Marge Dixon MD;  [facility];  Service: Urology Family History:   Father has history of prostate cancer and esophageal cancer  Father also has a history of heart disease       Social History:   Social History     Socioeconomic History    Marital status: Single     Spouse name: Not on file    Number of children: Not on file    Years of education: Not on file    Highest education level: Not on file   Occupational History    Not on file   Tobacco Use    Smoking status: Never Smoker    Smokeless tobacco: Never Used   Vaping Use    Vaping Use: Never used   Substance and Sexual Activity    Alcohol use: Not Currently    Drug use: Not Currently    Sexual activity: Not on file   Other Topics Concern    Not on file   Social History Narrative    Not on file     Social Determinants of Health     Financial Resource Strain: Not on file   Food Insecurity: Not on file   Transportation Needs: Not on file   Physical Activity: Not on file   Stress: Not on file   Social Connections: Not on file   Intimate Partner Violence: Not on file   Housing Stability: Not on file       Current Medications:   Current Outpatient Medications   Medication Sig Dispense Refill    bictegravir-emtricitab-tenofovir alafenamide (BIKTARVY) -25 MG tablet Take 1 tablet by mouth daily with breakfast      Cholecalciferol 25 MCG (1000 UT) CHEW Chew 1 tablet daily      cyanocobalamin (VITAMIN B-12) 500 MCG tablet Take 1,000 mcg by mouth daily      docusate sodium (COLACE) 100 mg capsule Take 100 mg by mouth 3 (three) times a day as needed for constipation      famotidine (PEPCID) 20 mg tablet Take 1 tablet by mouth      lisinopril (ZESTRIL) 20 mg tablet Take 20 mg by mouth daily      metoprolol succinate (TOPROL-XL) 50 mg 24 hr tablet Take 1 tablet (50 mg total) by mouth every evening 90 tablet 1    Multiple Vitamin (multivitamin) capsule Take 1 capsule by mouth daily      Omega-3 1000 MG CAPS Take 2 g by mouth daily      potassium citrate (Urocit-K 10) 10 mEq Take 1 tablet (10 mEq total) by mouth daily 90 tablet 3    pravastatin (PRAVACHOL) 80 mg tablet Take 80 mg by mouth daily      fluticasone (FLONASE) 50 mcg/act nasal spray 1 spray into each nostril 2 (two) times a day (Patient not taking: Reported on 7/19/2022)       No current facility-administered medications for this visit  Allergies: Allergies   Allergen Reactions    Epinephrine Palpitations and Shortness Of Breath    Sulfa Antibiotics        Physical Exam:    Body surface area is 2 18 meters squared  Wt Readings from Last 3 Encounters:   09/07/22 91 6 kg (202 lb)   07/19/22 90 kg (198 lb 6 4 oz)   07/14/22 92 3 kg (203 lb 6 4 oz)        Temp Readings from Last 3 Encounters:   09/07/22 (!) 96 9 °F (36 1 °C) (Tympanic)   04/08/22 (!) 97 2 °F (36 2 °C) (Tympanic)   03/11/22 98 5 °F (36 9 °C) (Probe)        BP Readings from Last 3 Encounters:   09/07/22 118/72   07/19/22 130/78   07/14/22 132/80         Pulse Readings from Last 3 Encounters:   09/07/22 65   07/19/22 70   07/14/22 68       Physical Exam     Constitutional   General appearance: No acute distress, well appearing and well nourished  Eyes   Conjunctiva and lids: No swelling, erythema or discharge  Pupils and irises: Equal, round and reactive to light  Ears, Nose, Mouth, and Throat   External inspection of ears and nose: Normal     Nasal mucosa, septum, and turbinates: Normal without edema or erythema  Oropharynx: Normal with no erythema, edema, exudate or lesions  Pulmonary   Respiratory effort: No increased work of breathing or signs of respiratory distress  Auscultation of lungs: Clear to auscultation  Cardiovascular   Palpation of heart: Normal PMI, no thrills  Auscultation of heart: Normal rate and rhythm, normal S1 and S2, without murmurs  Examination of extremities for edema and/or varicosities: Normal     Carotid pulses: Normal     Abdomen   Abdomen: Non-tender, no masses      Liver and spleen: No hepatomegaly or splenomegaly  Lymphatic   Palpation of lymph nodes in neck: No lymphadenopathy  Musculoskeletal   Gait and station: Normal     Digits and nails: Normal without clubbing or cyanosis  Inspection/palpation of joints, bones, and muscles: Normal     Skin   Skin and subcutaneous tissue:  Small cherry hemangiomas present on his upper extremities  Normal without rashes or lesions  Neurologic   Cranial nerves: Cranial nerves 2-12 intact  Sensation: No sensory loss  Psychiatric   Orientation to person, place, and time: Normal     Mood and affect: Normal         Assessment/ Plan:      1  Thrombocytopenia ([address] )  Patient's labs showing chronic thrombocytopenia, dating back to 2014, ranging from 120s-130s, lowest was 110s in 2017  Thrombocytopenia likely 2/2 underlying HIV and medications  He has had a diagnosis of HIV for over 15 years  Patient with no significant liver disease (recent elastography showed no fibrosis on the scan)  Will check ESR and CRP to evaluate for any signs of inflammation secondary to autoimmune conditions (i e , lupus, RA) versus collagen vascular disease  Will also check hepatitis panel and coags (i e , PT and PTT) for completion  No need for any urgent interventions at this time  Repeat CBC in approximately 3 months and follow-up in office  Continue surveillance  - Ambulatory Referral to Hematology / Oncology  - CBC and differential; Future  - Sedimentation rate, automated; Future  - C-reactive protein; Future  - Protime-INR; Future  - APTT; Future  - Chronic Hepatitis Panel; Future    2  Splenomegaly  Patient was noted to have mildly enlarged spleen, measuring 12 9 x 12 9 x 5 0 cm  Recent elastography showed no liver disease  Patient was informed that this is not anything significant that he has to be worried about  3  Polycythemia  Patient noted to have mildly elevated hemoglobin level, ranging from 16-17 g/dL since 2014   Patient denied taking any testosterone supplements in the recent months and years  Will check for JAK2 mutation and EPO level for completion to evaluate for any underlying myeloproliferative disease such as polycythemia vera  - Erythropoietin; Future  - JAK2 V617F,Ql,W/RFL Exons 12,13 and MPL X581,P783; Future    4  Need for hepatitis B screening test  Patient with history of drug abuse inhalation of crystal meth, cocaine, and marijuana), denied any IV drug use in the past   No prior hepatitis-B results in chart, therefore will check hepatitis-B panel in the setting of thrombocytopenia also  - Chronic Hepatitis Panel; Future    5  HIV, unspecified  Patient with underlying HIV, diagnosed over 15 years ago  Patient's most recent CD4 count has been good at 777  Patient currently on [address] as his maintenance medication for ART  Continue Biktary and routine follow-up at [employer] [facility]  6  History of drug abuse ([facility] )  As above  - Chronic Hepatitis Panel;  Future

## 2022-10-10 ENCOUNTER — APPOINTMENT (OUTPATIENT)
Dept: LAB | Facility: HOSPITAL | Age: 67
End: 2022-10-10
Payer: MEDICARE

## 2022-10-10 DIAGNOSIS — E80.6 HYPERBILIRUBINEMIA: ICD-10-CM

## 2022-10-10 LAB
ALBUMIN SERPL BCP-MCNC: 3.7 G/DL (ref 3.5–5)
ALP SERPL-CCNC: 52 U/L (ref 46–116)
ALT SERPL W P-5'-P-CCNC: 47 U/L (ref 12–78)
AST SERPL W P-5'-P-CCNC: 29 U/L (ref 5–45)
BILIRUB DIRECT SERPL-MCNC: 0.15 MG/DL (ref 0–0.2)
BILIRUB SERPL-MCNC: 0.77 MG/DL (ref 0.2–1)
PROT SERPL-MCNC: 7.1 G/DL (ref 6.4–8.4)

## 2022-10-10 PROCEDURE — 36415 COLL VENOUS BLD VENIPUNCTURE: CPT

## 2022-10-10 PROCEDURE — 80076 HEPATIC FUNCTION PANEL: CPT

## 2022-10-17 NOTE — RESULT ENCOUNTER NOTE
Inform patient via [employer]  Please review the pathology/lab result of further discussion      Copied from [employer] message :       Melissa Landrum,     Your liver numbers were normal     Best regards,     Danielle Lowery MD

## 2022-10-18 ENCOUNTER — APPOINTMENT (OUTPATIENT)
Dept: LAB | Facility: HOSPITAL | Age: 67
End: 2022-10-18
Payer: MEDICARE

## 2022-10-18 DIAGNOSIS — C61 PROSTATE CANCER (HCC): ICD-10-CM

## 2022-10-18 LAB — PSA SERPL-MCNC: 4.2 NG/ML (ref 0–4)

## 2022-10-18 PROCEDURE — 84153 ASSAY OF PSA TOTAL: CPT

## 2022-11-06 PROBLEM — Z11.59 NEED FOR HEPATITIS B SCREENING TEST: Status: RESOLVED | Noted: 2022-09-07 | Resolved: 2022-11-06

## 2022-11-09 ENCOUNTER — TELEPHONE (OUTPATIENT)
Dept: SURGERY | Facility: CLINIC | Age: 67
End: 2022-11-09

## 2022-11-09 NOTE — TELEPHONE ENCOUNTER
----- Message from Anne-Marie Isbell, HARRIET sent at 11/9/2022  9:52 AM EST -----  Regarding: FW: Establish Care  Good morning Luann,    Can you please contact patient, as a means to assess financial coverage/availability to [facility] for services  Patient states may not be eligible to receive Case Management services, but is interested in receiving clinical services  Please review clinic associated expenses, such as, completion of laboratory tests, medical visit co-payments  Thanks Ramana Villarreal!  ----- Message -----  From: ARINA Davis  Sent: 11/8/2022   3:29 PM EST  To: Flor Gipson MA, Anne-Marie Isbell, HARRIET, #  Subject: RE: Establish Care                               Hello,    I called and spoke with Jonah Rashid and he has made his decision to transfer his care back to [facility] since he receives other services at [place] [address]  Pt did inquire about copay for Biktarvy  He does have the copay card and Sobeida was able to help with his cost      Thank you,    Doug Later  ----- Message -----  From: Meg Herr  Sent: 11/8/2022   2:58 PM EST  To: ARINA Davis  Subject: Establish Care                                   Pt stated that they spoke to you before and would like a call back from you, he has more questions and would like to become a pt of yours at [facility]

## 2022-11-09 NOTE — TELEPHONE ENCOUNTER
Spoke with patient today to set up re-intake apt  Pt was a former pt of [facility] and would like to come back to [facility] for ID, PCP care  Pt is scheduled on Thursday 11/17/22 for re-intake with PN  Juan Jose Thao, please let me know if this day is good for you

## 2022-11-17 ENCOUNTER — DOCUMENTATION (OUTPATIENT)
Dept: SURGERY | Facility: CLINIC | Age: 67
End: 2022-11-17

## 2022-11-17 DIAGNOSIS — Z01.84 IMMUNITY STATUS TESTING: ICD-10-CM

## 2022-11-17 DIAGNOSIS — Z20.2 CONTACT WITH AND (SUSPECTED) EXPOSURE TO INFECTIONS WITH A PREDOMINANTLY SEXUAL MODE OF TRANSMISSION: ICD-10-CM

## 2022-11-17 DIAGNOSIS — Z11.3 ROUTINE SCREENING FOR STI (SEXUALLY TRANSMITTED INFECTION): ICD-10-CM

## 2022-11-17 DIAGNOSIS — Z72.89 OTHER PROBLEMS RELATED TO LIFESTYLE: ICD-10-CM

## 2022-11-17 DIAGNOSIS — E78.5 HYPERLIPIDEMIA, UNSPECIFIED HYPERLIPIDEMIA TYPE: ICD-10-CM

## 2022-11-17 DIAGNOSIS — Z11.1 SCREENING-PULMONARY TB: ICD-10-CM

## 2022-11-17 DIAGNOSIS — Z13.1 SCREENING FOR DIABETES MELLITUS: ICD-10-CM

## 2022-11-17 DIAGNOSIS — B20 HIV DISEASE (HCC): Primary | ICD-10-CM

## 2022-11-17 DIAGNOSIS — Z11.59 ENCOUNTER FOR SCREENING FOR OTHER VIRAL DISEASES: ICD-10-CM

## 2022-11-17 DIAGNOSIS — Z79.899 OTHER LONG TERM (CURRENT) DRUG THERAPY: ICD-10-CM

## 2022-11-17 NOTE — PROGRESS NOTES
Patient presented to clinic today as scheduled to complete [facility] Intake. Intake documents completed and scanned. Patient to complete Pre-requisite laboratory tests.  Medical record consent completed and faxed to [facility]ESS[facility].  Initial PCP visit scheduled for 1/3/2023 at 1130.

## 2022-11-21 ENCOUNTER — APPOINTMENT (OUTPATIENT)
Dept: LAB | Facility: HOSPITAL | Age: 67
End: 2022-11-21

## 2022-11-21 LAB
ALBUMIN SERPL BCP-MCNC: 3.9 G/DL (ref 3.5–5)
ALP SERPL-CCNC: 52 U/L (ref 46–116)
ALT SERPL W P-5'-P-CCNC: 59 U/L (ref 12–78)
ANION GAP SERPL CALCULATED.3IONS-SCNC: 4 MMOL/L (ref 4–13)
AST SERPL W P-5'-P-CCNC: 35 U/L (ref 5–45)
BACTERIA UR QL AUTO: NORMAL /HPF
BASOPHILS # BLD AUTO: 0.03 THOUSANDS/ÂΜL (ref 0–0.1)
BASOPHILS NFR BLD AUTO: 1 % (ref 0–1)
BILIRUB SERPL-MCNC: 0.93 MG/DL (ref 0.2–1)
BILIRUB UR QL STRIP: NEGATIVE
BUN SERPL-MCNC: 16 MG/DL (ref 5–25)
C TRACH DNA SPEC QL NAA+PROBE: NEGATIVE
CALCIUM SERPL-MCNC: 9.3 MG/DL (ref 8.3–10.1)
CHLORIDE SERPL-SCNC: 109 MMOL/L (ref 96–108)
CHOLEST SERPL-MCNC: 126 MG/DL
CLARITY UR: CLEAR
CO2 SERPL-SCNC: 27 MMOL/L (ref 21–32)
COLOR UR: YELLOW
CREAT SERPL-MCNC: 1.17 MG/DL (ref 0.6–1.3)
EOSINOPHIL # BLD AUTO: 0.06 THOUSAND/ÂΜL (ref 0–0.61)
EOSINOPHIL NFR BLD AUTO: 2 % (ref 0–6)
ERYTHROCYTE [DISTWIDTH] IN BLOOD BY AUTOMATED COUNT: 11.8 % (ref 11.6–15.1)
EST. AVERAGE GLUCOSE BLD GHB EST-MCNC: 97 MG/DL
GFR SERPL CREATININE-BSD FRML MDRD: 64 ML/MIN/1.73SQ M
GLUCOSE P FAST SERPL-MCNC: 109 MG/DL (ref 65–99)
GLUCOSE UR STRIP-MCNC: NEGATIVE MG/DL
HAV AB SER QL IA: REACTIVE
HBA1C MFR BLD: 5 %
HBV SURFACE AB SER-ACNC: 20.58 MIU/ML
HBV SURFACE AG SER QL: NORMAL
HCT VFR BLD AUTO: 46.2 % (ref 36.5–49.3)
HCV AB SER QL: NORMAL
HDLC SERPL-MCNC: 36 MG/DL
HGB BLD-MCNC: 16.8 G/DL (ref 12–17)
HGB UR QL STRIP.AUTO: NEGATIVE
IMM GRANULOCYTES # BLD AUTO: 0.01 THOUSAND/UL (ref 0–0.2)
IMM GRANULOCYTES NFR BLD AUTO: 0 % (ref 0–2)
KETONES UR STRIP-MCNC: NEGATIVE MG/DL
LDLC SERPL CALC-MCNC: 70 MG/DL (ref 0–100)
LEUKOCYTE ESTERASE UR QL STRIP: ABNORMAL
LYMPHOCYTES # BLD AUTO: 1.67 THOUSANDS/ÂΜL (ref 0.6–4.47)
LYMPHOCYTES NFR BLD AUTO: 49 % (ref 14–44)
MCH RBC QN AUTO: 35.1 PG (ref 26.8–34.3)
MCHC RBC AUTO-ENTMCNC: 36.4 G/DL (ref 31.4–37.4)
MCV RBC AUTO: 97 FL (ref 82–98)
MONOCYTES # BLD AUTO: 0.28 THOUSAND/ÂΜL (ref 0.17–1.22)
MONOCYTES NFR BLD AUTO: 8 % (ref 4–12)
N GONORRHOEA DNA SPEC QL NAA+PROBE: NEGATIVE
NEUTROPHILS # BLD AUTO: 1.37 THOUSANDS/ÂΜL (ref 1.85–7.62)
NEUTS SEG NFR BLD AUTO: 40 % (ref 43–75)
NITRITE UR QL STRIP: NEGATIVE
NON-SQ EPI CELLS URNS QL MICRO: NORMAL /HPF
NRBC BLD AUTO-RTO: 0 /100 WBCS
PH UR STRIP.AUTO: 6 [PH]
PLATELET # BLD AUTO: 116 THOUSANDS/UL (ref 149–390)
PMV BLD AUTO: 11.5 FL (ref 8.9–12.7)
POTASSIUM SERPL-SCNC: 4.1 MMOL/L (ref 3.5–5.3)
PROT SERPL-MCNC: 7.7 G/DL (ref 6.4–8.4)
PROT UR STRIP-MCNC: NEGATIVE MG/DL
RBC # BLD AUTO: 4.78 MILLION/UL (ref 3.88–5.62)
RBC #/AREA URNS AUTO: NORMAL /HPF
SODIUM SERPL-SCNC: 140 MMOL/L (ref 135–147)
SP GR UR STRIP.AUTO: 1.02 (ref 1–1.03)
TRIGL SERPL-MCNC: 101 MG/DL
UROBILINOGEN UR QL STRIP.AUTO: 0.2 E.U./DL
WBC # BLD AUTO: 3.42 THOUSAND/UL (ref 4.31–10.16)
WBC #/AREA URNS AUTO: NORMAL /HPF

## 2022-11-21 PROCEDURE — 80061 LIPID PANEL: CPT

## 2022-11-21 PROCEDURE — 83036 HEMOGLOBIN GLYCOSYLATED A1C: CPT

## 2022-11-21 PROCEDURE — 86592 SYPHILIS TEST NON-TREP QUAL: CPT

## 2022-11-21 PROCEDURE — 87591 N.GONORRHOEAE DNA AMP PROB: CPT

## 2022-11-21 PROCEDURE — 86803 HEPATITIS C AB TEST: CPT

## 2022-11-21 PROCEDURE — 86777 TOXOPLASMA ANTIBODY: CPT

## 2022-11-21 PROCEDURE — 86706 HEP B SURFACE ANTIBODY: CPT

## 2022-11-21 PROCEDURE — 87491 CHLMYD TRACH DNA AMP PROBE: CPT

## 2022-11-21 PROCEDURE — 86644 CMV ANTIBODY: CPT

## 2022-11-21 PROCEDURE — 86645 CMV ANTIBODY IGM: CPT

## 2022-11-21 PROCEDURE — 80053 COMPREHEN METABOLIC PANEL: CPT

## 2022-11-21 PROCEDURE — 86708 HEPATITIS A ANTIBODY: CPT

## 2022-11-21 PROCEDURE — 81001 URINALYSIS AUTO W/SCOPE: CPT

## 2022-11-21 PROCEDURE — 86480 TB TEST CELL IMMUN MEASURE: CPT

## 2022-11-21 PROCEDURE — 85025 COMPLETE CBC W/AUTO DIFF WBC: CPT

## 2022-11-21 PROCEDURE — 36415 COLL VENOUS BLD VENIPUNCTURE: CPT

## 2022-11-21 PROCEDURE — 86778 TOXOPLASMA ANTIBODY IGM: CPT

## 2022-11-21 PROCEDURE — 87340 HEPATITIS B SURFACE AG IA: CPT

## 2022-11-21 PROCEDURE — 86361 T CELL ABSOLUTE COUNT: CPT

## 2022-11-22 LAB
BASOPHILS # BLD AUTO: 0 X10E3/UL (ref 0–0.2)
BASOPHILS NFR BLD AUTO: 1 %
CD3+CD4+ CELLS # BLD: 719 /UL (ref 359–1519)
CD3+CD4+ CELLS NFR BLD: 42.3 % (ref 30.8–58.5)
CLINICAL COMMENT: NORMAL
CMV IGG SERPL IA-ACNC: 7.9 U/ML (ref 0–0.59)
CMV IGM SERPL IA-ACNC: <30 AU/ML (ref 0–29.9)
EOSINOPHIL # BLD AUTO: 0.1 X10E3/UL (ref 0–0.4)
EOSINOPHIL NFR BLD AUTO: 2 %
ERYTHROCYTE [DISTWIDTH] IN BLOOD BY AUTOMATED COUNT: 12.7 % (ref 11.6–15.4)
HCT VFR BLD AUTO: 48.6 % (ref 37.5–51)
HGB BLD-MCNC: 17 G/DL (ref 13–17.7)
IMM GRANULOCYTES # BLD: 0 X10E3/UL (ref 0–0.1)
IMM GRANULOCYTES NFR BLD: 0 %
LYMPHOCYTES # BLD AUTO: 1.7 X10E3/UL (ref 0.7–3.1)
LYMPHOCYTES NFR BLD AUTO: 46 %
MCH RBC QN AUTO: 34.8 PG (ref 26.6–33)
MCHC RBC AUTO-ENTMCNC: 35 G/DL (ref 31.5–35.7)
MCV RBC AUTO: 100 FL (ref 79–97)
MONOCYTES # BLD AUTO: 0.3 X10E3/UL (ref 0.1–0.9)
MONOCYTES NFR BLD AUTO: 8 %
NEUTROPHILS # BLD AUTO: 1.5 X10E3/UL (ref 1.4–7)
NEUTROPHILS NFR BLD AUTO: 43 %
PLATELET # BLD AUTO: 124 X10E3/UL (ref 150–450)
RBC # BLD AUTO: 4.88 X10E6/UL (ref 4.14–5.8)
RPR SER QL: NORMAL
T GONDII IGG SERPL IA-ACNC: 6.5 IU/ML (ref 0–7.1)
T GONDII IGM SER IA-ACNC: <3 AU/ML (ref 0–7.9)
WBC # BLD AUTO: 3.5 X10E3/UL (ref 3.4–10.8)

## 2022-11-23 LAB
GAMMA INTERFERON BACKGROUND BLD IA-ACNC: 0.34 IU/ML
M TB IFN-G BLD-IMP: NEGATIVE
M TB IFN-G CD4+ BCKGRND COR BLD-ACNC: -0.07 IU/ML
M TB IFN-G CD4+ BCKGRND COR BLD-ACNC: 0.02 IU/ML
MITOGEN IGNF BCKGRD COR BLD-ACNC: >10 IU/ML

## 2022-12-05 ENCOUNTER — APPOINTMENT (OUTPATIENT)
Dept: LAB | Facility: HOSPITAL | Age: 67
End: 2022-12-05

## 2022-12-05 DIAGNOSIS — Z11.59 NEED FOR HEPATITIS B SCREENING TEST: ICD-10-CM

## 2022-12-05 DIAGNOSIS — D75.1 POLYCYTHEMIA: ICD-10-CM

## 2022-12-05 DIAGNOSIS — F19.11 HISTORY OF DRUG ABUSE (HCC): ICD-10-CM

## 2022-12-05 DIAGNOSIS — D69.6 THROMBOCYTOPENIA (HCC): ICD-10-CM

## 2022-12-05 LAB
APTT PPP: 27 SECONDS (ref 23–37)
BASOPHILS # BLD AUTO: 0.02 THOUSANDS/ÂΜL (ref 0–0.1)
BASOPHILS NFR BLD AUTO: 1 % (ref 0–1)
CRP SERPL QL: <3 MG/L
EOSINOPHIL # BLD AUTO: 0.08 THOUSAND/ÂΜL (ref 0–0.61)
EOSINOPHIL NFR BLD AUTO: 2 % (ref 0–6)
ERYTHROCYTE [DISTWIDTH] IN BLOOD BY AUTOMATED COUNT: 11.8 % (ref 11.6–15.1)
ERYTHROCYTE [SEDIMENTATION RATE] IN BLOOD: 2 MM/HOUR (ref 0–19)
HBV CORE AB SER QL: NORMAL
HBV CORE IGM SER QL: NORMAL
HBV SURFACE AG SER QL: NORMAL
HCT VFR BLD AUTO: 46.3 % (ref 36.5–49.3)
HCV AB SER QL: NORMAL
HGB BLD-MCNC: 16.6 G/DL (ref 12–17)
IMM GRANULOCYTES # BLD AUTO: 0.01 THOUSAND/UL (ref 0–0.2)
IMM GRANULOCYTES NFR BLD AUTO: 0 % (ref 0–2)
INR PPP: 0.98 (ref 0.84–1.19)
LYMPHOCYTES # BLD AUTO: 1.87 THOUSANDS/ÂΜL (ref 0.6–4.47)
LYMPHOCYTES NFR BLD AUTO: 50 % (ref 14–44)
MCH RBC QN AUTO: 34.8 PG (ref 26.8–34.3)
MCHC RBC AUTO-ENTMCNC: 35.9 G/DL (ref 31.4–37.4)
MCV RBC AUTO: 97 FL (ref 82–98)
MONOCYTES # BLD AUTO: 0.29 THOUSAND/ÂΜL (ref 0.17–1.22)
MONOCYTES NFR BLD AUTO: 8 % (ref 4–12)
NEUTROPHILS # BLD AUTO: 1.43 THOUSANDS/ÂΜL (ref 1.85–7.62)
NEUTS SEG NFR BLD AUTO: 39 % (ref 43–75)
NRBC BLD AUTO-RTO: 0 /100 WBCS
PLATELET # BLD AUTO: 112 THOUSANDS/UL (ref 149–390)
PMV BLD AUTO: 11.5 FL (ref 8.9–12.7)
PROTHROMBIN TIME: 13 SECONDS (ref 11.6–14.5)
RBC # BLD AUTO: 4.77 MILLION/UL (ref 3.88–5.62)
WBC # BLD AUTO: 3.7 THOUSAND/UL (ref 4.31–10.16)

## 2022-12-06 LAB — EPO SERPL-ACNC: 24.7 MIU/ML (ref 2.6–18.5)

## 2022-12-07 NOTE — PROGRESS NOTES
Cardiology Office Note  MD Terrence Jacobson MD Delman Perkins, DO, MD Purnima Cain DO, Nadeem Tong DO, [facility] [place]  ----------------------------------------------------------------  [address] [address]    Andre Tovar 78 y o  male MRN: [id number]  Unit/Bed#:  Encounter: [id number]      History of Present Illness: It was a pleasure to see Andre Tovar in the office today for follow-up CV evaluation  He has a past medical history of hypertension, dyslipidemia, MING, GERD, non alcoholic fatty liver disease and HIV  He established care with us in April 2022  Patient began to experience symptoms of discomfort in the chest and palpitations starting back in November 2021  The patient follows up with Gastroenterology for some symptoms of abdominal discomfort with radiation into the chest   At that time, he began to experience left-sided discomfort in the chest   The discomfort was described as both an aching and heaviness sensation  It did not radiate and was not associated with other symptoms  Symptoms also occur after he eats  Additionally, he experienced some palpitations which were described as a fluttering in the chest   These would come and go lasting between 1-2 minutes  Both the chest discomfort and palpitations seem to resolve on their own  Neither of the symptoms worsened with physical activity or necessarily improved with rest   And of note he also occasionally experiences some shortness of breath  Does have a family history of coronary disease with his father having had stents placed in his 76s  Due to the patient's symptoms, he was sent for event recorder, stress test and echocardiogram   Event recorder demonstrated sinus rhythm with rare atrial and ventricular ectopy and a single run of nonsustained VT and 13 runs of nonsustained PSVT  Triggered events correlated with PSVT    Echocardiogram showed normal left ventricular function without significant valvular disease  Stress test was found to be negative for myocardial ischemia  The patient was increased on metoprolol due to persistent palpitations and runs of nonsustained SVT causing symptoms  Since the increase, his palpitation burden has decreased and he has been feeling much improved  He denies any chest pain, pressure, tightness or squeezing  Denies lightheadedness, dizziness or loss of consciousness  Denies lower extremity swelling, orthopnea or paroxysmal nocturnal dyspnea  Review of Systems:  Review of Systems   Constitutional: Negative for decreased appetite, fever, weight gain and weight loss  HENT: Negative for congestion and sore throat  Eyes: Negative for visual disturbance  Cardiovascular: Positive for palpitations  Negative for chest pain, dyspnea on exertion, leg swelling and near-syncope  Respiratory: Negative for cough and shortness of breath  Hematologic/Lymphatic: Negative for bleeding problem  Skin: Negative for rash  Musculoskeletal: Negative for myalgias and neck pain  Gastrointestinal: Negative for abdominal pain and nausea  Neurological: Negative for light-headedness and weakness  Psychiatric/Behavioral: Negative for depression         Past Medical History:   Diagnosis Date   • GERD (gastroesophageal reflux disease)    • HIV (human immunodeficiency virus infection) ([facility] )    • Hyperlipidemia    • Hypertension    • Kidney stone    • Non-alcoholic fatty liver disease    • PONV (postoperative nausea and vomiting)    • Prostate cancer ([facility] )    • Sleep apnea        Past Surgical History:   Procedure Laterality Date   • COLONOSCOPY     • HERNIA REPAIR     • DE BIOPSY OF PROSTATE,NEEDLE,TRANSPERINEAL N/A 1/25/2022    Procedure: TRANSPERINEAL ULTRASOUND GUIDED BIOPSY PROSTATE;  Surgeon: Stephenie Mesa MD;  Location: [facility];  Service: Urology   • DE BIOPSY OF [address] [place] [address] N/A 10/13/2020    Procedure: TRANSPERINEAL PROSTATE BIOPSY;  Surgeon: Paul Thomas MD;  [facility]: [facility];  Service: Urology       Social History     Socioeconomic History   • Marital status: Single     Spouse name: None   • Number of children: None   • Years of education: None   • Highest education level: None   Occupational History   • None   Tobacco Use   • Smoking status: Never   • Smokeless tobacco: Never   Vaping Use   • Vaping Use: Never used   Substance and Sexual Activity   • Alcohol use: Not Currently     Comment: used to be a heavy drinker, quit 15 years ago   • Drug use: Not Currently     Types: Marijuana, Cocaine     Comment: used to use drugs, consisting of crystal meth, cocaine, and marijuana, denied any IV drug use in the past   • Sexual activity: None   Other Topics Concern   • None   Social History Narrative   • None     Social Determinants of Health     Financial Resource Strain: Not on file   Food Insecurity: Not on file   Transportation Needs: Not on file   Physical Activity: Not on file   Stress: Not on file   Social Connections: Not on file   Intimate Partner Violence: Not on file   Housing Stability: Not on file       Family History   Problem Relation Age of Onset   • Prostate cancer Father    • Esophageal cancer Father    • Heart disease Father        Allergies   Allergen Reactions   • Epinephrine Palpitations and Shortness Of Breath   • Sulfa Antibiotics          Current Outpatient Medications:   •  bictegravir-emtricitab-tenofovir alafenamide (BIKTARVY) -25 MG tablet, Take 1 tablet by mouth daily with breakfast, Disp: , Rfl:   •  cholecalciferol (VITAMIN D3) 1,000 units tablet, Take 1,000 Units by mouth daily, Disp: , Rfl:   •  cyanocobalamin (VITAMIN B-12) 500 MCG tablet, Take 1,000 mcg by mouth daily, Disp: , Rfl:   •  docusate sodium (COLACE) 100 mg capsule, Take 100 mg by mouth 3 (three) times a day as needed for constipation, Disp: , Rfl:   •  lisinopril (ZESTRIL) 20 mg tablet, Take 20 mg by mouth daily, Disp: , Rfl:   •  metoprolol succinate (TOPROL-XL) 50 mg 24 hr tablet, Take 1 tablet (50 mg total) by mouth every evening (Patient taking differently: Take 50 mg by mouth every evening Patient states that he takes this in the morning), Disp: 90 tablet, Rfl: 1  •  Multiple Vitamin (multivitamin) capsule, Take 1 capsule by mouth daily, Disp: , Rfl:   •  Omega-3 1000 MG CAPS, Take 2 g by mouth daily, Disp: , Rfl:   •  potassium citrate (Urocit-K 10) 10 mEq, Take 1 tablet (10 mEq total) by mouth daily, Disp: 90 tablet, Rfl: 3  •  pravastatin (PRAVACHOL) 80 mg tablet, Take 80 mg by mouth daily, Disp: , Rfl:   •  Cholecalciferol 25 MCG (1000 UT) CHEW, Chew 1 tablet daily (Patient not taking: Reported on 2022), Disp: , Rfl:   •  famotidine (PEPCID) 20 mg tablet, Take 1 tablet by mouth (Patient not taking: Reported on 2022), Disp: , Rfl:     Vitals:    22 0901   BP: 138/70   Pulse: 60   SpO2: 99%   Weight: 95 5 kg (210 lb 9 6 oz)   Height: 6' 2" (1 88 m)     Body mass index is 27 04 kg/m²  PHYSICAL EXAMINATION:  Gen: Awake, Alert, NAD   Head/eyes: AT/NC, pupils equal and round, Anicteric  ENT: mmm  Neck: Supple, No elevated JVP, trachea midline  Resp: CTA bilaterally no w/r/r  CV: RRR +S1, S2, No m/r/g  Abd: Soft, NT/ND + BS  Ext: no LE edema bilaterally  Neuro: Follows commands, moves all extermities  Psych: Appropriate affect, pleasant mood, pleasant attitude, non-combative  Skin: warm; no rash, erythema or venous stasis changes on exposed skin    --------------------------------------------------------------------------------  TREADMILL STRESS  Results for orders placed during the hospital encounter of 18    Stress test only, exercise    Narrative  [address]    [place], [address]  [phone]    Stress Electrocardiography during Exercise    Name: Agustin Ordoñez  MR #: [id number]  Account #: [de-identified]  Study date: 2018  : 1955  Age: 65 years  Gender: Male  Height: 73 in  Weight: 212 lb  BSA: 2 21 m squared    Allergies: SULFA ANTIBIOTICS    Diagnosis: R07 9 - Chest pain, unspecified    Primary Physician:  Santos aMrt  Referring Physician:  Santos Mart  RN:  Idania Rey RN BSN  Technician:  Thomas Raza  GROUP:  [facility]  Interpreting Physician:  Purnima Lance DO  Report Prepared By[[email or web address]-identified]  Idania Rey RN BSN    CLINICAL QUESTION: Detection of coronary artery disease  HISTORY: The patient is a 65year old  male  Chest pain status: chest pain  Coronary artery disease risk factors: dyslipidemia, hypertension  Cardiovascular history: none significant  Co-morbidity: history of cocaine abuse  Medications: a beta blocker, an ACE inhibitor/ARB, aspirin, and a lipid lowering agent  PHYSICAL EXAM: Baseline physical exam screening: normal lung exam     REST ECG: Normal sinus rhythm  Normal baseline ECG  PROCEDURE: Treadmill exercise testing was performed, using the Servando protocol  Stress electrocardiographic evaluation was performed  SERVANDO PROTOCOL:  HR bpm SBP mmHg DBP mmHg Symptoms  Baseline 67 119 77 none  Stage 1 96 150 69 none  Stage 2 115 156 68 none  Stage 3 146 174 82 mild dyspnea  Stage 4 160 149 78 dyspnea, fatigue  Recovery 1 94 165 70 subsiding  Recovery 2 89 158 72 none  Recovery 3 86 139 74 none  No medications or fluids given  STRESS SUMMARY: 02 sat at rest 99% and at peak stress 98%  Duration of exercise was 9 min and 30 sec  The patient exercised to protocol stage 4  Maximal work rate was 10 9 METs  Maximal heart rate during stress was 160 bpm ( 101 % of  maximal predicted heart rate)  The heart rate response to stress was normal  There was normal resting blood pressure with an appropriate response to stress  The rate-pressure product for the peak heart rate and blood pressure was 88319  There was no chest pain during stress   The stress test was terminated due to achievement of maximal (symptom limited) exercise and achievement of target heart rate  The stress ECG was negative for ischemia  There were no stress arrhythmias  or conduction abnormalities  SUMMARY:  -  Stress results: Duration of exercise was 9 min and 30 sec  Target heart rate was achieved  There was no chest pain during stress  -  ECG conclusions: The stress ECG was negative for ischemia  IMPRESSIONS: Normal study after maximal exercise  No chest pain or ischemic ECG changes with exercise  Normal blood pressure and heart rate response to exercise with no evidence of exercise induced hypoxia  Good functional capacity  Prepared and signed by    [email or web address] DO Sandra  Signed 01/22/2018 15:23:34    Exercise stress test is negative for myocardial ischemia, ET 9:30, 101% MPHR, 10 9 METs, January 2018     --------------------------------------------------------------------------------  NUCLEAR STRESS TEST: No results found for this or any previous visit  No results found for this or any previous visit       --------------------------------------------------------------------------------  CATH:  No results found for this or any previous visit     --------------------------------------------------------------------------------  ECHO:   No results found for this or any previous visit  No results found for this or any previous visit     --------------------------------------------------------------------------------  HOLTER  No results found for this or any previous visit  No results found for this or any previous visit     --------------------------------------------------------------------------------  CAROTIDS  No results found for this or any previous visit      --------------------------------------------------------------------------------  ECGs:  No results found for this visit on 12/08/22       Lab Results   Component Value Date    WBC 3 70 (L) 12/05/2022    HGB 16 6 12/05/2022    HCT 46 3 12/05/2022 MCV 97 12/05/2022     (L) 12/05/2022      Lab Results   Component Value Date    SODIUM 140 11/21/2022    K 4 1 11/21/2022     (H) 11/21/2022    CO2 27 11/21/2022    BUN 16 11/21/2022    CREATININE 1 17 11/21/2022    GLUC 118 04/02/2022    CALCIUM 9 3 11/21/2022      Lab Results   Component Value Date    HGBA1C 5 0 11/21/2022      Lab Results   Component Value Date    CHOL 117 06/16/2015    CHOL 170 08/06/2014     Lab Results   Component Value Date    HDL 36 (L) 11/21/2022    HDL 42 07/15/2022    HDL 42 07/19/2021     Lab Results   Component Value Date    LDLCALC 70 11/21/2022    LDLCALC 88 07/15/2022    LDLCALC 82 07/19/2021     Lab Results   Component Value Date    TRIG 101 11/21/2022    TRIG 57 07/15/2022    TRIG 74 07/19/2021     No results found for: [place], [place]   Lab Results   Component Value Date    INR 0 98 12/05/2022    PROTIME 13 0 12/05/2022        1  Precordial chest pain    2  Palpitations    3  SVT (supraventricular tachycardia) ([address] )    4  Essential hypertension    5  Dyslipidemia      IMPRESSION:  Precordial chest pain - resolved  Exercise nuclear stress test appears negative for myocardial ischemia, ET 10 min, 97% MPHR, 11 7 METs, gated EF 64%, June 2022  Palpitations   Event recorder w/ SR avg HR 67 bpm, rare APCs, atrial couplets/triplets, rare VPCs, ventricular couplets/triplets, nonsustained VT (x1) 4 beats at 210 bpm, nonsustained PSVT (x13) 14 beats at 190 bpm, triggered events correlated with nonsustained PSVT, April 2022  Dyspnea on exertion  LVEF 65%, mild MILAN, normal diastolic function, mild RV dilatation, trace AR/MR, mild TR, ascending aortic ectasia 3 8 cm, June 2022  Hypertension  Dyslipidemia   MING  GERD   Non alcoholic fatty liver disease  History of prostate CA - active, January 2020  History of HIV    PLAN:  It was a pleasure to see Franklyn Bakeralysia in the office today for follow-up CV evaluation  Patient's palpitation burden has improved on increased dose of metoprolol    He has no symptoms of significant lightheadedness and has had no syncope  He has no symptoms concerning for angina and no signs or symptoms of heart failure  Clinically the patient examines to be euvolemic  He can perform greater than 4 Mets on a daily basis without significant exertional symptoms  The patient is tolerating his current medications without any reported adverse effects  Based on his clinical presentation, I have the following recommendations:    1  Recommend aggressive risk factor and lifestyle modifications  2  We have discussed increasing his metoprolol to Toprol-XL 50 mg in the evening and 25 mg in the morning  At this time, he would like to hold off and continue Toprol 50 mg daily   3  Continue statin therapy  Goal LDL is less than 70 mg/dL  Repeat lipid panel in 3-6 months  4  No additional CV testing at this time  5  Recommend heart healthy diet low in sodium and carbohydrate  6  Encourage 30 minutes a day, 5 days a week of moderate intensity activity to build cardiovascular endurance  7  As the patient still has had intermittent symptoms, he would like to follow-up in the next 6 months  We will follow up at that time to discuss either further titration of metoprolol or we will see him in 1 year  As always, please do not hesitate to call with any questions  Portions of the record may have been created with voice recognition software  Occasional wrong word or "sound a like" substitutions may have occurred due to the inherent limitations of voice recognition software  Read the chart carefully and recognize, using context, where substitutions have occurred        Signed: Pal Ponce DO, [facility] [place] [facility] [place] KRISTIE, TISH, [place]

## 2022-12-08 ENCOUNTER — OFFICE VISIT (OUTPATIENT)
Dept: CARDIOLOGY CLINIC | Facility: CLINIC | Age: 67
End: 2022-12-08

## 2022-12-08 VITALS
HEIGHT: 74 IN | DIASTOLIC BLOOD PRESSURE: 70 MMHG | BODY MASS INDEX: 27.03 KG/M2 | WEIGHT: 210.6 LBS | OXYGEN SATURATION: 99 % | SYSTOLIC BLOOD PRESSURE: 138 MMHG | HEART RATE: 60 BPM

## 2022-12-08 DIAGNOSIS — R00.2 PALPITATIONS: ICD-10-CM

## 2022-12-08 DIAGNOSIS — E78.5 DYSLIPIDEMIA: ICD-10-CM

## 2022-12-08 DIAGNOSIS — I10 ESSENTIAL HYPERTENSION: ICD-10-CM

## 2022-12-08 DIAGNOSIS — I47.1 SVT (SUPRAVENTRICULAR TACHYCARDIA) (HCC): ICD-10-CM

## 2022-12-08 DIAGNOSIS — R07.2 PRECORDIAL CHEST PAIN: Primary | ICD-10-CM

## 2022-12-08 RX ORDER — MELATONIN
1000 DAILY
COMMUNITY

## 2022-12-13 ENCOUNTER — OFFICE VISIT (OUTPATIENT)
Dept: HEMATOLOGY ONCOLOGY | Facility: CLINIC | Age: 67
End: 2022-12-13

## 2022-12-13 VITALS
SYSTOLIC BLOOD PRESSURE: 118 MMHG | OXYGEN SATURATION: 98 % | WEIGHT: 201.6 LBS | BODY MASS INDEX: 25.87 KG/M2 | HEART RATE: 74 BPM | RESPIRATION RATE: 16 BRPM | DIASTOLIC BLOOD PRESSURE: 60 MMHG | TEMPERATURE: 97.4 F | HEIGHT: 74 IN

## 2022-12-13 DIAGNOSIS — B20 HIV INFECTION, UNSPECIFIED SYMPTOM STATUS (HCC): ICD-10-CM

## 2022-12-13 DIAGNOSIS — R16.1 SPLENOMEGALY: Primary | ICD-10-CM

## 2022-12-13 DIAGNOSIS — D69.6 THROMBOCYTOPENIA (HCC): ICD-10-CM

## 2022-12-13 DIAGNOSIS — I10 PRIMARY HYPERTENSION: ICD-10-CM

## 2022-12-13 DIAGNOSIS — N40.2 PROSTATE NODULE: ICD-10-CM

## 2022-12-13 DIAGNOSIS — D70.9 NEUTROPENIA, UNSPECIFIED TYPE (HCC): ICD-10-CM

## 2022-12-13 NOTE — PATIENT INSTRUCTIONS
Patient will have blood work on a regular basis by ID specialist and if there is a significant change he could be referred to our office or patient can call directly  He will be seen as needed

## 2022-12-14 NOTE — PROGRESS NOTES
HPI:   Becky Arango is a 78 y o  male  Patient is here with his partner Glen Silvestre  Follow-up visit for mild splenomegaly and chronic modest thrombocytopenia  Patient has been on medications for HIV and he states his viral load has been 0  Patient will be following with HIV specialist at [address] [place] [address]   Borderline erythrocytosis  No symptoms at present  History of prostate cancer and he follows with his urologist   He did not require therapy for prostate cancer  He states first biopsy was positive and second biopsy was negative  No urological symptoms          Current Outpatient Medications:   •  bictegravir-emtricitab-tenofovir alafenamide (BIKTARVY) -25 MG tablet, Take 1 tablet by mouth daily with breakfast, Disp: , Rfl:   •  cholecalciferol (VITAMIN D3) 1,000 units tablet, Take 1,000 Units by mouth daily, Disp: , Rfl:   •  cyanocobalamin (VITAMIN B-12) 500 MCG tablet, Take 1,000 mcg by mouth daily, Disp: , Rfl:   •  docusate sodium (COLACE) 100 mg capsule, Take 100 mg by mouth 3 (three) times a day as needed for constipation, Disp: , Rfl:   •  famotidine (PEPCID) 20 mg tablet, Take 1 tablet by mouth, Disp: , Rfl:   •  lisinopril (ZESTRIL) 20 mg tablet, Take 20 mg by mouth daily, Disp: , Rfl:   •  Multiple Vitamin (multivitamin) capsule, Take 1 capsule by mouth daily, Disp: , Rfl:   •  Omega-3 1000 MG CAPS, Take 2 g by mouth daily, Disp: , Rfl:   •  potassium citrate (Urocit-K 10) 10 mEq, Take 1 tablet (10 mEq total) by mouth daily, Disp: 90 tablet, Rfl: 3  •  pravastatin (PRAVACHOL) 80 mg tablet, Take 80 mg by mouth daily, Disp: , Rfl:   •  metoprolol succinate (TOPROL-XL) 50 mg 24 hr tablet, Take 1 tablet (50 mg total) by mouth every evening (Patient taking differently: Take 50 mg by mouth every evening Patient states that he takes this in the morning), Disp: 90 tablet, Rfl: 1    Allergies   Allergen Reactions   • Epinephrine Palpitations and Shortness Of Breath   • Sulfa Antibiotics Other (See Comments)     Eye irritation       Oncology History    No history exists  ROS:  12/14/22 Reviewed 12 systems: See symptoms in HPI  Presently no  neurological, cardiac, pulmonary, GI and  symptoms  No symptoms at present  No fever, chills, bleeding, bone pains, skin rash, weight loss , night sweats, arthritic symptoms, tiredness ,  weakness, numbness,  claudication and gait problem  No frequent infections  Not unusually sensitive to heat or cold  No swelling of the ankles  No swollen glands  Patient is much less anxious  /60 (BP Location: Left arm, Patient Position: Sitting, Cuff Size: Adult)   Pulse 74   Temp (!) 97 4 °F (36 3 °C) (Temporal)   Resp 16   Ht 6' 2" (1 88 m)   Wt 91 4 kg (201 lb 9 6 oz)   SpO2 98%   BMI 25 88 kg/m²     Physical Exam:  Alert, oriented, not in distress, vitals are above, no icterus, no oral thrush, no palpable neck mass, clear lung fields, regular heart rate, abdomen  soft and non tender, no palpable abdominal mass, no ascites, no edema of ankles, no calf tenderness, no focal neurological deficit, no skin rash, no palpable lymphadenopathy in the neck and axillary areas, no clubbing  Patient is much less anxious  Performance status 0       IMAGING:  No orders to display       LABS:  Results for orders placed or performed in visit on 12/05/22   Erythropoietin   Result Value Ref Range    Erythropoietin 24 7 (H) 2 6 - 18 5 mIU/mL   CBC and differential   Result Value Ref Range    WBC 3 70 (L) 4 31 - 10 16 Thousand/uL    RBC 4 77 3 88 - 5 62 Million/uL    Hemoglobin 16 6 12 0 - 17 0 g/dL    Hematocrit 46 3 36 5 - 49 3 %    MCV 97 82 - 98 fL    MCH 34 8 (H) 26 8 - 34 3 pg    MCHC 35 9 31 4 - 37 4 g/dL    RDW 11 8 11 6 - 15 1 %    MPV 11 5 8 9 - 12 7 fL    Platelets 811 (L) 072 - 390 Thousands/uL    nRBC 0 /100 WBCs    Neutrophils Relative 39 (L) 43 - 75 %    Immat GRANS % 0 0 - 2 %    Lymphocytes Relative 50 (H) 14 - 44 %    Monocytes Relative 8 4 - 12 %    Eosinophils Relative 2 0 - 6 %    Basophils Relative 1 0 - 1 %    Neutrophils Absolute 1 43 (L) 1 85 - 7 62 Thousands/µL    Immature Grans Absolute 0 01 0 00 - 0 20 Thousand/uL    Lymphocytes Absolute 1 87 0 60 - 4 47 Thousands/µL    Monocytes Absolute 0 29 0 17 - 1 22 Thousand/µL    Eosinophils Absolute 0 08 0 00 - 0 61 Thousand/µL    Basophils Absolute 0 02 0 00 - 0 10 Thousands/µL   Sedimentation rate, automated   Result Value Ref Range    Sed Rate 2 0 - 19 mm/hour   C-reactive protein   Result Value Ref Range    CRP <3 0 <3 0 mg/L   Protime-INR   Result Value Ref Range    Protime 13 0 11 6 - 14 5 seconds    INR 0 98 0 84 - 1 19   APTT   Result Value Ref Range    PTT 27 23 - 37 seconds   Chronic Hepatitis Panel   Result Value Ref Range    Hepatitis B Surface Ag Non-reactive Non-reactive, NonReactive - Confirmed    Hepatitis C Ab Non-reactive Non-reactive    Hep B C IgM Non-reactive Non-reactive    Hep B Core Total Ab Non-reactive Non-reactive   CD4 count is 719  JAK2 mutation test result is pending  Labs, Imaging, & Other studies:   SPLEEN:   Measures 12 9 x 12 9 x 5 0 cm  Mildly enlarged  No focal masses      ASCITES:  None      IMPRESSION:     Limited study  1   Mildly increased hepatic echogenicity suggests mild hepatic steatosis  2   Mild splenomegaly        Workstation performed: [id number]        Imaging    US abdomen complete (Order: [id number]) - 11/30/2021  IMPRESSION:     No acute cardiopulmonary disease                  Workstation performed: [id number]        Imaging    XR chest 2 views (Order: [id number]) - 4/2/2022    All pertinent labs and imaging studies were personally reviewed      Reviewed and discussed with patient  Assessment and plan: See diagnoses, orders and instructions below  Follow-up visit for mild splenomegaly and chronic modest thrombocytopenia  Patient has been on medications for HIV and he states his viral load has been 0    Patient will be following with HIV specialist at [address] [place] [address]   Borderline erythrocytosis  No symptoms at present  History of prostate cancer and he follows with his urologist   He did not require therapy for prostate cancer  He states first biopsy was positive and second biopsy was negative  No urological symptoms  Physical examination and test results are as recorded and discussed  Patient has borderline hematological abnormalities like slight increase in size of spleen to 12 9 cm, modest thrombocytopenia more than 100,000, upper normal MCV, neutropenia and slight increase in erythropoietin level  I explained these abnormalities to the patient  Patient will have blood work regularly by infectious disease specialist and if there is a significant change patient can be referred back to our office or patient can call himself for an appointment  I explained all this to the patient and he agrees with the plan  Pending results of JAK2 mutation and patient will be notified of the result  Follow-up in our office could be as needed and he also liked that  All discussed in detail  Questions answered  Discussed importance of eating healthy foods, activities as tolerated and health screening test   Discussed precautions against coronavirus, flu and other infections  Patient appears to be capable of self-care  Goal is to monitor patient's counts and can be done  by infection disease specialist   1  Splenomegaly      2  Thrombocytopenia ([address] )      3  Neutropenia, unspecified type ([address] )      4  HIV infection, unspecified symptom status ([facility] )      5  Primary hypertension      6  Prostate nodule    Patient will have blood work on a regular basis by ID specialist and if there is a significant change he could be referred to our office or patient can call directly  He will be seen as needed     Patient will continue to follow with  primary physician and other consultants    Patient  voiced understanding and agrees      Disclaimer: This document was prepared using dictation device and if a word or phrase is confusing, or does not make sense, this is likely due to recognition error which was not discovered during the providers review  If you believe an error has occurred, please Contact me through [employer] service for nedra? cation  Counseling / Coordination of Care       Provided counseling and support

## 2022-12-19 ENCOUNTER — APPOINTMENT (OUTPATIENT)
Dept: LAB | Facility: HOSPITAL | Age: 67
End: 2022-12-19

## 2022-12-19 DIAGNOSIS — B20 HIV DISEASE (HCC): ICD-10-CM

## 2022-12-19 DIAGNOSIS — Z79.899 OTHER LONG TERM (CURRENT) DRUG THERAPY: ICD-10-CM

## 2022-12-19 LAB — SCAN RESULT: NORMAL

## 2022-12-21 LAB
HIV1 RNA # SERPL NAA+PROBE: <20 COPIES/ML
HIV1 RNA SERPL NAA+PROBE-LOG#: NORMAL LOG10COPY/ML

## 2023-01-03 ENCOUNTER — OFFICE VISIT (OUTPATIENT)
Dept: SURGERY | Facility: CLINIC | Age: 68
End: 2023-01-03

## 2023-01-03 ENCOUNTER — TELEPHONE (OUTPATIENT)
Dept: SURGERY | Facility: CLINIC | Age: 68
End: 2023-01-03

## 2023-01-03 VITALS
DIASTOLIC BLOOD PRESSURE: 82 MMHG | HEART RATE: 61 BPM | TEMPERATURE: 97.9 F | WEIGHT: 204 LBS | BODY MASS INDEX: 26.18 KG/M2 | SYSTOLIC BLOOD PRESSURE: 141 MMHG | OXYGEN SATURATION: 97 % | HEIGHT: 74 IN | RESPIRATION RATE: 18 BRPM

## 2023-01-03 DIAGNOSIS — D72.819 LEUKOPENIA, UNSPECIFIED TYPE: ICD-10-CM

## 2023-01-03 DIAGNOSIS — Z00.00 ENCOUNTER FOR MEDICAL EXAMINATION TO ESTABLISH CARE: ICD-10-CM

## 2023-01-03 DIAGNOSIS — Z81.4 FAMILY HISTORY OF SUBSTANCE ABUSE: ICD-10-CM

## 2023-01-03 DIAGNOSIS — B20 CURRENTLY ASYMPTOMATIC HIV INFECTION, WITH HISTORY OF HIV-RELATED ILLNESS (HCC): Primary | ICD-10-CM

## 2023-01-03 PROBLEM — F10.11 H/O ETOH ABUSE: Status: ACTIVE | Noted: 2023-01-03

## 2023-01-03 NOTE — TELEPHONE ENCOUNTER
RD called to complete annual nutrition assessment, left voicemail message  ----    Pt returned call, left voicemail  RD called back, spoke briefly w pt, he was driving at the time of call, and so a f/u call was coordinated for tomorrow morning at 10am   RD to call

## 2023-01-03 NOTE — ASSESSMENT & PLAN NOTE
Doing well on with Biktarvy an undetectable VL  Pt reports 100% medication compliance on HAART  · Stressed the importance of 100% medication adherence  · Reviewed labs with pt  · Schedule ID visit     HIV Counseling:    Viral Load: < 20    CD4 Count: 546      Denies side effects  Stressed the importance of adherence  Continue follow up in the ID clinic with Dr Stefany Rice or Dr Yesenia Friend  Reviewed the most recent labs, including the CD4 and viral load  Discussed the risks and benefits of treatment options, instructions for management, importance of treatment adherence, and reduction of risk factors  Educated on possible medication side effects  Counseled on routes of HIV transmission, including the risk of  infection  Emphasized that viral suppression is the best method to prevent HIV transmission  At this time the pt denies the need for HIV testing of anyone in their life  Total encounter time was greater than 35 minutes  Greater than 20 minutes were spent on counseling and patient education  Pt voices understanding and agreement with treatment plan

## 2023-01-03 NOTE — PROGRESS NOTES
Assessment/Plan:  · Recommend receiving Shingrix vaccine at local pharmacy  HIV infection [facility])  Doing well on with Biktarvy an undetectable VL  Pt reports 100% medication compliance on HAART  · Stressed the importance of 100% medication adherence  · Reviewed labs with pt  · Schedule ID visit     HIV Counseling:    Viral Load: < 20    CD4 Count: 536      Denies side effects  Stressed the importance of adherence  Continue follow up in the ID clinic with Dr Kim Easley or Dr Osiel Barton  Reviewed the most recent labs, including the CD4 and viral load  Discussed the risks and benefits of treatment options, instructions for management, importance of treatment adherence, and reduction of risk factors  Educated on possible medication side effects  Counseled on routes of HIV transmission, including the risk of  infection  Emphasized that viral suppression is the best method to prevent HIV transmission  At this time the pt denies the need for HIV testing of anyone in their life  Total encounter time was greater than 35 minutes  Greater than 20 minutes were spent on counseling and patient education  Pt voices understanding and agreement with treatment plan  Leukopenia  Stable  ANC> 1000  Work up by Dr Brijesh Burt showed enlarged liver and spleen otherwise workup unremarkable      · No further follow up with hematology/oncology needed  · Monitor CBCD       Diagnoses and all orders for this visit:    Currently asymptomatic HIV infection, with history of HIV-related illness ([facility] [place] )    Encounter for medical examination to establish care    Family history of substance abuse    Leukopenia, unspecified type        Patient Active Problem List   Diagnosis   • HIV infection ([facility] )   • Family history of prostate cancer in father   • Prostate nodule   • HTN (hypertension)   • Hyperlipidemia   • Non-alcoholic fatty liver disease   • GERD (gastroesophageal reflux disease)   • Palpitations   • Splenomegaly   • Thrombocytopenia (HCC)   • Polycythemia   • Family history of substance abuse   • Neutropenia ([address] )   • H/O ETOH abuse   • Leukopenia     Lab Results   Component Value Date     12/16/2015    K 4 1 11/21/2022     (H) 11/21/2022    CO2 27 11/21/2022    ANIONGAP 6 12/16/2015    BUN 16 11/21/2022    CREATININE 1 17 11/21/2022    GLUCOSE 99 12/16/2015    GLUF 109 (H) 11/21/2022    CALCIUM 9 3 11/21/2022    AST 35 11/21/2022    ALT 59 11/21/2022    ALKPHOS 52 11/21/2022    PROT 7 2 12/16/2015    BILITOT 0 91 12/16/2015    EGFR 64 11/21/2022     Lab Results   Component Value Date    WBC 3 70 (L) 12/05/2022    HGB 16 6 12/05/2022    HCT 46 3 12/05/2022    MCV 97 12/05/2022     (L) 12/05/2022          Subjective:      Patient ID: Becky Arango is a 78 y o  male  HPI  Pt presents for visit to establish care with PCP and for management of HIV   PMH: HIV, SVT,HTN, GERD, RPR, anemia, hyperlipidemia, nephrolithiasis, bipolar, ETOH in remission, Substance abuse in Remission, Vitamin D deficiency, Hepatitis steatosis, Right inguinal hernia, Prostate CA 2022, acinar type  Pt does have Hep A & B immunity  Pt was dx with HIV 1/2006 pt is not sure of HIV contact through sex or needles  Pt was initially started on Atripla by Dr Lilly Arndt in [facility] and 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  Pt had transitioned to Truvada and Descovy then Descovy and Tivicay and then to Marina about 4 years ago  Pt was receiving services from [facility] who helped with cost associated with Marina and has transitioned care back to [place] since he receives other services at [facility] [place] [facility]  Pt reports he is doing good  Pt reports over 2022 pt had developed palpitations with chest pain and was referred to GI and cardiology with full work up  Pt also had large blood cells on CBC and was sent to hematology/oncology for work up by Cem Duran    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 who also come to [facility] clinic  Pt father passed away from esophageal cancer, and also had prostate cancer and heart disease  Pt had blood workup and evaluation for enlarged blood cells by Dr Nitza Rodrigez was essentially negative but has enlarged liver/spleen  Pt has work up by Dr Uyen Nava for symptomatic palpitations must noticeable will sitting on cough doing nothing  Cardiac workup did not show anything at this time  Pt is taking Metoprolol 50 mg QD  Pt has stopped taking Pepcid everyday and does have to avoid hot spicy an acidic foods  Does occassionally take Pepcid 20 mintes prior to eating  EGD 2/8/20, DEXA scan 2/12/20, and colonoscopy 2/21/20  Pt UTD on flu and received covid 4th vaccine  Pt has started doing transcendental meditation twice edaily and has flet that this has help him reduce his stress and anxiety  Pt does 20 minutes twice per day and feels it has been very benifical       Pt has been in recovery for 15 years from substance and ETOH abuse  I have spent 60 minutes with Patient  today in which greater than 50% of this time was spent in counseling/coordination of care regarding Diagnostic results, Prognosis, Risks and benefits of tx options, Intructions for management, Patient and family education, Importance of tx compliance and Risk factor reductions  The following portions of the patient's history were reviewed and updated as appropriate: allergies, current medications, past family history, past medical history, past social history, past surgical history and problem list     Review of Systems   Constitutional: Negative  HENT: Negative  Respiratory: Negative  Cardiovascular: Negative  Gastrointestinal: Negative  Musculoskeletal: Negative  Skin: Negative  Neurological: Negative  Psychiatric/Behavioral: Negative            Objective:      /82 (BP Location: Right arm, Patient Position: Sitting, Cuff Size: Large) Comment: Manual  Pulse 61   Temp 97 9 °F (36 6 °C) (Tympanic)   Resp 18   Ht 6' 2" (1 88 m)   Wt 92 5 kg (204 lb)   SpO2 97%   BMI 26 19 kg/m²          Physical Exam  Vitals and nursing note reviewed  Constitutional:       General: He is not in acute distress  Appearance: Normal appearance  He is not ill-appearing  HENT:      Right Ear: Tympanic membrane normal       Left Ear: Tympanic membrane normal       Nose: Nose normal       Mouth/Throat:      Mouth: Mucous membranes are moist       Pharynx: Oropharynx is clear  Eyes:      Extraocular Movements: Extraocular movements intact  Pupils: Pupils are equal, round, and reactive to light  Neck:      Vascular: No carotid bruit  Cardiovascular:      Rate and Rhythm: Normal rate  Pulses: Normal pulses  Heart sounds: Normal heart sounds  Pulmonary:      Effort: Pulmonary effort is normal       Breath sounds: Normal breath sounds  Abdominal:      General: Abdomen is flat  Bowel sounds are normal       Palpations: Abdomen is soft  Musculoskeletal:         General: Normal range of motion  Lymphadenopathy:      Cervical: No cervical adenopathy  Skin:     General: Skin is warm and dry  Capillary Refill: Capillary refill takes less than 2 seconds  Neurological:      Mental Status: He is alert and oriented to person, place, and time  Psychiatric:         Mood and Affect: Mood normal          Behavior: Behavior normal          Thought Content:  Thought content normal          Judgment: Judgment normal

## 2023-01-03 NOTE — ASSESSMENT & PLAN NOTE
Stable  ANC> 1000  Work up by Dr Donna Rocha showed enlarged liver and spleen otherwise workup unremarkable      · No further follow up with hematology/oncology needed  · Monitor CBCD

## 2023-01-04 ENCOUNTER — DOCUMENTATION (OUTPATIENT)
Dept: SURGERY | Facility: CLINIC | Age: 68
End: 2023-01-04

## 2023-01-04 NOTE — PROGRESS NOTES
Assessment    Delisa Liu is a 78 y o  male encounter over the phone with RD following  PCP establish care   Delisa Liu  is establishing care at [facility], not newly diagnosed     PMHx:  NAFLD, GERD, HTN, hyperlipidemia, neuropathy        Clinical Data/Client History    CD4 count:  719  Viral load:  <20  ART:   [address]      , Patient Navigator: HUY Vasquez 3 Coordinator: N/A    Food assistance: n/a    Living situation: House or apartment  and in [address] factors: None noted    Mobility:  self ambulates    Physical activity: Walks 3-4 mi daily      Oral problems: denied difficulties chewing and swallowing       Typical food/beverage intake:  Most meals from restaurant    · Breakfast oatmeal (prepared with water), banana   · Lunch 1/2 can low sodium chicken and wild rice soup, 1/2 turkey sandwich [facility] (with avocado, lettuce, tomato, on focaccia)   · Dinner usually out, I e  salmon, salad with some cherelle cheese dressing, double broccoli portion   · Snacks 7 oz frozen yogurt (from [employer]) sweetened with monk fruit/stevia; chocolate rice cakes; almonds; 1/2 Jazz apple   · Beverages water, small amount of coffee     Appetite: Excellent    Vitamin, mineral, herbal supplements: multivitamin, fish oil, D, B-complex     Oral/enteral nutrition supplements:  no    GI problems: denied n/v/d/c and takes medication for heartburn prevention     Food allergies/intolerances:  NKFA    Weight history:  194# (Apr-22)  198# ([facility])  201# (Dec-22)      Current body weight:  204# (92 5 kg)  Height:  6' 2" (1 88 m)  BMI:  26 19  IBW:  190# (86 4 kg)  %IBW  107%    Weight change: 3# increase     Time period of weight change: 1 month    Clinically significant/severe: no      Nutrition-related labs:    Lab Results   Component Value Date    CHOLESTEROL 126 11/21/2022    CHOLESTEROL 141 07/15/2022    CHOLESTEROL 139 07/19/2021     Lab Results   Component Value Date    HDL 36 (L) 11/21/2022    HDL 42 07/15/2022    HDL 42 07/19/2021     Lab Results   Component Value Date    HGBA1C 5 0 11/21/2022     Lab Results   Component Value Date    GLUF 109 (H) 11/21/2022    LDLCALC 70 11/21/2022    CREATININE 1 17 11/21/2022           Nutrition-related medications: potassium citrate, pepcid     Physical findings/skin integrity:  n/a      Nutrition Diagnosis    Problem: impaired ability to prepare foods/meals     Related to: lack of food planning, purchasing, and preparation skills    As Evidenced By: patient interview       Estimated Nutritional Needs    Lana Ware REE: 1779 kcal     · ~2100 kcal (based on: 28 kcal/kg BW, -500 kcal)  · ~93 g protein (based on: 1 g/kg BW)  · ~2600 ml fluid (based on: 30 ml/kg IBW)      Current intake estimation: meeting needs       Nutrition Intervention/Recommendations    Nutrition education intervention: provided    Nutrition recommendations: Encouraged pt to join cooking classes offered at [facility], continue with healthy dietary habits and exercise     Supplement recommendations: Separate supplements at least 6 hrs from ART      Teaching Method: verbal     Person Educated: patient    Comprehension: very good    Receptivity: very good    Expected compliance: very good    Collaboration/referral of nutrition care:   Further nutrition support as receptive and warranted, upcoming cooking workshops       Goals:  1  Improve quality of diet   -Jasmin Campbell would like to learn cooking skills; considering [employer] or other meal kit service, considering joining in person cooking classes at [facility]     Monitoring/Evaluation:  1  Food and nutrition intake     Visit Summary    Jasmin Campbell called RD f/u from phone encounter yesterday  Annual nutrition assessment was completed  Jasmin Campbell expressed that he had lost 40# nearly 4 years ago, intentionally, as a means of improving health and preventing chronic disease risk    He intentionally seeks low sodium products, avoids refined sugars, and includes lean proteins and dietary fiber  Pt walks daily with his partner outside, even in the cold  He takes medication for prevention of GERD symptoms when he knows he will be consuming triggering foods  Andre Tovar verbalized interest in learning to cook, his partner is a former , but they do not cook often at home and get meals outside of home  Considering getting a meal kit service  He was invited to attend cooking classes at [facility], and provided email for flyer  Continue to offer nutritional support, check in with pt as warranted and as he is receptive  He was encouraged to call at any time        Kaz Winslow, MS, RD, LDN

## 2023-01-13 ENCOUNTER — TREATMENT (OUTPATIENT)
Dept: SURGERY | Facility: CLINIC | Age: 68
End: 2023-01-13

## 2023-01-13 ENCOUNTER — DOCUMENTATION (OUTPATIENT)
Dept: SURGERY | Facility: CLINIC | Age: 68
End: 2023-01-13

## 2023-01-13 ENCOUNTER — OFFICE VISIT (OUTPATIENT)
Dept: SURGERY | Facility: CLINIC | Age: 68
End: 2023-01-13

## 2023-01-13 VITALS
HEART RATE: 71 BPM | SYSTOLIC BLOOD PRESSURE: 131 MMHG | BODY MASS INDEX: 25.54 KG/M2 | TEMPERATURE: 99.6 F | OXYGEN SATURATION: 99 % | DIASTOLIC BLOOD PRESSURE: 72 MMHG | HEIGHT: 74 IN | WEIGHT: 199 LBS

## 2023-01-13 DIAGNOSIS — B20 HIV DISEASE (HCC): Primary | ICD-10-CM

## 2023-01-13 DIAGNOSIS — N40.2 PROSTATE NODULE: ICD-10-CM

## 2023-01-13 DIAGNOSIS — R00.2 PALPITATIONS: ICD-10-CM

## 2023-01-13 NOTE — PROGRESS NOTES
Assessment/Plan: Pt was approached within ID clinic to get formally acquainted, along with exploring pt's emotional, cognitive, and behavioral health's stability  During today's contact, pt disclosed experiencing increase of anxiety attacks, yet described utilizing self-care habits that consist on mindfulness meditations 2x/day, along with physical exercise, relying on AA meetings, and displaying acts of kindness to his neighbors  It was explored pt's anxiety attacks' onset, along with possible behavioral, and cognitive triggers  Pt described that his initial episodes were reflected at age 28, acknowledging past extended substance use disorder that has been on full remission for 16 years  Pt mentioned that attacks happened sporadically, with no specific negative trigger, mentioning that at some point he was receiving psychiatric MM, being Px Xanax, and another anxiolytic that pt described created ongoing physical dysregulations, leading pt to taper off from psychotropics  Positive reinforcements were provided to pt for awareness emphasis, along with mindfulness, and self-discipline practices to reinforce sustained sobriety  Pt was encouraged to consider updating psychiatric evaluation to address possible co-occurrence, and considerations for psychotropic MM which pt agreed to consider as needed  Before session's completion, it was explored pt's willingness to complete the PHQ-9 screening, scoring 1 as a display of minimal depressive traits without SI or HI  At the end of contact, pt was encouraged to consider ongoing [address] interventions, along with extending invitations to support groups, and extracurricular programs provided by [employer] which pt agreed to benefit from within his participation in [facility]     Today patient present with No chief complaint on file  Patient would likely benefit from: Monitoring pt's disclosed anxiety vaults     Consider/focus/continue: Monitoring pt's emotional, cognitive, and behavioral health's stability  Stage of change: Pre-contemplation  Plan/ Behavioral Recommendations: Ongoing BH interventions per pt's coordinated care, and/or pt's request of services  There are no diagnoses linked to this encounter  Subjective:     Patient ID: Rachelle Avila is a 78 y o  male  HPI    History of Present Illness:      Patient is being seen for annual behavioral health assessment  Patient reports new behavioral health concerns  [unfilled]       Review of Systems      Objective:     Physical Exam      [facility]    Orientation     Person: yes    Place: yes    Time: yes    Appearance    Well Developed: yes healthy    Uncomfortable: no    Normal Body Odor: yes    Smells of Feces: no    Smells of Urine: no    Disheveled: no    Well Nourished: yes overweight    Grooming Unkempt: no    Poor Eye Contact: no    Hirsute: no    Looks Tired: no    Acutely Exhausted: noappearance reflects stated age    Mood and Affect:     Appropriate: yes    Euthymicyes    Irritable: no    Angry: no    Anxious: no    Depressed:no    Blunted:no    Labile: no    Restricted: no    Harm to Self or Others: No SI or HI were disclosed, nor displayed throughout contact  Substance Abuse: ETOH Use Disorder, in Full Remission; Methamphetamine Use Disorder, in Full Remission

## 2023-01-13 NOTE — PROGRESS NOTES
Consultation - Infectious Disease   Jaylen Chaves 78 y o  male MRN: [id number]  Unit/Bed#:  Encounter: [id number]      IMPRESSION & RECOMMENDATIONS:     1  HIV  Well controlled on [address] with 100% adherence, no side effects  CD4 719, VL <20  Previously followed at [place] [address] but has transferred care to [facility]    -continue [address]   -Patient was provided medication, adherence and prevention education   -labs 5 months, follow up 6 months    2  Prostate cancer  Undergoing active surveillance with urology  PSA has been stable    -ongoing urology follow up    3  History of polysubstance abuse  In recovery for 15 years  4  Palpitations  Nuclear stress test negative June 2022  Follows with cardiology, metoprolol dosing recently increased    -Ongoing follow-up with cardiology    5  Vaccinations  Hepatitis A and B immune  UTD COVID and influenza vaccination  Above plan of care discussed with the patient and Heron Bentley  Follow up 6 months  HISTORY OF PRESENT ILLNESS:  Reason for Consult: HIV  HPI: Jaylen Chaves is a 78y o  year old male here for new patient evaluation for HIV  He also has a history of prior polysubstance abuse in remission, SVT, bipolar, prostate cancer 2022  The patient was initially diagnosed with HIV in 2006  He is currently taking Biktarvy and reports 100% compliance  The patient has an alarm and also uses a pill container  His partner is also HIV positive and they take their pills at the same time to help with adherence  The patient previously followed at [place] [address] who helped with the cost of his medications, but he would like to transition back to the [facility] since he receives most of his care at [place] [address]  The patient was diagnosed with HIV 1/2006  Unclear if he acquired this through injection drug use or sexual contact (male sexual partners)  He was initially started on Atripla and [facility]  The patient had syphilitic meningitis with bilateral hearing loss    He has been on numerous regimens for HIV including Truvada then Descovy and Tivicay  He was switched to the about 4 years ago  Patient is doing well without any side effects  He has been following with cardiology for palpitations and his metoprolol dosing was increased  Cardiac work-up did not show any abnormalities  Patient follows with urology; he is undergoing active surveillance for prostate cancer  PSA is downtrending  The patient does report some anxiety and practices transcendental meditation          REVIEW OF SYSTEMS:  A complete review of systems is negative other than that noted in the HPI    PAST MEDICAL HISTORY:  Past Medical History:   Diagnosis Date   • GERD (gastroesophageal reflux disease)    • HIV (human immunodeficiency virus infection) ([facility] )    • Hyperlipidemia    • Hypertension    • Kidney stone    • Non-alcoholic fatty liver disease    • PONV (postoperative nausea and vomiting)    • Prostate cancer ([facility] )    • Sleep apnea      Past Surgical History:   Procedure Laterality Date   • COLONOSCOPY     • HERNIA REPAIR     • OH BX PROSTATE STRTCTC SATURATION SAMPLING IMG GID N/A 1/25/2022    Procedure: TRANSPERINEAL ULTRASOUND GUIDED BIOPSY PROSTATE;  Surgeon: Siva Bellamy MD;  Location: [facility];  Service: Urology   • OH PROSTATE NEEDLE BIOPSY ANY APPROACH N/A 10/13/2020    Procedure: TRANSPERINEAL PROSTATE BIOPSY;  Surgeon: Siva Bellamy MD;  Location: [facility];  Service: Urology       FAMILY HISTORY:  Non-contributory    SOCIAL HISTORY:  Social History   Social History     Substance and Sexual Activity   Alcohol Use Not Currently    Comment: used to be a heavy drinker, quit 15 years ago     Social History     Substance and Sexual Activity   Drug Use Not Currently   • Types: Marijuana, Cocaine    Comment: used to use drugs, consisting of crystal meth, cocaine, and marijuana, denied any IV drug use in the past     Social History     Tobacco Use   Smoking Status Never   Smokeless Tobacco Never ALLERGIES:  Allergies   Allergen Reactions   • Epinephrine Palpitations and Shortness Of Breath   • Sulfa Antibiotics Other (See Comments)     Eye irritation       MEDICATIONS:  All current active medications have been reviewed  PHYSICAL EXAM:  Vitals:    01/13/23 0732   BP: 131/72   BP Location: Left arm   Patient Position: Sitting   Cuff Size: Standard   Pulse: 71   Temp: 99 6 °F (37 6 °C)   SpO2: 99%   Weight: 90 3 kg (199 lb)   Height: 6' 2" (1 88 m)         General Appearance:  Appearing well, nontoxic, and in no distress   Head:  Normocephalic, without obvious abnormality, atraumatic   Eyes:  Conjunctiva pink and sclera anicteric, both eyes   Nose: Nares normal, mucosa normal, no drainage   Throat: Oropharynx moist without lesions   Neck: Supple, symmetrical, no adenopathy, no tenderness/mass/nodules   Back:   Symmetric, no curvature, ROM normal, no CVA tenderness   Lungs:   Clear to auscultation bilaterally, respirations unlabored   Chest Wall:  No tenderness or deformity   Heart:  RRR; no murmur, rub or gallop   Abdomen:   Soft, non-tender, non-distended, positive bowel sounds,    Extremities: No cyanosis, clubbing or edema   Skin: No rashes or lesions  No draining wounds noted  Lymph nodes: Cervical, supraclavicular nodes normal   Neurologic: Alert and oriented times 3, extremity strength 5/5 and symmetric       LABS, IMAGING, & OTHER STUDIES:  Lab Results:  I have personally reviewed pertinent labs    Lab Results   Component Value Date     12/16/2015    K 4 1 11/21/2022     (H) 11/21/2022    CO2 27 11/21/2022    ANIONGAP 6 12/16/2015    BUN 16 11/21/2022    CREATININE 1 17 11/21/2022    GLUCOSE 99 12/16/2015    GLUF 109 (H) 11/21/2022    CALCIUM 9 3 11/21/2022    AST 35 11/21/2022    ALT 59 11/21/2022    ALKPHOS 52 11/21/2022    PROT 7 2 12/16/2015    BILITOT 0 91 12/16/2015    EGFR 64 11/21/2022     Lab Results   Component Value Date    WBC 3 70 (L) 12/05/2022    HGB 16 6 12/05/2022 HCT 46 3 12/05/2022    MCV 97 12/05/2022     (L) 12/05/2022     Lab Results   Component Value Date    HEPCAB Non-reactive 12/05/2022     Lab Results   Component Value Date    HAV Reactive (A) 11/21/2022    HEPBIGM Non-reactive 12/05/2022    HEPBCAB Non-reactive 12/05/2022    HEPCAB Non-reactive 12/05/2022     Lab Results   Component Value Date    RPR Non-Reactive 11/21/2022     CD4 ABS   Date/Time Value Ref Range Status   11/21/2022 07:14  359 - 1519 /uL Final     HIV-1 RNA by PCR, Qn   Date/Time Value Ref Range Status   12/19/2022 07:14 AM <20 copies/mL Final     Comment:     HIV-1 RNA not detected  The reportable range for this assay is 20 to 10,000,000  copies HIV-1 RNA/mL  Imaging Studies:   I have personally reviewed pertinent imaging study reports and images in PACS  Other Studies:   I have personally reviewed pertinent reports

## 2023-01-13 NOTE — PROGRESS NOTES
Pastoral Care Progress Note    2023  Patient: Svetlana Galan : 1955  Encounter Date & Time: 2023   MRN: [id number]        The  met with Mr Diane Carrillo to initiate a relationship of care and trust   Mr Diane Carrillo advised he was doing well  He mentioned already knowing the  from accompanying his partner during medical visits  He shared openly, " I attend AA so I believe in a Higher Power, I am not clear on what that Higher Power is "  He reported meditation and prayers support his spiritual wellbeing and helps with his anxiety  The  welcomed Mr Acuna to the practice, offered words of encouragement and invited Mr Acuna to attending the Mindfulness  and Functional Fitness classes  Further support as needed                Chaplaincy Interventions Utilized:   Empowerment: Encouraged self-care and Provided chaplaincy education    Exploration: Explored emotional needs & resources    Relationship Building: Cultivated a relationship of care and support and Listened empathically    Chaplaincy Outcomes Achieved:  Expressed gratitude    Spiritual Coping Strategies Utilized:   Spiritual practices

## 2023-01-13 NOTE — PROGRESS NOTES
RD met briefly with Tiffanie Garcia in conjunction with ID clinic appt  Pt was invited to upcoming cooking class, and he will consider  He has been trying to find other breakfast foods to try, getting tired of oatmeal   Discussed low sugar cereals

## 2023-01-16 ENCOUNTER — PATIENT OUTREACH (OUTPATIENT)
Dept: SURGERY | Facility: CLINIC | Age: 68
End: 2023-01-16

## 2023-01-18 ENCOUNTER — PATIENT OUTREACH (OUTPATIENT)
Dept: SURGERY | Facility: CLINIC | Age: 68
End: 2023-01-18

## 2023-01-18 NOTE — PROGRESS NOTES
"   LOS: 4 days   Patient Care Team:  Malia Powers APRN as PCP - General (Family Medicine)  Shelley Brock MD as Consulting Physician (General Surgery)  Cirilo Deshpande II, MD (Pain Medicine)      Interval History: Patient feels okay has no new symptoms at this time he is eager to go home.  No difficulty in breathing.    Review of Systems:   Pertinent items are noted in HPI.      Objective     Vital Sign Min/Max for last 24 hours  Temp  Min: 97.6 °F (36.4 °C)  Max: 98.7 °F (37.1 °C)   BP  Min: 95/48  Max: 111/61   Pulse  Min: 81  Max: 104   Resp  Min: 16  Max: 19   SpO2  Min: 92 %  Max: 96 %   Flow (L/min)  Min: 2  Max: 2   Weight  Min: 109 kg (240 lb 1.3 oz)  Max: 109 kg (240 lb 1.3 oz)     Flowsheet Rows      First Filed Value   Admission Height  170.2 cm (67\") Documented at 10/19/2018 0028   Admission Weight  109 kg (240 lb) Documented at 10/19/2018 0028          Physical Exam:     General Appearance:    Alert, cooperative, in no acute distress   Head:    Normocephalic, without obvious abnormality, atraumatic   Eyes:            Lids and lashes normal, conjunctivae and sclerae normal, no   icterus, no pallor, corneas clear, PERRLA   Ears:    Ears appear intact with no abnormalities noted   Throat:   No oral lesions, no thrush, oral mucosa moist   Neck:   No adenopathy, supple, trachea midline, no thyromegaly, no     carotid bruit, no JVD   Back:     No kyphosis present, no scoliosis present, no skin lesions,       erythema or scars, no tenderness to percussion or                   palpation,   range of motion normal   Lungs:     Clear to auscultation,respirations regular, even and                   unlabored    Heart:    Regular rhythm and normal rate, normal S1 and S2, no            murmur, no gallop, no rub, no click   Breast Exam:    Deferred   Abdomen:     Normal bowel sounds, no masses, no organomegaly, soft        non-tender, non-distended, no guarding, no rebound                 tenderness " PN contacted pt to do certification  Pt re-establish care with the clinic few months ago  Pt brought his income documents the day of his intake  Pt reported to be over the income, therefore pt does not qualify for CM services at this time  Pt saw his dentist in October last year and is doing well  RW application was scanned the day of his intake but no denial letter received from hospital financial councelor as of yet    Genitalia:    Deferred   Extremities:   Moves all extremities well, no edema, no cyanosis, no              redness   Pulses:   Pulses palpable and equal bilaterally   Skin:   No bleeding, bruising or rash   Lymph nodes:   No palpable adenopathy   Neurologic:   Cranial nerves 2 - 12 grossly intact, sensation intact, DTR        present and equal bilaterally        Results Review:     I reviewed the patient's new clinical results.    EKG: Sinus rhythm QTc interval of 429 ms.  T-wave inversion lateral-lateral anterior leads.    Telemetry: Average heart rate in 80s.  EKG: HDL CHOL mg/dL 36*   LDL CHOL mg/dL 114*       Laboratory results:      Results from last 7 days  Lab Units 10/23/18  0606 10/22/18  0605 10/21/18  0611 10/20/18  0406 10/19/18  0607 10/19/18  0043   SODIUM mmol/L 138 138 137 136* 140 137   POTASSIUM mmol/L 4.5 5.0 4.6 3.6 3.5 3.3*   CHLORIDE mmol/L 106 108* 108* 105 105 102   CO2 mmol/L 26.0 24.0* 23.0* 28.0 27.0 24.0*   BUN mg/dL 16 17 17 16 10 11   CREATININE mg/dL 0.80 0.70 0.70 0.70 0.70 0.70   CALCIUM mg/dL 9.3 9.4 9.1 8.8 9.1 9.5   BILIRUBIN mg/dL  --   --   --   --   --  0.5   ALK PHOS U/L  --   --   --   --   --  56   ALT (SGPT) U/L  --   --   --   --   --  45   AST (SGOT) U/L  --   --   --   --   --  33   GLUCOSE mg/dL 110* 108* 113* 113* 118* 121*       Results from last 7 days  Lab Units 10/21/18  0611 10/20/18  0406 10/19/18  0607 10/19/18  0043   WBC 10*3/mm3 6.51 6.62 8.78 8.95   HEMOGLOBIN g/dL 11.0* 11.3* 11.9* 11.5*   HEMATOCRIT % 35.5* 35.9* 37.0 36.3*   PLATELETS 10*3/mm3 258 277 255 294                   Results from last 7 days  Lab Units 10/21/18  0611   TROPONIN I ng/mL 0.033               Results from last 7 days  Lab Units 10/19/18  0112   PH, ARTERIAL pH units 7.421   PCO2, ARTERIAL mm Hg 33.0*   PO2 ART mm Hg 63.6*   HCO3 ART mmol/L 21.4*   BASE EXCESS ART mmol/L -2.4*   O2 SATURATION ART % 92.7*   HEMOGLOBIN, ARTERIAL g/dL 12.0   HEMATOCRIT, ARTERIAL % 36.8   OXYHEMOGLOBIN  % 90.3*   METHEMOGLOBIN % 1.40   CARBOXYHEMOGLOBIN % 1.2   BAROMETRIC PRESSURE FOR BLOOD GAS mmHg 743   MODALITY  Nasal Cannula   FIO2 % 24     Lab Results   Component Value Date    HGBA1C 5.3 10/19/2018       Results from last 7 days  Lab Units 10/19/18  0607   TSH mIU/mL 1.900   FREE T4 ng/dL 0.99           IMAGING DATA:     Ct Chest Pulmonary Embolism With Contrast    Result Date: 10/19/2018  Narrative: PROCEDURE: CT CHEST PULMONARY EMBOLISM W CONTRAST-  HISTORY: chest/back pain, sob, hypoxia, on chemo for lymphoma  COMPARISON: None .  TECHNIQUE: Multiple axial CT images were obtained from the thoracic inlet through the upper abdomen following the administration of Isovue 300 per the CT PE protocol. Coronal and oblique 3D MIP images were reconstructed from the original axial data set.  FINDINGS: There are no filling defects within the pulmonary arteries to suggest an embolus. The thoracic aorta is normal in caliber with no evidence of dissection. The heart is normal in size. There are small bilateral pleural effusions. There is no pericardial effusion. Enlarged lymph nodes are noted within the mediastinum, for example there is a 1.8 cm prevascular lymph node.. Lung windows reveal interlobular septal thickening with patchy bilateral opacities. The visualized upper abdomen is unremarkable. Bone windows reveal no acute osseous abnormalities.      Impression: 1. No evidence of pulmonary embolus or dissection. 2. Findings consistent with pulmonary edema with small bilateral pleural effusions. There is more confluent airspace disease bilaterally which may also reflect pulmonary edema, however a superimposed pneumonia is not excluded. 3. Nonspecific mediastinal adenopathy.      449.88 mGy.cm     This study was performed with techniques to keep radiation doses as low as reasonably achievable (ALARA). Individualized dose reduction techniques using automated exposure control or adjustment of mA and/or kV according to the  patient size were employed.  This report was finalized on 10/19/2018 7:53 AM by Mukesh Hong M.D..            Assessment/Plan    #1 LV dysfunction: This appears to be an acute event with a normal echocardiogram 3 months ago.  Etiology appears to be secondary to chemotherapy versus stress-induced current myopathy.  On appropriate medical therapy at this time.  The vital signs are acceptable.  Would follow through with this on outpatient basis.    #2 mitral insufficiency: Has moderate mitral insufficiency.  Has handled the present medications quite well continue the present medications her saturations are doing well to.  Would do Lasix only as needed.  This in view of her borderline blood pressures.    #3 coronary artery disease: Has extensive ST segment changes with prolonged QTc interval and positive enzymes had a cardiac catheterization which revealed normal coronaries.    #4 prolonged QTc interval: This is been a persistent issue.  Would avoid any QTC prolonging drugs.  No arrhythmia noted on telemetry for the last 4 days nevertheless.  Would continue the beta blocker therapy and follow through with this on outpatient basis.  Would avoid Lasix as much as possible.    #5 T-cell lymphoma: On chemotherapy at this time.    Is being discharged suggest that she follow up with me in 2-3 weeks time.    Acute respiratory failure with hypoxia (CMS/HCC)    Acute non Q wave MI (myocardial infarction), initial episode of care (CMS/HCC)    Productive cough    Sinus tachycardia            Zheng Curtis MD  10/23/18  7:08 AM

## 2023-02-15 ENCOUNTER — TELEPHONE (OUTPATIENT)
Dept: SURGERY | Facility: CLINIC | Age: 68
End: 2023-02-15

## 2023-02-15 NOTE — TELEPHONE ENCOUNTER
Called and LM for patient regarding bill he received  Patient emailed me copy of bill from his PCP visit  An email was sent to billing office at [facility] to follow up

## 2023-02-17 ENCOUNTER — PATIENT OUTREACH (OUTPATIENT)
Dept: SURGERY | Facility: CLINIC | Age: 68
End: 2023-02-17

## 2023-03-01 ENCOUNTER — TELEPHONE (OUTPATIENT)
Dept: SURGERY | Facility: CLINIC | Age: 68
End: 2023-03-01

## 2023-03-01 NOTE — TELEPHONE ENCOUNTER
Patient called asking for an update on his bill  Patient aware the bill was sent to the manager and the financial counselor  Patient states his bill will be past due soon and wanted to know an update      Patient aware I will follow up with him on Monday 3/6,

## 2023-03-06 ENCOUNTER — TELEPHONE (OUTPATIENT)
Dept: SURGERY | Facility: CLINIC | Age: 68
End: 2023-03-06

## 2023-03-06 NOTE — TELEPHONE ENCOUNTER
PN spoke with patient to follow up on a bill he received   Patient aware per billing he  is responsible for the remaining amount

## 2023-03-20 ENCOUNTER — TELEPHONE (OUTPATIENT)
Dept: SURGERY | Facility: CLINIC | Age: 68
End: 2023-03-20

## 2023-03-24 DIAGNOSIS — N20.0 NEPHROLITHIASIS: ICD-10-CM

## 2023-03-24 RX ORDER — POTASSIUM CITRATE 10 MEQ/1
TABLET, EXTENDED RELEASE ORAL
Qty: 90 TABLET | Refills: 3 | Status: SHIPPED | OUTPATIENT
Start: 2023-03-24

## 2023-04-03 ENCOUNTER — OFFICE VISIT (OUTPATIENT)
Dept: SURGERY | Facility: CLINIC | Age: 68
End: 2023-04-03

## 2023-04-03 VITALS
HEART RATE: 61 BPM | HEIGHT: 74 IN | BODY MASS INDEX: 27.23 KG/M2 | OXYGEN SATURATION: 99 % | TEMPERATURE: 98.2 F | WEIGHT: 212.2 LBS | SYSTOLIC BLOOD PRESSURE: 140 MMHG | DIASTOLIC BLOOD PRESSURE: 80 MMHG | RESPIRATION RATE: 18 BRPM

## 2023-04-03 DIAGNOSIS — B20 HIV DISEASE (HCC): Primary | ICD-10-CM

## 2023-04-03 DIAGNOSIS — H65.93 FLUID LEVEL BEHIND TYMPANIC MEMBRANE OF BOTH EARS: ICD-10-CM

## 2023-04-03 DIAGNOSIS — C61 PROSTATE CANCER (HCC): ICD-10-CM

## 2023-04-03 DIAGNOSIS — Z13.820 SCREENING FOR OSTEOPOROSIS: ICD-10-CM

## 2023-04-03 DIAGNOSIS — Z00.00 MEDICARE ANNUAL WELLNESS VISIT, SUBSEQUENT: ICD-10-CM

## 2023-04-03 DIAGNOSIS — M23.91 LOCKING OF BOTH KNEES: ICD-10-CM

## 2023-04-03 DIAGNOSIS — Z23 NEED FOR MENACTRA VACCINATION: ICD-10-CM

## 2023-04-03 DIAGNOSIS — I10 PRIMARY HYPERTENSION: ICD-10-CM

## 2023-04-03 DIAGNOSIS — M23.92 LOCKING OF BOTH KNEES: ICD-10-CM

## 2023-04-03 PROBLEM — M50.30 DEGENERATIVE CERVICAL DISC: Status: ACTIVE | Noted: 2023-04-03

## 2023-04-03 NOTE — ASSESSMENT & PLAN NOTE
New dx  Most likely due to URI viral infection or allergies    · Continue Nasacort  · Continue Allegra

## 2023-04-03 NOTE — ASSESSMENT & PLAN NOTE
Doing well on Biktarvy with an undetectable VL  Pt reports 100% medication compliance on HAART  Stressed the importance of 100% medication adherence  HIV Counseling:    Viral Load: < 20    CD4 Count: 061      Denies side effects  Stressed the importance of adherence  Continue follow up in the ID clinic with Dr Susy Nicolas or Dr Gigi Schwab  Reviewed the most recent labs, including the CD4 and viral load  Discussed the risks and benefits of treatment options, instructions for management, importance of treatment adherence, and reduction of risk factors  Educated on possible medication side effects  Counseled on routes of HIV transmission, including the risk of  infection  Emphasized that viral suppression is the best method to prevent HIV transmission  At this time the pt denies the need for HIV testing of anyone in their life  Total encounter time was greater than 35 minutes  Greater than 20 minutes were spent on counseling and patient education  Pt voices understanding and agreement with treatment plan

## 2023-04-03 NOTE — ASSESSMENT & PLAN NOTE
Worsening over past year  Both knees alternate and happens about once every 2 weeks especially when going up and down steps and increases pt risk of falling  Pt does have pain at the time that resolved after pt walks it off  Pt denies any crepitus, swelling, or pain at other times     · Discussed PT for now and pt wishes to keep an eye on it  · May need MRI in future

## 2023-04-03 NOTE — PROGRESS NOTES
Assessment and Plan:   · Declined vaccine till later this month when feeling better  Problem List Items Addressed This Visit        Cardiovascular and Mediastinum    HTN (hypertension)     Well controlled  · Continue HTN regimen  · Monitor BP at home            Nervous and Auditory    Fluid level behind tympanic membrane of both ears     New dx  Most likely due to URI viral infection or allergies  · Continue Nasacort  · Continue Allegra            Other    HIV disease ([facility] [address] ) - Primary     Doing well on Biktarvy with an undetectable VL  Pt reports 100% medication compliance on HAART  Stressed the importance of 100% medication adherence  HIV Counseling:    Viral Load: < 20    CD4 Count: 938      Denies side effects  Stressed the importance of adherence  Continue follow up in the ID clinic with Dr Emigdio Walker or Dr Daphney Goltz  Reviewed the most recent labs, including the CD4 and viral load  Discussed the risks and benefits of treatment options, instructions for management, importance of treatment adherence, and reduction of risk factors  Educated on possible medication side effects  Counseled on routes of HIV transmission, including the risk of  infection  Emphasized that viral suppression is the best method to prevent HIV transmission  At this time the pt denies the need for HIV testing of anyone in their life  Total encounter time was greater than 35 minutes  Greater than 20 minutes were spent on counseling and patient education  Pt voices understanding and agreement with treatment plan  Locking of both knees     Worsening over past year  Both knees alternate and happens about once every 2 weeks especially when going up and down steps and increases pt risk of falling  Pt does have pain at the time that resolved after pt walks it off  Pt denies any crepitus, swelling, or pain at other times     · Discussed PT for now and pt wishes to keep an eye on it  · May need MRI in future        Other Visit Diagnoses     Need for Menactra vaccination        Relevant Orders    MENINGOCOCCAL ACYW-135 TT CONJUGATE    Screening for osteoporosis        Prostate cancer ([facility] [address] )        Relevant Orders    PSA Total (Reflex To Free)    Medicare annual wellness visit, subsequent            BMI Counseling: Body mass index is 27 24 kg/m²  The BMI is above normal  Nutrition recommendations include decreasing portion sizes, encouraging healthy choices of fruits and vegetables, consuming healthier snacks, moderation in carbohydrate intake, increasing intake of lean protein, reducing intake of saturated and trans fat and reducing intake of cholesterol  Exercise recommendations include moderate physical activity 150 minutes/week, exercising 3-5 times per week and strength training exercises  No pharmacotherapy was ordered  Rationale for BMI follow-up plan is due to patient being overweight or obese  Preventive health issues were discussed with patient, and age appropriate screening tests were ordered as noted in patient's After Visit Summary  Personalized health advice and appropriate referrals for health education or preventive services given if needed, as noted in patient's After Visit Summary  History of Present Illness:     Patient presents for a Medicare Wellness Visit and management of HIV  Pt reports onset of cold like symptoms, body aches, HA's, sore throat, sneezing, and running nose  Pt is taking Nasacort and Allegra, and OTC cough medicine  OTC product is working  Pt has tested for covid and was negative  Pt is starting to feel better  Pt's partner was sick first but he is feeling better  Pt has ongoing bilateral knee locking that occurs about 1 time per week with alternating knees and independent of his doing anything  Denies any trauma or previous injury  Denies, swelling, erythema, or crepitus; just pain at time of locking that resolves with walking it off soon after       Pt does not feel is weight is accurate since he weighted 203 8 lb at home today and now weighs 212 lb  Pt does not feel he has gained any weight  Pt denied fever, chills, nausea, vomiting, or diarrhea  HPI   Patient Care Team:  Selma Mantilla as PCP - General (Nurse Practitioner)  ARINA Crabtree MD     Review of Systems:     Review of Systems   Constitutional: Negative  HENT: Positive for postnasal drip, rhinorrhea and sore throat  Negative for ear pain  Eyes: Negative  Respiratory: Positive for cough  Cardiovascular: Negative  Gastrointestinal: Negative  Musculoskeletal: Negative  Skin: Negative  Neurological: Negative  Psychiatric/Behavioral: Negative           Problem List:     Patient Active Problem List   Diagnosis   • HIV disease ([place] [address] )   • Family history of prostate cancer in father   • Prostate nodule   • HTN (hypertension)   • Hyperlipidemia   • Non-alcoholic fatty liver disease   • GERD (gastroesophageal reflux disease)   • Palpitations   • Splenomegaly   • Thrombocytopenia ([place] [address] )   • Polycythemia   • Family history of substance abuse   • Neutropenia ([place] [address] )   • H/O ETOH abuse   • Leukopenia   • Locking of both knees   • Degenerative cervical disc   • Fluid level behind tympanic membrane of both ears      Past Medical and Surgical History:     Past Medical History:   Diagnosis Date   • GERD (gastroesophageal reflux disease)    • HIV (human immunodeficiency virus infection) ([place] [address] )    • Hyperlipidemia    • Hypertension    • Kidney stone    • Non-alcoholic fatty liver disease    • PONV (postoperative nausea and vomiting)    • Prostate cancer ([facility] )    • Sleep apnea      Past Surgical History:   Procedure Laterality Date   • COLONOSCOPY     • HERNIA REPAIR     • NH BX PROSTATE STRTCTC SATURATION SAMPLING IMG GID N/A 1/25/2022    Procedure: TRANSPERINEAL ULTRASOUND GUIDED BIOPSY PROSTATE;  Surgeon: Yasmine Rodriguez MD;  Location: [facility];  Service: Urology   • TX PROSTATE NEEDLE BIOPSY ANY APPROACH N/A 10/13/2020    Procedure: TRANSPERINEAL PROSTATE BIOPSY;  Surgeon: Bernie Velasquez MD;  Location: [facility];  Service: Urology      Family History:     Family History   Problem Relation Age of Onset   • Prostate cancer Father    • Esophageal cancer Father    • Heart disease Father       Social History:     Social History     Socioeconomic History   • Marital status: Single     Spouse name: None   • Number of children: None   • Years of education: None   • Highest education level: None   Occupational History   • None   Tobacco Use   • Smoking status: Never   • Smokeless tobacco: Never   Vaping Use   • Vaping Use: Never used   Substance and Sexual Activity   • Alcohol use: Not Currently     Comment: used to be a heavy drinker, quit 15 years ago   • Drug use: Not Currently     Types: Marijuana, Cocaine     Comment: used to use drugs, consisting of crystal meth, cocaine, and marijuana, denied any IV drug use in the past   • Sexual activity: Not Currently     Partners: Male   Other Topics Concern   • None   Social History Narrative   • None     Social Determinants of Health     Financial Resource Strain: Not on file   Food Insecurity: No Food Insecurity   • Worried About Running Out of Food in the Last Year: Never true   • Ran Out of Food in the Last Year: Never true   Transportation Needs: Not on file   Physical Activity: Not on file   Stress: Not on file   Social Connections: Not on file   Intimate Partner Violence: Not on file   Housing Stability: Not on file      Medications and Allergies:     Current Outpatient Medications   Medication Sig Dispense Refill   • bictegravir-emtricitab-tenofovir alafenamide (BIKTARVY) -25 MG tablet Take 1 tablet by mouth daily with breakfast 90 tablet 0   • cholecalciferol (VITAMIN D3) 1,000 units tablet Take 1,000 Units by mouth daily     • cyanocobalamin (VITAMIN B-12) 500 MCG tablet Take 1,000 mcg by mouth daily     • docusate sodium (COLACE) 100 mg capsule Take 100 mg by mouth 3 (three) times a day as needed for constipation     • famotidine (PEPCID) 20 mg tablet Take 1 tablet by mouth     • lisinopril (ZESTRIL) 20 mg tablet Take 20 mg by mouth daily     • Multiple Vitamin (multivitamin) capsule Take 1 capsule by mouth daily     • Omega-3 1000 MG CAPS Take 2 g by mouth daily     • potassium citrate (UROCIT-K) 10 mEq take 1 tablet by mouth once daily 90 tablet 3   • pravastatin (PRAVACHOL) 80 mg tablet Take 80 mg by mouth daily     • metoprolol succinate (TOPROL-XL) 50 mg 24 hr tablet Take 1 tablet (50 mg total) by mouth every evening (Patient taking differently: Take 50 mg by mouth every evening Patient states that he takes this in the morning) 90 tablet 1     No current facility-administered medications for this visit  Allergies   Allergen Reactions   • Epinephrine Palpitations and Shortness Of Breath   • Sulfa Antibiotics Other (See Comments)     Eye irritation      Immunizations:     Immunization History   Administered Date(s) Administered   • COVID-19 MODERNA VACC 0 5 ML IM 01/19/2021, 02/15/2021, 10/31/2021   • COVID-19 Moderna Vac BIVALENT 12 Yr+ IM (BOOSTER ONLY) 0 5 ML 10/03/2022   • H1N1, All Formulations 11/16/2009   • Hep A, adult 02/06/2008, 08/22/2008   • Hep B, adult 02/06/2008, 04/07/2008, 08/22/2008   • INFLUENZA 10/15/2019, 10/07/2021   • Influenza Quadrivalent, 6-35 Months IM 11/04/2015, 10/07/2016   • Influenza, Seasonal Vaccine, Quadrivalent, Adjuvanted,   5e 10/07/2021   • Influenza, seasonal, injectable 09/29/2014, 10/10/2022   • Pneumococcal Conjugate 13-Valent 09/09/2013   • Pneumococcal Polysaccharide PPV23 01/16/2008, 04/13/2015   • Tdap 01/01/2005, 10/01/2012   • Tuberculin Skin Test-PPD Intradermal 06/10/2009      Health Maintenance:         Topic Date Due   • Colorectal Cancer Screening  02/24/2025   • Hepatitis C Screening  Completed         Topic Date Due   • Meningococcal ACWY Vaccine (1 - Risk start 2-23 months series) Never done   • Pneumococcal Vaccine: 65+ Years (4 - PPSV23 if available, else PCV20) 11/05/2021      Medicare Screening Tests and Risk Assessments:      Britta is here for his Initial Wellness visit  Health Risk Assessment:   Patient rates overall health as good  Patient feels that their physical health rating is same  Patient is satisfied with their life  Eyesight was rated as slightly worse  Hearing was rated as slightly worse  Patient feels that their emotional and mental health rating is same  Patients states they are never, rarely angry  Patient states they are sometimes unusually tired/fatigued  Pain experienced in the last 7 days has been some  Patient's pain rating has been 5/10  Patient states that he has experienced no weight loss or gain in last 6 months  Fall Risk Screening: In the past year, patient has experienced: history of falling in past year    Injured during fall?: No    Feels unsteady when standing or walking?: No    Worried about falling?: No      Home Safety:  Patient does not have trouble with stairs inside or outside of their home  Patient has working smoke alarms and has working carbon monoxide detector  Home safety hazards include: none  Nutrition:   Current diet is No Added Salt, Low Saturated Fat and Limited junk food  Medications:   Patient is not currently taking any over-the-counter supplements  Patient is able to manage medications  Activities of Daily Living (ADLs)/Instrumental Activities of Daily Living (IADLs):   Walk and transfer into and out of bed and chair?: Yes  Dress and groom yourself?: Yes    Bathe or shower yourself?: Yes    Feed yourself?  Yes  Do your laundry/housekeeping?: Yes  Manage your money, pay your bills and track your expenses?: Yes  Make your own meals?: Yes    Do your own shopping?: Yes    Previous Hospitalizations:   Any hospitalizations or ED visits within the last 12 months?: No      Advance Care Planning: "  Living will: No    Durable POA for healthcare: No    Five wishes given: Yes      Cognitive Screening:   Provider or family/friend/caregiver concerned regarding cognition?: No    PREVENTIVE SCREENINGS      Cardiovascular Screening:    General: Screening Not Indicated and History Lipid Disorder      Diabetes Screening:     General: Screening Current      Colorectal Cancer Screening:     General: Screening Current      Prostate Cancer Screening:    General: History Prostate Cancer      Osteoporosis Screening:      Due for: Bone Density CT Appendicular      Abdominal Aortic Aneurysm (AAA) Screening:    Risk factors include: age between 73-69 yo        General: Screening Not Indicated      Lung Cancer Screening:     General: Screening Not Indicated      Hepatitis C Screening:    General: Screening Current    Hep C Screening Accepted: Yes      Screening, Brief Intervention, and Referral to Treatment (SBIRT)    Screening  Typical number of drinks in a day: 0    Brief Intervention  Alcohol & drug use screenings were reviewed  No concerns regarding substance use disorder identified  Other Counseling Topics:   Car/seat belt/driving safety, skin self-exam, sunscreen and calcium and vitamin D intake and regular weightbearing exercise  No results found  Physical Exam:     /80 (BP Location: Right arm, Patient Position: Sitting, Cuff Size: Large)   Pulse 61   Temp 98 2 °F (36 8 °C) (Tympanic)   Resp 18   Ht 6' 2\" (1 88 m)   Wt 96 3 kg (212 lb 3 2 oz)   SpO2 99%   BMI 27 24 kg/m²     Physical Exam  Vitals and nursing note reviewed  Constitutional:       General: He is not in acute distress  Appearance: Normal appearance  He is not ill-appearing  HENT:      Head: Normocephalic  Right Ear: A middle ear effusion is present  Left Ear: A middle ear effusion is present  Nose: Mucosal edema and congestion present  Right Turbinates: Not enlarged or swollen        Left Turbinates: Not " enlarged or swollen  Right Sinus: No maxillary sinus tenderness or frontal sinus tenderness  Left Sinus: No maxillary sinus tenderness or frontal sinus tenderness  Mouth/Throat:      Mouth: Mucous membranes are moist       Pharynx: Oropharynx is clear  Cardiovascular:      Rate and Rhythm: Normal rate and regular rhythm  Chest Wall: PMI is not displaced  Pulses: Normal pulses  Heart sounds: Normal heart sounds  Pulmonary:      Effort: Pulmonary effort is normal       Breath sounds: Normal breath sounds  Abdominal:      General: Bowel sounds are normal       Palpations: Abdomen is soft  Musculoskeletal:         General: Normal range of motion  Skin:     General: Skin is warm and dry  Capillary Refill: Capillary refill takes less than 2 seconds  Neurological:      Mental Status: He is alert and oriented to person, place, and time  Psychiatric:         Mood and Affect: Mood normal          Behavior: Behavior normal          Thought Content:  Thought content normal          Judgment: Judgment normal           ARINA Contreras

## 2023-06-12 ENCOUNTER — APPOINTMENT (OUTPATIENT)
Dept: LAB | Facility: HOSPITAL | Age: 68
End: 2023-06-12
Payer: MEDICARE

## 2023-06-12 DIAGNOSIS — B20 HIV DISEASE (HCC): ICD-10-CM

## 2023-06-12 LAB
ALBUMIN SERPL BCP-MCNC: 4.3 G/DL (ref 3.5–5)
ALP SERPL-CCNC: 44 U/L (ref 34–104)
ALT SERPL W P-5'-P-CCNC: 32 U/L (ref 7–52)
ANION GAP SERPL CALCULATED.3IONS-SCNC: 3 MMOL/L (ref 4–13)
AST SERPL W P-5'-P-CCNC: 29 U/L (ref 13–39)
BASOPHILS # BLD AUTO: 0.02 THOUSANDS/ÂΜL (ref 0–0.1)
BASOPHILS NFR BLD AUTO: 1 % (ref 0–1)
BILIRUB SERPL-MCNC: 0.75 MG/DL (ref 0.2–1)
BUN SERPL-MCNC: 16 MG/DL (ref 5–25)
CALCIUM SERPL-MCNC: 9.1 MG/DL (ref 8.4–10.2)
CHLORIDE SERPL-SCNC: 105 MMOL/L (ref 96–108)
CO2 SERPL-SCNC: 30 MMOL/L (ref 21–32)
CREAT SERPL-MCNC: 1 MG/DL (ref 0.6–1.3)
EOSINOPHIL # BLD AUTO: 0.06 THOUSAND/ÂΜL (ref 0–0.61)
EOSINOPHIL NFR BLD AUTO: 2 % (ref 0–6)
ERYTHROCYTE [DISTWIDTH] IN BLOOD BY AUTOMATED COUNT: 12 % (ref 11.6–15.1)
GFR SERPL CREATININE-BSD FRML MDRD: 78 ML/MIN/1.73SQ M
GLUCOSE P FAST SERPL-MCNC: 106 MG/DL (ref 65–99)
HCT VFR BLD AUTO: 43.9 % (ref 36.5–49.3)
HGB BLD-MCNC: 15.9 G/DL (ref 12–17)
IMM GRANULOCYTES # BLD AUTO: 0 THOUSAND/UL (ref 0–0.2)
IMM GRANULOCYTES NFR BLD AUTO: 0 % (ref 0–2)
LYMPHOCYTES # BLD AUTO: 1.62 THOUSANDS/ÂΜL (ref 0.6–4.47)
LYMPHOCYTES NFR BLD AUTO: 48 % (ref 14–44)
MCH RBC QN AUTO: 34.7 PG (ref 26.8–34.3)
MCHC RBC AUTO-ENTMCNC: 36.2 G/DL (ref 31.4–37.4)
MCV RBC AUTO: 96 FL (ref 82–98)
MONOCYTES # BLD AUTO: 0.3 THOUSAND/ÂΜL (ref 0.17–1.22)
MONOCYTES NFR BLD AUTO: 9 % (ref 4–12)
NEUTROPHILS # BLD AUTO: 1.35 THOUSANDS/ÂΜL (ref 1.85–7.62)
NEUTS SEG NFR BLD AUTO: 40 % (ref 43–75)
NRBC BLD AUTO-RTO: 0 /100 WBCS
PLATELET # BLD AUTO: 108 THOUSANDS/UL (ref 149–390)
PMV BLD AUTO: 12.1 FL (ref 8.9–12.7)
POTASSIUM SERPL-SCNC: 4.2 MMOL/L (ref 3.5–5.3)
PROT SERPL-MCNC: 6.7 G/DL (ref 6.4–8.4)
RBC # BLD AUTO: 4.58 MILLION/UL (ref 3.88–5.62)
SODIUM SERPL-SCNC: 138 MMOL/L (ref 135–147)
WBC # BLD AUTO: 3.35 THOUSAND/UL (ref 4.31–10.16)

## 2023-06-12 PROCEDURE — 86361 T CELL ABSOLUTE COUNT: CPT

## 2023-06-12 PROCEDURE — 85025 COMPLETE CBC W/AUTO DIFF WBC: CPT

## 2023-06-12 PROCEDURE — 36415 COLL VENOUS BLD VENIPUNCTURE: CPT

## 2023-06-12 PROCEDURE — 80053 COMPREHEN METABOLIC PANEL: CPT

## 2023-06-12 PROCEDURE — 87536 HIV-1 QUANT&REVRSE TRNSCRPJ: CPT

## 2023-06-13 LAB
BASOPHILS # BLD AUTO: 0 X10E3/UL (ref 0–0.2)
BASOPHILS NFR BLD AUTO: 1 %
CD3+CD4+ CELLS # BLD: 624 /UL (ref 359–1519)
CD3+CD4+ CELLS NFR BLD: 41.6 % (ref 30.8–58.5)
EOSINOPHIL # BLD AUTO: 0.1 X10E3/UL (ref 0–0.4)
EOSINOPHIL NFR BLD AUTO: 2 %
ERYTHROCYTE [DISTWIDTH] IN BLOOD BY AUTOMATED COUNT: 12.5 % (ref 11.6–15.4)
HCT VFR BLD AUTO: 45 % (ref 37.5–51)
HGB BLD-MCNC: 15.5 G/DL (ref 13–17.7)
IMM GRANULOCYTES # BLD: 0 X10E3/UL (ref 0–0.1)
IMM GRANULOCYTES NFR BLD: 0 %
LYMPHOCYTES # BLD AUTO: 1.5 X10E3/UL (ref 0.7–3.1)
LYMPHOCYTES NFR BLD AUTO: 45 %
MCH RBC QN AUTO: 34.3 PG (ref 26.6–33)
MCHC RBC AUTO-ENTMCNC: 34.4 G/DL (ref 31.5–35.7)
MCV RBC AUTO: 100 FL (ref 79–97)
MONOCYTES # BLD AUTO: 0.3 X10E3/UL (ref 0.1–0.9)
MONOCYTES NFR BLD AUTO: 8 %
NEUTROPHILS # BLD AUTO: 1.4 X10E3/UL (ref 1.4–7)
NEUTROPHILS NFR BLD AUTO: 44 %
PLATELET # BLD AUTO: 110 X10E3/UL (ref 150–450)
RBC # BLD AUTO: 4.52 X10E6/UL (ref 4.14–5.8)
WBC # BLD AUTO: 3.2 X10E3/UL (ref 3.4–10.8)

## 2023-06-14 ENCOUNTER — HOSPITAL ENCOUNTER (OUTPATIENT)
Dept: BONE DENSITY | Facility: MEDICAL CENTER | Age: 68
Discharge: HOME | End: 2023-06-14
Payer: MEDICARE

## 2023-06-14 DIAGNOSIS — M85.88 OTHER SPECIFIED DISORDERS OF BONE DENSITY AND STRUCTURE, OTHER SITE: ICD-10-CM

## 2023-06-14 DIAGNOSIS — M85.9 LOW BONE DENSITY FOR AGE: ICD-10-CM

## 2023-06-14 DIAGNOSIS — B20 HIV DISEASE (HCC): ICD-10-CM

## 2023-06-14 DIAGNOSIS — R97.20 ELEVATED PSA: ICD-10-CM

## 2023-06-14 LAB
HIV1 RNA # SERPL NAA+PROBE: <20 COPIES/ML
HIV1 RNA SERPL NAA+PROBE-LOG#: NORMAL LOG10COPY/ML

## 2023-06-14 PROCEDURE — 77080 DXA BONE DENSITY AXIAL: CPT

## 2023-06-16 ENCOUNTER — CLINICAL SUPPORT (OUTPATIENT)
Dept: SURGERY | Facility: CLINIC | Age: 68
End: 2023-06-16
Payer: MEDICARE

## 2023-06-16 DIAGNOSIS — Z23 NEED FOR MENACTRA VACCINATION: Primary | ICD-10-CM

## 2023-06-16 PROCEDURE — 90471 IMMUNIZATION ADMIN: CPT

## 2023-06-16 PROCEDURE — 90619 MENACWY-TT VACCINE IM: CPT

## 2023-06-20 ENCOUNTER — TELEPHONE (OUTPATIENT)
Dept: SURGERY | Facility: CLINIC | Age: 68
End: 2023-06-20

## 2023-06-20 NOTE — PROGRESS NOTES
First meningococcal vaccine administered without any complications  Patient watched for 15 minutes as per recommenced guidelines  VIS provided to patient, questions and concerns answered

## 2023-06-20 NOTE — TELEPHONE ENCOUNTER
Spoke with patient and let him know his DEXA scan was normal and PCP will continue to screen every 2 years  Patient verbalizes understanding

## 2023-06-29 ENCOUNTER — APPOINTMENT (EMERGENCY)
Dept: CT IMAGING | Facility: HOSPITAL | Age: 68
End: 2023-06-29
Payer: MEDICARE

## 2023-06-29 ENCOUNTER — TELEPHONE (OUTPATIENT)
Dept: SURGERY | Facility: CLINIC | Age: 68
End: 2023-06-29

## 2023-06-29 ENCOUNTER — HOSPITAL ENCOUNTER (EMERGENCY)
Facility: HOSPITAL | Age: 68
Discharge: HOME | End: 2023-06-29
Attending: EMERGENCY MEDICINE
Payer: MEDICARE

## 2023-06-29 VITALS
OXYGEN SATURATION: 97 % | DIASTOLIC BLOOD PRESSURE: 81 MMHG | SYSTOLIC BLOOD PRESSURE: 169 MMHG | WEIGHT: 209.44 LBS | TEMPERATURE: 97.6 F | HEART RATE: 71 BPM | BODY MASS INDEX: 26.89 KG/M2 | RESPIRATION RATE: 18 BRPM

## 2023-06-29 DIAGNOSIS — R10.9 ACUTE ABDOMINAL PAIN: Primary | ICD-10-CM

## 2023-06-29 DIAGNOSIS — R93.89 ABNORMAL CT SCAN: ICD-10-CM

## 2023-06-29 LAB
ALBUMIN SERPL BCP-MCNC: 4.7 G/DL (ref 3.5–5)
ALP SERPL-CCNC: 46 U/L (ref 34–104)
ALT SERPL W P-5'-P-CCNC: 46 U/L (ref 7–52)
AMORPH URATE CRY URNS QL MICRO: ABNORMAL
ANION GAP SERPL CALCULATED.3IONS-SCNC: 8 MMOL/L
AST SERPL W P-5'-P-CCNC: 40 U/L (ref 13–39)
BACTERIA UR QL AUTO: ABNORMAL /HPF
BASOPHILS # BLD AUTO: 0.04 THOUSANDS/ÂΜL (ref 0–0.1)
BASOPHILS NFR BLD AUTO: 1 % (ref 0–1)
BILIRUB SERPL-MCNC: 1.12 MG/DL (ref 0.2–1)
BILIRUB UR QL STRIP: NEGATIVE
BUN SERPL-MCNC: 18 MG/DL (ref 5–25)
CALCIUM SERPL-MCNC: 9.5 MG/DL (ref 8.4–10.2)
CHLORIDE SERPL-SCNC: 104 MMOL/L (ref 96–108)
CLARITY UR: ABNORMAL
CO2 SERPL-SCNC: 28 MMOL/L (ref 21–32)
COLOR UR: YELLOW
CREAT SERPL-MCNC: 1.1 MG/DL (ref 0.6–1.3)
EOSINOPHIL # BLD AUTO: 0.09 THOUSAND/ÂΜL (ref 0–0.61)
EOSINOPHIL NFR BLD AUTO: 2 % (ref 0–6)
ERYTHROCYTE [DISTWIDTH] IN BLOOD BY AUTOMATED COUNT: 12.1 % (ref 11.6–15.1)
GFR SERPL CREATININE-BSD FRML MDRD: 69 ML/MIN/1.73SQ M
GLUCOSE SERPL-MCNC: 86 MG/DL (ref 65–140)
GLUCOSE UR STRIP-MCNC: NEGATIVE MG/DL
HCT VFR BLD AUTO: 46.2 % (ref 36.5–49.3)
HGB BLD-MCNC: 16.4 G/DL (ref 12–17)
HGB UR QL STRIP.AUTO: ABNORMAL
IMM GRANULOCYTES # BLD AUTO: 0.02 THOUSAND/UL (ref 0–0.2)
IMM GRANULOCYTES NFR BLD AUTO: 0 % (ref 0–2)
KETONES UR STRIP-MCNC: NEGATIVE MG/DL
LACTATE SERPL-SCNC: 0.4 MMOL/L (ref 0.5–2)
LEUKOCYTE ESTERASE UR QL STRIP: ABNORMAL
LIPASE SERPL-CCNC: 29 U/L (ref 11–82)
LYMPHOCYTES # BLD AUTO: 2.32 THOUSANDS/ÂΜL (ref 0.6–4.47)
LYMPHOCYTES NFR BLD AUTO: 41 % (ref 14–44)
MCH RBC QN AUTO: 34.3 PG (ref 26.8–34.3)
MCHC RBC AUTO-ENTMCNC: 35.5 G/DL (ref 31.4–37.4)
MCV RBC AUTO: 97 FL (ref 82–98)
MONOCYTES # BLD AUTO: 0.41 THOUSAND/ÂΜL (ref 0.17–1.22)
MONOCYTES NFR BLD AUTO: 7 % (ref 4–12)
MUCOUS THREADS UR QL AUTO: ABNORMAL
NEUTROPHILS # BLD AUTO: 2.82 THOUSANDS/ÂΜL (ref 1.85–7.62)
NEUTS SEG NFR BLD AUTO: 49 % (ref 43–75)
NITRITE UR QL STRIP: NEGATIVE
NON-SQ EPI CELLS URNS QL MICRO: ABNORMAL /HPF
NRBC BLD AUTO-RTO: 0 /100 WBCS
PH UR STRIP.AUTO: 7 [PH] (ref 4.5–8)
PLATELET # BLD AUTO: 122 THOUSANDS/UL (ref 149–390)
PMV BLD AUTO: 12.1 FL (ref 8.9–12.7)
POTASSIUM SERPL-SCNC: 3.5 MMOL/L (ref 3.5–5.3)
PROT SERPL-MCNC: 7.4 G/DL (ref 6.4–8.4)
PROT UR STRIP-MCNC: NEGATIVE MG/DL
RBC # BLD AUTO: 4.78 MILLION/UL (ref 3.88–5.62)
RBC #/AREA URNS AUTO: ABNORMAL /HPF
SODIUM SERPL-SCNC: 140 MMOL/L (ref 135–147)
SP GR UR STRIP.AUTO: 1.02 (ref 1–1.03)
UROBILINOGEN UR QL STRIP.AUTO: 0.2 E.U./DL
WBC # BLD AUTO: 5.7 THOUSAND/UL (ref 4.31–10.16)
WBC #/AREA URNS AUTO: ABNORMAL /HPF

## 2023-06-29 PROCEDURE — 83605 ASSAY OF LACTIC ACID: CPT | Performed by: EMERGENCY MEDICINE

## 2023-06-29 PROCEDURE — 81001 URINALYSIS AUTO W/SCOPE: CPT

## 2023-06-29 PROCEDURE — 36415 COLL VENOUS BLD VENIPUNCTURE: CPT | Performed by: EMERGENCY MEDICINE

## 2023-06-29 PROCEDURE — 83690 ASSAY OF LIPASE: CPT | Performed by: EMERGENCY MEDICINE

## 2023-06-29 PROCEDURE — 74177 CT ABD & PELVIS W/CONTRAST: CPT

## 2023-06-29 PROCEDURE — 80053 COMPREHEN METABOLIC PANEL: CPT | Performed by: EMERGENCY MEDICINE

## 2023-06-29 PROCEDURE — 85025 COMPLETE CBC W/AUTO DIFF WBC: CPT | Performed by: EMERGENCY MEDICINE

## 2023-06-29 PROCEDURE — G1004 CDSM NDSC: HCPCS

## 2023-06-29 RX ORDER — KETOROLAC TROMETHAMINE 30 MG/ML
15 INJECTION, SOLUTION INTRAMUSCULAR; INTRAVENOUS ONCE
Status: DISCONTINUED | OUTPATIENT
Start: 2023-06-29 | End: 2023-06-29 | Stop reason: HOSPADM

## 2023-06-29 RX ORDER — SUCRALFATE ORAL 1 G/10ML
1 SUSPENSION ORAL 4 TIMES DAILY
Qty: 420 ML | Refills: 0 | Status: SHIPPED | OUTPATIENT
Start: 2023-06-29 | End: 2023-07-06

## 2023-06-29 RX ORDER — FAMOTIDINE 20 MG/1
20 TABLET, FILM COATED ORAL 2 TIMES DAILY
Qty: 28 TABLET | Refills: 0 | Status: SHIPPED | OUTPATIENT
Start: 2023-06-29 | End: 2023-07-13

## 2023-06-29 RX ADMIN — IOHEXOL 100 ML: 350 INJECTION, SOLUTION INTRAVENOUS at 17:57

## 2023-06-29 NOTE — TELEPHONE ENCOUNTER
Patient called with complaints of pain on the right side of his belly button that has been going on for a while now and its persistent  He states he feels gurgling in his stomach  Patient made aware PCP is on vacation and I referred him to go to the ER   Patient verbalizes understanding

## 2023-06-29 NOTE — ED PROVIDER NOTES
History  Chief Complaint   Patient presents with   • Abdominal Pain     States RLQ pain for 1 5 weeks  Denies N/V/D/F  Denies urinary symptoms  History provided by:  Patient  Abdominal Pain  Pain location:  RLQ  Pain quality: gnawing    Pain radiates to:  Does not radiate  Pain severity:  Moderate  Onset quality:  Gradual  Duration:  10 hours  Timing:  Constant  Progression:  Waxing and waning  Relieved by:  Nothing  Worsened by:  Nothing  Associated symptoms: no chest pain, no chills, no constipation, no cough, no diarrhea, no dysuria, no fatigue, no fever, no hematuria, no nausea, no shortness of breath, no sore throat and no vomiting        Prior to Admission Medications   Prescriptions Last Dose Informant Patient Reported? Taking?    Biktarvy -25 MG tablet   No Yes   Sig: TAKE 1 TABLET BY MOUTH DAILY WITH BREAKFAST   Multiple Vitamin (multivitamin) capsule   Yes Yes   Sig: Take 1 capsule by mouth daily   Omega-3 1000 MG CAPS   Yes Yes   Sig: Take 2 g by mouth daily   cholecalciferol (VITAMIN D3) 1,000 units tablet   Yes Yes   Sig: Take 1,000 Units by mouth daily   cyanocobalamin (VITAMIN B-12) 500 MCG tablet   Yes Yes   Sig: Take 1,000 mcg by mouth daily   docusate sodium (COLACE) 100 mg capsule   Yes Yes   Sig: Take 100 mg by mouth 3 (three) times a day as needed for constipation   famotidine (PEPCID) 20 mg tablet   Yes Yes   Sig: Take 1 tablet by mouth   lisinopril (ZESTRIL) 20 mg tablet   Yes Yes   Sig: Take 20 mg by mouth daily   metoprolol succinate (TOPROL-XL) 50 mg 24 hr tablet  Self No Yes   Sig: Take 1 tablet (50 mg total) by mouth every evening   Patient taking differently: Take 50 mg by mouth every evening Patient states that he takes this in the morning   potassium citrate (UROCIT-K) 10 mEq   No Yes   Sig: take 1 tablet by mouth once daily   pravastatin (PRAVACHOL) 80 mg tablet   Yes Yes   Sig: Take 80 mg by mouth daily      Facility-Administered Medications: None       Past Medical History:   Diagnosis Date   • GERD (gastroesophageal reflux disease)    • HIV (human immunodeficiency virus infection) ([facility] )    • Hyperlipidemia    • Hypertension    • Kidney stone    • Non-alcoholic fatty liver disease    • PONV (postoperative nausea and vomiting)    • Prostate cancer ([facility] )    • Sleep apnea        Past Surgical History:   Procedure Laterality Date   • COLONOSCOPY     • HERNIA REPAIR     • AR BX PROSTATE STRTCTC SATURATION SAMPLING IMG GID N/A 1/25/2022    Procedure: TRANSPERINEAL ULTRASOUND GUIDED BIOPSY PROSTATE;  Surgeon: Branden Weiss MD;  Location: [facility];  Service: Urology   • AR PROSTATE NEEDLE BIOPSY ANY APPROACH N/A 10/13/2020    Procedure: TRANSPERINEAL PROSTATE BIOPSY;  Surgeon: Branden Weiss MD;  Location: [facility];  Service: Urology       Family History   Problem Relation Age of Onset   • Prostate cancer Father    • Esophageal cancer Father    • Heart disease Father      I have reviewed and agree with the history as documented  E-Cigarette/Vaping   • E-Cigarette Use Never User      E-Cigarette/Vaping Substances     Social History     Tobacco Use   • Smoking status: Never   • Smokeless tobacco: Never   Vaping Use   • Vaping Use: Never used   Substance Use Topics   • Alcohol use: Not Currently     Comment: used to be a heavy drinker, quit 15 years ago   • Drug use: Not Currently     Types: Marijuana, Cocaine     Comment: used to use drugs, consisting of crystal meth, cocaine, and marijuana, denied any IV drug use in the past       Review of Systems   Constitutional: Negative for chills, diaphoresis, fatigue and fever  HENT: Negative for congestion, ear pain, rhinorrhea, sinus pressure, sneezing, sore throat and trouble swallowing  Eyes: Negative for pain and visual disturbance  Respiratory: Negative for cough, chest tightness, shortness of breath, wheezing and stridor  Cardiovascular: Negative for chest pain, palpitations and leg swelling     Gastrointestinal: Positive for abdominal pain  Negative for blood in stool, constipation, diarrhea, nausea and vomiting  Endocrine: Negative for polydipsia, polyphagia and polyuria  Genitourinary: Negative for decreased urine volume, dysuria, flank pain, frequency, hematuria and urgency  Musculoskeletal: Negative for arthralgias and myalgias  Skin: Negative for color change and rash  Neurological: Negative for dizziness, seizures, light-headedness, numbness and headaches  Hematological: Negative for adenopathy  Psychiatric/Behavioral: The patient is not nervous/anxious  Physical Exam  Physical Exam  Vitals and nursing note reviewed  Constitutional:       Appearance: He is well-developed  HENT:      Head: Normocephalic and atraumatic  Eyes:      General: No scleral icterus  Conjunctiva/sclera: Conjunctivae normal    Neck:      Vascular: No JVD  Trachea: No tracheal deviation  Cardiovascular:      Rate and Rhythm: Normal rate and regular rhythm  Pulmonary:      Effort: Pulmonary effort is normal  No respiratory distress  Abdominal:      General: There is no distension  Tenderness: There is abdominal tenderness in the right lower quadrant  There is no right CVA tenderness, left CVA tenderness, guarding or rebound  Hernia: No hernia is present  Musculoskeletal:         General: No deformity  Normal range of motion  Cervical back: Normal range of motion  Skin:     Coloration: Skin is not pale  Findings: No erythema or rash  Neurological:      Mental Status: He is alert and oriented to person, place, and time     Psychiatric:         Behavior: Behavior normal          Vital Signs  ED Triage Vitals [06/29/23 1619]   Temperature Pulse Respirations Blood Pressure SpO2   97 6 °F (36 4 °C) 75 18 (!) 159/101 98 %      Temp Source Heart Rate Source Patient Position - Orthostatic VS BP Location FiO2 (%)   Oral Monitor Sitting Right arm --      Pain Score       5           Vitals: 06/29/23 1619   BP: (!) 159/101   Pulse: 75   Patient Position - Orthostatic VS: Sitting         Visual Acuity      ED Medications  Medications   ketorolac (TORADOL) injection 15 mg (15 mg Intravenous Not Given 6/29/23 1719)   iohexol (OMNIPAQUE) 350 MG/ML injection (SINGLE-DOSE) 100 mL (100 mL Intravenous Given 6/29/23 1757)       Diagnostic Studies  Results Reviewed     Procedure Component Value Units Date/Time    Comprehensive metabolic panel [[id number]]  (Abnormal) Collected: 06/29/23 1706    Lab Status: Final result Specimen: Blood from Arm, Left Updated: 06/29/23 1733     Sodium 140 mmol/L      Potassium 3 5 mmol/L      Chloride 104 mmol/L      CO2 28 mmol/L      ANION GAP 8 mmol/L      BUN 18 mg/dL      Creatinine 1 10 mg/dL      Glucose 86 mg/dL      Calcium 9 5 mg/dL      AST 40 U/L      ALT 46 U/L      Alkaline Phosphatase 46 U/L      Total Protein 7 4 g/dL      Albumin 4 7 g/dL      Total Bilirubin 1 12 mg/dL      eGFR 69 ml/min/1 73sq m     Narrative:      Jr guidelines for Chronic Kidney Disease (CKD):   •  Stage 1 with normal or high GFR (GFR > 90 mL/min/1 73 square meters)  •  Stage 2 Mild CKD (GFR = 60-89 mL/min/1 73 square meters)  •  Stage 3A Moderate CKD (GFR = 45-59 mL/min/1 73 square meters)  •  Stage 3B Moderate CKD (GFR = 30-44 mL/min/1 73 square meters)  •  Stage 4 Severe CKD (GFR = 15-29 mL/min/1 73 square meters)  •  Stage 5 End Stage CKD (GFR <15 mL/min/1 73 square meters)  Note: GFR calculation is accurate only with a steady state creatinine    Lipase [[id number]]  (Normal) Collected: 06/29/23 1706    Lab Status: Final result Specimen: Blood from Arm, Left Updated: 06/29/23 1733     Lipase 29 u/L     Lactic acid, plasma (w/reflex if result > 2 0) [[id number]]  (Abnormal) Collected: 06/29/23 1706    Lab Status: Final result Specimen: Blood from Arm, Left Updated: 06/29/23 1732     LACTIC ACID 0 4 mmol/L     Narrative:      Result may be elevated if tourniquet was used during collection  Urine Microscopic [[id number]]  (Abnormal) Collected: 06/29/23 1700    Lab Status: Final result Specimen: Urine, Clean Catch Updated: 06/29/23 1726     RBC, UA 1-2 /hpf      WBC, UA 2-4 /hpf      Epithelial Cells Occasional /hpf      Bacteria, UA Occasional /hpf      MUCUS THREADS Occasional     Amorphous Crystals, UA Moderate    CBC and differential [[id number]]  (Abnormal) Collected: 06/29/23 1706    Lab Status: Final result Specimen: Blood from Arm, Left Updated: 06/29/23 1715     WBC 5 70 Thousand/uL      RBC 4 78 Million/uL      Hemoglobin 16 4 g/dL      Hematocrit 46 2 %      MCV 97 fL      MCH 34 3 pg      MCHC 35 5 g/dL      RDW 12 1 %      MPV 12 1 fL      Platelets 723 Thousands/uL      nRBC 0 /100 WBCs      Neutrophils Relative 49 %      Immat GRANS % 0 %      Lymphocytes Relative 41 %      Monocytes Relative 7 %      Eosinophils Relative 2 %      Basophils Relative 1 %      Neutrophils Absolute 2 82 Thousands/µL      Immature Grans Absolute 0 02 Thousand/uL      Lymphocytes Absolute 2 32 Thousands/µL      Monocytes Absolute 0 41 Thousand/µL      Eosinophils Absolute 0 09 Thousand/µL      Basophils Absolute 0 04 Thousands/µL     Urine Macroscopic, POC [[id number]]  (Abnormal) Collected: 06/29/23 1700    Lab Status: Final result Specimen: Urine Updated: 06/29/23 1702     Color, UA Yellow     Clarity, UA Slightly Cloudy     pH, UA 7 0     Leukocytes, UA Trace     Nitrite, UA Negative     Protein, UA Negative mg/dl      Glucose, UA Negative mg/dl      Ketones, UA Negative mg/dl      Urobilinogen, UA 0 2 E U /dl      Bilirubin, UA Negative     Occult Blood, UA Trace     Specific [place], UA 1 025    Narrative:      CLINITEK RESULT                 CT abdomen pelvis with contrast   Final Result by Dea De La Fuente DO (06/29 1944)      Thickening of the gastric wall which may represent gastritis  Follow-up with gastroenterology        Thickening of the urinary bladder wall which may represent cystitis  Follow-up with urology is recommended  Periaortic lymph node measuring up to 9 mm of unclear etiology  The study was marked in [facility] for immediate notification  Workstation performed: [id number]                    Procedures  Procedures         ED Course  ED Course as of 06/29/23 1958   Thu Jun 29, 2023 1953 CT reviewed  Likely secondary to GERD  Will treat with Pepcid, nausea medication, patient will follow-up with GI and urology for thickened bladder wall                                 SBIRT 22yo+    Flowsheet Row Most Recent Value   Initial Alcohol Screen: US AUDIT-C     1  How often do you have a drink containing alcohol? 0 Filed at: 06/29/2023 1720   2  How many drinks containing alcohol do you have on a typical day you are drinking? 0 Filed at: 06/29/2023 1720   3b  FEMALE Any Age, or MALE 65+: How often do you have 4 or more drinks on one occassion? 0 Filed at: 06/29/2023 1720   Audit-C Score 0 Filed at: 06/29/2023 1720   KATERINA: How many times in the past year have you    Used an illegal drug or used a prescription medication for non-medical reasons? Never Filed at: 06/29/2023 1720                    Medical Decision Making  You really quadrant abdominal pain-we will check abdominal labs to rule out acute pathology such as hepatitis, pancreatitis, urine dip for UTI, CT evidence of acute surgical pathology such as but not limited to appendicitis, hernia, obstruction, perforated viscus, pain medications and reassess  Asymptomatic hypertension-no work-up is indicated this time patient will follow-up as outpatient  Amount and/or Complexity of Data Reviewed  Labs: ordered  Radiology: ordered  Risk  Prescription drug management            Disposition  Final diagnoses:   Acute abdominal pain   Abnormal CT scan - thickened bladder wall     Time reflects when diagnosis was documented in both MDM as applicable and the Disposition within this note     Time User Action Codes Description Comment    6/29/2023  7:55 PM [facility] Add [R10 9] Acute abdominal pain     6/29/2023  7:55 PM [facility] Add [R93 89] Abnormal CT scan     6/29/2023  7:55 PM [facility] Modify [R93 89] Abnormal CT scan thickened bladder wall      ED Disposition     ED Disposition   Discharge    Condition   Stable    Date/Time   Thu Jun 29, 2023  7:54 PM    Comment   Dylon Dudley discharge to home/self care  Follow-up Information     Follow up With Specialties Details Why Contact Info Additional [address] And ARINA Mortensen Nurse Practitioner Schedule an appointment as soon as possible for a visit in 2 days  [address] [facility] [address] [facility]   [facility] [address] [id number]  [address]  [facility] [address], [address]   [phone]          Patient's Medications   Discharge Prescriptions    FAMOTIDINE (PEPCID) 20 MG TABLET    Take 1 tablet (20 mg total) by mouth 2 (two) times a day for 28 doses       Start Date: 6/29/2023 End Date: 7/13/2023       Order Dose: 20 mg       Quantity: 28 tablet    Refills: 0    SUCRALFATE (CARAFATE) 1 G/10 ML SUSPENSION    Take 10 mL (1 g total) by mouth 4 (four) times a day for 7 days       Start Date: 6/29/2023 End Date: 7/6/2023       Order Dose: 1 g       Quantity: 420 mL    Refills: 0       No discharge procedures on file      PDMP Review     None          ED Provider  Electronically Signed by           Jesica Wong MD  06/29/23 2442

## 2023-06-30 ENCOUNTER — TELEPHONE (OUTPATIENT)
Dept: UROLOGY | Facility: MEDICAL CENTER | Age: 68
End: 2023-06-30

## 2023-06-30 ENCOUNTER — NURSE TRIAGE (OUTPATIENT)
Age: 68
End: 2023-06-30

## 2023-06-30 DIAGNOSIS — N32.89 BLADDER WALL THICKENING: Primary | ICD-10-CM

## 2023-06-30 NOTE — TELEPHONE ENCOUNTER
Images reviewed  Generalized diffuse bladder wall thickening favors nonmalignant finding    Urinalysis bland no acute LUTS  Appears similar to MRI in 2021    Have repeat bladder US prior to October visit    zp fyi    CT June 2023      MRI pelvis Nov 2021

## 2023-06-30 NOTE — TELEPHONE ENCOUNTER
PT under care of:Jerel      Pt last seen: 7/19/22    PT calling today because: Pt was in the Er yesterday and had abnormal CT  Was told to make an asap appointment  Per dr Jyothi Slaughter last not pt not due in till  October    Return in about 14 months (around 10/2/2023) for Cancer surveillance, PSA 3mos and again in 10/2023 MD visit  IMPRESSION:     Thickening of the gastric wall which may represent gastritis  Follow-up with gastroenterology      Thickening of the urinary bladder wall which may represent cystitis  Follow-up with urology is recommended      Periaortic lymph node measuring up to 9 mm of unclear etiology         PT can be reached at: [phone]    Pt is not scheduled for October yet

## 2023-06-30 NOTE — TELEPHONE ENCOUNTER
----- Message from Vanessa Route sent at 6/30/2023 10:24 AM EDT -----  Patient called to let us know he was in the er last night and wants Dr Joe Hdez to read the notes from the ER Visit to see if he needs any further testing or an office visit please review and reach out to patient to let him know thank you

## 2023-07-03 NOTE — TELEPHONE ENCOUNTER
Telephone call to pt. Reviewed provider's message with pt. They verbalized understanding. Scheduled pt for f/u in October.

## 2023-07-05 NOTE — TELEPHONE ENCOUNTER
Left voicemail with infusion center asking to return call regarding questions about Leqvio. Spoke to pt regarding his results f/up with  on 9/11

## 2023-07-06 ENCOUNTER — TELEPHONE (OUTPATIENT)
Dept: SURGERY | Facility: CLINIC | Age: 68
End: 2023-07-06

## 2023-07-06 NOTE — TELEPHONE ENCOUNTER
Called patient to check on him after being seen in the ER. Patient states today he finally feels better. He is still taking pepcid but he stopped taking sulcrafate as suggested by GI. They also told him they believed it was food poisoning.

## 2023-07-10 ENCOUNTER — OFFICE VISIT (OUTPATIENT)
Dept: SURGERY | Facility: CLINIC | Age: 68
End: 2023-07-10
Payer: MEDICARE

## 2023-07-10 VITALS
BODY MASS INDEX: 26.56 KG/M2 | RESPIRATION RATE: 18 BRPM | OXYGEN SATURATION: 99 % | HEIGHT: 74 IN | SYSTOLIC BLOOD PRESSURE: 148 MMHG | DIASTOLIC BLOOD PRESSURE: 76 MMHG | TEMPERATURE: 98.3 F | WEIGHT: 207 LBS | HEART RATE: 62 BPM

## 2023-07-10 DIAGNOSIS — K21.00 GASTROESOPHAGEAL REFLUX DISEASE WITH ESOPHAGITIS WITHOUT HEMORRHAGE: ICD-10-CM

## 2023-07-10 DIAGNOSIS — K44.9 HIATAL HERNIA: ICD-10-CM

## 2023-07-10 DIAGNOSIS — B20 HIV DISEASE (HCC): Primary | ICD-10-CM

## 2023-07-10 DIAGNOSIS — Z98.890 S/P HERNIA REPAIR: ICD-10-CM

## 2023-07-10 DIAGNOSIS — R63.4 WEIGHT LOSS: ICD-10-CM

## 2023-07-10 DIAGNOSIS — R10.31 RIGHT LOWER QUADRANT ABDOMINAL PAIN: ICD-10-CM

## 2023-07-10 DIAGNOSIS — I10 PRIMARY HYPERTENSION: ICD-10-CM

## 2023-07-10 DIAGNOSIS — N32.89 BLADDER WALL THICKENING: ICD-10-CM

## 2023-07-10 DIAGNOSIS — Z87.19 S/P HERNIA REPAIR: ICD-10-CM

## 2023-07-10 DIAGNOSIS — D72.810 LYMPHOPENIA: ICD-10-CM

## 2023-07-10 PROCEDURE — 99214 OFFICE O/P EST MOD 30 MIN: CPT | Performed by: NURSE PRACTITIONER

## 2023-07-10 NOTE — ASSESSMENT & PLAN NOTE
Mild gastritis. Unclear etiology may be from dietary intake, viral, or inflammatory. Less likely infectious. Sporadic cramping pain not related to post meal time. No signs of infection or continued diarrhea.   · Check Right inguinal hernia site for post op surgical changes, hernia, adhesions, or scar tissue  · Keep food diary to see for pattern   · Call office with any worsening symptoms  · Avoid food high in gas

## 2023-07-10 NOTE — ASSESSMENT & PLAN NOTE
Doing well on Biktarvy with an undetectable VL. Pt reports 100% medication compliance on HAART. Stressed the importance of 100% medication adherence. HIV Counseling:    Viral Load: < 20    CD4 Count: 255      Denies side effects. Stressed the importance of adherence. Continue follow up in the ID clinic with Dr. Danitza Reid or Dr. Jamar Alvarez. Reviewed the most recent labs, including the CD4 and viral load. Discussed the risks and benefits of treatment options, instructions for management, importance of treatment adherence, and reduction of risk factors. Educated on possible medication side effects. Counseled on routes of HIV transmission, including the risk of  infection. Emphasized that viral suppression is the best method to prevent HIV transmission. At this time the pt denies the need for HIV testing of anyone in their life. Total encounter time was greater than 35 minutes. Greater than 20 minutes were spent on counseling and patient education. Pt voices understanding and agreement with treatment plan.

## 2023-07-10 NOTE — ASSESSMENT & PLAN NOTE
Feeling better. Diarrhea episode X 1 with RLL pain, bloating, and fullness.   Gastritis noted on Ct scan abdomen pelvis but could also have been viral.  · Continue pepcid  · Follow up with GI  · Reviewed labs and ct results with pt

## 2023-07-10 NOTE — ASSESSMENT & PLAN NOTE
S/p right inguinal hernia repair from years ago. On Postsurgical changes from right inguinal hernia repair. Cannot rule out surgical changes could be the contributing cause of new RLL pain and cramping. No signs of infection.   · Check US right groin

## 2023-07-10 NOTE — ASSESSMENT & PLAN NOTE
Incidental finding out ct scan.   · Discussed conservation approach for tx  · Avoid over eating, eating late at night, remain upright for 2 hours after eating, and avoid acidic or carbonated foods  · Follow with GI

## 2023-07-10 NOTE — ASSESSMENT & PLAN NOTE
Loss 5 lb since April. Reports feeling bloating and feels like abdomen is bigger.   BMI: 26.58  · Continue to monitor  · Food diary  · Ok to slowly change from [place] to adding back daily foods  · Continue to promote life style modifications

## 2023-07-10 NOTE — PROGRESS NOTES
Assessment/Plan:    HIV disease ([address])  Doing well on Biktarvy with an undetectable VL. Pt reports 100% medication compliance on HAART. Stressed the importance of 100% medication adherence. HIV Counseling:    Viral Load: < 20    CD4 Count: 278      Denies side effects. Stressed the importance of adherence. Continue follow up in the ID clinic with Dr. Francesca Kayser or Dr. Beth Sparks. Reviewed the most recent labs, including the CD4 and viral load. Discussed the risks and benefits of treatment options, instructions for management, importance of treatment adherence, and reduction of risk factors. Educated on possible medication side effects. Counseled on routes of HIV transmission, including the risk of  infection. Emphasized that viral suppression is the best method to prevent HIV transmission. At this time the pt denies the need for HIV testing of anyone in their life. Total encounter time was greater than 35 minutes. Greater than 20 minutes were spent on counseling and patient education. Pt voices understanding and agreement with treatment plan. Bladder wall thickening  Incidental finding on Ct abdomen/pelvis. · US bladder and kidney already ordered by Urology  · Management per Urology  · Fx of prostate cancer    GERD (gastroesophageal reflux disease)  Feeling better. Diarrhea episode X 1 with RLL pain, bloating, and fullness. Gastritis noted on Ct scan abdomen pelvis but could also have been viral.  · Continue pepcid  · Follow up with GI  · Reviewed labs and ct results with pt    HTN (hypertension)  Suboptimal control. Pt feels BP is related to coming to office  · Continue HTN mediations  · Continue to monitor BP at home  · UA: no protein      Leukopenia  At baseline. ANC: 1.35  · Continue to monitor CBCD  · No need for further work up at this time    Right lower quadrant abdominal pain  Mild gastritis. Unclear etiology may be from dietary intake, viral, or inflammatory.   Less likely infectious. Sporadic cramping pain not related to post meal time. No signs of infection or continued diarrhea. · Check Right inguinal hernia site for post op surgical changes, hernia, adhesions, or scar tissue  · Keep food diary to see for pattern   · Call office with any worsening symptoms  · Avoid food high in gas    Weight loss  Loss 5 lb since April. Reports feeling bloating and feels like abdomen is bigger. BMI: 26.58  · Continue to monitor  · Food diary  · Ok to slowly change from [place] to adding back daily foods  · Continue to promote life style modifications    Hiatal hernia  Incidental finding out ct scan. · Discussed conservation approach for tx  · Avoid over eating, eating late at night, remain upright for 2 hours after eating, and avoid acidic or carbonated foods  · Follow with GI    S/P hernia repair  S/p right inguinal hernia repair from years ago. On Postsurgical changes from right inguinal hernia repair. Cannot rule out surgical changes could be the contributing cause of new RLL pain and cramping. No signs of infection. · Check US right groin        Diagnoses and all orders for this visit:    HIV disease ([address])    Bladder wall thickening    Gastroesophageal reflux disease with esophagitis without hemorrhage    Primary hypertension    Lymphopenia    S/P hernia repair  -     US groin/inguinal area; Future    Right lower quadrant abdominal pain  -     US groin/inguinal area;  Future    Hiatal hernia    Weight loss        Lab Results   Component Value Date     12/16/2015    K 3.5 06/29/2023     06/29/2023    CO2 28 06/29/2023    ANIONGAP 6 12/16/2015    BUN 18 06/29/2023    CREATININE 1.10 06/29/2023    GLUCOSE 99 12/16/2015    GLUF 106 (H) 06/12/2023    CALCIUM 9.5 06/29/2023    AST 40 (H) 06/29/2023    ALT 46 06/29/2023    ALKPHOS 46 06/29/2023    PROT 7.2 12/16/2015    BILITOT 0.91 12/16/2015    EGFR 69 06/29/2023     Lab Results   Component Value Date    WBC 5.70 06/29/2023 HGB 16.4 06/29/2023    HCT 46.2 06/29/2023    MCV 97 06/29/2023     (L) 06/29/2023          Subjective:      Patient ID: Fly Saucedo is a 78 y.o. male. HILTON PADILLA is being seen for 3 month follow up visit for management of HIV and chronic health care conditions. VALERIE was seen in [facility] ER on 6/29 for RLL quadrant discomfort and diarrhea X 1 episode. No leukocytosis on labs. CT of abdomen showed gastritis and thickening of bladder wall. Pt was discharged on Pepcid and Carafate and to f/u with GI and Urology. VALERIE did call GI and was told this was probably related to food poisoning and to stop the Carafate. No leukocytosis on labs. VALERIE has f/u US of bladder ordered. VALERIE has been feeling better since ER visit and denies any further nausea, vomiting or diarrhea. VALERIE still is having sxs that come and go with discomfort coming at different times of the day and is sporatic almost like a cramp. VALERIE does have some discomfort down into right groin. Recent weight loss of 5 lb since April 2023. VALERIE. does not endorse any fever, chills, nausea, vomiting, cough, SOB, diarrhea, or night sweats. The following portions of the patient's history were reviewed and updated as appropriate: allergies, current medications, past family history, past medical history, past social history, past surgical history and problem list.    Review of Systems   Constitutional: Negative. HENT: Negative. Respiratory: Negative. Cardiovascular: Negative. Gastrointestinal: Negative. Neurological: Negative. Psychiatric/Behavioral: Negative. Objective:      /76 (BP Location: Right arm, Patient Position: Sitting, Cuff Size: Standard)   Pulse 62   Temp 98.3 °F (36.8 °C) (Tympanic)   Resp 18   Ht 6' 2" (1.88 m)   Wt 93.9 kg (207 lb)   SpO2 99%   BMI 26.58 kg/m²          Physical Exam  Vitals and nursing note reviewed. Constitutional:       General: He is not in acute distress. Appearance: Normal appearance. He is not ill-appearing. Cardiovascular:      Rate and Rhythm: Normal rate and regular rhythm. Pulses: Normal pulses. Heart sounds: Normal heart sounds. Pulmonary:      Effort: Pulmonary effort is normal.      Breath sounds: Normal breath sounds. Abdominal:      General: Bowel sounds are normal. There is distension. Palpations: Abdomen is soft. Tenderness: There is no abdominal tenderness. There is no guarding or rebound. Hernia: No hernia is present. Lymphadenopathy:      Lower Body: No right inguinal adenopathy. Skin:     General: Skin is warm and dry. Capillary Refill: Capillary refill takes less than 2 seconds. Neurological:      Mental Status: He is alert and oriented to person, place, and time. Psychiatric:         Mood and Affect: Mood normal.         Behavior: Behavior normal.         Thought Content:  Thought content normal.         Judgment: Judgment normal.

## 2023-07-10 NOTE — ASSESSMENT & PLAN NOTE
Suboptimal control.   Pt feels BP is related to coming to office  · Continue HTN mediations  · Continue to monitor BP at home  · UA: no protein

## 2023-07-10 NOTE — ASSESSMENT & PLAN NOTE
Incidental finding on Ct abdomen/pelvis.   · US bladder and kidney already ordered by Urology  · Management per Urology  · Fx of prostate cancer

## 2023-07-13 RX ORDER — METOPROLOL SUCCINATE 50 MG/1
50 TABLET, EXTENDED RELEASE ORAL EVERY EVENING
Qty: 90 TABLET | Refills: 1 | Status: CANCELLED | OUTPATIENT
Start: 2023-07-13 | End: 2023-10-11

## 2023-07-14 ENCOUNTER — OFFICE VISIT (OUTPATIENT)
Dept: SURGERY | Facility: CLINIC | Age: 68
End: 2023-07-14
Payer: MEDICARE

## 2023-07-14 ENCOUNTER — DOCUMENTATION (OUTPATIENT)
Dept: SURGERY | Facility: CLINIC | Age: 68
End: 2023-07-14

## 2023-07-14 VITALS
RESPIRATION RATE: 17 BRPM | SYSTOLIC BLOOD PRESSURE: 132 MMHG | BODY MASS INDEX: 26.41 KG/M2 | HEART RATE: 59 BPM | WEIGHT: 205.8 LBS | OXYGEN SATURATION: 97 % | TEMPERATURE: 96.6 F | HEIGHT: 74 IN | DIASTOLIC BLOOD PRESSURE: 70 MMHG

## 2023-07-14 DIAGNOSIS — Z13.6 ENCOUNTER FOR LIPID SCREENING FOR CARDIOVASCULAR DISEASE: ICD-10-CM

## 2023-07-14 DIAGNOSIS — R00.2 PALPITATIONS: ICD-10-CM

## 2023-07-14 DIAGNOSIS — Z11.3 ENCOUNTER FOR SCREENING FOR BACTERIAL SEXUALLY TRANSMITTED DISEASE: ICD-10-CM

## 2023-07-14 DIAGNOSIS — D75.1 POLYCYTHEMIA: ICD-10-CM

## 2023-07-14 DIAGNOSIS — I47.1 SVT (SUPRAVENTRICULAR TACHYCARDIA) (HCC): ICD-10-CM

## 2023-07-14 DIAGNOSIS — D70.9 NEUTROPENIA, UNSPECIFIED TYPE (HCC): ICD-10-CM

## 2023-07-14 DIAGNOSIS — Z72.89 OTHER PROBLEMS RELATED TO LIFESTYLE: ICD-10-CM

## 2023-07-14 DIAGNOSIS — D72.89 OTHER SPECIFIED DISORDERS OF WHITE BLOOD CELLS: ICD-10-CM

## 2023-07-14 DIAGNOSIS — Z13.1 SCREENING FOR DIABETES MELLITUS: ICD-10-CM

## 2023-07-14 DIAGNOSIS — D69.6 THROMBOCYTOPENIA (HCC): ICD-10-CM

## 2023-07-14 DIAGNOSIS — R73.01 IMPAIRED FASTING GLUCOSE: ICD-10-CM

## 2023-07-14 DIAGNOSIS — Z13.220 ENCOUNTER FOR LIPID SCREENING FOR CARDIOVASCULAR DISEASE: ICD-10-CM

## 2023-07-14 DIAGNOSIS — Z20.2 CONTACT WITH AND (SUSPECTED) EXPOSURE TO INFECTIONS WITH A PREDOMINANTLY SEXUAL MODE OF TRANSMISSION: ICD-10-CM

## 2023-07-14 DIAGNOSIS — Z11.1 SCREENING-PULMONARY TB: ICD-10-CM

## 2023-07-14 DIAGNOSIS — D72.810 LYMPHOPENIA: ICD-10-CM

## 2023-07-14 DIAGNOSIS — B20 HIV DISEASE (HCC): Primary | ICD-10-CM

## 2023-07-14 PROCEDURE — 99214 OFFICE O/P EST MOD 30 MIN: CPT | Performed by: STUDENT IN AN ORGANIZED HEALTH CARE EDUCATION/TRAINING PROGRAM

## 2023-07-14 NOTE — PROGRESS NOTES
Progress Note - Infectious Disease   Aniya Zamarripa 78 y.o. male MRN: [id number]  Unit/Bed#:  Encounter: [id number]      Impression/Plan:    1. HIV. Well controlled on [address] with 100% adherence, no side effects. CD4 624, VL <20.    -continue Biktarvy   -Patient was provided medication, adherence and prevention education   -check labs in 5 months to assess for virologic control,drug toxicity, co-infections   -follow up in 6 months    2. Prostate cancer. Undergoing active surveillance with urology. PSA has been stable.   -ongoing urology follow up    3. History of polysubstance abuse. In recovery for 15 years. 4. Palpitations. Nuclear stress test negative June 2022. Follows with cardiology.   -Ongoing follow-up with cardiology    5. Vaccinations. Hepatitis A and B immune. UTD COVID and influenza vaccination. Received first dose meningococcal vaccine 6/16/23, due for second dose > 8 weeks    My recommendations were discussed with the patient in detail who verbalized understanding. Patient care coordinated with ARINA Haynes. Subjective:  Routine follow-up for HIV. Patient claims 100% adherence with Biktarvy. Patient denies any notable side effects. Overall he is feeling well. The patient denies any fever chills or sweats, denies any nausea vomiting or diarrhea, denies any cough or shortness of breath. He was seen in the ED 6/29/23 for RLQ abdominal pain for 1 week. He was told symptoms were due to GERD and he was discharged with GI follow up. CT of the abdomen showed thickening of the gastric wall and urinary bladder. Today he continues to have some abdominal pain. He has a history of right inguinal hernia repair so right groin and renal U/S have been ordered. ROS:  A complete review of systems is negative other than that noted above in the subjective    Followup portions patient history reviewed and updated as:   Allergies, current medications, past medical history, past social history, past surgical history, and the problem list    Objective:  Vitals:  Vitals:    07/14/23 0724   BP: 132/70   BP Location: Right arm   Patient Position: Sitting   Cuff Size: Standard   Pulse: 59   Resp: 17   Temp: (!) 96.6 °F (35.9 °C)   TempSrc: Tympanic   SpO2: 97%   Weight: 93.4 kg (205 lb 12.8 oz)   Height: 6' 2" (1.88 m)       Physical Exam:   General Appearance:  Alert, interactive, appearing well,  nontoxic, no acute distress. Neck:   Supple without lymphadenopathy, no thyromegaly or masses   Throat: Oropharynx moist without lesions. Lungs:   Clear to auscultation bilaterally; no wheezes, rhonchi or rales; respirations unlabored   Heart:  RRR; no murmur, rub or gallop   Abdomen:   Soft, non-distended, positive bowel sounds. No RLQ tenderness to palpation   Extremities: No clubbing, cyanosis or edema   Skin: No new rashes or lesions. No draining wounds noted.        Labs, Imaging, & Other studies:   All pertinent labs and imaging studies were personally reviewed    Lab Results   Component Value Date     12/16/2015    K 3.5 06/29/2023     06/29/2023    CO2 28 06/29/2023    ANIONGAP 6 12/16/2015    BUN 18 06/29/2023    CREATININE 1.10 06/29/2023    GLUCOSE 99 12/16/2015    GLUF 106 (H) 06/12/2023    CALCIUM 9.5 06/29/2023    AST 40 (H) 06/29/2023    ALT 46 06/29/2023    ALKPHOS 46 06/29/2023    PROT 7.2 12/16/2015    BILITOT 0.91 12/16/2015    EGFR 69 06/29/2023     Lab Results   Component Value Date    WBC 5.70 06/29/2023    HGB 16.4 06/29/2023    HCT 46.2 06/29/2023    MCV 97 06/29/2023     (L) 06/29/2023     Lab Results   Component Value Date    HEPCAB Non-reactive 12/05/2022     Lab Results   Component Value Date    HAV Reactive (A) 11/21/2022    HEPBIGM Non-reactive 12/05/2022    HEPBCAB Non-reactive 12/05/2022    HEPCAB Non-reactive 12/05/2022     Lab Results   Component Value Date    RPR Non-Reactive 11/21/2022     CD4 ABS   Date/Time Value Ref Range Status   06/12/2023 07:33  918 - 3050 /uL Final     HIV-1 RNA by PCR, Qn   Date/Time Value Ref Range Status   06/12/2023 07:33 AM <20 copies/mL Final     Comment:     HIV-1 RNA detected  The reportable range for this assay is 20 to 10,000,000  copies HIV-1 RNA/mL.            Current Outpatient Medications:   •  Biktarvy -25 MG tablet, TAKE 1 TABLET BY MOUTH DAILY WITH BREAKFAST, Disp: 90 tablet, Rfl: 1  •  cholecalciferol (VITAMIN D3) 1,000 units tablet, Take 1,000 Units by mouth daily, Disp: , Rfl:   •  cyanocobalamin (VITAMIN B-12) 500 MCG tablet, Take 1,000 mcg by mouth daily, Disp: , Rfl:   •  docusate sodium (COLACE) 100 mg capsule, Take 100 mg by mouth 3 (three) times a day as needed for constipation, Disp: , Rfl:   •  famotidine (PEPCID) 20 mg tablet, Take 1 tablet by mouth (Patient not taking: Reported on 7/10/2023), Disp: , Rfl:   •  famotidine (PEPCID) 20 mg tablet, Take 1 tablet (20 mg total) by mouth 2 (two) times a day for 28 doses (Patient taking differently: Take 20 mg by mouth 3 (three) times a day), Disp: 28 tablet, Rfl: 0  •  lisinopril (ZESTRIL) 20 mg tablet, Take 20 mg by mouth daily, Disp: , Rfl:   •  metoprolol succinate (TOPROL-XL) 50 mg 24 hr tablet, Take 1 tablet (50 mg total) by mouth every evening (Patient taking differently: Take 50 mg by mouth every evening Patient states that he takes at night), Disp: 90 tablet, Rfl: 1  •  Multiple Vitamin (multivitamin) capsule, Take 1 capsule by mouth daily, Disp: , Rfl:   •  Omega-3 1000 MG CAPS, Take 2 g by mouth daily, Disp: , Rfl:   •  potassium citrate (UROCIT-K) 10 mEq, take 1 tablet by mouth once daily, Disp: 90 tablet, Rfl: 3  •  pravastatin (PRAVACHOL) 80 mg tablet, Take 80 mg by mouth daily, Disp: , Rfl:

## 2023-07-14 NOTE — PROGRESS NOTES
[facility] nutrition note    RD met briefly with Bashir Spain during his scheduled ID clinic appt, to offer any nutritional intervention as warranted or as receptive. Bashir Spain shared that he has been trying to cook more at home, and recently started steaming green beans with different herbs and olive oil. Recommend well-cooked or boiled vegetables for breakdown of fibers and improved GI tolerance. He mentioned having some GI distress recently, including GERD. Has been eating smaller amounts of tomato sauce. He has also been watching some Youtube videos for cooking instruction. Bashir Spain also eating Honey Nut Cheerios, blueberries, almond milk for breakfast.  Lunch includes some low sodium soup and half [employer] sandwich. RD invited Bashir Spain to attend cooking classes at [facility], and explore available recipes on the [facility] waiting room wellness table. Bashir Spain described looking forward to upcoming trip to [facility] [place]. Pt had no further questions, concerns, or nutritional issues he wished to discuss with RD at this time.       Evi Stephens, MS, RD, LDN

## 2023-08-01 ENCOUNTER — HOSPITAL ENCOUNTER (OUTPATIENT)
Dept: ULTRASOUND IMAGING | Facility: HOSPITAL | Age: 68
Discharge: HOME | End: 2023-08-01
Payer: MEDICARE

## 2023-08-01 DIAGNOSIS — Z87.19 S/P HERNIA REPAIR: ICD-10-CM

## 2023-08-01 DIAGNOSIS — R10.31 RIGHT LOWER QUADRANT ABDOMINAL PAIN: ICD-10-CM

## 2023-08-01 DIAGNOSIS — Z98.890 S/P HERNIA REPAIR: ICD-10-CM

## 2023-08-01 PROCEDURE — 76705 ECHO EXAM OF ABDOMEN: CPT

## 2023-08-08 ENCOUNTER — TELEPHONE (OUTPATIENT)
Dept: SURGERY | Facility: CLINIC | Age: 68
End: 2023-08-08

## 2023-08-21 ENCOUNTER — TELEPHONE (OUTPATIENT)
Dept: SURGERY | Facility: CLINIC | Age: 68
End: 2023-08-21

## 2023-08-21 NOTE — TELEPHONE ENCOUNTER
Spoke with patient regarding a vaccine bill he received for [facility]ard. Patient aware per financial counselor,     "due to his income he was approved for the self-rate, that means he does not get any discount for the balance.  Any bills will still be his responsibility."

## 2023-08-23 ENCOUNTER — TELEPHONE (OUTPATIENT)
Dept: SURGERY | Facility: CLINIC | Age: 68
End: 2023-08-23

## 2023-08-23 NOTE — TELEPHONE ENCOUNTER
Patient had called previously stating he has a dental procedure coming up and was told to take ibufrofen for 1/2 days and wanted to make sure he was ok to take it. After speaking with PCP, she is ok with him taking it.  Patient notified it's ok for him to take it do to his kidney function being normal.

## 2023-09-07 ENCOUNTER — TELEPHONE (OUTPATIENT)
Dept: SURGERY | Facility: CLINIC | Age: 68
End: 2023-09-07

## 2023-09-11 ENCOUNTER — OFFICE VISIT (OUTPATIENT)
Dept: GASTROENTEROLOGY | Facility: MEDICAL CENTER | Age: 68
End: 2023-09-11
Payer: MEDICARE

## 2023-09-11 ENCOUNTER — TELEPHONE (OUTPATIENT)
Dept: GASTROENTEROLOGY | Facility: MEDICAL CENTER | Age: 68
End: 2023-09-11

## 2023-09-11 ENCOUNTER — TELEPHONE (OUTPATIENT)
Dept: SURGERY | Facility: CLINIC | Age: 68
End: 2023-09-11

## 2023-09-11 VITALS
DIASTOLIC BLOOD PRESSURE: 88 MMHG | TEMPERATURE: 97.6 F | SYSTOLIC BLOOD PRESSURE: 150 MMHG | WEIGHT: 208.6 LBS | HEART RATE: 65 BPM | BODY MASS INDEX: 26.78 KG/M2

## 2023-09-11 DIAGNOSIS — K76.0 NON-ALCOHOLIC FATTY LIVER DISEASE: Primary | ICD-10-CM

## 2023-09-11 DIAGNOSIS — K44.9 HIATAL HERNIA: ICD-10-CM

## 2023-09-11 DIAGNOSIS — K21.00 GASTROESOPHAGEAL REFLUX DISEASE WITH ESOPHAGITIS WITHOUT HEMORRHAGE: ICD-10-CM

## 2023-09-11 DIAGNOSIS — K31.89 GASTRIC WALL THICKENING: ICD-10-CM

## 2023-09-11 PROCEDURE — 99214 OFFICE O/P EST MOD 30 MIN: CPT | Performed by: INTERNAL MEDICINE

## 2023-09-11 NOTE — PATIENT INSTRUCTIONS
1. Metamucil daily for a month. 2. Miralax once a day for one week. 3. Smooth move tea. 4. EGD. 5. Pepcid before bed for now for one month and then stop it. Then as needed. 6. After EGD : Omeprazole 40 mg daily if any significant inflammation.

## 2023-09-11 NOTE — PROGRESS NOTES
Outpatient Follow up  [address] [place] [address] [address]  [place] [address]  Jose J Vasquez MD  Ph : [phone]  Fax : [phone]  Mobile : [phone]  Email : [email or web address]  Also available on [employer] Text    Steve Ma 78 y.o. male MRN: [id number]    PCP: Wei Zhao  Referring: No referring provider defined for this encounter. Steve Ma presented for a follow up visit. My recommendations are included. Please do not hesitate to contact me with any questions you may have. ASSESSMENT AND PLAN:      No problem-specific Assessment & Plan notes found for this encounter. Diagnoses and all orders for this visit:    Non-alcoholic fatty liver disease    Gastroesophageal reflux disease with esophagitis without hemorrhage  -     EGD; Future    Hiatal hernia  -     EGD; Future    Gastric wall thickening  -     EGD; Future    Other orders  -     Diet NPO; Sips with meds; Standing  -     Void on call to OR; Standing  -     Insert peripheral IV; Standing      1. Metamucil daily for a month. 2. Miralax once a day for one week. 3. Smooth move tea. 4. EGD. 5. Pepcid before bed for now for one month and then stop it. Then as needed. 6. After EGD : Omeprazole 40 mg daily if any significant inflammation. 7. Colon polyp in 2020 : repeat in 2025. 8. Periarotic lymph node on CT - repeat in 6 months. Sent a message to his primary. ______________________________________________________________________    SUBJECTIVE:  79 y/o with h/o GERD for which he was seen last year. EGD in 4/22 : One salmon colored tongue noted extending from [place] 1.5 cm at 2 O'clock position. Biopsy obtained. Normal appearing gastric mucosa. Random gastric biopsies obtained to rule out H. Pylori infection. Bile noted in body of stomach. Mild duodenitis in bulb. Biopsies negative for BE and for HP.      His father had esophageal cancer. He does snore. No diarrhea/ constipation/ bleeding. Was recently diagnosed with fatty liver and has thrombocytopenia (long standing). Has not had that work up done. He has HIV and is under control.      His US elastography did not show fibrosis.      Interval history 9/23 :     He was having pain in the right lower quadrant in June 2023. He went to the ER and had a CT scan done which showed : Thickening of the gastric wall which may represent gastritis. Follow-up with gastroenterology. Thickening of the urinary bladder wall which may represent cystitis. Follow-up with urology is recommended. Periaortic lymph node measuring up to 9 mm of unclear etiology. He has an appointment with Dr. Anali Hamlin to follow up on the bladder wall thickening. He has been on pepcid twice daily since this episode. His bowels are regular. Answers for HPI/ROS submitted by the patient on 9/4/2023  Chronicity: recurrent  Onset: more than 1 month ago  Onset quality: gradual  Frequency: intermittently  Progression since onset: gradually improving  Pain location: RLQ, periumbilical region  Pain - numeric: 4/10  Pain quality: cramping, dull  Radiates to: RLQ  anorexia: No  arthralgias: No  belching: Yes  constipation: No  diarrhea: No  dysuria: No  fever: No  flatus: No  frequency: No  headaches: No  hematochezia: No  hematuria: No  melena: No  myalgias: No  nausea: No  weight loss: No  vomiting: No  Aggravated by: eating  Relieved by: nothing  Diagnostic workup: CT scan, ultrasound        REVIEW OF SYSTEMS IS OTHERWISE NEGATIVE.       Historical Information   Past Medical History:   Diagnosis Date   • GERD (gastroesophageal reflux disease)    • HIV (human immunodeficiency virus infection) ([address])    • Hyperlipidemia    • Hypertension    • Kidney stone    • Non-alcoholic fatty liver disease    • PONV (postoperative nausea and vomiting)    • Prostate cancer ([address])    • Sleep apnea      Past Surgical History: Procedure Laterality Date   • COLONOSCOPY     • HERNIA REPAIR     • DE BX PROSTATE STRTCTC SATURATION SAMPLING IMG GID N/A 1/25/2022    Procedure: TRANSPERINEAL ULTRASOUND GUIDED BIOPSY PROSTATE;  Surgeon: Pascual Floyd MD;  Location: [facility];  Service: Urology   • DE PROSTATE NEEDLE BIOPSY ANY APPROACH N/A 10/13/2020    Procedure: TRANSPERINEAL PROSTATE BIOPSY;  Surgeon: Pascual Floyd MD;  Location: [facility];  Service: Urology     Social History   Social History     Substance and Sexual Activity   Alcohol Use Not Currently    Comment: used to be a heavy drinker, quit 15 years ago     Social History     Substance and Sexual Activity   Drug Use Not Currently   • Types: Marijuana, Cocaine    Comment: used to use drugs, consisting of crystal meth, cocaine, and marijuana, denied any IV drug use in the past     Social History     Tobacco Use   Smoking Status Never   Smokeless Tobacco Never     Family History   Problem Relation Age of Onset   • Prostate cancer Father    • Esophageal cancer Father    • Heart disease Father        Meds/Allergies       Current Outpatient Medications:   •  Biktarvy -25 MG tablet  •  cholecalciferol (VITAMIN D3) 1,000 units tablet  •  cyanocobalamin (VITAMIN B-12) 500 MCG tablet  •  docusate sodium (COLACE) 100 mg capsule  •  famotidine (PEPCID) 20 mg tablet  •  lisinopril (ZESTRIL) 20 mg tablet  •  Multiple Vitamin (multivitamin) capsule  •  Omega-3 1000 MG CAPS  •  potassium citrate (UROCIT-K) 10 mEq  •  pravastatin (PRAVACHOL) 80 mg tablet  •  famotidine (PEPCID) 20 mg tablet  •  metoprolol succinate (TOPROL-XL) 50 mg 24 hr tablet    Allergies   Allergen Reactions   • Epinephrine Palpitations and Shortness Of Breath   • Sulfa Antibiotics Other (See Comments)     Eye irritation           Objective     Blood pressure 150/88, pulse 65, temperature 97.6 °F (36.4 °C), temperature source Tympanic, weight 94.6 kg (208 lb 9.6 oz). Body mass index is 26.78 kg/m².       PHYSICAL EXAM:      Physical Exam  Constitutional:       Appearance: Normal appearance. He is well-developed. HENT:      Head: Normocephalic and atraumatic. Eyes:      General: No scleral icterus. Conjunctiva/sclera: Conjunctivae normal.      Pupils: Pupils are equal, round, and reactive to light. Cardiovascular:      Rate and Rhythm: Normal rate and regular rhythm. Heart sounds: Normal heart sounds. Pulmonary:      Effort: Pulmonary effort is normal. No respiratory distress. Breath sounds: Normal breath sounds. Abdominal:      General: Bowel sounds are normal. There is no distension. Palpations: Abdomen is soft. There is no mass. Tenderness: There is no abdominal tenderness. Hernia: No hernia is present. Musculoskeletal:         General: Normal range of motion. Cervical back: Normal range of motion. Lymphadenopathy:      Cervical: No cervical adenopathy. Skin:     General: Skin is warm. Neurological:      Mental Status: He is alert and oriented to person, place, and time. Psychiatric:         Behavior: Behavior normal.         Thought Content: Thought content normal.         Lab Results:   No visits with results within 1 Day(s) from this visit.    Latest known visit with results is:   Admission on 06/29/2023, Discharged on 06/29/2023   Component Date Value   • WBC 06/29/2023 5.70    • RBC 06/29/2023 4.78    • Hemoglobin 06/29/2023 16.4    • Hematocrit 06/29/2023 46.2    • MCV 06/29/2023 97    • MCH 06/29/2023 34.3    • MCHC 06/29/2023 35.5    • RDW 06/29/2023 12.1    • MPV 06/29/2023 12.1    • Platelets 18/17/1331 122 (L)    • nRBC 06/29/2023 0    • Neutrophils Relative 06/29/2023 49    • Immat GRANS % 06/29/2023 0    • Lymphocytes Relative 06/29/2023 41    • Monocytes Relative 06/29/2023 7    • Eosinophils Relative 06/29/2023 2    • Basophils Relative 06/29/2023 1    • Neutrophils Absolute 06/29/2023 2.82    • Immature Grans Absolute 06/29/2023 0.02    • Lymphocytes Absolute 06/29/2023 2.32    • Monocytes Absolute 06/29/2023 0.41    • Eosinophils Absolute 06/29/2023 0.09    • Basophils Absolute 06/29/2023 0.04    • Sodium 06/29/2023 140    • Potassium 06/29/2023 3.5    • Chloride 06/29/2023 104    • CO2 06/29/2023 28    • ANION GAP 06/29/2023 8    • BUN 06/29/2023 18    • Creatinine 06/29/2023 1.10    • Glucose 06/29/2023 86    • Calcium 06/29/2023 9.5    • AST 06/29/2023 40 (H)    • ALT 06/29/2023 46    • Alkaline Phosphatase 06/29/2023 46    • Total Protein 06/29/2023 7.4    • Albumin 06/29/2023 4.7    • Total Bilirubin 06/29/2023 1.12 (H)    • eGFR 06/29/2023 69    • Lipase 06/29/2023 29    • LACTIC ACID 06/29/2023 0.4 (L)    • Color, UA 06/29/2023 Yellow    • Clarity, UA 06/29/2023 Slightly Cloudy    • pH, UA 06/29/2023 7.0    • Leukocytes, UA 06/29/2023 Trace (A)    • Nitrite, UA 06/29/2023 Negative    • Protein, UA 06/29/2023 Negative    • Glucose, UA 06/29/2023 Negative    • Ketones, UA 06/29/2023 Negative    • Urobilinogen, UA 06/29/2023 0.2    • Bilirubin, UA 06/29/2023 Negative    • Occult Blood, UA 06/29/2023 Trace (A)    • Specific Alpha, UA 06/29/2023 1.025    • RBC, UA 06/29/2023 1-2    • WBC, UA 06/29/2023 2-4 (A)    • Epithelial Cells 06/29/2023 Occasional    • Bacteria, UA 06/29/2023 Occasional    • MUCUS THREADS 06/29/2023 Occasional (A)    • Amorphous Crystals, UA 06/29/2023 Moderate          Radiology Results:   No results found.

## 2023-09-14 DIAGNOSIS — R59.0 ABDOMINAL LYMPHADENOPATHY: ICD-10-CM

## 2023-09-14 DIAGNOSIS — R59.9 ENLARGEMENT OF LYMPH NODE: ICD-10-CM

## 2023-09-14 DIAGNOSIS — R93.89 ABNORMAL FINDING ON CT SCAN: ICD-10-CM

## 2023-09-14 DIAGNOSIS — R10.31 RIGHT LOWER QUADRANT ABDOMINAL PAIN: Primary | ICD-10-CM

## 2023-10-03 ENCOUNTER — TELEPHONE (OUTPATIENT)
Dept: GASTROENTEROLOGY | Facility: CLINIC | Age: 68
End: 2023-10-03

## 2023-10-03 NOTE — TELEPHONE ENCOUNTER
Spoke to patient confirming upcoming procedure. Patient was instructed to call [phone] if they have any questions or concerns about the prep instructions or if they need to change or cancel the procedure.

## 2023-10-12 ENCOUNTER — APPOINTMENT (OUTPATIENT)
Dept: LAB | Facility: HOSPITAL | Age: 68
End: 2023-10-12
Payer: MEDICARE

## 2023-10-12 ENCOUNTER — HOSPITAL ENCOUNTER (OUTPATIENT)
Dept: ULTRASOUND IMAGING | Facility: HOSPITAL | Age: 68
Discharge: HOME | End: 2023-10-12
Payer: MEDICARE

## 2023-10-12 DIAGNOSIS — C61 PROSTATE CANCER (HCC): ICD-10-CM

## 2023-10-12 DIAGNOSIS — N32.89 BLADDER WALL THICKENING: ICD-10-CM

## 2023-10-12 LAB — PSA SERPL-MCNC: 4.57 NG/ML (ref 0–4)

## 2023-10-12 PROCEDURE — 36415 COLL VENOUS BLD VENIPUNCTURE: CPT

## 2023-10-12 PROCEDURE — 84153 ASSAY OF PSA TOTAL: CPT

## 2023-10-12 PROCEDURE — 76775 US EXAM ABDO BACK WALL LIM: CPT

## 2023-10-12 RX ORDER — SODIUM CHLORIDE 9 MG/ML
125 INJECTION, SOLUTION INTRAVENOUS CONTINUOUS
Status: CANCELLED | OUTPATIENT
Start: 2023-10-12

## 2023-10-16 ENCOUNTER — ANESTHESIA (OUTPATIENT)
Dept: GASTROENTEROLOGY | Facility: MEDICAL CENTER | Age: 68
End: 2023-10-16

## 2023-10-16 ENCOUNTER — ANESTHESIA EVENT (OUTPATIENT)
Dept: GASTROENTEROLOGY | Facility: MEDICAL CENTER | Age: 68
End: 2023-10-16

## 2023-10-16 ENCOUNTER — HOSPITAL ENCOUNTER (OUTPATIENT)
Dept: GASTROENTEROLOGY | Facility: MEDICAL CENTER | Age: 68
Setting detail: OUTPATIENT SURGERY
Discharge: HOME | End: 2023-10-16
Attending: INTERNAL MEDICINE | Admitting: INTERNAL MEDICINE
Payer: MEDICARE

## 2023-10-16 VITALS
WEIGHT: 201.6 LBS | TEMPERATURE: 96.9 F | HEART RATE: 63 BPM | RESPIRATION RATE: 20 BRPM | HEIGHT: 74 IN | SYSTOLIC BLOOD PRESSURE: 103 MMHG | BODY MASS INDEX: 25.87 KG/M2 | DIASTOLIC BLOOD PRESSURE: 56 MMHG | OXYGEN SATURATION: 95 %

## 2023-10-16 DIAGNOSIS — K31.89 GASTRIC WALL THICKENING: ICD-10-CM

## 2023-10-16 DIAGNOSIS — K44.9 HIATAL HERNIA: ICD-10-CM

## 2023-10-16 DIAGNOSIS — K21.00 GASTROESOPHAGEAL REFLUX DISEASE WITH ESOPHAGITIS WITHOUT HEMORRHAGE: ICD-10-CM

## 2023-10-16 PROCEDURE — 88305 TISSUE EXAM BY PATHOLOGIST: CPT | Performed by: PATHOLOGY

## 2023-10-16 PROCEDURE — 88313 SPECIAL STAINS GROUP 2: CPT | Performed by: PATHOLOGY

## 2023-10-16 RX ORDER — SODIUM CHLORIDE 9 MG/ML
125 INJECTION, SOLUTION INTRAVENOUS CONTINUOUS
Status: DISCONTINUED | OUTPATIENT
Start: 2023-10-16 | End: 2023-10-16

## 2023-10-16 RX ORDER — PROPOFOL 10 MG/ML
INJECTION, EMULSION INTRAVENOUS AS NEEDED
Status: DISCONTINUED | OUTPATIENT
Start: 2023-10-16 | End: 2023-10-16

## 2023-10-16 RX ORDER — OMEPRAZOLE 40 MG/1
40 CAPSULE, DELAYED RELEASE ORAL DAILY
Qty: 30 CAPSULE | Refills: 0 | Status: SHIPPED | OUTPATIENT
Start: 2023-10-16 | End: 2023-11-15

## 2023-10-16 RX ORDER — LIDOCAINE HYDROCHLORIDE 20 MG/ML
INJECTION, SOLUTION EPIDURAL; INFILTRATION; INTRACAUDAL; PERINEURAL AS NEEDED
Status: DISCONTINUED | OUTPATIENT
Start: 2023-10-16 | End: 2023-10-16

## 2023-10-16 RX ORDER — ONDANSETRON 2 MG/ML
INJECTION INTRAMUSCULAR; INTRAVENOUS AS NEEDED
Status: DISCONTINUED | OUTPATIENT
Start: 2023-10-16 | End: 2023-10-16

## 2023-10-16 RX ORDER — PROPOFOL 10 MG/ML
INJECTION, EMULSION INTRAVENOUS CONTINUOUS PRN
Status: DISCONTINUED | OUTPATIENT
Start: 2023-10-16 | End: 2023-10-16

## 2023-10-16 RX ADMIN — PROPOFOL 150 MG: 10 INJECTION, EMULSION INTRAVENOUS at 11:05

## 2023-10-16 RX ADMIN — SODIUM CHLORIDE 125 ML/HR: 0.9 INJECTION, SOLUTION INTRAVENOUS at 10:57

## 2023-10-16 RX ADMIN — PROPOFOL 180 MCG/KG/MIN: 10 INJECTION, EMULSION INTRAVENOUS at 11:05

## 2023-10-16 RX ADMIN — LIDOCAINE HYDROCHLORIDE 100 MG: 20 INJECTION, SOLUTION EPIDURAL; INFILTRATION; INTRACAUDAL; PERINEURAL at 11:05

## 2023-10-16 RX ADMIN — ONDANSETRON 4 MG: 2 INJECTION INTRAMUSCULAR; INTRAVENOUS at 11:05

## 2023-10-16 NOTE — ANESTHESIA POSTPROCEDURE EVALUATION
Post-Op Assessment Note    CV Status:  Stable    Pain management: adequate     Mental Status:  Alert and awake   Hydration Status:  Euvolemic   PONV Controlled:  Controlled   Airway Patency:  Patent      Post Op Vitals Reviewed: Yes      Staff: Anesthesiologist         No notable events documented.     /59 (10/16/23 1117)    Temp     Pulse 65 (10/16/23 1117)   Resp 17 (10/16/23 1117)    SpO2 95 % (10/16/23 1117)

## 2023-10-16 NOTE — ANESTHESIA PREPROCEDURE EVALUATION
Procedure:  EGD    Relevant Problems   CARDIO   (+) HTN (hypertension)   (+) Hyperlipidemia      GI/HEPATIC   (+) GERD (gastroesophageal reflux disease)   (+) Hiatal hernia   (+) Non-alcoholic fatty liver disease      /RENAL   (+) Prostate nodule      MUSCULOSKELETAL   (+) Degenerative cervical disc   (+) Hiatal hernia      Digestive   (+) Gastric wall thickening      Other   (+) H/O ETOH abuse   (+) Splenomegaly        Physical Exam    Airway    Mallampati score: I  TM Distance: >3 FB  Neck ROM: full     Dental   No notable dental hx     Cardiovascular  Cardiovascular exam normal    Pulmonary  Pulmonary exam normal     Other Findings        Anesthesia Plan  ASA Score- 2     Anesthesia Type- IV sedation with anesthesia with ASA Monitors. Additional Monitors:     Airway Plan:            Plan Factors-Exercise tolerance (METS): >4 METS. Chart reviewed. Existing labs reviewed. Patient summary reviewed. Patient is not a current smoker. Obstructive sleep apnea risk education given perioperatively. Induction- intravenous. Postoperative Plan-     Informed Consent- Anesthetic plan and risks discussed with patient.

## 2023-10-16 NOTE — H&P
History and Physical - [facility]  Kiel Murcia 78 y.o. male MRN: [id number]    HPI: Kiel Murcia is a 78y.o. year old male who presents with GERD and gastric wall thickening. Review of Systems    Historical Information   Past Medical History:   Diagnosis Date    GERD (gastroesophageal reflux disease)     HIV (human immunodeficiency virus infection) ([address])     Hyperlipidemia     Hypertension     Kidney stone     Non-alcoholic fatty liver disease     PONV (postoperative nausea and vomiting)     Prostate cancer ([address])     Sleep apnea      Past Surgical History:   Procedure Laterality Date    COLONOSCOPY      HERNIA REPAIR      TX BX PROSTATE STRTCTC SATURATION SAMPLING IMG GID N/A 1/25/2022    Procedure: TRANSPERINEAL ULTRASOUND GUIDED BIOPSY PROSTATE;  Surgeon: Debora Flores MD;  Location: [facility];  Service: Urology    TX PROSTATE NEEDLE BIOPSY ANY APPROACH N/A 10/13/2020    Procedure: TRANSPERINEAL PROSTATE BIOPSY;  Surgeon: Debora Flores MD;  Location: [facility];  Service: Urology     Social History   Social History     Substance and Sexual Activity   Alcohol Use Not Currently    Comment: used to be a heavy drinker, quit 15 years ago     Social History     Substance and Sexual Activity   Drug Use Not Currently    Types: Marijuana, Cocaine    Comment: used to use drugs, consisting of crystal meth, cocaine, and marijuana, denied any IV drug use in the past     Social History     Tobacco Use   Smoking Status Never   Smokeless Tobacco Never     Family History   Problem Relation Age of Onset    Prostate cancer Father     Esophageal cancer Father     Heart disease Father        Meds/Allergies     (Not in a hospital admission)      Allergies   Allergen Reactions    Epinephrine Palpitations and Shortness Of Breath    Sulfa Antibiotics Other (See Comments)     Eye irritation       Objective     There were no vitals taken for this visit.       PHYSICAL EXAM    Gen: NAD  CV: RRR  CHEST: Clear  ABD: soft, NT/ND  EXT: no edema  Neuro: AAO      ASSESSMENT/PLAN:  This is a 78y.o. year old male here for EGD for evaluation of gastric wall thickening. PLAN:   Procedure: EGD.

## 2023-10-17 PROCEDURE — 88305 TISSUE EXAM BY PATHOLOGIST: CPT | Performed by: PATHOLOGY

## 2023-10-17 PROCEDURE — 88313 SPECIAL STAINS GROUP 2: CPT | Performed by: PATHOLOGY

## 2023-10-18 NOTE — RESULT ENCOUNTER NOTE
Hello. Follow up with [facility]  please. Thank you. Inform [facility] via [facility]. Please review the pathology/lab result of further discussion. Copied from [employer] message :       Bhaskar Dickinson,     Your biopsies from the esophagus, stomach and small bowel were all unremarkable. Continue omperazole and follow up in office.      Best regards,     Ling Fu MD

## 2023-10-19 ENCOUNTER — OFFICE VISIT (OUTPATIENT)
Dept: UROLOGY | Facility: CLINIC | Age: 68
End: 2023-10-19
Payer: MEDICARE

## 2023-10-19 VITALS
BODY MASS INDEX: 25.93 KG/M2 | RESPIRATION RATE: 20 BRPM | OXYGEN SATURATION: 95 % | WEIGHT: 202 LBS | HEIGHT: 74 IN | SYSTOLIC BLOOD PRESSURE: 164 MMHG | DIASTOLIC BLOOD PRESSURE: 86 MMHG | HEART RATE: 68 BPM

## 2023-10-19 DIAGNOSIS — N20.0 NEPHROLITHIASIS: ICD-10-CM

## 2023-10-19 DIAGNOSIS — C61 PROSTATE CANCER (HCC): Primary | ICD-10-CM

## 2023-10-19 PROCEDURE — 99214 OFFICE O/P EST MOD 30 MIN: CPT | Performed by: UROLOGY

## 2023-10-19 RX ORDER — POTASSIUM CITRATE 5 MEQ/1
5 TABLET, EXTENDED RELEASE ORAL
Qty: 90 TABLET | Refills: 3 | Status: SHIPPED | OUTPATIENT
Start: 2023-10-19

## 2023-10-19 NOTE — PROGRESS NOTES
UROLOGY FOLLOWUP NOTE     CHIEF COMPLAINT   Gurinder Hess is a 78 y.o. male with a complaint of   Chief Complaint   Patient presents with    Prostate Cancer       History of Present Illness:     78 y.o. male, partner of another patient of mine, who presents as a 2nd opinion. The patient has had a history of PSAs in the mid 2 range with PSA last year in the mid threes now with PSA of 4.7 and recheck of 4.8 in May and June of this year. He has a family history of prostate cancer in his father, who was treated with brachytherapy. He was recommended to undergo up from biopsy by his outside urologist.  Documentation of his rectal exam demonstrates a normal 15 gland prostate. He is an avid bicycle rider. Patient underwent initialy upfront multiparametric MRI of the prostate which demonstrated a right posterior lesion. Given immunocompromised/HIV status, the patient was very interested in perineal fusion. Unfortunate this could not be offered. Patient ultimately opted to undergo transperineal approach with saturation of the right side. Very low risk [place] 6 diagnosed, agreed to active surveillance. Subsequent confirmatory biopsy in 2022 did not demonstrate any additional prostate cancer. Patient continues on surveillance. Lab Results   Component Value Date    PSA 4.57 (H) 10/12/2023    PSA 4.2 (H) 10/18/2022    PSA 4.6 (H) 07/14/2022   PSA 5/2020 4.8    Patient has developed some GI issues for which she is following with gastroenterology. CT scan in June demonstrated bowel issues. He underwent endoscopies with negative biopsies. Performed a bowel cleanse with improved abdominal discomfort. At the time of his CT, bladder thickening was noted. Mild hematuria noted on urinalysis in the emergency room. No gross hematuria. Patient's issues have continued to improve. Does have some intermittent starting and stopping of urinary stream.  Known BPH.     Past Medical History:     Past Medical History: Diagnosis Date    Cancer ([facility])     GERD (gastroesophageal reflux disease)     HIV (human immunodeficiency virus infection) ([facility])     Hyperlipidemia     Hypertension     Kidney stone     Non-alcoholic fatty liver disease     PONV (postoperative nausea and vomiting)     Prostate cancer ([facility])     Sleep apnea        PAST SURGICAL HISTORY:     Past Surgical History:   Procedure Laterality Date    COLONOSCOPY      HERNIA REPAIR      MS BX PROSTATE STRTCTC SATURATION SAMPLING IMG GID N/A 1/25/2022    Procedure: TRANSPERINEAL ULTRASOUND GUIDED BIOPSY PROSTATE;  Surgeon: Yasmin Elias MD;  Location: [facility];  Service: Urology    MS PROSTATE NEEDLE BIOPSY ANY APPROACH N/A 10/13/2020    Procedure: TRANSPERINEAL PROSTATE BIOPSY;  Surgeon: Yasmin Elias MD;  Location: [facility];  Service: Urology       CURRENT MEDICATIONS:     Current Outpatient Medications   Medication Sig Dispense Refill    Biktarvy -25 MG tablet TAKE 1 TABLET BY MOUTH DAILY WITH BREAKFAST 90 tablet 1    cholecalciferol (VITAMIN D3) 1,000 units tablet Take 1,000 Units by mouth daily      cyanocobalamin (VITAMIN B-12) 500 MCG tablet Take 1,000 mcg by mouth daily      docusate sodium (COLACE) 100 mg capsule Take 100 mg by mouth 3 (three) times a day as needed for constipation      lisinopril (ZESTRIL) 20 mg tablet Take 20 mg by mouth daily      metoprolol succinate (TOPROL-XL) 50 mg 24 hr tablet Take 1 tablet (50 mg total) by mouth every evening (Patient taking differently: Take 50 mg by mouth every evening Patient states that he takes at night) 90 tablet 1    Multiple Vitamin (multivitamin) capsule Take 1 capsule by mouth daily      Omega-3 1000 MG CAPS Take 2 g by mouth daily      omeprazole (PriLOSEC) 40 MG capsule Take 1 capsule (40 mg total) by mouth daily 30 capsule 0    potassium citrate (UROCIT-K) 5 MEQ (540 MG) Take 1 tablet (5 mEq total) by mouth 3 (three) times a day with meals 90 tablet 3    pravastatin (PRAVACHOL) 80 mg tablet Take 80 mg by mouth daily       No current facility-administered medications for this visit. ALLERGIES:     Allergies   Allergen Reactions    Epinephrine Palpitations and Shortness Of Breath    Sulfa Antibiotics Other (See Comments)     Eye irritation       SOCIAL HISTORY:     Social History     Socioeconomic History    Marital status: Single     Spouse name: None    Number of children: None    Years of education: None    Highest education level: None   Occupational History    None   Tobacco Use    Smoking status: Never    Smokeless tobacco: Never   Vaping Use    Vaping Use: Never used   Substance and Sexual Activity    Alcohol use: Not Currently     Comment: used to be a heavy drinker, quit 15 years ago    Drug use: Not Currently     Types: Marijuana, Cocaine     Comment: used to use drugs, consisting of crystal meth, cocaine, and marijuana, denied any IV drug use in the past    Sexual activity: Not Currently     Partners: Male   Other Topics Concern    None   Social History Narrative    None     Social Determinants of Health     Financial Resource Strain: Not on file   Food Insecurity: No Food Insecurity (7/10/2023)    Hunger Vital Sign     Worried About Running Out of Food in the Last Year: Never true     Ran Out of Food in the Last Year: Never true   Transportation Needs: Not on file   Physical Activity: Not on file   Stress: Not on file   Social Connections: Not on file   Intimate Partner Violence: Not on file   Housing Stability: Not on file       SOCIAL HISTORY:     Family History   Problem Relation Age of Onset    Prostate cancer Father     Esophageal cancer Father     Heart disease Father        REVIEW OF SYSTEMS:     Review of Systems   Constitutional: Negative. Respiratory: Negative. Cardiovascular: Negative. Gastrointestinal:  Positive for abdominal pain and constipation. Genitourinary:  Positive for difficulty urinating (Intermittent). Negative for enuresis.    Musculoskeletal: Negative. Skin: Negative. Psychiatric/Behavioral: Negative. PHYSICAL EXAM:     /86   Pulse 68   Resp 20   Ht 6' 2" (1.88 m)   Wt 91.6 kg (202 lb)   SpO2 95%   BMI 25.94 kg/m²     General:  Healthy appearing male in no acute distress. They have a normal affect. There is not appear to be any gross neurologic defects or abnormalities. HEENT:  Normocephalic, atraumatic. Neck is supple without any palpable lymphadenopathy  Cardiovascular:  Patient has normal palpable distal radial pulses. There is no significant peripheral edema. No JVD is noted. Respiratory:  Patient has unlabored respirations. There is no audible wheeze or rhonchi. Abdomen:  Abdomen is soft and nontender. There is no tympany. Inguinal and umbilical hernia are not appreciated. :   Circumcised phallus, orthotopic meatus, testicles are smooth and descended, prior rectal exam demonstrated induration along the right lateral sidewall however today there is slight nodularity less than 1/2 cm in the area of prior abnormal exam  Musculoskeletal:  Patient does not have significant CVA tenderness in the  flank with palpation or percussion. They full range of motion in all 4 extremities. Strength in all 4 extremities appears congruent. Patient is able to ambulate without assistance or difficulty. Dermatologic:  Patient has no skin abnormalities or rashes.         LABS:     CBC:   Lab Results   Component Value Date    WBC 5.70 06/29/2023    HGB 16.4 06/29/2023    HCT 46.2 06/29/2023    MCV 97 06/29/2023     (L) 06/29/2023       BMP:   Lab Results   Component Value Date    GLUCOSE 99 12/16/2015    CALCIUM 9.5 06/29/2023     12/16/2015    K 3.5 06/29/2023    CO2 28 06/29/2023     06/29/2023    BUN 18 06/29/2023    CREATININE 1.10 06/29/2023     Lab Results   Component Value Date    PSA 4.57 (H) 10/12/2023    PSA 4.2 (H) 10/18/2022    PSA 4.6 (H) 07/14/2022   PSA 5/2020 4.8    IMAGING: 11/18/21  MULTIPARAMETRIC MRI OF THE PROSTATE WITH AND WITHOUT CONTRAST-WITH 3-D POSTPROCESSING. INDICATION:  C61: Malignant neoplasm of prostate. COMPARISON:  MR prostate 8/3/2020     PSA LEVEL: 4.9 ng/ml  8/18/2021. PRIOR BIOPSY DATE: 10/13/2020. BIOPSY RESULTS: A.  Prostate, right posterior medial, biopsy:     - Prostatic adenocarcinoma, acinar type, Irving score 3 + 3 = 6, Prognostic Grade Group 1, continuously       involving 15% of 1 of 2 cores. TECHNIQUE: The following pulse sequences were obtained:  Small field-of-view axial T1-weighted and multiplanar T2-weighted images; DWI axial and ADC map; large field of view axial T2 weighted images; T1w in-phase and opposed-phase axials of entire pelvis   and dynamic 3D T1w axial before and during IV contrast injection. Imaging performed on 3.0T MRI      CONTRAST:  Gadobutrol (Gadavist) 9 mL of Gadobutrol injection (SINGLE-DOSE)     TECHNICAL LIMITATIONS: None. FINDINGS:     PROSTATE:     Size: 5.5 x 4.6 x 4.7 cm = 64.4 cc. Post-biopsy hemorrhage:  None. Central gland enlargement (BPH): Moderate. Focal lesions - No dominant lesion. Heterogeneous peripheral zone. Findings of BPH with a mostly encapsulated OR a homogenous circumscribed nodule without encapsulation OR a homogenous hypointense area between nodules. PI-RADSv2.1 Category 2 - Low   (clinically significant cancer unlikely). Previously described lesion in the right posterior transition zone is not visualized. SEMINAL VESICLES: Right seminal vesicle is smaller than the left. Otherwise unremarkable. Note: Clinically significant cancer is defined on pathology/histology as Chaitanya score greater than or equal to 7, and/or volume of greater than or equal to 0.5 mL, and/or extraprostatic extension. URINARY BLADDER: Unremarkable. LYMPH NODES: No pelvic lymphadenopathy. BONES: No suspicious osseous lesion. IMPRESSION:     1.  PI-RADSv2.1 Category 2 - Low (clinically significant cancer is unlikely to be present). Previously described lesion in the right posterior transition zone is not visualized. 2. No extraprostatic tumor, seminal vesicle invasion, pelvic lymphadenopathy, or pelvic osseous metastatic disease. 3. Calculated prostate volume of 64.4 cc.      8/3/20  MULTIPARAMETRIC MRI OF THE PROSTATE WITH AND WITHOUT CONTRAST-WITH 3-D POSTPROCESSING. INDICATION:   N40.2: Nodular prostate without lower urinary tract symptoms  R97.20: Elevated prostate specific antigen (PSA). 79-year-old male with elevated PSA, family history of prostate cancer and 2 cm nodule towards the right base on physical exam.  History of negative prostate biopsy at an outside institution. COMPARISON: None     PSA LEVEL: 4.8 ng/ml  (5/27/2020). PRIORS: 2.4 on 4/18/2017; 2.9 on 7/13/2016. PRIOR BIOPSY DATE: 5/27/2020. BIOPSY RESULTS: Reportedly negative. TECHNIQUE: The following pulse sequences were obtained:  Small field-of-view axial T1-weighted and multiplanar T2-weighted images; DWI axial and ADC map; large field of view axial T2 weighted images; T1w in-phase and opposed-phase axials of entire pelvis   and dynamic 3D T1w axial before and during IV contrast injection. Imaging performed on 3.0T MRI      CONTRAST:  Gadobutrol (Gadavist) 9 mL of gadobutrol injection (MULTI-DOSE) . TECHNICAL LIMITATIONS: None. FINDINGS:     PROSTATE:     Size (AP x TRV x CC): 5.4 x 4.7 x 4.7 cm. Prostate volume: 60 mL. PSA density: 0.08 ng/mL2. POST-BIOPSY HEMORRHAGE:  None. PERIPHERAL ZONE:  Diffuse effacement by enlarged transition zone extruded TZ nodule extending into the right posterolateral PZ at the base, described in more detail below. No foci of restricted diffusion. TRANSITION ZONE: Moderate BPH. Atypical nodule in the right posterior TZ at the base, described in more detail below. .    CENTRAL ZONE: Normal.    ANTERIOR FIBROMUSCULAR STROMA:  Normal. FOCAL LESIONS:       LESION: 1       Size: 0.8 x 1.0 x 0.9 cm (AP x TRV x CC), measured on: Series 4, image 16; series 10, image 22. Location: Incompletely encapsulated extruded nodule in the posterior transition zone, protruding into the right posterolateral peripheral zone, near the base. T2-weighted images: Score 2: Circumscribed hypointense or heterogeneous encapsulated nodule(s)(BPH). Diffusion-weighted images: Score 4: Focal markedly hypointense on ADC and markedly hyperintense on high b-value DWI; less than 1.5 cm in greatest dimension. Dynamic post-contrast images: (-) Lesion that does not enhance early compared to the surrounding prostate or enhances diffusely so that the margins of the enhancing area do not correspond to a finding on T2-weighted images and/or DWI. PI-RADS Assessment Category: 3, Intermediate (presence of clinically significant cancer equivocal.      Extra-prostatic extension (EPE): Does not abut capsule. SEMINAL VESICLES:  Unremarkable     URINARY BLADDER: Mild bladder wall trabeculation. Otherwise unremarkable. LYMPH NODES: No pelvic lymphadenopathy. BONES: Normal marrow signal. No suspicious bony lesion. OTHER:       Vessels:  Normal.      Bowel:  Unremarkable. IMPRESSION:     1. PI-RADSv2 Category 3 - Intermediate (the presence of clinically significant cancer is equivocal): 0.8 x 1.0 x 0.9 cm atypical nodule in the right posterior transition zone near the base. 2. No extraprostatic tumor, seminal vesicle invasion, pelvic lymphadenopathy, or pelvic osseous metastatic disease. 3. Calculated prostate volume of 60 mL. Note: Clinically significant cancer is defined on pathology/histology as Central score greater than or equal to 7, and/or volume of greater than or equal to 0.5 mL, and/or extraprostatic extension. PATHOLOGY:     1/25/22  Final Diagnosis   A.  Prostate, Right Posterior Lateral, needle core biopsy:  - Benign prostate tissue; negative for malignancy and high grade prostatic intraepithelial neoplasia. B. Prostate, Right Posterior Medial, needle core biopsy:  - Benign prostate tissue; negative for malignancy and high grade prostatic intraepithelial neoplasia. C. Prostate, Right Base, needle core biopsy:  - Benign prostate tissue; negative for malignancy and high grade prostatic intraepithelial neoplasia. -- Confirmed by prostate triple immunostain ( and p63 positive; p504s negative). - Atrophy with acute and chronic inflammation. D. Prostate, Left Posterior Medial, needle core biopsy:  - Atypical small acinar proliferation. -- Prostate triple immunostain ( and p63 questionably negative/non-basal cell staining);        p504s negative). - Atrophy with acute and chronic inflammation. E. Prostate, Left Posterior Lateral, needle core biopsy:  - Benign prostate tissue; negative for malignancy and high grade prostatic intraepithelial neoplasia. - Atrophy with acute and chronic inflammation. F. Prostate, Left Base, needle core biopsy:  - Benign prostate tissue; negative for malignancy and high grade prostatic intraepithelial neoplasia. - Portion of benign seminal vesicle. G. Prostate, Left Anterior Lateral, needle core biopsy:  - Benign prostate tissue; negative for malignancy and high grade prostatic intraepithelial neoplasia. - Focal acute and chronic inflammation. H. Prostate, Left Anterior Medial, needle core biopsy:  - Benign prostate tissue; negative for malignancy. - Fibromuscular tissue with no prostatic glands. I. Prostate, Right Anterior Lateral, needle core biopsy:  - Benign prostate tissue; negative for malignancy and high grade prostatic intraepithelial neoplasia. -- Confirmed by prostate triple immunostain ( and p63 positive; p504s negative).       J. Prostate, Right Anterior Medial, needle core biopsy:  - Benign prostate tissue; negative for malignancy and high grade prostatic intraepithelial neoplasia. -- Confirmed by prostate triple immunostain ( and p63 patchy positivity; p504s negative). 10/13/20  Final Diagnosis    A. Prostate, right posterior medial, biopsy:     - Prostatic adenocarcinoma, acinar type, Jackson score 3 + 3 = 6, Prognostic Grade Group 1, continuously       involving 15% of 1 of 2 cores. Comment:  Results of multiplex immunostaining (, p63, P504S) help support the above diagnosis. B.  Prostate, right posterolateral, biopsy:     - Prostatic tissue with focal atrophy.     - No high grade prostatic intraepithelial neoplasia (HGPIN) or carcinoma identified. Comment:  Results of multiplex immunostaining (, p63, P504S) help support the above diagnosis. C.  Prostate, right base, biopsy:     - No significant pathologic abnormalities. - No high grade prostatic intraepithelial neoplasia (HGPIN) or carcinoma identified. D.  Prostate, left posterior lateral, biopsy:     - No significant pathologic abnormalities. - No high grade prostatic intraepithelial neoplasia (HGPIN) or carcinoma identified. E.  Prostate, left posterior medial, biopsy:     - No significant pathologic abnormalities. - No high grade prostatic intraepithelial neoplasia (HGPIN) or carcinoma identified. F.  Prostate, left base, biopsy:     - No significant pathologic abnormalities. - No high grade prostatic intraepithelial neoplasia (HGPIN) or carcinoma identified. G.  Prostate, left anterior lateral, biopsy:     - No significant pathologic abnormalities. - No high grade prostatic intraepithelial neoplasia (HGPIN) or carcinoma identified. Comment:  Results of multiplex immunostaining (, p63, P504S) help support the above diagnosis. H.  Prostate, left anterior medial, biopsy:     - No significant pathologic abnormalities.      - No high grade prostatic intraepithelial neoplasia (HGPIN) or carcinoma identified. I.  Prostate, right anterior medial, biopsy:     - No significant pathologic abnormalities. - No high grade prostatic intraepithelial neoplasia (HGPIN) or carcinoma identified. J.  Prostate, right anterior lateral, biopsy:     - No significant pathologic abnormalities. - No high grade prostatic intraepithelial neoplasia (HGPIN) or carcinoma identified. ASSESSMENT:     78 y.o. male with newly diagnosed very low risk Chaitanya 3+3=6 prostate cancer in the right posterior medial transperineal prostate biopsy, consistent with patient's multiparametric MRI    PLAN:     Patient will continue on active surveillance for very low risk prostate cancer. Has undertaken a surveillance confirmatory biopsy without residual disease. Continue PSA checks, next in 6 months. Additional PSA and rectal exam in 1 years time. We will continue to monitor the area of nodularity and induration on the right lateral side. Patient does have a history of stone disease. Has been on potassiums citrate 10 mEq daily. Patient is having new GI distress and issues. I recommended reducing the potassium citrate to 5 mEq a day. We will update urinalysis in 6 months. Small nonobstructing stone on CT. Would monitor and observe. Would not recommend active treatment at this time. Patient has some mild urinary discomfort that is intermittent and waxes and wanes. Would avoid medical therapy at the current time unless we see progression. Given the bladder wall thickening seen on CT, urinalysis reviewed. Urine study from June demonstrates 1-2 red blood cells per high-powered field. We will be rechecking his urinalysis in 6 months. If there is microscopic hematuria to meet threshold criteria, would consider office cystoscopy. Nonspecific bladder thickening may be more related to patient's BPH history.

## 2023-10-20 ENCOUNTER — OFFICE VISIT (OUTPATIENT)
Dept: CARDIOLOGY CLINIC | Facility: CLINIC | Age: 68
End: 2023-10-20

## 2023-10-20 ENCOUNTER — TELEPHONE (OUTPATIENT)
Age: 68
End: 2023-10-20

## 2023-10-20 VITALS
BODY MASS INDEX: 26.44 KG/M2 | SYSTOLIC BLOOD PRESSURE: 114 MMHG | HEART RATE: 65 BPM | HEIGHT: 74 IN | DIASTOLIC BLOOD PRESSURE: 86 MMHG | WEIGHT: 206 LBS

## 2023-10-20 DIAGNOSIS — I47.10 SVT (SUPRAVENTRICULAR TACHYCARDIA): ICD-10-CM

## 2023-10-20 DIAGNOSIS — I10 ESSENTIAL HYPERTENSION: ICD-10-CM

## 2023-10-20 DIAGNOSIS — R07.2 PRECORDIAL CHEST PAIN: Primary | ICD-10-CM

## 2023-10-20 DIAGNOSIS — E78.5 DYSLIPIDEMIA: ICD-10-CM

## 2023-10-20 DIAGNOSIS — R00.2 PALPITATIONS: ICD-10-CM

## 2023-10-20 RX ORDER — ATORVASTATIN CALCIUM 40 MG/1
40 TABLET, FILM COATED ORAL DAILY
Qty: 90 TABLET | Refills: 3 | Status: SHIPPED | OUTPATIENT
Start: 2023-10-20 | End: 2024-10-14

## 2023-10-20 NOTE — PROGRESS NOTES
Cardiology Office Note  Alvan Pauling, MD Junette Columbus, MD Clearence Riedel, DO, MD Torres Wrae DO, Janice Szymanski DO, [facility] [place]  ----------------------------------------------------------------  [employer]  [address] [place] [id number]    Jacquelyn Cline 78 y.o. male MRN: [id number]  Unit/Bed#:  Encounter: [id number]      History of Present Illness: It was a pleasure to see Jacquelyn Cline in the office today for follow-up CV evaluation. He has a past medical history of hypertension, dyslipidemia, MING, GERD, non alcoholic fatty liver disease and HIV. He established care with us in April 2022. Patient began to experience symptoms of discomfort in the chest and palpitations starting back in November 2021. The patient follows up with Gastroenterology for some symptoms of abdominal discomfort with radiation into the chest.  At that time, he began to experience left-sided discomfort in the chest.  The discomfort was described as both an aching and heaviness sensation. It did not radiate and was not associated with other symptoms. Symptoms also occur after he eats. Additionally, he experienced some palpitations which were described as a fluttering in the chest.  These would come and go lasting between 1-2 minutes. Both the chest discomfort and palpitations seem to resolve on their own. Neither of the symptoms worsened with physical activity or necessarily improved with rest.  And of note he also occasionally experiences some shortness of breath. Does have a family history of coronary disease with his father having had stents placed in his 76s. Due to the patient's symptoms, he was sent for event recorder, stress test and echocardiogram.  Event recorder demonstrated sinus rhythm with rare atrial and ventricular ectopy and a single run of nonsustained VT and 13 runs of nonsustained PSVT. Triggered events correlated with PSVT.   Echocardiogram showed normal left ventricular function without significant valvular disease. Stress test was found to be negative for myocardial ischemia. The patient was increased on metoprolol due to persistent palpitations and runs of nonsustained SVT causing symptoms. Since the increase, his palpitation burden had decreased and he had been feeling much improved. In the office today, palpitations remain very minimal in nature. They do not extend for significant periods of time. He feels to be in his usual state of health. He has been tracking his blood pressures and heart rates as well as his weights. All of these seem to be stable. He denies any chest pain, pressure, tightness or squeezing. Denies lightheadedness or dizziness. Denies lower extremity swelling, orthopnea or paroxysmal nocturnal dyspnea. The patient feels to be in his usual state of health. Review of Systems:  Review of Systems   Constitutional: Negative for decreased appetite, fever, weight gain and weight loss. HENT:  Negative for congestion and sore throat. Eyes:  Negative for visual disturbance. Cardiovascular:  Negative for chest pain, dyspnea on exertion, leg swelling, near-syncope and palpitations. Respiratory:  Negative for cough and shortness of breath. Hematologic/Lymphatic: Negative for bleeding problem. Skin:  Negative for rash. Musculoskeletal:  Negative for myalgias and neck pain. Gastrointestinal:  Negative for abdominal pain and nausea. Neurological:  Negative for light-headedness and weakness. Psychiatric/Behavioral:  Negative for depression.         Past Medical History:   Diagnosis Date    Cancer ([address])     GERD (gastroesophageal reflux disease)     HIV (human immunodeficiency virus infection) ([address])     Hyperlipidemia     Hypertension     Kidney stone     Non-alcoholic fatty liver disease     PONV (postoperative nausea and vomiting)     Prostate cancer ([address])     Sleep apnea        Past Surgical History:   Procedure Laterality Date    COLONOSCOPY      HERNIA REPAIR      VT BX PROSTATE STRTCTC SATURATION SAMPLING IMG GID N/A 1/25/2022    Procedure: TRANSPERINEAL ULTRASOUND GUIDED BIOPSY PROSTATE;  Surgeon: Olinda Lynch MD;  Location: [facility];  Service: Urology    VT PROSTATE NEEDLE BIOPSY ANY APPROACH N/A 10/13/2020    Procedure: TRANSPERINEAL PROSTATE BIOPSY;  Surgeon: Olinda yLnch MD;  Location: [facility];  Service: Urology       Social History     Socioeconomic History    Marital status: Single     Spouse name: None    Number of children: None    Years of education: None    Highest education level: None   Occupational History    None   Tobacco Use    Smoking status: Never    Smokeless tobacco: Never   Vaping Use    Vaping Use: Never used   Substance and Sexual Activity    Alcohol use: Not Currently     Comment: used to be a heavy drinker, quit 15 years ago    Drug use: Not Currently     Types: Marijuana, Cocaine     Comment: used to use drugs, consisting of crystal meth, cocaine, and marijuana, denied any IV drug use in the past    Sexual activity: Not Currently     Partners: Male   Other Topics Concern    None   Social History Narrative    None     Social Determinants of Health     Financial Resource Strain: Not on file   Food Insecurity: No Food Insecurity (7/10/2023)    Hunger Vital Sign     Worried About Running Out of Food in the Last Year: Never true     Ran Out of Food in the Last Year: Never true   Transportation Needs: Not on file   Physical Activity: Not on file   Stress: Not on file   Social Connections: Not on file   Intimate Partner Violence: Not on file   Housing Stability: Not on file       Family History   Problem Relation Age of Onset    Prostate cancer Father     Esophageal cancer Father     Heart disease Father        Allergies   Allergen Reactions    Epinephrine Palpitations and Shortness Of Breath    Sulfa Antibiotics Other (See Comments)     Eye irritation         Current Outpatient Medications: atorvastatin (LIPITOR) 40 mg tablet, Take 1 tablet (40 mg total) by mouth daily, Disp: 90 tablet, Rfl: 3    Biktarvy -25 MG tablet, TAKE 1 TABLET BY MOUTH DAILY WITH BREAKFAST, Disp: 90 tablet, Rfl: 1    cholecalciferol (VITAMIN D3) 1,000 units tablet, Take 1,000 Units by mouth daily, Disp: , Rfl:     cyanocobalamin (VITAMIN B-12) 500 MCG tablet, Take 1,000 mcg by mouth daily, Disp: , Rfl:     docusate sodium (COLACE) 100 mg capsule, Take 100 mg by mouth 3 (three) times a day as needed for constipation, Disp: , Rfl:     lisinopril (ZESTRIL) 20 mg tablet, Take 20 mg by mouth daily, Disp: , Rfl:     metoprolol succinate (TOPROL-XL) 50 mg 24 hr tablet, Take 1 tablet (50 mg total) by mouth every evening (Patient taking differently: Take 50 mg by mouth every evening Patient states that he takes at night), Disp: 90 tablet, Rfl: 1    Multiple Vitamin (multivitamin) capsule, Take 1 capsule by mouth daily, Disp: , Rfl:     Omega-3 1000 MG CAPS, Take 2 g by mouth daily, Disp: , Rfl:     omeprazole (PriLOSEC) 40 MG capsule, Take 1 capsule (40 mg total) by mouth daily, Disp: 30 capsule, Rfl: 0    potassium citrate (UROCIT-K) 5 MEQ (540 MG), Take 1 tablet (5 mEq total) by mouth 3 (three) times a day with meals, Disp: 90 tablet, Rfl: 3    Vitals:    10/20/23 1409   BP: 114/86   Pulse: 65   Weight: 93.4 kg (206 lb)   Height: 6' 2" (1.88 m)       Body mass index is 26.45 kg/m². PHYSICAL EXAMINATION:  Gen: Awake, Alert, NAD   Head/eyes: AT/NC, pupils equal and round, Anicteric  ENT: mmm  Neck: Supple, No elevated JVP, trachea midline  Resp: CTA bilaterally no w/r/r  CV: RRR +S1, S2, No m/r/g  Abd: Soft, NT/ND + BS  Ext: no LE edema bilaterally  Neuro:  Follows commands, moves all extermities  Psych: Appropriate affect, normal mood, cooperative attitude, non-combative  Skin: warm; no rash, erythema or venous stasis changes on exposed skin    --------------------------------------------------------------------------------  TREADMILL STRESS  Results for orders placed during the hospital encounter of 18    Stress test only, exercise    Narrative  [facility]. [address]  [phone]    Stress Electrocardiography during Exercise    Name: Mechelle Carty  MR #: [id number]  Account #: [de-identified]  Study date: 2018  : 1955  Age: 65 years  Gender: Male  Height: 73 in  Weight: 212 lb  BSA: 2.21 m squared    Allergies: SULFA ANTIBIOTICS    Diagnosis: R07.9 - Chest pain, unspecified    Primary Physician:  Divina Garza  Referring Physician:  Divina Garza  RN:  Sumeet John RN BSN  Technician:  Jocelynn Cazares  GROUP:  [id number]  Interpreting Physician:  Willy Meade DO  Report Prepared By[[email or web address]-identified]  Sumeet John RN BSN    CLINICAL QUESTION: Detection of coronary artery disease. HISTORY: The patient is a 65year old  male. Chest pain status: chest pain. Coronary artery disease risk factors: dyslipidemia, hypertension. Cardiovascular history: none significant. Co-morbidity: history of cocaine abuse. Medications: a beta blocker, an ACE inhibitor/ARB, aspirin, and a lipid lowering agent. PHYSICAL EXAM: Baseline physical exam screening: normal lung exam.    REST ECG: Normal sinus rhythm. Normal baseline ECG. PROCEDURE: Treadmill exercise testing was performed, using the Servando protocol. Stress electrocardiographic evaluation was performed. SERVANDO PROTOCOL:  HR bpm SBP mmHg DBP mmHg Symptoms  Baseline 67 119 77 none  Stage 1 96 150 69 none  Stage 2 115 156 68 none  Stage 3 146 174 82 mild dyspnea  Stage 4 160 149 78 dyspnea, fatigue  Recovery 1 94 165 70 subsiding  Recovery 2 89 158 72 none  Recovery 3 86 139 74 none  No medications or fluids given. STRESS SUMMARY: 02 sat at rest 99% and at peak stress 98%.  Duration of exercise was 9 min and 30 sec. The patient exercised to protocol stage 4. Maximal work rate was 10.9 METs. Maximal heart rate during stress was 160 bpm ( 101 % of  maximal predicted heart rate). The heart rate response to stress was normal. There was normal resting blood pressure with an appropriate response to stress. The rate-pressure product for the peak heart rate and blood pressure was 13110. There was no chest pain during stress. The stress test was terminated due to achievement of maximal (symptom limited) exercise and achievement of target heart rate. The stress ECG was negative for ischemia. There were no stress arrhythmias  or conduction abnormalities. SUMMARY:  -  Stress results: Duration of exercise was 9 min and 30 sec. Target heart rate was achieved. There was no chest pain during stress. -  ECG conclusions: The stress ECG was negative for ischemia. IMPRESSIONS: Normal study after maximal exercise. No chest pain or ischemic ECG changes with exercise. Normal blood pressure and heart rate response to exercise with no evidence of exercise induced hypoxia. Good functional capacity. Prepared and signed by    Joellyn Duverney, DO  Signed 01/22/2018 15:23:34    Exercise stress test is negative for myocardial ischemia, ET 9:30, 101% MPHR, 10.9 METs, January 2018     --------------------------------------------------------------------------------  NUCLEAR STRESS TEST: No results found for this or any previous visit. No results found for this or any previous visit.      --------------------------------------------------------------------------------  CATH:  No results found for this or any previous visit.    --------------------------------------------------------------------------------  ECHO:   No results found for this or any previous visit.     No results found for this or any previous visit.    --------------------------------------------------------------------------------  HOLTER  No results found for this or any previous visit. No results found for this or any previous visit.    --------------------------------------------------------------------------------  CAROTIDS  No results found for this or any previous visit.     --------------------------------------------------------------------------------  ECGs:  Results for orders placed or performed in visit on 10/20/23   POCT ECG    Impression    Sinus rhythm 65 bpm, left atrial abnormality        Lab Results   Component Value Date    WBC 5.70 06/29/2023    HGB 16.4 06/29/2023    HCT 46.2 06/29/2023    MCV 97 06/29/2023     (L) 06/29/2023      Lab Results   Component Value Date    SODIUM 140 06/29/2023    K 3.5 06/29/2023     06/29/2023    CO2 28 06/29/2023    BUN 18 06/29/2023    CREATININE 1.10 06/29/2023    GLUC 86 06/29/2023    CALCIUM 9.5 06/29/2023      Lab Results   Component Value Date    HGBA1C 5.0 11/21/2022      Lab Results   Component Value Date    CHOL 117 06/16/2015    CHOL 170 08/06/2014     Lab Results   Component Value Date    HDL 36 (L) 11/21/2022    HDL 42 07/15/2022    HDL 42 07/19/2021     Lab Results   Component Value Date    LDLCALC 70 11/21/2022    LDLCALC 88 07/15/2022    LDLCALC 82 07/19/2021     Lab Results   Component Value Date    TRIG 101 11/21/2022    TRIG 57 07/15/2022    TRIG 74 07/19/2021     No results found for: "CHOLHDL"   Lab Results   Component Value Date    INR 0.98 12/05/2022    PROTIME 13.0 12/05/2022        1. Precordial chest pain  -     POCT ECG    2. Palpitations    3. SVT (supraventricular tachycardia)    4. Essential hypertension    5. Dyslipidemia  -     atorvastatin (LIPITOR) 40 mg tablet; Take 1 tablet (40 mg total) by mouth daily  -     Lipid Panel with Direct LDL reflex;  Future; Expected date: 04/20/2024        IMPRESSION:  Precordial chest pain - resolved  Exercise nuclear stress test appears negative for myocardial ischemia, ET 10 min, 97% MPHR, 11.7 METs, gated EF 64%, June 2022  Palpitations   Event recorder w/ SR avg HR 67 bpm, rare APCs, atrial couplets/triplets, rare VPCs, ventricular couplets/triplets, nonsustained VT (x1) 4 beats at 210 bpm, nonsustained PSVT (x13) 14 beats at 190 bpm, triggered events correlated with nonsustained PSVT, April 2022  Dyspnea on exertion  LVEF 65%, mild MILAN, normal diastolic function, mild RV dilatation, trace AR/MR, mild TR, ascending aortic ectasia 3.8 cm, June 2022  Hypertension  Dyslipidemia w/ LDL 88 mg/dL, HDL 42 mg/dL, TG 57 mg/dL, July 2023  ASCVD 11.5%, November 2023  MING  GERD   Non alcoholic fatty liver disease  History of prostate CA - active, January 2020  History of HIV    PLAN:  It was a pleasure to see Tanesha Rouse in the office today for follow-up CV evaluation. He is here today for routine CV follow-up. Since her last encounter, he has been feeling much improved overall. His palpitations are minimal and they do not extend for any significant period of time. He has no chest pain concerning for angina and no signs or symptoms of heart failure. Clinically he examines to be euvolemic. Blood pressure and heart rate are currently stable. Blood pressures at home have been predominantly well controlled with an average systolic pressure in the 848Y and not a single reading of diastolic pressure over 90. He has been tolerating his current medications without any reported adverse effects. He can perform greater than 4 METS on daily basis without significant exertional symptoms. ECG is nonischemic. Based on his clinical presentation, I have the following recommendations:    1. Recommend 30 minutes a day, 5 days a week of moderate intensity activity to build cardiovascular endurance. 2.  Blood pressure has been stable. Continue Toprol-XL 50 mg daily and lisinopril 20 mg daily. Should he need further antihypertensive control, can consider minimal increase in the metoprolol for an additional dose of 25 mg in the evening. 3.  LDL is 88 mg/dL on his blood work in July 2023. Would ideally like to move his LDL below 70. We will switch him to atorvastatin 40 mg daily with repeat lipid panel in 6 months. 4.  Recommend heart healthy diet low in sodium and carbohydrate  5. No additional CV testing at this time. May consider repeat echocardiogram sometime in the next year. 6.  If his palpitations worsen in frequency or severity or change in quality, recommend seeking immediate medical attention/calling the office. 7.  We will follow-up with him in 6 months to review the results of his lipid panel within 1 year. As always, please do not hesitate to call with any questions. Portions of the record may have been created with voice recognition software. Occasional wrong word or "sound a like" substitutions may have occurred due to the inherent limitations of voice recognition software. Read the chart carefully and recognize, using context, where substitutions have occurred.       Signed: Taisha Narvaez DO, [facility] [place] [facility], [place]

## 2023-10-23 ENCOUNTER — TELEPHONE (OUTPATIENT)
Age: 68
End: 2023-10-23

## 2023-10-23 DIAGNOSIS — K21.00 GASTROESOPHAGEAL REFLUX DISEASE WITH ESOPHAGITIS WITHOUT HEMORRHAGE: Primary | ICD-10-CM

## 2023-10-23 DIAGNOSIS — K21.00 GASTROESOPHAGEAL REFLUX DISEASE WITH ESOPHAGITIS WITHOUT HEMORRHAGE: ICD-10-CM

## 2023-10-23 DIAGNOSIS — E78.5 DYSLIPIDEMIA: ICD-10-CM

## 2023-10-23 RX ORDER — ATORVASTATIN CALCIUM 40 MG/1
40 TABLET, FILM COATED ORAL DAILY
Qty: 90 TABLET | Refills: 3 | Status: SHIPPED | OUTPATIENT
Start: 2023-10-23 | End: 2024-10-17

## 2023-10-23 RX ORDER — OMEPRAZOLE 40 MG/1
40 CAPSULE, DELAYED RELEASE ORAL DAILY
Qty: 30 CAPSULE | Refills: 0 | Status: SHIPPED | OUTPATIENT
Start: 2023-10-23 | End: 2023-10-23 | Stop reason: SDUPTHER

## 2023-10-23 RX ORDER — OMEPRAZOLE 40 MG/1
40 CAPSULE, DELAYED RELEASE ORAL DAILY
Qty: 30 CAPSULE | Refills: 0 | Status: SHIPPED | OUTPATIENT
Start: 2023-10-23

## 2023-10-24 ENCOUNTER — OFFICE VISIT (OUTPATIENT)
Dept: SURGERY | Facility: CLINIC | Age: 68
End: 2023-10-24

## 2023-10-24 ENCOUNTER — TREATMENT (OUTPATIENT)
Dept: SURGERY | Facility: CLINIC | Age: 68
End: 2023-10-24

## 2023-10-24 ENCOUNTER — DOCUMENTATION (OUTPATIENT)
Dept: SURGERY | Facility: CLINIC | Age: 68
End: 2023-10-24

## 2023-10-24 VITALS
OXYGEN SATURATION: 98 % | SYSTOLIC BLOOD PRESSURE: 147 MMHG | RESPIRATION RATE: 17 BRPM | DIASTOLIC BLOOD PRESSURE: 83 MMHG | BODY MASS INDEX: 26.77 KG/M2 | TEMPERATURE: 98.6 F | HEIGHT: 74 IN | HEART RATE: 63 BPM | WEIGHT: 208.6 LBS

## 2023-10-24 DIAGNOSIS — K20.90 ESOPHAGITIS: ICD-10-CM

## 2023-10-24 DIAGNOSIS — F32.A DEPRESSION, UNSPECIFIED DEPRESSION TYPE: Primary | ICD-10-CM

## 2023-10-24 DIAGNOSIS — Z23 NEED FOR INFLUENZA VACCINATION: ICD-10-CM

## 2023-10-24 DIAGNOSIS — D70.9 NEUTROPENIA, UNSPECIFIED TYPE (HCC): ICD-10-CM

## 2023-10-24 DIAGNOSIS — N32.89 BLADDER WALL THICKENING: ICD-10-CM

## 2023-10-24 DIAGNOSIS — R59.0 PERIAORTIC LYMPHADENOPATHY: ICD-10-CM

## 2023-10-24 DIAGNOSIS — B20 HIV DISEASE (HCC): Primary | ICD-10-CM

## 2023-10-24 DIAGNOSIS — I10 PRIMARY HYPERTENSION: ICD-10-CM

## 2023-10-24 DIAGNOSIS — Z80.42 FAMILY HISTORY OF PROSTATE CANCER IN FATHER: ICD-10-CM

## 2023-10-24 DIAGNOSIS — R93.89 ABNORMAL FINDING ON CT SCAN: ICD-10-CM

## 2023-10-24 RX ORDER — FAMOTIDINE 20 MG/1
20 TABLET, FILM COATED ORAL DAILY
Qty: 90 TABLET | Refills: 0 | Status: SHIPPED | OUTPATIENT
Start: 2023-10-24

## 2023-10-24 NOTE — ASSESSMENT & PLAN NOTE
Doing well on Biktarvy with an undetectable VL. Pt reports 100% medication compliance on HAART. Stressed the importance of 100% medication adherence. HIV Counseling:    Viral Load: < 20    CD4 Count: 494      Denies side effects. Stressed the importance of adherence. Continue follow up in the ID clinic with Dr. Blayne Worley or Dr. Ivy Oconnell. Reviewed the most recent labs, including the CD4 and viral load. Discussed the risks and benefits of treatment options, instructions for management, importance of treatment adherence, and reduction of risk factors. Educated on possible medication side effects. Counseled on routes of HIV transmission, including the risk of  infection. Emphasized that viral suppression is the best method to prevent HIV transmission. At this time the pt denies the need for HIV testing of anyone in their life. Total encounter time was greater than 35 minutes. Greater than 20 minutes were spent on counseling and patient education. Pt voices understanding and agreement with treatment plan.

## 2023-10-24 NOTE — PROGRESS NOTES
Assessment/Plan:    HIV disease ([address])  Doing well on Biktarvy with an undetectable VL. Pt reports 100% medication compliance on HAART. Stressed the importance of 100% medication adherence. HIV Counseling:    Viral Load: < 20    CD4 Count: 712      Denies side effects. Stressed the importance of adherence. Continue follow up in the ID clinic with Dr. Eufemia Sánchez or Dr. Hawk Muro. Reviewed the most recent labs, including the CD4 and viral load. Discussed the risks and benefits of treatment options, instructions for management, importance of treatment adherence, and reduction of risk factors. Educated on possible medication side effects. Counseled on routes of HIV transmission, including the risk of  infection. Emphasized that viral suppression is the best method to prevent HIV transmission. At this time the pt denies the need for HIV testing of anyone in their life. Total encounter time was greater than 35 minutes. Greater than 20 minutes were spent on counseling and patient education. Pt voices understanding and agreement with treatment plan. HTN (hypertension)  Slightly elevated but home records indicate BP is usually at goal.  Continue Toprol XL and lisinopril for now  Monitor BP at home  Follow with cardiology     Bladder wall thickening  Could be related to pt's BPH. Continue surveillance PSA diagnostic every 6 months  Follow with urology     Neutropenia ([address])  Most likely in the setting of chronic HIV disease. Kimsberg remains 1.35    Continue to follow with hematology  No proliferative pathology noted on workup   Monitor CBCD      Esophagitis  Found on EGD . Continue omeprazole  Follow with GI  Avoid acidic foods   Discussed adding pepcid for break through heartburn with dinner    Periaortic lymphadenopathy  Incidental finding on ct scan of chest abdomen. Unclear etiology.   Work up for prostate recurrence CA negative and hematology workup was negative in past.  F/u ct scan ordered for 12/29 at 6 months         Diagnoses and all orders for this visit:    HIV disease ([address])    Need for influenza vaccination  -     influenza vaccine, high-dose, PF 0.7 mL (FLUZONE HIGH-DOSE)    Primary hypertension    Bladder wall thickening    Neutropenia, unspecified type (HCC)    Esophagitis  -     famotidine (PEPCID) 20 mg tablet; Take 1 tablet (20 mg total) by mouth daily    Periaortic lymphadenopathy    Abnormal finding on CT scan    Family history of prostate cancer in father    Other orders  -     psyllium (METAMUCIL) 58.6 % packet; Take 1 packet by mouth daily        Lab Results   Component Value Date     12/16/2015    K 3.5 06/29/2023     06/29/2023    CO2 28 06/29/2023    ANIONGAP 6 12/16/2015    BUN 18 06/29/2023    CREATININE 1.10 06/29/2023    GLUCOSE 99 12/16/2015    GLUF 106 (H) 06/12/2023    CALCIUM 9.5 06/29/2023    AST 40 (H) 06/29/2023    ALT 46 06/29/2023    ALKPHOS 46 06/29/2023    PROT 7.2 12/16/2015    BILITOT 0.91 12/16/2015    EGFR 69 06/29/2023     Lab Results   Component Value Date    WBC 5.70 06/29/2023    HGB 16.4 06/29/2023    HCT 46.2 06/29/2023    MCV 97 06/29/2023     (L) 06/29/2023          Subjective:      Patient ID: Kiki Jiménez is a 78 y.o. male. HPI  Claudy Gonzalez presents for follow up visit for management of HIV and chronic health care conditions. Claudy Gonzalez reports doing good. Claudy Gonzalez had recent f/u with cardiology for mild sob and palpitations with no changes in care at this time or further CV workup. His LDL was not at goal and changed to atorvastatin from pravastatin. Claduy Gonzalez also went to Urology for a second opinion regarding his PSA levels and negative bx given family hx of father with prostate cancer and pt's previous treatment for prostate cancer. Pt was told to continue surveillance screening for every 6 months and was consider a low risk. Heartburn is doing better with omeprazole but does have a break through heartburn when eating dinner at night. He does not endorse any fever, chills, nausea, vomiting, cough, SOB, diarrhea, or night sweats. The following portions of the patient's history were reviewed and updated as appropriate: allergies, current medications, past family history, past medical history, past social history, and problem list.    Review of Systems   Constitutional: Negative. HENT: Negative. Respiratory: Negative. Cardiovascular: Negative. Gastrointestinal: Negative. Neurological: Negative. Psychiatric/Behavioral: Negative. Objective:      /83 (BP Location: Right arm, Patient Position: Sitting, Cuff Size: Standard)   Pulse 63   Temp 98.6 °F (37 °C) (Tympanic)   Resp 17   Ht 6' 2" (1.88 m)   Wt 94.6 kg (208 lb 9.6 oz)   SpO2 98%   BMI 26.78 kg/m²          Physical Exam  Vitals and nursing note reviewed. Constitutional:       Appearance: Normal appearance. Cardiovascular:      Rate and Rhythm: Normal rate and regular rhythm. Chest Wall: PMI is not displaced. Pulses: Normal pulses. Heart sounds: Normal heart sounds. Pulmonary:      Effort: Pulmonary effort is normal.      Breath sounds: Normal breath sounds. Abdominal:      General: Bowel sounds are normal.      Palpations: Abdomen is soft. Musculoskeletal:         General: Normal range of motion. Skin:     General: Skin is warm and dry. Capillary Refill: Capillary refill takes less than 2 seconds. Neurological:      Mental Status: He is alert and oriented to person, place, and time. Psychiatric:         Mood and Affect: Mood normal.         Behavior: Behavior normal.         Thought Content:  Thought content normal.         Judgment: Judgment normal.

## 2023-10-24 NOTE — ASSESSMENT & PLAN NOTE
Could be related to pt's BPH.   Continue surveillance PSA diagnostic every 6 months  Follow with urology

## 2023-10-24 NOTE — ASSESSMENT & PLAN NOTE
Incidental finding on ct scan of chest abdomen. Unclear etiology.   Work up for prostate recurrence CA negative and hematology workup was negative in past.  F/u ct scan ordered for 12/29 at 6 months Problem: SKIN/TISSUE INTEGRITY - ADULT  Goal: Skin integrity remains intact  INTERVENTIONS  - Assess and document risk factors for pressure ulcer development  - Assess and document skin integrity  - Monitor for areas of redness and/or skin breakdown  - Ini for assistance with activity based on assessment  - Modify environment to reduce risk of injury  - Provide assistive devices as appropriate  - Consider OT/PT consult to assist with strengthening/mobility  - Encourage toileting schedule  Outcome: Progressin

## 2023-10-24 NOTE — ASSESSMENT & PLAN NOTE
Most likely in the setting of chronic HIV disease.   ANC remains 1.35    Continue to follow with hematology  No proliferative pathology noted on workup   Monitor CBCD

## 2023-10-24 NOTE — ASSESSMENT & PLAN NOTE
Slightly elevated but home records indicate BP is usually at goal.  Continue Toprol XL and lisinopril for now  Monitor BP at home  Follow with cardiology

## 2023-10-24 NOTE — PROGRESS NOTES
Assessment/Plan: Pt was approached by S after completing session with assigned PCP to explore multidimensional stability by completing the PHQ-9 screening. During today's contact, pt disclosed being currently stable after experiencing a couple of past stressful months. Pt proceeded to describe going through a battery of specialist test after experiencing severe abdominal pain. Pt described being concerned about poor prognosis, but after several tests, pt shared "feeling better". Pt's emotions and cognitions were validated throughout contact, along with receiving positive reinforcements for his willingness to address circumstances. A brief psycho-educational conversation was developed regarding the importance of aiming in cognitive and emotional expressions by addressing stressors with providers to acquire proper assistance. Pt was encouraged to benefit from [facility] services to address any future concerns, pt agreed to benefit from services. Before session's completion, pt completed the PHQ-9 assessment, scoring 4 as a display of  minimal depressive traits without SI or HI. Counts include [facility] beds at the [facility]     Today patient present with No chief complaint on file. Patient would likely benefit from: Addressing core stressors within [facility] interventions to explore assertive and healthy coping mechanisms. Consider/focus/continue: Monitoring pt's emotional, cognitive, and behavioral health's stability. Stage of change: Pre-contemplation  Plan/ Behavioral Recommendations: Ongoing  interventions per pt's coordinated care, and/or pt's request of services. There are no diagnoses linked to this encounter. Subjective:     Patient ID: Saw De La Fuente is a 78 y.o. male. HPI    History of Present Illness:      Patient is being seen for annual behavioral health assessment. Patient reports their behavioral health concerns are stable.     [unfilled]       Review of Systems      Objective:     Physical Exam      [facility]    Orientation     Person: yes    Place: yes    Time: yes    Appearance    Well Developed: yes healthy    Uncomfortable: no    Normal Body Odor: yes    Smells of Feces: no    Smells of Urine: no    Disheveled: no    Well Nourished: yes overweight    Grooming Unkempt: no    Poor Eye Contact: no    Hirsute: no    Looks Tired: no    Acutely Exhausted: noappearance reflects stated age    Mood and Affect:     Appropriate: yes    Euthymicyes    Irritable: no    Angry: no    Anxious: no    Depressed:no    Blunted:no    Labile: no    Restricted: no    Harm to Self or Others: None reported by pt. Substance Abuse: None reported by pt.

## 2023-10-24 NOTE — PROGRESS NOTES
[facility] Nutrition Encounter, brief    RD met with Laila Jefferson during scheduled appt with PCP, in order to check in with pt, offer any nutritional education as desired. Laila Jefferson advised that he has been doing well and has been trying to keep his diet "low fat, low CHO, low Na, low acid" for health conditions. RD also reminded him to enjoy smaller portions of foods as a means of preventing heartburn, pt agreed. He does eat out frequently and is mindful of portion control; has not been cooking as often at home anymore. He is also well aware of the foods that trigger his heartburn and either makes alternative plans or takes proper precautions include prescribed medications. Laila Jefferson does read nutrition facts labels, and opts for healthier fat choices. Discussed snacks to eat in between meals to prevent binge eating at mealtime and stave off hunger, including fruits, cut vegetables with hummus or guacamole, he does like rice cakes. Liala Jefferson had no questions or concerns he wished to discuss with RD at this time.       Sharlene Bradshaw, MS, RD, LDN

## 2023-10-24 NOTE — ASSESSMENT & PLAN NOTE
Found on EGD 9/23.   Continue omeprazole  Follow with GI  Avoid acidic foods   Discussed adding pepcid for break through heartburn with dinner

## 2023-10-26 DIAGNOSIS — R59.0 PERIAORTIC LYMPHADENOPATHY: Primary | ICD-10-CM

## 2023-10-26 DIAGNOSIS — I47.10 SVT (SUPRAVENTRICULAR TACHYCARDIA): ICD-10-CM

## 2023-10-26 DIAGNOSIS — R00.2 PALPITATIONS: ICD-10-CM

## 2023-10-26 RX ORDER — METOPROLOL SUCCINATE 50 MG/1
50 TABLET, EXTENDED RELEASE ORAL DAILY
Qty: 90 TABLET | Refills: 1 | Status: SHIPPED | OUTPATIENT
Start: 2023-10-26

## 2023-10-30 DIAGNOSIS — D75.1 POLYCYTHEMIA: ICD-10-CM

## 2023-10-30 DIAGNOSIS — R93.5 ABNORMAL ABDOMINAL CT SCAN: ICD-10-CM

## 2023-10-30 DIAGNOSIS — K31.89 GASTRIC WALL THICKENING: Primary | ICD-10-CM

## 2023-10-30 DIAGNOSIS — R59.0 PERIAORTIC LYMPHADENOPATHY: ICD-10-CM

## 2023-10-30 DIAGNOSIS — R16.1 SPLENOMEGALY: ICD-10-CM

## 2023-10-30 NOTE — PROGRESS NOTES
Discussed results of Ct scan abdomen with incidental finding of for aida aortic lymph node with Dr. Leonor Louie. Repeat ct scan was scheduled for 6 months but moved up ct scan based on Dr. Leonor Louie recommendation. Periaortic lymph node measuring up to 9 mm of unclear etiology.      Discussed with pt who will call radiology to schedule ct scan   Ct abdomen pelvis with contrast

## 2023-10-31 DIAGNOSIS — R59.0 PERIAORTIC LYMPHADENOPATHY: Primary | ICD-10-CM

## 2023-11-03 ENCOUNTER — APPOINTMENT (OUTPATIENT)
Dept: LAB | Facility: HOSPITAL | Age: 68
End: 2023-11-03
Payer: MEDICARE

## 2023-11-03 DIAGNOSIS — R59.0 PERIAORTIC LYMPHADENOPATHY: ICD-10-CM

## 2023-11-03 LAB
ALBUMIN SERPL BCP-MCNC: 4.4 G/DL (ref 3.5–5)
ALP SERPL-CCNC: 47 U/L (ref 34–104)
ALT SERPL W P-5'-P-CCNC: 47 U/L (ref 7–52)
ANION GAP SERPL CALCULATED.3IONS-SCNC: 4 MMOL/L
AST SERPL W P-5'-P-CCNC: 32 U/L (ref 13–39)
BILIRUB SERPL-MCNC: 0.83 MG/DL (ref 0.2–1)
BUN SERPL-MCNC: 18 MG/DL (ref 5–25)
CALCIUM SERPL-MCNC: 9.4 MG/DL (ref 8.4–10.2)
CHLORIDE SERPL-SCNC: 104 MMOL/L (ref 96–108)
CO2 SERPL-SCNC: 30 MMOL/L (ref 21–32)
CREAT SERPL-MCNC: 1.03 MG/DL (ref 0.6–1.3)
GFR SERPL CREATININE-BSD FRML MDRD: 75 ML/MIN/1.73SQ M
GLUCOSE P FAST SERPL-MCNC: 99 MG/DL (ref 65–99)
POTASSIUM SERPL-SCNC: 4 MMOL/L (ref 3.5–5.3)
PROT SERPL-MCNC: 6.8 G/DL (ref 6.4–8.4)
SODIUM SERPL-SCNC: 138 MMOL/L (ref 135–147)

## 2023-11-03 PROCEDURE — 36415 COLL VENOUS BLD VENIPUNCTURE: CPT

## 2023-11-03 PROCEDURE — 80053 COMPREHEN METABOLIC PANEL: CPT

## 2023-11-06 ENCOUNTER — PATIENT OUTREACH (OUTPATIENT)
Dept: SURGERY | Facility: CLINIC | Age: 68
End: 2023-11-06

## 2023-11-06 NOTE — PROGRESS NOTES
PN contacted patient to do re-certification over the phone. Patient doing well, taking medication daily without missing any doses. Patient last saw his dentist  two weeks ago and is doing well. Patient does not have any issues with transportation and comes to all his appointments. Patient aware the RW sliding scale fee will need to be updated, patient will email financial documents once he has them.       Acuity score: level 1

## 2023-11-09 DIAGNOSIS — K20.90 ESOPHAGITIS: ICD-10-CM

## 2023-11-09 RX ORDER — FAMOTIDINE 20 MG/1
20 TABLET, FILM COATED ORAL DAILY
Qty: 90 TABLET | Refills: 0 | Status: SHIPPED | OUTPATIENT
Start: 2023-11-09

## 2023-11-10 ENCOUNTER — HOSPITAL ENCOUNTER (OUTPATIENT)
Dept: CT IMAGING | Facility: HOSPITAL | Age: 68
Discharge: HOME | End: 2023-11-10
Payer: MEDICARE

## 2023-11-10 DIAGNOSIS — K31.89 GASTRIC WALL THICKENING: ICD-10-CM

## 2023-11-10 DIAGNOSIS — R93.5 ABNORMAL ABDOMINAL CT SCAN: ICD-10-CM

## 2023-11-10 DIAGNOSIS — R16.1 SPLENOMEGALY: ICD-10-CM

## 2023-11-10 DIAGNOSIS — D75.1 POLYCYTHEMIA: ICD-10-CM

## 2023-11-10 DIAGNOSIS — R59.0 PERIAORTIC LYMPHADENOPATHY: ICD-10-CM

## 2023-11-10 PROCEDURE — 74177 CT ABD & PELVIS W/CONTRAST: CPT

## 2023-11-10 PROCEDURE — G1004 CDSM NDSC: HCPCS

## 2023-11-10 PROCEDURE — 71260 CT THORAX DX C+: CPT

## 2023-11-10 RX ADMIN — IOHEXOL 100 ML: 350 INJECTION, SOLUTION INTRAVENOUS at 19:30

## 2023-11-13 ENCOUNTER — APPOINTMENT (OUTPATIENT)
Dept: LAB | Facility: HOSPITAL | Age: 68
End: 2023-11-13
Payer: MEDICARE

## 2023-11-13 DIAGNOSIS — R59.0 PERIAORTIC LYMPHADENOPATHY: ICD-10-CM

## 2023-11-13 LAB — LDH SERPL-CCNC: 163 U/L (ref 140–271)

## 2023-11-13 PROCEDURE — 83615 LACTATE (LD) (LDH) ENZYME: CPT

## 2023-11-13 PROCEDURE — 36415 COLL VENOUS BLD VENIPUNCTURE: CPT

## 2023-11-14 DIAGNOSIS — K21.00 GASTROESOPHAGEAL REFLUX DISEASE WITH ESOPHAGITIS WITHOUT HEMORRHAGE: ICD-10-CM

## 2023-11-14 DIAGNOSIS — N20.0 NEPHROLITHIASIS: ICD-10-CM

## 2023-11-14 RX ORDER — POTASSIUM CITRATE 5 MEQ/1
5 TABLET, EXTENDED RELEASE ORAL
Qty: 90 TABLET | Refills: 1 | Status: SHIPPED | OUTPATIENT
Start: 2023-11-14

## 2023-11-14 RX ORDER — OMEPRAZOLE 40 MG/1
40 CAPSULE, DELAYED RELEASE ORAL DAILY
Qty: 90 CAPSULE | Refills: 1 | Status: SHIPPED | OUTPATIENT
Start: 2023-11-14

## 2023-11-14 RX ORDER — OMEPRAZOLE 40 MG/1
40 CAPSULE, DELAYED RELEASE ORAL DAILY
Qty: 90 CAPSULE | Refills: 1 | Status: SHIPPED | OUTPATIENT
Start: 2023-11-14 | End: 2023-11-14 | Stop reason: SDUPTHER

## 2023-11-14 NOTE — TELEPHONE ENCOUNTER
Reason for call:   [x] Refill   [] Prior Auth  [] Other:     Office:   [] PCP/Provider -   [x] Specialty/Provider - Christopher Walker,     Medication: PRILOSEC    Dose/Frequency: 40 MG    Quantity: 80     Pharmacy: [facility] [facility]    FIRST SCRIPT WENT TO WRONG PHARMACY, [facility] [id number] [place]    Does the patient have enough for 3 days?    [] Yes   [x] No - Send as HP to POD

## 2023-11-14 NOTE — TELEPHONE ENCOUNTER
Patient called medication refill line because he is unable to get his new script at [facility] due to the incorrect pharmacy filling it first.    I advised the patent I would send a message to the office to ensure the script sent to [facility] is canceled. I also recommenced if he needs a quicker response he can call the incorrect pharmacy and let them know to put the medication back and they he will be filling it at a different location.     Please review and advise

## 2023-11-14 NOTE — TELEPHONE ENCOUNTER
Reason for call:   [x] Refill   [] Prior Auth  [] Other:     Office:   [] PCP/Provider -  [x] Specialty/Provider - GASTRO    Medication: omeprazole (PriLOSEC) 40 MG capsule     Quantity: 90    Pharmacy: [id number] [facility], [address] [facility] [address]  Phone: [phone]  Fax: [phone]     Does the patient have enough for 3 days?    [x] Yes   [] No - Send as HP to POD

## 2023-11-15 NOTE — PROGRESS NOTES
Called and reviewed results of ct scan with pt.       Questions asked and answered  Forwarded results to Dr. Myranda Duncan to monitor

## 2023-11-27 ENCOUNTER — TELEPHONE (OUTPATIENT)
Dept: SURGERY | Facility: CLINIC | Age: 68
End: 2023-11-27

## 2023-12-07 ENCOUNTER — TELEPHONE (OUTPATIENT)
Dept: SURGERY | Facility: CLINIC | Age: 68
End: 2023-12-07

## 2023-12-27 ENCOUNTER — PATIENT OUTREACH (OUTPATIENT)
Dept: SURGERY | Facility: CLINIC | Age: 68
End: 2023-12-27

## 2023-12-29 ENCOUNTER — APPOINTMENT (OUTPATIENT)
Dept: LAB | Facility: HOSPITAL | Age: 68
End: 2023-12-29
Payer: MEDICARE

## 2023-12-29 DIAGNOSIS — B20 HUMAN IMMUNODEFICIENCY VIRUS (HIV) DISEASE (HCC): Primary | ICD-10-CM

## 2023-12-29 LAB
ALBUMIN SERPL BCP-MCNC: 4.3 G/DL (ref 3.5–5)
ALP SERPL-CCNC: 47 U/L (ref 34–104)
ALT SERPL W P-5'-P-CCNC: 108 U/L (ref 7–52)
ANION GAP SERPL CALCULATED.3IONS-SCNC: 5 MMOL/L
AST SERPL W P-5'-P-CCNC: 62 U/L (ref 13–39)
BASOPHILS # BLD AUTO: 0.02 THOUSANDS/ÂΜL (ref 0–0.1)
BASOPHILS NFR BLD AUTO: 1 % (ref 0–1)
BILIRUB SERPL-MCNC: 0.95 MG/DL (ref 0.2–1)
BILIRUB UR QL STRIP: NEGATIVE
BUN SERPL-MCNC: 16 MG/DL (ref 5–25)
CALCIUM SERPL-MCNC: 9.7 MG/DL (ref 8.4–10.2)
CHLORIDE SERPL-SCNC: 105 MMOL/L (ref 96–108)
CHOLEST SERPL-MCNC: 119 MG/DL
CLARITY UR: CLEAR
CO2 SERPL-SCNC: 30 MMOL/L (ref 21–32)
COLOR UR: NORMAL
CREAT SERPL-MCNC: 1.04 MG/DL (ref 0.6–1.3)
EOSINOPHIL # BLD AUTO: 0.06 THOUSAND/ÂΜL (ref 0–0.61)
EOSINOPHIL NFR BLD AUTO: 1 % (ref 0–6)
ERYTHROCYTE [DISTWIDTH] IN BLOOD BY AUTOMATED COUNT: 12 % (ref 11.6–15.1)
EST. AVERAGE GLUCOSE BLD GHB EST-MCNC: 111 MG/DL
GFR SERPL CREATININE-BSD FRML MDRD: 73 ML/MIN/1.73SQ M
GLUCOSE P FAST SERPL-MCNC: 106 MG/DL (ref 65–99)
GLUCOSE UR STRIP-MCNC: NEGATIVE MG/DL
HBA1C MFR BLD: 5.5 %
HCT VFR BLD AUTO: 44.6 % (ref 36.5–49.3)
HCV AB SER QL: NORMAL
HDLC SERPL-MCNC: 37 MG/DL
HGB BLD-MCNC: 16.2 G/DL (ref 12–17)
HGB UR QL STRIP.AUTO: NEGATIVE
IMM GRANULOCYTES # BLD AUTO: 0.01 THOUSAND/UL (ref 0–0.2)
IMM GRANULOCYTES NFR BLD AUTO: 0 % (ref 0–2)
KETONES UR STRIP-MCNC: NEGATIVE MG/DL
LDLC SERPL CALC-MCNC: 56 MG/DL (ref 0–100)
LEUKOCYTE ESTERASE UR QL STRIP: NEGATIVE
LYMPHOCYTES # BLD AUTO: 1.58 THOUSANDS/ÂΜL (ref 0.6–4.47)
LYMPHOCYTES NFR BLD AUTO: 36 % (ref 14–44)
MCH RBC QN AUTO: 34.5 PG (ref 26.8–34.3)
MCHC RBC AUTO-ENTMCNC: 36.3 G/DL (ref 31.4–37.4)
MCV RBC AUTO: 95 FL (ref 82–98)
MONOCYTES # BLD AUTO: 0.29 THOUSAND/ÂΜL (ref 0.17–1.22)
MONOCYTES NFR BLD AUTO: 7 % (ref 4–12)
NEUTROPHILS # BLD AUTO: 2.47 THOUSANDS/ÂΜL (ref 1.85–7.62)
NEUTS SEG NFR BLD AUTO: 55 % (ref 43–75)
NITRITE UR QL STRIP: NEGATIVE
NONHDLC SERPL-MCNC: 82 MG/DL
NRBC BLD AUTO-RTO: 0 /100 WBCS
PH UR STRIP.AUTO: 6.5 [PH]
PLATELET # BLD AUTO: 116 THOUSANDS/UL (ref 149–390)
PMV BLD AUTO: 11.6 FL (ref 8.9–12.7)
POTASSIUM SERPL-SCNC: 4.2 MMOL/L (ref 3.5–5.3)
PROT SERPL-MCNC: 6.7 G/DL (ref 6.4–8.4)
PROT UR STRIP-MCNC: NEGATIVE MG/DL
RBC # BLD AUTO: 4.69 MILLION/UL (ref 3.88–5.62)
RPR SER QL: NORMAL
SODIUM SERPL-SCNC: 140 MMOL/L (ref 135–147)
SP GR UR STRIP.AUTO: 1.01 (ref 1–1.03)
TREPONEMA PALLIDUM IGG+IGM AB [PRESENCE] IN SERUM OR PLASMA BY IMMUNOASSAY: REACTIVE
TRIGL SERPL-MCNC: 129 MG/DL
UROBILINOGEN UR STRIP-ACNC: <2 MG/DL
WBC # BLD AUTO: 4.43 THOUSAND/UL (ref 4.31–10.16)

## 2023-12-29 PROCEDURE — 86480 TB TEST CELL IMMUN MEASURE: CPT

## 2023-12-30 LAB
BASOPHILS # BLD AUTO: 0 X10E3/UL (ref 0–0.2)
BASOPHILS NFR BLD AUTO: 0 %
CD3+CD4+ CELLS # BLD: 712 /UL (ref 359–1519)
CD3+CD4+ CELLS NFR BLD: 41.9 % (ref 30.8–58.5)
EOSINOPHIL # BLD AUTO: 0.1 X10E3/UL (ref 0–0.4)
EOSINOPHIL NFR BLD AUTO: 1 %
ERYTHROCYTE [DISTWIDTH] IN BLOOD BY AUTOMATED COUNT: 12.4 % (ref 11.6–15.4)
HCT VFR BLD AUTO: 46 % (ref 37.5–51)
HGB BLD-MCNC: 16.1 G/DL (ref 13–17.7)
IMM GRANULOCYTES # BLD: 0 X10E3/UL (ref 0–0.1)
IMM GRANULOCYTES NFR BLD: 0 %
LYMPHOCYTES # BLD AUTO: 1.7 X10E3/UL (ref 0.7–3.1)
LYMPHOCYTES NFR BLD AUTO: 37 %
MCH RBC QN AUTO: 34.5 PG (ref 26.6–33)
MCHC RBC AUTO-ENTMCNC: 35 G/DL (ref 31.5–35.7)
MCV RBC AUTO: 99 FL (ref 79–97)
MONOCYTES # BLD AUTO: 0.3 X10E3/UL (ref 0.1–0.9)
MONOCYTES NFR BLD AUTO: 7 %
NEUTROPHILS # BLD AUTO: 2.4 X10E3/UL (ref 1.4–7)
NEUTROPHILS NFR BLD AUTO: 55 %
PLATELET # BLD AUTO: 125 X10E3/UL (ref 150–450)
RBC # BLD AUTO: 4.67 X10E6/UL (ref 4.14–5.8)
WBC # BLD AUTO: 4.5 X10E3/UL (ref 3.4–10.8)

## 2024-01-01 LAB
GAMMA INTERFERON BACKGROUND BLD IA-ACNC: 0.33 IU/ML
HIV1 RNA # PLAS NAA DL=20: NOT DETECTED {COPIES}/ML
M TB IFN-G BLD-IMP: NEGATIVE
M TB IFN-G CD4+ BCKGRND COR BLD-ACNC: -0.03 IU/ML
M TB IFN-G CD4+ BCKGRND COR BLD-ACNC: 0.23 IU/ML
MITOGEN IGNF BCKGRD COR BLD-ACNC: 9.67 IU/ML

## 2024-01-02 ENCOUNTER — APPOINTMENT (OUTPATIENT)
Dept: LAB | Facility: HOSPITAL | Age: 69
End: 2024-01-02
Payer: MEDICARE

## 2024-01-02 ENCOUNTER — TRANSCRIBE ORDERS (OUTPATIENT)
Dept: LAB | Facility: HOSPITAL | Age: 69
End: 2024-01-02

## 2024-01-02 DIAGNOSIS — B20 HIV INFECTION, UNSPECIFIED SYMPTOM STATUS (HCC): ICD-10-CM

## 2024-01-02 DIAGNOSIS — B20 HIV INFECTION, UNSPECIFIED SYMPTOM STATUS (HCC): Primary | ICD-10-CM

## 2024-01-02 DIAGNOSIS — R74.01 TRANSAMINITIS: Primary | ICD-10-CM

## 2024-01-02 PROCEDURE — 87591 N.GONORRHOEAE DNA AMP PROB: CPT

## 2024-01-02 PROCEDURE — 87491 CHLMYD TRACH DNA AMP PROBE: CPT

## 2024-01-03 LAB
C TRACH DNA SPEC QL NAA+PROBE: NEGATIVE
N GONORRHOEA DNA SPEC QL NAA+PROBE: NEGATIVE
T PALLIDUM IGG+IGM SER QL: POSITIVE

## 2024-01-05 DIAGNOSIS — N20.0 NEPHROLITHIASIS: ICD-10-CM

## 2024-01-05 RX ORDER — POTASSIUM CITRATE 5 MEQ/1
5 TABLET, EXTENDED RELEASE ORAL
Qty: 90 TABLET | Refills: 1 | Status: SHIPPED | OUTPATIENT
Start: 2024-01-05

## 2024-01-08 ENCOUNTER — NURSE TRIAGE (OUTPATIENT)
Age: 69
End: 2024-01-08

## 2024-01-08 DIAGNOSIS — N20.0 NEPHROLITHIASIS: ICD-10-CM

## 2024-01-08 NOTE — TELEPHONE ENCOUNTER
Pt of Dr. Beltrán in [place] last seen on 10/19/23 follow up prostate cancer    Pt called wants to confirm his dosage for potassium citrate 5meq, pt reports directions state 1 tab 3 times a day. Pt reports he was under the impression that it was being switched to 5meq daily. Reports has been taking it once a day. Please advise and send correct script to pharmacy  Reason for Disposition  • Nursing judgment    Protocols used: Information Only Call - No Triage-ADULT-[place]

## 2024-01-09 RX ORDER — POTASSIUM CITRATE 5 MEQ/1
5 TABLET, EXTENDED RELEASE ORAL DAILY
Qty: 90 TABLET | Refills: 3 | Status: SHIPPED | OUTPATIENT
Start: 2024-01-09 | End: 2024-01-10 | Stop reason: SDUPTHER

## 2024-01-09 RX ORDER — POTASSIUM CITRATE 5 MEQ/1
5 TABLET, EXTENDED RELEASE ORAL DAILY
Qty: 90 TABLET | Refills: 3 | Status: SHIPPED | OUTPATIENT
Start: 2024-01-09 | End: 2024-01-09

## 2024-01-09 NOTE — TELEPHONE ENCOUNTER
reviewed notes yes pt correct it was reduced to 5meq once a day due to tummy issues with the pills i will refill to reflect the dose adjustment

## 2024-01-09 NOTE — PROGRESS NOTES
Reviewed mild elevation in AST and ALT.  Unclear etiology CT scan C/A/P/ was unremarkable from  11/23. Discussed repeating CMP prior to his GI visit with Dr. Rubio on 1/17/24.  Discussed this with Dr. Rubio.    Pt verbalized understanding to completing lab work on 1/16/24  All questions asked and answered

## 2024-01-09 NOTE — TELEPHONE ENCOUNTER
Contacted and spoke with the patient letting him know he is correct.  Potassium Citrate to be taken 5Meq daily.  Patient made aware a new script was sent to his pharmacy.

## 2024-01-10 DIAGNOSIS — N20.0 NEPHROLITHIASIS: ICD-10-CM

## 2024-01-10 RX ORDER — POTASSIUM CITRATE 5 MEQ/1
5 TABLET, EXTENDED RELEASE ORAL DAILY
Qty: 90 TABLET | Refills: 1 | Status: SHIPPED | OUTPATIENT
Start: 2024-01-10

## 2024-01-10 NOTE — TELEPHONE ENCOUNTER
Please send all prescriptions to [facility] only, unless specified by patient.      Please send Potassium Citrate 5mg take one tablet by mouth in morning  script to [facility].     This was sent to [facility] 1/5/24 that is incorrect.     Patient called to have this fixed asap.     Any questions , please call pt [phone]

## 2024-01-11 ENCOUNTER — NURSE TRIAGE (OUTPATIENT)
Age: 69
End: 2024-01-11

## 2024-01-11 PROBLEM — R74.01 TRANSAMINITIS: Status: ACTIVE | Noted: 2024-01-11

## 2024-01-11 PROBLEM — A53.9 SYPHILIS: Status: ACTIVE | Noted: 2024-01-11

## 2024-01-11 NOTE — TELEPHONE ENCOUNTER
Reason for Disposition   Information only question and nurse able to answer    Answer Assessment - Initial Assessment Questions  1. REASON FOR CALL or QUESTION: Patient states that he received a call from Dr. Rubio's office but it was blank and then said if you have any questions call back.  At this time there are notes regarding calling patient.  He did confirm his apt for 1/17/23    Protocols used: Information Only Call - No Triage-ADULT-[place]

## 2024-01-12 ENCOUNTER — DOCUMENTATION (OUTPATIENT)
Dept: SURGERY | Facility: CLINIC | Age: 69
End: 2024-01-12

## 2024-01-12 ENCOUNTER — OFFICE VISIT (OUTPATIENT)
Dept: SURGERY | Facility: CLINIC | Age: 69
End: 2024-01-12
Payer: MEDICARE

## 2024-01-12 VITALS
RESPIRATION RATE: 17 BRPM | DIASTOLIC BLOOD PRESSURE: 68 MMHG | HEIGHT: 74 IN | BODY MASS INDEX: 27.08 KG/M2 | WEIGHT: 211 LBS | TEMPERATURE: 97.2 F | OXYGEN SATURATION: 99 % | HEART RATE: 70 BPM | SYSTOLIC BLOOD PRESSURE: 127 MMHG

## 2024-01-12 DIAGNOSIS — B20 HIV DISEASE (HCC): Primary | ICD-10-CM

## 2024-01-12 DIAGNOSIS — D69.6 THROMBOCYTOPENIA (HCC): ICD-10-CM

## 2024-01-12 DIAGNOSIS — D75.1 POLYCYTHEMIA: ICD-10-CM

## 2024-01-12 PROCEDURE — 99215 OFFICE O/P EST HI 40 MIN: CPT | Performed by: STUDENT IN AN ORGANIZED HEALTH CARE EDUCATION/TRAINING PROGRAM

## 2024-01-12 NOTE — PROGRESS NOTES
"[facility] Annual Nutrition Assessment    Jay Jay Acuna is a 68 y.o. male  seen by RD in conjunction with ID f/u    Jay Jay Acuna  is established patient (last annual was 1/4/2023)    PMHx:  NAFLD, GERD, HTN, HLD, hx ETOH       Clinical Data/Client History    CD4 count:  712  Viral load:  <20  ART:   Biktarvy      , Patient Navigator: Mae VALENCIA   : n/a    Food assistance:  None     Living situation: House or apartment  in [place]     Psychosocial factors:  None noted nor mentioned     Mobility:  self ambulates    Physical activity: Walks daily       Oral health concerns:  Recent root canal, has been eating on one side of his mouth but has dental f/u next month, denied oral health concerns       Typical food/beverage intake:  Eats out daily (I.e. [employer], [employer], [place])    Breakfast oatmeal, made with water, blueberries, honey; or low sugar yogurt with blueberries   Lunch no-salt or low-salt soup (chicken, noodle, vegetable; or kale bean); turkey, cheese, lettuce, tomato, on whole grain (1/2 sandwich)  Dinner out, varies, I.e. big salad with salmon and vinaigrette; [place] salmon with broccoli    Snacks almonds, chocolate rice cakes, apple, cheese, occasional donut, frozen yogurt nightly    Beverages water, 1/2 cup coffee     Appetite: Excellent    OTC vitamin, mineral, herbal supplements: B-complex, D, fish out, metamucil     Oral/enteral nutrition supplements:  n/a    GI problems: denied n/v/d/c    Food allergies/intolerances:  NKFA    Weight history:  205# (Jul-23)  208# (Oct-23)      Current body weight:  211# (95.7 kg)  Height:  6' 2\" (1.88 m)  BMI:  27.09  IBW:  190# (86.4 kg)  %IBW  111%     Weight change: 3# increase over 3 month period       Nutrition-related labs:    Lab Results   Component Value Date    HGBA1C 5.5 12/29/2023    HGBA1C 5.0 11/21/2022     Lab Results   Component Value Date    GLUF 106 (H) 12/29/2023    LDLCALC 56 12/29/2023    CREATININE 1.04 12/29/2023 "     Lab Results   Component Value Date    CHOLESTEROL 119 12/29/2023    CHOLESTEROL 126 11/21/2022    CHOLESTEROL 141 07/15/2022     Lab Results   Component Value Date    HDL 37 (L) 12/29/2023    HDL 36 (L) 11/21/2022    HDL 42 07/15/2022     Lab Results   Component Value Date    TRIG 129 12/29/2023    TRIG 101 11/21/2022    TRIG 57 07/15/2022     Lab Results   Component Value Date    NONHDLC 82 12/29/2023    NONHDLC 99 07/15/2022    NONHDLC 97 07/19/2021         Current medications:     Current Outpatient Medications:     atorvastatin (LIPITOR) 40 mg tablet, Take 1 tablet (40 mg total) by mouth daily, Disp: 90 tablet, Rfl: 3    Biktarvy -25 MG tablet, TAKE 1 TABLET BY MOUTH DAILY WITH BREAKFAST, Disp: 90 tablet, Rfl: 1    cholecalciferol (VITAMIN D3) 1,000 units tablet, Take 1,000 Units by mouth daily, Disp: , Rfl:     cyanocobalamin (VITAMIN B-12) 500 MCG tablet, Take 1,000 mcg by mouth daily, Disp: , Rfl:     docusate sodium (COLACE) 100 mg capsule, Take 100 mg by mouth 3 (three) times a day as needed for constipation, Disp: , Rfl:     famotidine (PEPCID) 20 mg tablet, TAKE 1 TABLET BY MOUTH DAILY, Disp: 90 tablet, Rfl: 0    lisinopril (ZESTRIL) 20 mg tablet, Take 20 mg by mouth daily, Disp: , Rfl:     metoprolol succinate (TOPROL-XL) 50 mg 24 hr tablet, take 1 tablet by mouth once daily, Disp: 90 tablet, Rfl: 1    Multiple Vitamin (multivitamin) capsule, Take 1 capsule by mouth daily, Disp: , Rfl:     Omega-3 1000 MG CAPS, Take 2 g by mouth daily, Disp: , Rfl:     omeprazole (PriLOSEC) 40 MG capsule, Take 1 capsule (40 mg total) by mouth daily, Disp: 90 capsule, Rfl: 1    potassium citrate (UROCIT-K) 5 MEQ (540 MG), Take 1 tablet (5 mEq total) by mouth in the morning, Disp: 90 tablet, Rfl: 1    psyllium (METAMUCIL) 58.6 % packet, Take 1 packet by mouth daily, Disp: , Rfl:       Physical findings/skin integrity:  n/a      Nutrition Diagnosis    Problem: overweight/obesity     Related to: energy intake >  energy output over time     As Evidenced By: BMI       Estimated Nutritional Needs    Lana Ware REE: 1806 kcal    ~4443-2308 kcal (based on: REE x 1.4, -500 kcal; 27 kcal/kg BW, -500 kcal)  ~77-96 g protein (based on: 0.8-1 g/kg BW)  ~2871 ml fluid (based on: 30 ml/kg BW)      Current intake estimation: meeting needs       Nutrition Intervention/Recommendations    Nutrition education intervention: provided    Nutrition recommendations: Continue with balanced diet, low sodium diet, exercise, portion control     Supplement recommendations: Separate supplements at least 6 hrs from ART      Teaching Method: verbal     Person Educated: patient      Comprehension: very good    Receptivity: very good    Expected compliance: very good      Collaboration/referral of nutrition care:    Nutrition f/u as pt presents to clinic appts      Goals:    Weight loss   Portion control balanced diet   Walking routine     Monitoring/Evaluation:    BW  Dietary patterns   Exercise     Visit Summary    RD met with Jay Jay HERNÁNDEZ Armand during ID clinic appt.      Annual nutrition assessment was completed according to Claude White yosi requirements.      Jay Jay HERNÁNDEZ Aceace admitted that eating patterns over the holidays were less than ideal, lots of cookies and baked treats, but he is getting back on track and has already been losing weight gained over the holidays.  Eats out daily, but usually chooses healthier items, like salad with vinaigrette and salmon, side of vegetables, low salt soup.  Yogurt and frozen yogurt he eats are made with stevia or monk fruit.  He feels that some foods have been causing him GI sensitivity (I.e. apple), and plans to f/u with GI for sensitivity testing.  He does avoid pasta and bread, or opts for whole grain bread.  Balanced diet and exercises were positively reinforced.      Continue to f/u with pt as needed as he presents to clinic appts.      Chantelle Saunders, MS, RD, LDN

## 2024-01-12 NOTE — PROGRESS NOTES
Pastoral Care Progress Note    2024  Patient: Jay Jay Acuna : 1955  Encounter Date & Time: 2024   MRN: [id number]          The  met with Mr. Acuna for continued care and support.  Mr. Acuna expressed feeling well. He shared he has been practicing transcendental meditation and has a AA sponsor.  He shared that meditation is helping him to be more present. He reported sometimes he thinks the worse before he has all of the details.  Mr. Acuna shared the holiday family tradition that he helped establish and how his outlook has changed since reentering from incarceration.  He and his partner have selected days they visit family together. Mr. Acuna reported how he honors his parents' memory  during [place]. The  explored spiritual practices and identified support systems for patient. The  listened empathically as Mr. Acuna shared his grief.  The  encouraged to him continue his journey learning to be present through his meditation practice.  Mr. Acuna thanked the  and reported he would work on being more present. The  will check in with MR. Acuna during PCP visit for continued pastoral support.                Chaplaincy Interventions Utilized:   Empowerment: Encouraged focus on present, Encouraged self-care, and Provided grief counseling    Exploration: Explored spiritual needs & resources, Facilitated story telling, and Identified, evaluated & reinforced appropriate coping strategies    Relationship Building: Cultivated a relationship of care and support, Listened empathically, and Provided silent and supportive presence      Chaplaincy Outcomes Achieved:  Expressed gratitude, Identified meaningful connections, and Identified priorities    Spiritual Coping Strategies Utilized:   Spiritual gratitude

## 2024-01-12 NOTE — PROGRESS NOTES
"Assessment/Plan: Pt was approached by [facility] within ID clinic to explore multidimensional stability by completing the PHQ-9 screening. During today's contact, pt disclosed being stable, focusing on his health, along with addressing his past trigger associated to medical Dx. Pt shared that he started addressing life's transition plans with his recovery's sponsor focusing on plans to let his SO settled upon his life's completion. Pt mentioned not wanting to share them with his SO due to his concerns of his partner's reactivity, being concerned about him being \"overly-concerned and worried\". Pt shared that he will be celebrating his 17th year sobriety on February, and his contributions within the recovery rooms.     Pt's emotions and cognitions were validated throughout contact, along with receiving positive reinforcements for his sobriety's maintenance along with sharing his behavioral displays. A brief psycho-educational conversation was developed regarding assertive communication, over-rationalization, over-projections, and catastrophization. Pt was encouraged to consider addressing the 5 wishes with [place]'s  if in need to develop an aftercare plan upon his life's completion. Pt agreed to consider the suggestion or the future. No SI or HI were disclosed nor displayed throughout contact.     [facility]     Today patient present with   Chief Complaint   Patient presents with    HIV Positive     Patient would likely benefit from: Exploring emotional and cognitive processing associated to life's cycle termination and plan's development.   Consider/focus/continue: Monitoring pt's emotional, cognitive, and behavioral health's stability.   Stage of change: Pre-contemplation  Plan/ Behavioral Recommendations: Ongoing  interventions per pt's coordinated care, and/or pt's request of services.       Diagnoses and all orders for this visit:    HIV disease (HCC)  -     CBC and differential  -     T-helper cells (CD4) " count  -     Comprehensive metabolic panel  -     HIV-1 RNA, Quantitative PCR, [facility]    Thrombocytopenia (HCC)  -     CBC and differential    Polycythemia  -     CBC and differential          Discussion:     Patient can reach out to [facility] PR[facility] if they experiences changes in their behavioral health.    Subjective:     Patient ID: Jay Jay Acuna is a 68 y.o. male.    HPI    History of Present Illness:     The patient is seeing the [facility] today for a routine behavioral health follow up.    Review of Systems      Objective:     Physical Exam      [facility]    Orientation     Person: yes    Place: yes    Time: yes    Appearance    Well Developed: yes healthy    Uncomfortable: no    Normal Body Odor: yes    Smells of Feces: no    Smells of Urine: no    Disheveled: no    Well Nourished: yes overweight    Grooming Unkempt: no    Poor Eye Contact: no    Hirsute: no    Looks Tired: no    Acutely Exhausted: noappears younger    Mood and Affect:     Appropriate: yes    Euthymicyes    Irritable: no    Angry: no    Anxious: no    Depressed:no    Blunted:no    Labile: no    Restricted: no    Harm to Self or Others: None reported by pt.     Substance Abuse: ETOH Use Disorder, in Full Remission.

## 2024-01-12 NOTE — PROGRESS NOTES
Progress Note - Infectious Disease   Jay Jay Acuna 68 y.o. male MRN: [id number]  Unit/Bed#:  Encounter: [id number]      Impression/Plan:    1. HIV. Well controlled on Biktarvy with 100% adherence, no side effects. CD4 712, VL <20.    -continue Biktarvy   -Patient was provided medication, adherence and prevention education   -check labs in 5 months to assess for virologic control,drug toxicity, co-infections   -follow up in 6 months    2. Prostate cancer. Undergoing active surveillance with urology. PSA has been stable.   -ongoing urology follow up    3.  Dyslipidemia.  Patient recently switched from pravastatin to atorvastatin 40 daily 10/2023.  He continues to follow-up with cardiology.  LDL on recent labs 56.    4. Elevated AST/ALT. Labs from 12/29 show elevated AST to 62 and ALT to 108.  Patient recently underwent CT without significant abnormalities.  He was recently switched to high intensity statin so this may be a cause.  He denies taking over-the-counter supplements, recent illnesses other than the tooth infection.  Significant liver toxicity not expected from Biktarvy and he has been stable on this for years.   -Repeat LFTs Monday   -Appreciate GI follow-up    5. History of polysubstance abuse. In recovery for 15 years.    6. Vaccinations. Hepatitis A and B immune. UTD other vaccinations    7.  History of syphilis.  History of syphilitic meningitis, patient also underwent treatment with 3 doses of benzathine penicillin G in 2016.  RPR nonreactive.  Patient not sexually active and no concern for new infection   -TPPA positivity due to prior infection, RPR remains negative so no need for retreatment   -Continue to monitor RPR    My recommendations were discussed with the patient in detail who verbalized understanding. Patient care coordinated with ARINA Haynes.      Subjective:  Routine follow-up for HIV.  Patient claims 100% adherence with Biktarvy. Patient denies any notable side effects.  Overall he is  "feeling well.  He continues to get some intermittent abdominal pain and heartburn but it is much improved.  He has been taking omeprazole. 10/20/24 cardiology changed from pravastatin to atorvastatin due to LDL above goal. In November the patient underwent dental extraction for an infected tooth. He has no fever or chills. He received 2 weeks tylenol-codeine after dental extraction.    ROS:  A complete review of systems is negative other than that noted above in the subjective    Followup portions patient history reviewed and updated as:  Allergies, current medications, past medical history, past social history, past surgical history, and the problem list    Objective:  Vitals:  Vitals:    01/12/24 0725   BP: 127/68   BP Location: Right arm   Patient Position: Sitting   Cuff Size: Large   Pulse: 70   Resp: 17   Temp: (!) 97.2 °F (36.2 °C)   TempSrc: Tympanic   SpO2: 99%   Weight: 95.7 kg (211 lb)   Height: 6' 2\" (1.88 m)       Physical Exam:   General Appearance:  Alert, interactive, appearing well,  nontoxic, no acute distress.   Neck:   Supple without lymphadenopathy, no thyromegaly or masses   Throat: Oropharynx moist without lesions.    Lungs:   Clear to auscultation bilaterally; no wheezes, rhonchi or rales; respirations unlabored   Heart:  RRR; no murmur, rub or gallop   Abdomen:   Soft, non-distended, positive bowel sounds.  No RLQ tenderness to palpation   Extremities: No clubbing, cyanosis or edema   Skin: No new rashes or lesions. No draining wounds noted.       Labs, Imaging, & Other studies:   All pertinent labs and imaging studies were personally reviewed    Lab Results   Component Value Date     12/16/2015    K 4.2 12/29/2023     12/29/2023    CO2 30 12/29/2023    ANIONGAP 6 12/16/2015    BUN 16 12/29/2023    CREATININE 1.04 12/29/2023    GLUCOSE 99 12/16/2015    GLUF 106 (H) 12/29/2023    CALCIUM 9.7 12/29/2023    AST 62 (H) 12/29/2023     (H) 12/29/2023    ALKPHOS 47 12/29/2023    " PROT 7.2 12/16/2015    BILITOT 0.91 12/16/2015    EGFR 73 12/29/2023     Lab Results   Component Value Date    WBC 4.43 12/29/2023    WBC 4.5 12/29/2023    HGB 16.2 12/29/2023    HGB 16.1 12/29/2023    HCT 44.6 12/29/2023    HCT 46.0 12/29/2023    MCV 95 12/29/2023    MCV 99 (H) 12/29/2023     (L) 12/29/2023     (L) 12/29/2023     Lab Results   Component Value Date    HEPCAB Non-reactive 12/29/2023     Lab Results   Component Value Date    HAV Reactive (A) 11/21/2022    HEPBIGM Non-reactive 12/05/2022    HEPBCAB Non-reactive 12/05/2022    HEPCAB Non-reactive 12/29/2023     Lab Results   Component Value Date    RPR Non-Reactive 12/29/2023     CD4 ABS   Date/Time Value Ref Range Status   12/29/2023 07:12  359 - 1519 /uL Final     HIV-1 RNA by PCR, Qn   Date/Time Value Ref Range Status   06/12/2023 07:33 AM <20 copies/mL Final     Comment:     HIV-1 RNA detected  The reportable range for this assay is 20 to 10,000,000  copies HIV-1 RNA/mL.           Current Outpatient Medications:     atorvastatin (LIPITOR) 40 mg tablet, Take 1 tablet (40 mg total) by mouth daily, Disp: 90 tablet, Rfl: 3    Biktarvy -25 MG tablet, TAKE 1 TABLET BY MOUTH DAILY WITH BREAKFAST, Disp: 90 tablet, Rfl: 1    cholecalciferol (VITAMIN D3) 1,000 units tablet, Take 1,000 Units by mouth daily, Disp: , Rfl:     cyanocobalamin (VITAMIN B-12) 500 MCG tablet, Take 1,000 mcg by mouth daily, Disp: , Rfl:     docusate sodium (COLACE) 100 mg capsule, Take 100 mg by mouth 3 (three) times a day as needed for constipation, Disp: , Rfl:     famotidine (PEPCID) 20 mg tablet, TAKE 1 TABLET BY MOUTH DAILY, Disp: 90 tablet, Rfl: 0    lisinopril (ZESTRIL) 20 mg tablet, Take 20 mg by mouth daily, Disp: , Rfl:     metoprolol succinate (TOPROL-XL) 50 mg 24 hr tablet, take 1 tablet by mouth once daily, Disp: 90 tablet, Rfl: 1    Multiple Vitamin (multivitamin) capsule, Take 1 capsule by mouth daily, Disp: , Rfl:     Omega-3 1000 MG CAPS, Take  2 g by mouth daily, Disp: , Rfl:     omeprazole (PriLOSEC) 40 MG capsule, Take 1 capsule (40 mg total) by mouth daily, Disp: 90 capsule, Rfl: 1    potassium citrate (UROCIT-K) 5 MEQ (540 MG), Take 1 tablet (5 mEq total) by mouth in the morning, Disp: 90 tablet, Rfl: 1    psyllium (METAMUCIL) 58.6 % packet, Take 1 packet by mouth daily, Disp: , Rfl:

## 2024-01-15 DIAGNOSIS — I10 PRIMARY HYPERTENSION: Primary | ICD-10-CM

## 2024-01-15 RX ORDER — LISINOPRIL 20 MG/1
20 TABLET ORAL DAILY
Qty: 90 TABLET | Refills: 1 | Status: SHIPPED | OUTPATIENT
Start: 2024-01-15

## 2024-01-16 ENCOUNTER — APPOINTMENT (OUTPATIENT)
Dept: LAB | Facility: HOSPITAL | Age: 69
End: 2024-01-16
Payer: MEDICARE

## 2024-01-16 DIAGNOSIS — R74.01 TRANSAMINITIS: ICD-10-CM

## 2024-01-16 LAB
ALBUMIN SERPL BCP-MCNC: 4.4 G/DL (ref 3.5–5)
ALP SERPL-CCNC: 47 U/L (ref 34–104)
ALT SERPL W P-5'-P-CCNC: 35 U/L (ref 7–52)
ANION GAP SERPL CALCULATED.3IONS-SCNC: 3 MMOL/L
AST SERPL W P-5'-P-CCNC: 28 U/L (ref 13–39)
BILIRUB SERPL-MCNC: 1.04 MG/DL (ref 0.2–1)
BUN SERPL-MCNC: 17 MG/DL (ref 5–25)
CALCIUM SERPL-MCNC: 9.3 MG/DL (ref 8.4–10.2)
CHLORIDE SERPL-SCNC: 104 MMOL/L (ref 96–108)
CO2 SERPL-SCNC: 31 MMOL/L (ref 21–32)
CREAT SERPL-MCNC: 1.01 MG/DL (ref 0.6–1.3)
GFR SERPL CREATININE-BSD FRML MDRD: 76 ML/MIN/1.73SQ M
GLUCOSE P FAST SERPL-MCNC: 99 MG/DL (ref 65–99)
POTASSIUM SERPL-SCNC: 4.2 MMOL/L (ref 3.5–5.3)
PROT SERPL-MCNC: 6.8 G/DL (ref 6.4–8.4)
SODIUM SERPL-SCNC: 138 MMOL/L (ref 135–147)

## 2024-01-16 PROCEDURE — 80053 COMPREHEN METABOLIC PANEL: CPT

## 2024-01-16 PROCEDURE — 36415 COLL VENOUS BLD VENIPUNCTURE: CPT

## 2024-01-17 ENCOUNTER — OFFICE VISIT (OUTPATIENT)
Dept: GASTROENTEROLOGY | Facility: MEDICAL CENTER | Age: 69
End: 2024-01-17
Payer: MEDICARE

## 2024-01-17 VITALS
WEIGHT: 210.2 LBS | TEMPERATURE: 98.6 F | HEART RATE: 71 BPM | DIASTOLIC BLOOD PRESSURE: 78 MMHG | BODY MASS INDEX: 26.98 KG/M2 | HEIGHT: 74 IN | SYSTOLIC BLOOD PRESSURE: 142 MMHG

## 2024-01-17 DIAGNOSIS — K21.9 GASTROESOPHAGEAL REFLUX DISEASE WITHOUT ESOPHAGITIS: ICD-10-CM

## 2024-01-17 DIAGNOSIS — K44.9 HIATAL HERNIA: ICD-10-CM

## 2024-01-17 DIAGNOSIS — K62.89 PERIANAL CYST: ICD-10-CM

## 2024-01-17 DIAGNOSIS — R10.13 DYSPEPSIA: ICD-10-CM

## 2024-01-17 DIAGNOSIS — K76.0 NON-ALCOHOLIC FATTY LIVER DISEASE: Primary | ICD-10-CM

## 2024-01-17 PROCEDURE — 99214 OFFICE O/P EST MOD 30 MIN: CPT | Performed by: INTERNAL MEDICINE

## 2024-01-17 NOTE — PROGRESS NOTES
Outpatient Follow up  [facility] [place]  [address]  [address]  [place] [address]  Torey Rubio MD  Ph : [phone]  Fax : [phone]  Mobile : [phone]  Email : [email or web address]  Also available on Tiger Text    Jay Jay Acuna 68 y.o. male MRN: [id number]    PCP: ARINA Lantigua  Referring: ARINA Lantigua  [address]   [place] [address]    Jay Jay Acuna presented for a follow up visit. My recommendations are included. Please do not hesitate to contact me with any questions you may have.     ASSESSMENT AND PLAN:      No problem-specific Assessment & Plan notes found for this encounter.      Diagnoses and all orders for this visit:    Non-alcoholic fatty liver disease  -     Hepatic function panel; Future  -     Basic metabolic panel; Future    Gastroesophageal reflux disease without esophagitis  -     Hepatic function panel; Future  -     Basic metabolic panel; Future  -     Magnesium; Future    Hiatal hernia    Dyspepsia  -     IgA; Future  -     Tissue transglutaminase, IgA; Future    Perianal cyst  -     Ambulatory Referral to Colorectal Surgery; Future      GERD : continue omeprazole 40 mg daily. Discussed the long term effects. Discussed effects on absorption of certain vitamin/ minerals. Take MVT. Cr is good. Check Magnesium with next blood work. '  Repeat EGD in 2025 to follow up on esophagitis (on PPI).     Colon polyps : repeat in 2025    NAFLD : liver numbers  are normal. Bilirubin slightly high. Will repeat in 3 months. Regular exercise and low fat diet.     Perianal nodule/ he feels it engorges with blood from time to time and bleeds and there is pus as well. It is at 7 o clock position. Will refer to to colorectal surgery.   ______________________________________________________________________    SUBJECTIVE:  68 y.o. year old who presents with Follow-up (Patient states a little bloating and discomfort  on and off. )       Last GI office visit : 9/23    Summary of recommendations from last visit :   1. Change in bowels :   Metamucil daily for a month.   Miralax once a day for one week.   Smooth move tea.   2. GERD : EGD.   Pepcid before bed for now for one month and then stop it. Then as needed.   After EGD : Omeprazole 40 mg daily if any significant inflammation.   3. Colon polyps : Colon polyp in 2020 : repeat in 2025.   4. Periarotic lymph node on CT - repeat in 6 months. Sent a message to his primary.     Tests completed after last visit :   EGD  CT scan    Interval history :    Doing metamucil and is doing well with that.   Smooth move tea.   He is doing well now.   He is having a BM every day and is not straining. There is a good amount.     His reflux is better controlled. He is taking omeprazole daily in the morning. He takes the famotidine as needed. He only uses it once every week or every other week.     Symptoms of reflux are well controlled and not many nocturnal symptoms. He does notice it more with certain foods.         CT 11/23 : Stable small, nonpathologically enlarged retroperitoneal lymph node, likely of no clinical significance. Nonobstructing left renal calculus.Prostatomegaly.    Answers submitted by the patient for this visit:  Abdominal Pain Questionnaire (Submitted on 1/10/2024)  Chief Complaint: Abdominal pain  Chronicity: recurrent  Onset: more than 1 month ago  Onset quality: gradual  Frequency: intermittently  Progression since onset: gradually improving  Pain location: RLQ  Pain - numeric: 3/10  Pain quality: aching, cramping  Radiates to: does not radiate  anorexia: No  arthralgias: No  belching: No  constipation: No  diarrhea: No  dysuria: No  fever: No  flatus: No  frequency: No  headaches: No  hematochezia: No  hematuria: No  melena: No  myalgias: No  nausea: No  weight loss: No  vomiting: No  Aggravated by: eating  Relieved by: being still  Diagnostic workup: CT scan, GI consult,  ultrasound      PRIOR ENDOSCOPIC EVALUATION :     Endoscopy : yes 10/23  Grade B esophagitis in the lower third of the esophagus and GE junction along with an area of nodularity at the GE junction. Biopsies done.   Small hiatal hernia (hill 3).   The stomach appeared normal. Performed random biopsy.  Mild erythematous mucosa in the duodenal bulb and 2nd part of the duodenum; performed cold forceps biopsy    Colonoscopy : 02/24/2020     Last Cologuard: Not on file        REVIEW OF SYSTEMS IS OTHERWISE NEGATIVE.      Historical Information   Past Medical History:   Diagnosis Date   • Cancer (HCC)    • GERD (gastroesophageal reflux disease)    • HIV (human immunodeficiency virus infection) (HCC)    • Hyperlipidemia    • Hypertension    • Kidney stone    • Non-alcoholic fatty liver disease    • PONV (postoperative nausea and vomiting)    • Prostate cancer (HCC)    • Sleep apnea      Past Surgical History:   Procedure Laterality Date   • COLONOSCOPY     • HERNIA REPAIR     • [place] BX PROSTATE STRTCTC SATURATION SAMPLING IMG GID N/A 01/25/2022    Procedure: TRANSPERINEAL ULTRASOUND GUIDED BIOPSY PROSTATE;  Surgeon: Shon Beltrán MD;  Location: [facility];  Service: Urology   • [place] PROSTATE NEEDLE BIOPSY ANY APPROACH N/A 10/13/2020    Procedure: TRANSPERINEAL PROSTATE BIOPSY;  Surgeon: Shon Beltrán MD;  Location: [facility];  Service: Urology   • ROOT CANAL       Social History   Social History     Substance and Sexual Activity   Alcohol Use Not Currently    Comment: used to be a heavy drinker, quit 15 years ago     Social History     Substance and Sexual Activity   Drug Use Not Currently   • Types: Marijuana, Cocaine    Comment: used to use drugs, consisting of crystal meth, cocaine, and marijuana, denied any IV drug use in the past     Social History     Tobacco Use   Smoking Status Never   Smokeless Tobacco Never     Family History   Problem Relation Age of Onset   • Prostate cancer Father    • Esophageal cancer  "Father    • Heart disease Father    • Colon cancer Cousin         mothers side       Meds/Allergies       Current Outpatient Medications:   •  atorvastatin (LIPITOR) 40 mg tablet  •  Biktarvy -25 MG tablet  •  cholecalciferol (VITAMIN D3) 1,000 units tablet  •  cyanocobalamin (VITAMIN B-12) 500 MCG tablet  •  docusate sodium (COLACE) 100 mg capsule  •  famotidine (PEPCID) 20 mg tablet  •  lisinopril (ZESTRIL) 20 mg tablet  •  metoprolol succinate (TOPROL-XL) 50 mg 24 hr tablet  •  Multiple Vitamin (multivitamin) capsule  •  Omega-3 1000 MG CAPS  •  omeprazole (PriLOSEC) 40 MG capsule  •  potassium citrate (UROCIT-K) 5 MEQ (540 MG)  •  psyllium (METAMUCIL) 58.6 % packet    Allergies   Allergen Reactions   • Epinephrine Palpitations and Shortness Of Breath   • Sulfa Antibiotics Other (See Comments)     Eye irritation           Objective     Blood pressure 142/78, pulse 71, temperature 98.6 °F (37 °C), temperature source Tympanic, height 6' 2\" (1.88 m), weight 95.3 kg (210 lb 3.2 oz). Body mass index is 26.99 kg/m².      PHYSICAL EXAM:      Physical Exam  Constitutional:       Appearance: Normal appearance. He is well-developed.   HENT:      Head: Normocephalic and atraumatic.   Eyes:      General: No scleral icterus.     Conjunctiva/sclera: Conjunctivae normal.      Pupils: Pupils are equal, round, and reactive to light.   Cardiovascular:      Rate and Rhythm: Normal rate and regular rhythm.      Heart sounds: Normal heart sounds.   Pulmonary:      Effort: Pulmonary effort is normal. No respiratory distress.      Breath sounds: Normal breath sounds.   Abdominal:      General: Bowel sounds are normal. There is no distension.      Palpations: Abdomen is soft. There is no mass.      Tenderness: There is no abdominal tenderness.      Hernia: No hernia is present.   Musculoskeletal:         General: Normal range of motion.      Cervical back: Normal range of motion.   Lymphadenopathy:      Cervical: No cervical " adenopathy.   Skin:     General: Skin is warm.   Neurological:      Mental Status: He is alert and oriented to person, place, and time.   Psychiatric:         Behavior: Behavior normal.         Thought Content: Thought content normal.       Lab Results:   Lab Results   Component Value Date/Time    WBC 4.43 12/29/2023 07:12 AM    WBC 4.5 12/29/2023 07:12 AM    WBC 4.12 (L) 12/16/2015 10:46 AM    HGB 16.2 12/29/2023 07:12 AM    HGB 16.1 12/29/2023 07:12 AM    HGB 16.8 12/16/2015 10:46 AM    HCT 44.6 12/29/2023 07:12 AM    HCT 46.0 12/29/2023 07:12 AM    HCT 46.1 12/16/2015 10:46 AM    MCV 95 12/29/2023 07:12 AM    MCV 99 (H) 12/29/2023 07:12 AM    MCV 93 12/16/2015 10:46 AM     (L) 12/29/2023 07:12 AM     (L) 12/29/2023 07:12 AM     (L) 12/16/2015 10:46 AM    SODIUM 138 01/16/2024 07:02 AM    K 4.2 01/16/2024 07:02 AM    K 4.2 12/16/2015 10:46 AM     01/16/2024 07:02 AM     12/16/2015 10:46 AM    CO2 31 01/16/2024 07:02 AM    CO2 30.9 12/16/2015 10:46 AM    BUN 17 01/16/2024 07:02 AM    BUN 17 12/16/2015 10:46 AM    CREATININE 1.01 01/16/2024 07:02 AM    CREATININE 1.19 12/16/2015 10:46 AM    GLUF 99 01/16/2024 07:02 AM    GLUCOSE 99 12/16/2015 10:46 AM    AST 28 01/16/2024 07:02 AM    AST 29 12/16/2015 10:46 AM    ALT 35 01/16/2024 07:02 AM    ALT 53 12/16/2015 10:46 AM    ALKPHOS 47 01/16/2024 07:02 AM    ALKPHOS 46 12/16/2015 10:46 AM    TBILI 1.04 (H) 01/16/2024 07:02 AM    BILIDIR 0.15 10/10/2022 08:10 AM    ALB 4.4 01/16/2024 07:02 AM    ALB 4.2 12/16/2015 10:46 AM    INR 0.98 12/05/2022 07:07 AM    LIPASE 29 06/29/2023 05:06 PM         Radiology Results:   No results found.

## 2024-01-19 NOTE — PROGRESS NOTES
Colon and Rectal Surgery   Jay Jay Acuna 68 y.o. male MRN: [id number]   Encounter: [id number]  01/23/24   1:21 PM        ASSESSMENT:    Jay Jay is a 68-year-old male, NAFLD, HIV controlled on biktarvy, GERD, hypertension, hyperlipidemia.  Perianal irritation/cyst today for evaluation, states that this has been bothering him for 1 year.    Colonoscopy is current 2020.    On examination he has left lateral/anterior anal punctum, suggests external fistula opening, normal anoscopy without signs of proctitis    We discussed examination under anesthesia, possible fistulotomy, possible seton placement, anorectal surgery and in a face to face, personal informed consent the alternatives,benefits risks including not limited to bleeding, infection, risks of anesthesia, damage to sphincter/incontinence, possibility of seton placement and staged surgery. They understood these risks, signed informed consent, and wish to proceed.    PLAN:  Examination under anesthesia, possible fistulotomy, possible seton placement  CC:  ARINA Jurado Dr.      HPI  Jay Jay Acuna is a 68 y.o. male referred for evaluation today by Dr. Torey Rubio for perianal cyst.     The patient notes a non painful cyst near anus, since a year ago, causing itching and blood upon wiping. He notes soft and formed bowel movements with Metamucil and a history of RBL.      Last colonoscopy performed on 2/24/2020 by Dr. Tello, with a 5 year recall.     Personal history of HIV.       Historical Information   Past Medical History:   Diagnosis Date   • Cancer (HCC)    • GERD (gastroesophageal reflux disease)    • HIV (human immunodeficiency virus infection) (HCC)    • Hyperlipidemia    • Hypertension    • Kidney stone    • Non-alcoholic fatty liver disease    • PONV (postoperative nausea and vomiting)    • Prostate cancer (HCC)    • Sleep apnea      Past Surgical History:   Procedure Laterality Date   • COLONOSCOPY     • HERNIA REPAIR     • [facility]    Procedure: TRANSPERINEAL ULTRASOUND GUIDED BIOPSY PROSTATE;  Surgeon: Shon Beltrán MD;  Location: [facility];  Service: Urology   • [place] PROSTATE NEEDLE BIOPSY ANY APPROACH N/A 10/13/2020    Procedure: TRANSPERINEAL PROSTATE BIOPSY;  Surgeon: Shon Beltrán MD;  Location: [facility];  Service: Urology   • ROOT CANAL         Meds/Allergies       Current Outpatient Medications:   •  atorvastatin (LIPITOR) 40 mg tablet, Take 1 tablet (40 mg total) by mouth daily, Disp: 90 tablet, Rfl: 3  •  Biktarvy -25 MG tablet, TAKE 1 TABLET BY MOUTH DAILY WITH BREAKFAST, Disp: 90 tablet, Rfl: 1  •  cholecalciferol (VITAMIN D3) 1,000 units tablet, Take 1,000 Units by mouth daily, Disp: , Rfl:   •  cyanocobalamin (VITAMIN B-12) 500 MCG tablet, Take 1,000 mcg by mouth daily, Disp: , Rfl:   •  docusate sodium (COLACE) 100 mg capsule, Take 100 mg by mouth 3 (three) times a day as needed for constipation, Disp: , Rfl:   •  famotidine (PEPCID) 20 mg tablet, TAKE 1 TABLET BY MOUTH DAILY, Disp: 90 tablet, Rfl: 0  •  lisinopril (ZESTRIL) 20 mg tablet, Take 1 tablet (20 mg total) by mouth daily, Disp: 90 tablet, Rfl: 1  •  metoprolol succinate (TOPROL-XL) 50 mg 24 hr tablet, take 1 tablet by mouth once daily, Disp: 90 tablet, Rfl: 1  •  Multiple Vitamin (multivitamin) capsule, Take 1 capsule by mouth daily, Disp: , Rfl:   •  Omega-3 1000 MG CAPS, Take 2 g by mouth daily, Disp: , Rfl:   •  omeprazole (PriLOSEC) 40 MG capsule, Take 1 capsule (40 mg total) by mouth daily, Disp: 90 capsule, Rfl: 1  •  potassium citrate (UROCIT-K) 5 MEQ (540 MG), Take 1 tablet (5 mEq total) by mouth in the morning, Disp: 90 tablet, Rfl: 1  •  psyllium (METAMUCIL) 58.6 % packet, Take 1 packet by mouth daily, Disp: , Rfl:       Allergies   Allergen Reactions   • Epinephrine Palpitations and Shortness Of Breath   • Sulfa Antibiotics Other (See Comments)     Eye irritation         Social History  "  Social History     Substance and Sexual Activity   Alcohol Use Not Currently    Comment: used to be a heavy drinker, quit 15 years ago     Social History     Substance and Sexual Activity   Drug Use Not Currently   • Types: Marijuana, Cocaine    Comment: used to use drugs, consisting of crystal meth, cocaine, and marijuana, denied any IV drug use in the past     Social History     Tobacco Use   Smoking Status Never   Smokeless Tobacco Never     Family History:   Family History   Problem Relation Age of Onset   • Prostate cancer Father    • Esophageal cancer Father    • Heart disease Father    • Colon cancer Cousin         mothers side     Review of Systems   Constitutional: Negative.    HENT: Negative.     Eyes: Negative.    Respiratory: Negative.     Cardiovascular: Negative.    Gastrointestinal:  Positive for rectal pain.   Endocrine: Negative.    Genitourinary: Negative.    Musculoskeletal: Negative.    Skin: Negative.    Allergic/Immunologic: Negative.    Neurological: Negative.    Hematological: Negative.    Psychiatric/Behavioral: Negative.         Objective   Current Vitals:   Vitals:    01/23/24 1011   BP: 136/80   Weight: 95.7 kg (211 lb)   Height: 6' 2\" (1.88 m)     Physical Exam:  General:no distress  ENT:moist mucus membranes  Neck:supple  Pulm:no increased work of breathing, clear bilateral  CV:sinus  Abdomen:soft,nontender  Rectal: Left lateral-anterior perianal punctum, suggests external fistula opening no masses palpated   Extremities:no edema    Anoscopy    Date/Time: 1/23/2024 10:30 AM    Performed by: Librado Chamorro MD  Authorized by: Librado Chamorro MD    Verbal consent obtained?: Yes    Consent given by:  Patient  Patient identity confirmed:  Verbally with patient  Indications: rectal irritation    Scope type:  Anoscope   Normal anorectal mucosa internal.          "

## 2024-01-23 ENCOUNTER — OFFICE VISIT (OUTPATIENT)
Age: 69
End: 2024-01-23
Payer: MEDICARE

## 2024-01-23 ENCOUNTER — TELEPHONE (OUTPATIENT)
Age: 69
End: 2024-01-23

## 2024-01-23 ENCOUNTER — PREP FOR PROCEDURE (OUTPATIENT)
Age: 69
End: 2024-01-23

## 2024-01-23 VITALS
WEIGHT: 211 LBS | BODY MASS INDEX: 27.08 KG/M2 | SYSTOLIC BLOOD PRESSURE: 136 MMHG | HEIGHT: 74 IN | DIASTOLIC BLOOD PRESSURE: 80 MMHG

## 2024-01-23 DIAGNOSIS — K62.89 PERIANAL CYST: ICD-10-CM

## 2024-01-23 DIAGNOSIS — K60.3 ANAL FISTULA: Primary | ICD-10-CM

## 2024-01-23 PROCEDURE — 99204 OFFICE O/P NEW MOD 45 MIN: CPT | Performed by: COLON & RECTAL SURGERY

## 2024-01-23 PROCEDURE — 46600 DIAGNOSTIC ANOSCOPY SPX: CPT | Performed by: COLON & RECTAL SURGERY

## 2024-01-23 NOTE — LETTER
Jay Jay Acuna  [address] [facility] [address]        1/23/2024    Dear Jay Jay Acuna,    Your surgery is scheduled for: April 2, 2024   The hospital will call the evening prior to your surgery with your expected arrival time.   Location:[facility] [address] [facility] [address]    CHECK LIST PRIOR TO OUTPATIENT SURGERY  It is your responsibility to obtain any/all referrals needed for your surgery if required by your insurance. Our office will contact you to discuss your insurance coverage for this procedure.     Special instructions required if you are taking any blood thinners. Please verify with the prescribing physician. Examples include Coumadin, Plavix, Xarelto, Eliquis, Pradaxa, etc.     Please check with your family physician if you are taking the following medications: Aspirin or any Aspirin containing medication, Gingko biloba, Ginseng, Feverfew, and/or [place]’s Wort. We suggest stopping these for 3 days.     The night before and the day of your surgery, wash from your neck to groin with chlorhexidine soap. This soap is available at most retail pharmacies under such brand names as Hibiclens, Endure or Aplicare.     Pre-admission testing Required:  NO X     NOTHING TO EAT OR DRINK AFTER MIDNIGHT PRIOR TO SURGERY.     Take ONE FLEET ENEMA one hour prior to leaving for the hospital.     Please do not hesitate to call our office with any questions regarding your surgery.                                    Post-Operative Care Information   Hemorrhoidectomy, EUA, Fissurectomy, Fistulotomy, Sphincterotomy      Resume high fiber diet. Fiber supplementation with Metamucil or Konsyl also recommended daily.       Your first bowel movement may take up to 3 days after surgery. THIS IS OFTEN PAINFUL.      While taking narcotic pain medication, you should remain on an over-the-counter stool softener such as Colace (one tablet twice daily). For constipation Miralax can be taken up  to three times daily.     Pain is to be expected after hemorrhoid surgery. Ibuprofen (400? 600MG) every 6?8 hours is an excellent alternative in addition or as a substitute to your narcotic pain medication.     Rectal bleeding or drainage is normal after surgery.       Nausea is a common complaint post op. This can be associated with the narcotic pain medications, anesthesia as well as with severe constipation.     Driving may be resumed when all off all narcotic pain medications and you can turn or twist your body without hesitation.    If you are unable to urinate within 8 hours after surgery, please call the office at [phone]    Frequent warm tub soaks or warm showers are often soothing, we suggest 3 to 4 times daily.    After bowel movements, you may wash with a hand shower or warm tub soak.  No soap is okay.     No strenuous activity (i.e., Running, jogging, swimming, or weightlifting) for up to one week after surgery.       When will I receive follow-up care?  You should be scheduled for a post-op appointment within 6-8 weeks after surgery, unless otherwise instructed on your discharge summary. If you do not have an existing appointment at the time of discharge, please call our office at [phone].    Call the office at [phone] immediately if you have a fever, chills, excessive sweating, difficulty urinating, or excessive/profuse bleeding with clots.

## 2024-01-23 NOTE — PATIENT INSTRUCTIONS
ASSESSMENT:    Jay Jay is a 68-year-old male, NAFLD, HIV controlled on biktarvy, GERD, hypertension, hyperlipidemia.  Perianal irritation/cyst today for evaluation, states that this has been bothering him for 1 year.    Colonoscopy is current 2020.    On examination he has left lateral/anterior anal punctum, suggests external fistula opening, normal anoscopy without signs of proctitis    We discussed examination under anesthesia, possible fistulotomy, possible seton placement, anorectal surgery and in a face to face, personal informed consent the alternatives,benefits risks including not limited to bleeding, infection, risks of anesthesia, damage to sphincter/incontinence, possibility of seton placement and staged surgery. They understood these risks, signed informed consent, and wish to proceed.    PLAN:  Examination under anesthesia, possible fistulotomy, possible seton placement  CC:  ARINA Jurado Dr.

## 2024-01-23 NOTE — TELEPHONE ENCOUNTER
Patient scheduled for surgery  4/2/24  at [facility] OR. Instructions and PAT's gone over with and handed to the patient.

## 2024-02-01 DIAGNOSIS — K20.90 ESOPHAGITIS: ICD-10-CM

## 2024-02-02 RX ORDER — FAMOTIDINE 20 MG/1
20 TABLET, FILM COATED ORAL DAILY
Qty: 90 TABLET | Refills: 1 | Status: SHIPPED | OUTPATIENT
Start: 2024-02-02

## 2024-02-08 ENCOUNTER — TELEPHONE (OUTPATIENT)
Dept: GASTROENTEROLOGY | Facility: CLINIC | Age: 69
End: 2024-02-08

## 2024-02-08 NOTE — TELEPHONE ENCOUNTER
Patients GI provider:  Dr. Rubio    Number to return call: [phone]    Reason for call: Pt states he and Dr. Rubio spoke via [employer] and he was advised to set up a consult for TIFF procedure with Dr. Rubio. No availability with Dr. Rubio until Sept. Please advise.     Scheduled procedure/appointment date if applicable: none

## 2024-02-12 ENCOUNTER — TELEPHONE (OUTPATIENT)
Dept: GASTROENTEROLOGY | Facility: MEDICAL CENTER | Age: 69
End: 2024-02-12

## 2024-02-14 ENCOUNTER — OFFICE VISIT (OUTPATIENT)
Dept: GASTROENTEROLOGY | Facility: MEDICAL CENTER | Age: 69
End: 2024-02-14
Payer: MEDICARE

## 2024-02-14 VITALS
TEMPERATURE: 99.2 F | BODY MASS INDEX: 26.32 KG/M2 | SYSTOLIC BLOOD PRESSURE: 117 MMHG | HEART RATE: 69 BPM | DIASTOLIC BLOOD PRESSURE: 77 MMHG | WEIGHT: 205 LBS

## 2024-02-14 DIAGNOSIS — K31.89 GASTRIC WALL THICKENING: ICD-10-CM

## 2024-02-14 DIAGNOSIS — K21.9 GASTROESOPHAGEAL REFLUX DISEASE WITHOUT ESOPHAGITIS: ICD-10-CM

## 2024-02-14 DIAGNOSIS — K76.0 NON-ALCOHOLIC FATTY LIVER DISEASE: ICD-10-CM

## 2024-02-14 DIAGNOSIS — K44.9 HIATAL HERNIA: Primary | ICD-10-CM

## 2024-02-14 PROCEDURE — 99214 OFFICE O/P EST MOD 30 MIN: CPT | Performed by: INTERNAL MEDICINE

## 2024-02-14 NOTE — PROGRESS NOTES
Outpatient Follow up  [facility] [place]  [address]  [address]  [facility] [address]  Torey Rubio MD  Ph : [phone]  Fax : [phone]  Mobile : [phone]  Email : [email or web address]  Also available on Tiger Text    Jay Jay Acuna 68 y.o. male MRN: [id number]    PCP: ARINA Lantigua  Referring: No referring provider defined for this encounter.    aJy Jay Acuna presented for a follow up visit. My recommendations are included. Please do not hesitate to contact me with any questions you may have.     ASSESSMENT AND PLAN:      No problem-specific Assessment & Plan notes found for this encounter.      Diagnoses and all orders for this visit:    Hiatal hernia  -     Ambulatory Referral to General Surgery; Future  -     Esophageal manometry; Future    Gastroesophageal reflux disease without esophagitis  -     Ambulatory Referral to General Surgery; Future  -     Esophageal manometry; Future    Non-alcoholic fatty liver disease    Gastric wall thickening    Other orders  -     Diet NPO; Sips with meds; Standing  -     Void on call to OR; Standing  -     Insert peripheral IV; Standing      68-year-old gentleman presents was for evaluation of his GERD and to find out more about Transoral Incisionless Fundoplication (TIF).  His symptoms are controlled on omeprazole.  I discussed Transoral Incisionless Fundoplication (TIF) with him extensively and discussed the procedure including possible risks and complications and dietary modifications are required after the procedure transiently.  He would like to think about it.  Prior to the procedure I would recommend doing a esophageal manometry.  I have also recommended for him to see Dr. Cedeno since he does have a hiatal hernia and will require a c-Transoral Incisionless Fundoplication (TIF).  I also offered to him that if his symptoms are well-controlled on the omeprazole he may very well continue on  that.  We discussed the long-term risks and benefits of being on PPI.  We discussed the small risk of affecting bone health, vitamin and nutritional absorption.  There are associations with dementia however this has not confirmed to be a causative factor.  I recommend for him to take a multivitamin.  He may take Pepcid as needed.  We will also try to cut down on the omeprazole dose in the future.  If he decides to not undergo the Transoral Incisionless Fundoplication (TIF) then he will continue with the PPI.    ______________________________________________________________________    SUBJECTIVE:  68 y.o. year old who presents with Consult (Pt is here to discuss tiff procedure -no GI symptoms )           Interval history :    Jay Jay is a 62-year-old gentleman presents was for evaluation of his gastroesophageal reflux disease and to get more information about Transoral Incisionless Fundoplication (TIF).  His symptoms are well-controlled on the omeprazole at this time.  He has no nausea vomiting.  No regurgitation.  No chest pain or heartburn.  He is on omeprazole.    He takes Metamucil.  He describes intermittent abdominal pain.  However this is controlled.  His bowel movements are regular.    Answers submitted by the patient for this visit:  Abdominal Pain Questionnaire (Submitted on 2/13/2024)  Chief Complaint: Abdominal pain  Chronicity: chronic  Onset: more than 1 year ago  Onset quality: gradual  Frequency: intermittently  Episode duration: 4 Hours  Progression since onset: gradually worsening  Pain location: RLQ  Pain - numeric: 4/10  Pain quality: aching, cramping, dull, a sensation of fullness  Radiates to: does not radiate  anorexia: No  arthralgias: No  belching: Yes  constipation: No  diarrhea: No  dysuria: No  fever: No  flatus: No  frequency: Yes  headaches: No  hematochezia: No  hematuria: No  melena: No  myalgias: No  nausea: No  weight loss: No  vomiting: No  Aggravated by: eating  Relieved by:  nothing  Diagnostic workup: CT scan, GI consult, upper endoscopy      PRIOR ENDOSCOPIC EVALUATION :     Endoscopy : yes    Colonoscopy : 02/24/2020     Last Cologuard: Not on file        REVIEW OF SYSTEMS IS OTHERWISE NEGATIVE.      Historical Information   Past Medical History:   Diagnosis Date    Cancer (HCC)     GERD (gastroesophageal reflux disease)     HIV (human immunodeficiency virus infection) (HCC)     Hyperlipidemia     Hypertension     Kidney stone     Non-alcoholic fatty liver disease     PONV (postoperative nausea and vomiting)     Prostate cancer (HCC)     Sleep apnea      Past Surgical History:   Procedure Laterality Date    COLONOSCOPY      HERNIA REPAIR      PA BX PROSTATE STRTCTC SATURATION SAMPLING IMG GID N/A 01/25/2022    Procedure: TRANSPERINEAL ULTRASOUND GUIDED BIOPSY PROSTATE;  Surgeon: Shon Beltrán MD;  [facility];  Service: Urology    PA PROSTATE NEEDLE BIOPSY ANY APPROACH N/A 10/13/2020    Procedure: TRANSPERINEAL PROSTATE BIOPSY;  Surgeon: Shon Beltrán MD;  Location: [facility];  Service: Urology    ROOT CANAL       Social History   Social History     Substance and Sexual Activity   Alcohol Use Not Currently    Comment: used to be a heavy drinker, quit 15 years ago     Social History     Substance and Sexual Activity   Drug Use Not Currently    Types: Marijuana, Cocaine    Comment: used to use drugs, consisting of crystal meth, cocaine, and marijuana, denied any IV drug use in the past     Social History     Tobacco Use   Smoking Status Never   Smokeless Tobacco Never     Family History   Problem Relation Age of Onset    Prostate cancer Father     Esophageal cancer Father     Heart disease Father     Colon cancer Cousin         mothers side       Meds/Allergies       Current Outpatient Medications:     atorvastatin (LIPITOR) 40 mg tablet    Biktarvy -25 MG tablet    cholecalciferol (VITAMIN D3) 1,000 units tablet    cyanocobalamin (VITAMIN B-12) 500 MCG  tablet    docusate sodium (COLACE) 100 mg capsule    famotidine (PEPCID) 20 mg tablet    lisinopril (ZESTRIL) 20 mg tablet    metoprolol succinate (TOPROL-XL) 50 mg 24 hr tablet    Multiple Vitamin (multivitamin) capsule    Omega-3 1000 MG CAPS    omeprazole (PriLOSEC) 40 MG capsule    potassium citrate (UROCIT-K) 5 MEQ (540 MG)    psyllium (METAMUCIL) 58.6 % packet    Allergies   Allergen Reactions    Epinephrine Palpitations and Shortness Of Breath    Sulfa Antibiotics Other (See Comments)     Eye irritation           Objective     Blood pressure 117/77, pulse 69, temperature 99.2 °F (37.3 °C), weight 93 kg (205 lb). Body mass index is 26.32 kg/m².      PHYSICAL EXAM:      Physical Exam  Constitutional:       Appearance: Normal appearance. He is well-developed.   HENT:      Head: Normocephalic and atraumatic.   Eyes:      General: No scleral icterus.     Conjunctiva/sclera: Conjunctivae normal.      Pupils: Pupils are equal, round, and reactive to light.   Cardiovascular:      Rate and Rhythm: Normal rate and regular rhythm.      Heart sounds: Normal heart sounds.   Pulmonary:      Effort: Pulmonary effort is normal. No respiratory distress.      Breath sounds: Normal breath sounds.   Abdominal:      General: Bowel sounds are normal. There is no distension.      Palpations: Abdomen is soft. There is no mass.      Tenderness: There is no abdominal tenderness.      Hernia: No hernia is present.   Musculoskeletal:         General: Normal range of motion.      Cervical back: Normal range of motion.   Lymphadenopathy:      Cervical: No cervical adenopathy.   Skin:     General: Skin is warm.   Neurological:      Mental Status: He is alert and oriented to person, place, and time.   Psychiatric:         Behavior: Behavior normal.         Thought Content: Thought content normal.         Lab Results:   Lab Results   Component Value Date/Time    WBC 4.43 12/29/2023 07:12 AM    WBC 4.5 12/29/2023 07:12 AM    WBC 4.12 (L)  12/16/2015 10:46 AM    HGB 16.2 12/29/2023 07:12 AM    HGB 16.1 12/29/2023 07:12 AM    HGB 16.8 12/16/2015 10:46 AM    HCT 44.6 12/29/2023 07:12 AM    HCT 46.0 12/29/2023 07:12 AM    HCT 46.1 12/16/2015 10:46 AM    MCV 95 12/29/2023 07:12 AM    MCV 99 (H) 12/29/2023 07:12 AM    MCV 93 12/16/2015 10:46 AM     (L) 12/29/2023 07:12 AM     (L) 12/29/2023 07:12 AM     (L) 12/16/2015 10:46 AM    SODIUM 138 01/16/2024 07:02 AM    K 4.2 01/16/2024 07:02 AM    K 4.2 12/16/2015 10:46 AM     01/16/2024 07:02 AM     12/16/2015 10:46 AM    CO2 31 01/16/2024 07:02 AM    CO2 30.9 12/16/2015 10:46 AM    BUN 17 01/16/2024 07:02 AM    BUN 17 12/16/2015 10:46 AM    CREATININE 1.01 01/16/2024 07:02 AM    CREATININE 1.19 12/16/2015 10:46 AM    GLUF 99 01/16/2024 07:02 AM    GLUCOSE 99 12/16/2015 10:46 AM    AST 28 01/16/2024 07:02 AM    AST 29 12/16/2015 10:46 AM    ALT 35 01/16/2024 07:02 AM    ALT 53 12/16/2015 10:46 AM    ALKPHOS 47 01/16/2024 07:02 AM    ALKPHOS 46 12/16/2015 10:46 AM    TBILI 1.04 (H) 01/16/2024 07:02 AM    BILIDIR 0.15 10/10/2022 08:10 AM    ALB 4.4 01/16/2024 07:02 AM    ALB 4.2 12/16/2015 10:46 AM    INR 0.98 12/05/2022 07:07 AM    LIPASE 29 06/29/2023 05:06 PM         Radiology Results:   No results found.

## 2024-02-15 ENCOUNTER — TELEPHONE (OUTPATIENT)
Dept: GASTROENTEROLOGY | Facility: HOSPITAL | Age: 69
End: 2024-02-15

## 2024-02-16 ENCOUNTER — TELEPHONE (OUTPATIENT)
Age: 69
End: 2024-02-16

## 2024-02-16 NOTE — TELEPHONE ENCOUNTER
Patients GI provider:  Dr. Chamorro    Number to return call: [phone]    Reason for call: Pt returned Sandy Forrester's call regarding an earlier appt with Dr Chamorro. I called the office, but no answer and no appts in the schedule. Please reach out to pt.     Scheduled procedure/appointment date if applicable: Appt 03/01/24

## 2024-02-19 NOTE — PROGRESS NOTES
Colon and Rectal Surgery   Jay Jay Acuna 68 y.o. male MRN: [id number]   Encounter: [id number]  02/21/24   1:55 PM        ASSESSMENT:    Jay Jay returns today for further preop discussion, he had some new concerns regarding surgery after researching fistula operation.  We discussed today the difference between fistulectomy and fistulotomy, the possibility of seton placement, the risks, alternatives and benefits of operation which was summarized from prior as below.    'left lateral/anterior anal punctum, suggests external fistula opening, normal anoscopy without signs of proctitis     We discussed examination under anesthesia, possible fistulotomy, possible seton placement, anorectal surgery and in a face to face, personal informed consent the alternatives,benefits risks including not limited to bleeding, infection, risks of anesthesia, damage to sphincter/incontinence, possibility of seton placement and staged surgery. They understood these risks, signed informed consent, and wish to proceed.'     PLAN:  Examination under anesthesia, possible fistulotomy, possible seton placement, he is requesting May date since he has plans/tickets around the prior selected April date.        HPI  Jay Jay Acuna is a 68 y.o. male is here today for a presurgical consult. He was last seen on 1/23/24.     He is currently scheduled for an EUA, Fistulotomy on 4/2/24    Historical Information   Past Medical History:   Diagnosis Date    Cancer (HCC)     GERD (gastroesophageal reflux disease)     HIV (human immunodeficiency virus infection) (HCC)     Hyperlipidemia     Hypertension     Kidney stone     Non-alcoholic fatty liver disease     PONV (postoperative nausea and vomiting)     Prostate cancer (HCC)     Sleep apnea      Past Surgical History:   Procedure Laterality Date    COLONOSCOPY      HERNIA REPAIR      [place] BX PROSTATE STRTCTC SATURATION SAMPLING IMG GID N/A 01/25/2022    Procedure: TRANSPERINEAL ULTRASOUND GUIDED BIOPSY PROSTATE;   Surgeon: Shon Beltrán MD;  Location[facility];  Service: Urology    SC PROSTATE NEEDLE BIOPSY ANY APPROACH N/A 10/13/2020    Procedure: TRANSPERINEAL PROSTATE BIOPSY;  Surgeon: Shon Beltrán MD;  Location[facility];  Service: Urology    ROOT CANAL         Meds/Allergies       Current Outpatient Medications:     atorvastatin (LIPITOR) 40 mg tablet, Take 1 tablet (40 mg total) by mouth daily, Disp: 90 tablet, Rfl: 3    Biktarvy -25 MG tablet, TAKE 1 TABLET BY MOUTH DAILY WITH BREAKFAST, Disp: 90 tablet, Rfl: 1    cholecalciferol (VITAMIN D3) 1,000 units tablet, Take 1,000 Units by mouth daily, Disp: , Rfl:     cyanocobalamin (VITAMIN B-12) 500 MCG tablet, Take 1,000 mcg by mouth daily, Disp: , Rfl:     docusate sodium (COLACE) 100 mg capsule, Take 100 mg by mouth 3 (three) times a day as needed for constipation, Disp: , Rfl:     famotidine (PEPCID) 20 mg tablet, TAKE 1 TABLET BY MOUTH DAILY, Disp: 90 tablet, Rfl: 1    lisinopril (ZESTRIL) 20 mg tablet, Take 1 tablet (20 mg total) by mouth daily, Disp: 90 tablet, Rfl: 1    metoprolol succinate (TOPROL-XL) 50 mg 24 hr tablet, take 1 tablet by mouth once daily, Disp: 90 tablet, Rfl: 1    Multiple Vitamin (multivitamin) capsule, Take 1 capsule by mouth daily, Disp: , Rfl:     Omega-3 1000 MG CAPS, Take 2 g by mouth daily, Disp: , Rfl:     omeprazole (PriLOSEC) 40 MG capsule, Take 1 capsule (40 mg total) by mouth daily, Disp: 90 capsule, Rfl: 1    potassium citrate (UROCIT-K) 5 MEQ (540 MG), Take 1 tablet (5 mEq total) by mouth in the morning, Disp: 90 tablet, Rfl: 1    psyllium (METAMUCIL) 58.6 % packet, Take 1 packet by mouth daily, Disp: , Rfl:       Allergies   Allergen Reactions    Epinephrine Palpitations and Shortness Of Breath    Sulfa Antibiotics Other (See Comments)     Eye irritation         Social History   Social History     Substance and Sexual Activity   Alcohol Use Not Currently    Comment: used to be a heavy drinker, quit 15 years ago  "    Social History     Substance and Sexual Activity   Drug Use Not Currently    Types: Marijuana, Cocaine    Comment: used to use drugs, consisting of crystal meth, cocaine, and marijuana, denied any IV drug use in the past     Social History     Tobacco Use   Smoking Status Never   Smokeless Tobacco Never         Family History:   Family History   Problem Relation Age of Onset    Prostate cancer Father     Esophageal cancer Father     Heart disease Father     Colon cancer Cousin         mothers side       Review of Systems    Objective     Current Vitals:   Vitals:    02/21/24 1327   Height: 6' 2\" (1.88 m)     Physical Exam:  General:no distress  Pulm:no increased work of breathing      " dietitian/nutrition services

## 2024-02-21 ENCOUNTER — OFFICE VISIT (OUTPATIENT)
Age: 69
End: 2024-02-21
Payer: MEDICARE

## 2024-02-21 VITALS — BODY MASS INDEX: 26.32 KG/M2 | HEIGHT: 74 IN

## 2024-02-21 DIAGNOSIS — K62.89 PERIANAL CYST: Primary | ICD-10-CM

## 2024-02-21 PROCEDURE — 99214 OFFICE O/P EST MOD 30 MIN: CPT | Performed by: COLON & RECTAL SURGERY

## 2024-02-21 NOTE — PATIENT INSTRUCTIONS
ASSESSMENT:    Jay Jay returns today for further preop discussion, he had some new concerns regarding surgery after researching fistula operation.  We discussed today the difference between fistulectomy and fistulotomy, the possibility of seton placement, the risks, alternatives and benefits of operation which was summarized from prior as below.    'left lateral/anterior anal punctum, suggests external fistula opening, normal anoscopy without signs of proctitis     We discussed examination under anesthesia, possible fistulotomy, possible seton placement, anorectal surgery and in a face to face, personal informed consent the alternatives,benefits risks including not limited to bleeding, infection, risks of anesthesia, damage to sphincter/incontinence, possibility of seton placement and staged surgery. They understood these risks, signed informed consent, and wish to proceed.'     PLAN:  Examination under anesthesia, possible fistulotomy, possible seton placement, he is requesting May date since he has plans/tickets around the prior selected April date.

## 2024-03-18 ENCOUNTER — TELEPHONE (OUTPATIENT)
Age: 69
End: 2024-03-18

## 2024-03-18 NOTE — TELEPHONE ENCOUNTER
Patients GI provider:  Dr. Chamorro    Reason for call: Pt was calling with questions about his upcoming surgery and would like to speak with Amee. I was unable to get through and the pt said he was going out and he would call her back later.

## 2024-03-21 DIAGNOSIS — B20 CURRENTLY ASYMPTOMATIC HIV INFECTION, WITH HISTORY OF HIV-RELATED ILLNESS (HCC): ICD-10-CM

## 2024-03-21 RX ORDER — BICTEGRAVIR SODIUM, EMTRICITABINE, AND TENOFOVIR ALAFENAMIDE FUMARATE 50; 200; 25 MG/1; MG/1; MG/1
TABLET ORAL
Qty: 90 TABLET | Refills: 1 | Status: SHIPPED | OUTPATIENT
Start: 2024-03-21

## 2024-04-11 ENCOUNTER — TELEPHONE (OUTPATIENT)
Dept: SURGERY | Facility: CLINIC | Age: 69
End: 2024-04-11

## 2024-04-11 NOTE — TELEPHONE ENCOUNTER
Patient called letting us know him and his partner have to use a new local pharmacy. It is [facility] - [address] [place] [address]. Pharmacy was already in file.

## 2024-04-16 DIAGNOSIS — R00.2 PALPITATIONS: ICD-10-CM

## 2024-04-16 DIAGNOSIS — I47.10 SVT (SUPRAVENTRICULAR TACHYCARDIA): ICD-10-CM

## 2024-04-17 DIAGNOSIS — K21.00 GASTROESOPHAGEAL REFLUX DISEASE WITH ESOPHAGITIS WITHOUT HEMORRHAGE: ICD-10-CM

## 2024-04-17 RX ORDER — METOPROLOL SUCCINATE 50 MG/1
50 TABLET, EXTENDED RELEASE ORAL DAILY
Qty: 90 TABLET | Refills: 1 | Status: SHIPPED | OUTPATIENT
Start: 2024-04-17

## 2024-04-17 RX ORDER — OMEPRAZOLE 40 MG/1
40 CAPSULE, DELAYED RELEASE ORAL DAILY
Qty: 90 CAPSULE | Refills: 1 | Status: SHIPPED | OUTPATIENT
Start: 2024-04-17

## 2024-04-19 ENCOUNTER — LAB (OUTPATIENT)
Dept: LAB | Facility: HOSPITAL | Age: 69
End: 2024-04-19
Payer: MEDICARE

## 2024-04-19 DIAGNOSIS — E78.5 DYSLIPIDEMIA: ICD-10-CM

## 2024-04-19 DIAGNOSIS — C61 PROSTATE CANCER (HCC): ICD-10-CM

## 2024-04-19 DIAGNOSIS — K21.9 GASTROESOPHAGEAL REFLUX DISEASE WITHOUT ESOPHAGITIS: ICD-10-CM

## 2024-04-19 DIAGNOSIS — N20.0 NEPHROLITHIASIS: ICD-10-CM

## 2024-04-19 DIAGNOSIS — R10.13 DYSPEPSIA: ICD-10-CM

## 2024-04-19 DIAGNOSIS — K76.0 NON-ALCOHOLIC FATTY LIVER DISEASE: ICD-10-CM

## 2024-04-19 LAB
ALBUMIN SERPL BCP-MCNC: 4.6 G/DL (ref 3.5–5)
ALP SERPL-CCNC: 50 U/L (ref 34–104)
ALT SERPL W P-5'-P-CCNC: 46 U/L (ref 7–52)
ANION GAP SERPL CALCULATED.3IONS-SCNC: 3 MMOL/L (ref 4–13)
AST SERPL W P-5'-P-CCNC: 34 U/L (ref 13–39)
BILIRUB DIRECT SERPL-MCNC: 0.21 MG/DL (ref 0–0.2)
BILIRUB SERPL-MCNC: 1.02 MG/DL (ref 0.2–1)
BUN SERPL-MCNC: 16 MG/DL (ref 5–25)
CALCIUM SERPL-MCNC: 9.9 MG/DL (ref 8.4–10.2)
CHLORIDE SERPL-SCNC: 104 MMOL/L (ref 96–108)
CHOLEST SERPL-MCNC: 107 MG/DL
CO2 SERPL-SCNC: 31 MMOL/L (ref 21–32)
CREAT SERPL-MCNC: 1.06 MG/DL (ref 0.6–1.3)
GFR SERPL CREATININE-BSD FRML MDRD: 72 ML/MIN/1.73SQ M
GLUCOSE P FAST SERPL-MCNC: 117 MG/DL (ref 65–99)
HDLC SERPL-MCNC: 33 MG/DL
IGA SERPL-MCNC: 186 MG/DL (ref 66–433)
LDLC SERPL CALC-MCNC: 53 MG/DL (ref 0–100)
MAGNESIUM SERPL-MCNC: 1.9 MG/DL (ref 1.9–2.7)
POTASSIUM SERPL-SCNC: 4.3 MMOL/L (ref 3.5–5.3)
PROT SERPL-MCNC: 7.1 G/DL (ref 6.4–8.4)
SODIUM SERPL-SCNC: 138 MMOL/L (ref 135–147)
TRIGL SERPL-MCNC: 105 MG/DL

## 2024-04-19 PROCEDURE — 80048 BASIC METABOLIC PNL TOTAL CA: CPT

## 2024-04-19 PROCEDURE — 83735 ASSAY OF MAGNESIUM: CPT

## 2024-04-19 PROCEDURE — 86364 TISS TRNSGLTMNASE EA IG CLAS: CPT

## 2024-04-19 PROCEDURE — 82784 ASSAY IGA/IGD/IGG/IGM EACH: CPT

## 2024-04-19 PROCEDURE — 80061 LIPID PANEL: CPT

## 2024-04-19 PROCEDURE — 36415 COLL VENOUS BLD VENIPUNCTURE: CPT

## 2024-04-19 PROCEDURE — 80076 HEPATIC FUNCTION PANEL: CPT

## 2024-04-21 LAB — TTG IGA SER-ACNC: <2 U/ML (ref 0–3)

## 2024-04-22 ENCOUNTER — TELEPHONE (OUTPATIENT)
Dept: UROLOGY | Facility: CLINIC | Age: 69
End: 2024-04-22

## 2024-04-22 ENCOUNTER — ANESTHESIA EVENT (OUTPATIENT)
Dept: ANESTHESIOLOGY | Facility: HOSPITAL | Age: 69
End: 2024-04-22

## 2024-04-22 ENCOUNTER — ANESTHESIA (OUTPATIENT)
Dept: ANESTHESIOLOGY | Facility: HOSPITAL | Age: 69
End: 2024-04-22

## 2024-04-22 ENCOUNTER — APPOINTMENT (OUTPATIENT)
Dept: LAB | Facility: HOSPITAL | Age: 69
End: 2024-04-22
Payer: MEDICARE

## 2024-04-22 DIAGNOSIS — C61 PROSTATE CANCER (HCC): ICD-10-CM

## 2024-04-22 LAB
BACTERIA UR QL AUTO: NORMAL /HPF
BILIRUB UR QL STRIP: NEGATIVE
CLARITY UR: CLEAR
COLOR UR: NORMAL
GLUCOSE UR STRIP-MCNC: NEGATIVE MG/DL
HGB UR QL STRIP.AUTO: NEGATIVE
KETONES UR STRIP-MCNC: NEGATIVE MG/DL
LEUKOCYTE ESTERASE UR QL STRIP: NEGATIVE
NITRITE UR QL STRIP: NEGATIVE
NON-SQ EPI CELLS URNS QL MICRO: NORMAL /HPF
PH UR STRIP.AUTO: 6.5 [PH]
PROT UR STRIP-MCNC: NEGATIVE MG/DL
PSA SERPL-MCNC: 4.55 NG/ML (ref 0–4)
RBC #/AREA URNS AUTO: NORMAL /HPF
SP GR UR STRIP.AUTO: 1.01 (ref 1–1.03)
UROBILINOGEN UR STRIP-ACNC: <2 MG/DL
WBC #/AREA URNS AUTO: NORMAL /HPF

## 2024-04-22 PROCEDURE — 81001 URINALYSIS AUTO W/SCOPE: CPT

## 2024-04-22 PROCEDURE — 36415 COLL VENOUS BLD VENIPUNCTURE: CPT

## 2024-04-22 PROCEDURE — 84153 ASSAY OF PSA TOTAL: CPT

## 2024-04-22 NOTE — TELEPHONE ENCOUNTER
"----- Message from Shon Beltrán MD sent at 4/22/2024 11:28 AM EDT -----  MyChart message sent below. Please help arrange appt for Jay Jay with AP within next several months. Please see if appt can be coordinated on same day as patient's partner.    \"Jay Jay. Your PSA looks very stable from prior. It is below what was the prior highest value of 4.9. Given the known size of your prostare from mpMRI in 2020 (60g), the PSA density (comparison of PSA to volume of tissue) is appropriately less than 10%.  At this time, I see no concerns about continuing a surveillance plan and will make sure my team has an appt to see you.    As an FYI, I am also sending a message to Antony and hope that your appts can be coordinated together.\"  "

## 2024-04-23 ENCOUNTER — OFFICE VISIT (OUTPATIENT)
Dept: SURGERY | Facility: CLINIC | Age: 69
End: 2024-04-23
Payer: MEDICARE

## 2024-04-23 ENCOUNTER — DOCUMENTATION (OUTPATIENT)
Dept: SURGERY | Facility: CLINIC | Age: 69
End: 2024-04-23

## 2024-04-23 ENCOUNTER — PATIENT OUTREACH (OUTPATIENT)
Dept: SURGERY | Facility: CLINIC | Age: 69
End: 2024-04-23

## 2024-04-23 VITALS
BODY MASS INDEX: 27.28 KG/M2 | HEART RATE: 61 BPM | HEIGHT: 74 IN | WEIGHT: 212.6 LBS | TEMPERATURE: 98.9 F | OXYGEN SATURATION: 98 % | DIASTOLIC BLOOD PRESSURE: 78 MMHG | RESPIRATION RATE: 17 BRPM | SYSTOLIC BLOOD PRESSURE: 137 MMHG

## 2024-04-23 DIAGNOSIS — I10 PRIMARY HYPERTENSION: ICD-10-CM

## 2024-04-23 DIAGNOSIS — E78.2 MIXED HYPERLIPIDEMIA: ICD-10-CM

## 2024-04-23 DIAGNOSIS — B20 HIV DISEASE (HCC): Primary | ICD-10-CM

## 2024-04-23 DIAGNOSIS — K44.9 HIATAL HERNIA: ICD-10-CM

## 2024-04-23 DIAGNOSIS — Z00.00 MEDICARE ANNUAL WELLNESS VISIT, SUBSEQUENT: ICD-10-CM

## 2024-04-23 DIAGNOSIS — K76.0 NON-ALCOHOLIC FATTY LIVER DISEASE: ICD-10-CM

## 2024-04-23 PROCEDURE — G0438 PPPS, INITIAL VISIT: HCPCS | Performed by: NURSE PRACTITIONER

## 2024-04-23 PROCEDURE — 99214 OFFICE O/P EST MOD 30 MIN: CPT | Performed by: NURSE PRACTITIONER

## 2024-04-23 RX ORDER — BENZOYL PEROXIDE 50 MG/ML
237 LIQUID TOPICAL AS NEEDED
COMMUNITY
Start: 2024-04-11

## 2024-04-23 NOTE — ASSESSMENT & PLAN NOTE
Recent GI fo/u and decided to wait until EGD next year in 2025 to determine next steps if needed.  Continue PPI with good relief  Does use Pepcid at night for breakthrough GERD or when he will eat foods that cause GERD  Continue to monitor

## 2024-04-23 NOTE — PROGRESS NOTES
Met with patient for his 6 month assessment patient.     Patient doing well taking medications daily and has not missed any doses. Patient has a dental appointment this Monday 4/29 with Dr. Baxter for a regular follow up.  Patient has not dental complaints at this time.

## 2024-04-23 NOTE — PROGRESS NOTES
Assessment and Plan:   Reviewed all labs and notes from GI and Colorectal.   Problem List Items Addressed This Visit          Cardiovascular and Mediastinum    HTN (hypertension)     BP acceptable today.  Continue HTN regimen  Continue to monitor BP at home  Continue to follow with Cardiology             Respiratory    Hiatal hernia     Recent GI fo/u and decided to wait until EGD next year in  to determine next steps if needed.  Continue PPI with good relief  Does use Pepcid at night for breakthrough GERD or when he will eat foods that cause GERD  Continue to monitor             Digestive    Non-alcoholic fatty liver disease     T. Bili and direct bili slightly elevated.   Could be related to autoimmune or NAFLD.  Continue to monitor   Follow with GI   No changes in ART               Other    HIV disease (HCC) - Primary     Doing well on Biktarvy with an undetectable VL.  Pt reports 100% medication compliance on HAART.  Stressed the importance of 100% medication adherence  Monitor CD4. HIV RNA, CMP, and CBCD for virologic response and drug toxicities  Follow up with Dr. Campa or Dr. Barrett   HIV Counseling:    Viral Load: not detected    CD4 Count: 712      Denies side effects.  Stressed the importance of adherence.  Continue follow up in the ID clinic with Dr. Campa or Dr. Barrett.    Reviewed the most recent labs, including the CD4 and viral load.  Discussed the risks and benefits of treatment options, instructions for management, importance of treatment adherence, and reduction of risk factors.  Educated on possible medication side effects.    Counseled on routes of HIV transmission, including the risk of  infection.  Emphasized that viral suppression is the best method to prevent HIV transmission.  At this time the pt denies the need for HIV testing of anyone in their life.    Total encounter time was greater than 35 minutes.  Greater than 20 minutes were spent on counseling and patient  education.  Pt voices understanding and agreement with treatment plan.             Hyperlipidemia     LDL at goal.  Continue statin  Continue to monitor Lipid panel every 6 months for now          Other Visit Diagnoses       Medicare annual wellness visit, subsequent                 Preventive health issues were discussed with patient, and age appropriate screening tests were ordered as noted in patient's After Visit Summary.  Personalized health advice and appropriate referrals for health education or preventive services given if needed, as noted in patient's After Visit Summary.     History of Present Illness:     Patient presents for a Medicare Wellness Visit and for management of HIV.  Jay Jay has been doing well and does not have any health concerns at this time.  Jay Jay inquired about his planned fistulotomy scheduled for 5/7/24 with Dr. Chamorro.     Recent visit with dermatology for removal of skin tag and dark mole on buttock.     He does not endorse any fever, chills, nausea, vomiting, cough, SOB, diarrhea, or night sweats.      HPI   Patient Care Team:  ARINA Lantigua as PCP - General (Nurse Practitioner)  ARINA Gunter MD     Review of Systems:     Review of Systems   Constitutional: Negative.    Respiratory: Negative.     Cardiovascular: Negative.    Gastrointestinal: Negative.    Neurological: Negative.    Psychiatric/Behavioral: Negative.          Problem List:     Patient Active Problem List   Diagnosis    HIV disease (HCC)    Family history of prostate cancer in father    Prostate nodule    HTN (hypertension)    Hyperlipidemia    Non-alcoholic fatty liver disease    GERD (gastroesophageal reflux disease)    Palpitations    Splenomegaly    Thrombocytopenia (HCC)    Polycythemia    Family history of substance abuse    Neutropenia (HCC)    H/O ETOH abuse    Leukopenia    Locking of both knees    Degenerative cervical disc    Fluid level behind tympanic membrane of  both ears    Bladder wall thickening    Right lower quadrant abdominal pain    Hiatal hernia    Weight loss    S/P hernia repair    Impaired fasting glucose    Gastric wall thickening    Esophagitis    Periaortic lymphadenopathy    Syphilis    Transaminitis    Perianal cyst      Past Medical and Surgical History:     Past Medical History:   Diagnosis Date    Cancer (HCC)     GERD (gastroesophageal reflux disease)     HIV (human immunodeficiency virus infection) (HCC)     Hyperlipidemia     Hypertension     Kidney stone     Non-alcoholic fatty liver disease     PONV (postoperative nausea and vomiting)     Prostate cancer (HCC)     Sleep apnea      Past Surgical History:   Procedure Laterality Date    COLONOSCOPY      HERNIA REPAIR      MT BX PROSTATE STRTCTC SATURATION SAMPLING IMG GID N/A 01/25/2022    Procedure: TRANSPERINEAL ULTRASOUND GUIDED BIOPSY PROSTATE;  Surgeon: Shon Beltrán MD;  Location: [facility];  Service: Urology    MT PROSTATE NEEDLE BIOPSY ANY APPROACH N/A 10/13/2020    Procedure: TRANSPERINEAL PROSTATE BIOPSY;  Surgeon: Shon Beltrán MD;  Location: [facility];  Service: Urology    ROOT CANAL        Family History:     Family History   Problem Relation Age of Onset    Prostate cancer Father     Esophageal cancer Father     Heart disease Father     Colon cancer Cousin         mothers side      Social History:     Social History     Socioeconomic History    Marital status: Single     Spouse name: None    Number of children: None    Years of education: None    Highest education level: None   Occupational History    None   Tobacco Use    Smoking status: Never    Smokeless tobacco: Never   Vaping Use    Vaping status: Never Used   Substance and Sexual Activity    Alcohol use: Not Currently     Comment: used to be a heavy drinker, quit 15 years ago    Drug use: Not Currently     Types: Marijuana, Cocaine     Comment: used to use drugs, consisting of crystal meth, cocaine, and marijuana, denied  any IV drug use in the past    Sexual activity: Not Currently     Partners: Male   Other Topics Concern    None   Social History Narrative    None     Social Determinants of Health     Financial Resource Strain: Not on file   Food Insecurity: No Food Insecurity (4/23/2024)    Hunger Vital Sign     Worried About Running Out of Food in the Last Year: Never true     Ran Out of Food in the Last Year: Never true   Transportation Needs: Not on file   Physical Activity: Not on file   Stress: Not on file   Social Connections: Not on file   Intimate Partner Violence: Not on file   Housing Stability: Not on file      Medications and Allergies:     Current Outpatient Medications   Medication Sig Dispense Refill    atorvastatin (LIPITOR) 40 mg tablet Take 1 tablet (40 mg total) by mouth daily 90 tablet 3    benzoyl peroxide 5 % external wash Apply 237 g topically if needed (as needed)      bictegravir-emtricitab-tenofovir alafenamide (Biktarvy) -25 MG tablet TAKE 1 TABLET BY MOUTH DAILY WITH BREAKFAST 90 tablet 1    cholecalciferol (VITAMIN D3) 1,000 units tablet Take 1,000 Units by mouth daily      cyanocobalamin (VITAMIN B-12) 500 MCG tablet Take 1,000 mcg by mouth daily      docusate sodium (COLACE) 100 mg capsule Take 100 mg by mouth 3 (three) times a day as needed for constipation      famotidine (PEPCID) 20 mg tablet TAKE 1 TABLET BY MOUTH DAILY (Patient taking differently: Take 20 mg by mouth daily As needed) 90 tablet 1    lisinopril (ZESTRIL) 20 mg tablet Take 1 tablet (20 mg total) by mouth daily 90 tablet 1    metoprolol succinate (TOPROL-XL) 50 mg 24 hr tablet take 1 tablet by mouth once daily 90 tablet 1    Multiple Vitamin (multivitamin) capsule Take 1 capsule by mouth daily      mupirocin (BACTROBAN) 2 % ointment Apply 1 g topically if needed (as needed)      Omega-3 1000 MG CAPS Take 2 g by mouth daily      omeprazole (PriLOSEC) 40 MG capsule Take 1 capsule (40 mg total) by mouth daily 90 capsule 1     potassium citrate (UROCIT-K) 5 MEQ (540 MG) Take 1 tablet (5 mEq total) by mouth in the morning 90 tablet 1    psyllium (METAMUCIL) 58.6 % packet Take 1 packet by mouth daily       No current facility-administered medications for this visit.     Allergies   Allergen Reactions    Epinephrine Palpitations and Shortness Of Breath    Sulfa Antibiotics Other (See Comments)     Eye irritation      Immunizations:     Immunization History   Administered Date(s) Administered    COVID-19 MODERNA VACC 0.5 ML IM 01/19/2021, 02/15/2021, 10/31/2021, 04/04/2022    COVID-19 Moderna Vac BIVALENT 12 Yr+ IM 0.5 ML 10/03/2022    COVID-19 Novavax 2023 vaccine IM 10/23/2023    H1N1, All Formulations 11/16/2009    Hep A, adult 02/06/2008, 08/22/2008    Hep B, adult 02/06/2008, 04/07/2008, 08/22/2008    INFLUENZA 10/15/2019, 10/07/2021, 11/06/2023    Influenza Quadrivalent, 6-35 Months IM 11/04/2015, 10/07/2016    Influenza, Seasonal Vaccine, Quadrivalent, Adjuvanted, .5e 10/07/2021    Influenza, seasonal, injectable 09/29/2014, 10/10/2022    Meningococcal Conjugate (MCV4O) 10/12/2023    Pneumococcal Conjugate 13-Valent 09/09/2013    Pneumococcal Conjugate Vaccine 20-valent (Pcv20), Polysace 04/18/2023    Pneumococcal Polysaccharide PPV23 01/16/2008, 04/13/2015    Respiratory Syncytial Virus Vaccine (Recombinant) 11/19/2023    Tdap 01/01/2005, 10/01/2012    Tuberculin Skin Test-PPD Intradermal 06/10/2009    Zoster Vaccine Recombinant 01/21/2023, 05/20/2023    meningococcal ACYW-135 TT Conjugate 06/16/2023      Health Maintenance:         Topic Date Due    Colorectal Cancer Screening  02/24/2025    Hepatitis C Screening  Completed         Topic Date Due    COVID-19 Vaccine (7 - 2023-24 season) 12/18/2023      Medicare Screening Tests and Risk Assessments:     Jay Jay is here for his Subsequent Wellness visit.     Health Risk Assessment:   Patient rates overall health as good. Patient feels that their physical health rating is same. Patient is  very satisfied with their life. Eyesight was rated as slightly worse. Hearing was rated as same. Patient feels that their emotional and mental health rating is same. Patients states they are never, rarely angry. Patient states they are sometimes unusually tired/fatigued. Pain experienced in the last 7 days has been none. Patient states that he has experienced no weight loss or gain in last 6 months.     Fall Risk Screening:   In the past year, patient has experienced: no history of falling in past year      Home Safety:  Patient does not have trouble with stairs inside or outside of their home. Patient has working smoke alarms and has working carbon monoxide detector. Home safety hazards include: none.     Nutrition:   Current diet is Low Saturated Fat and No Added Salt.     Medications:   Patient is currently taking over-the-counter supplements. OTC medications include: see medication list. Fish oil, vitamin D3, colace, vitamin B12, multivatamins and metamucill.    Activities of Daily Living (ADLs)/Instrumental Activities of Daily Living (IADLs):   Walk and transfer into and out of bed and chair?: Yes  Dress and groom yourself?: Yes    Bathe or shower yourself?: Yes    Feed yourself? Yes  Do your laundry/housekeeping?: Yes  Manage your money, pay your bills and track your expenses?: Yes  Make your own meals?: Yes    Do your own shopping?: Yes    Previous Hospitalizations:   Any hospitalizations or ED visits within the last 12 months?: Yes    How many hospitalizations have you had in the last year?: 1-2    Advance Care Planning:   Living will: No    Durable POA for healthcare: No      Cognitive Screening:   Provider or family/friend/caregiver concerned regarding cognition?: No    PREVENTIVE SCREENINGS      Cardiovascular Screening:    General: Screening Not Indicated and History Lipid Disorder      Diabetes Screening:     General: Screening Current      Colorectal Cancer Screening:     General: Screening Current      " Prostate Cancer Screening:    General: History Prostate Cancer      Osteoporosis Screening:    General: Screening Not Indicated      Abdominal Aortic Aneurysm (AAA) Screening:    Risk factors include: age between 65-76 yo        General: Screening Not Indicated      Lung Cancer Screening:     General: Screening Not Indicated      Hepatitis C Screening:    General: Screening Current    Hep C Screening Accepted: Yes      Screening, Brief Intervention, and Referral to Treatment (SBIRT)    Screening  Typical number of drinks in a day: 0    Brief Intervention  Alcohol & drug use screenings were reviewed. No concerns regarding substance use disorder identified.     Other Counseling Topics:   Car/seat belt/driving safety, skin self-exam, sunscreen and calcium and vitamin D intake and regular weightbearing exercise.     No results found.     Physical Exam:     /78 (BP Location: Right arm, Patient Position: Sitting, Cuff Size: Standard)   Pulse 61   Temp 98.9 °F (37.2 °C) (Tympanic)   Resp 17   Ht 6' 2\" (1.88 m)   Wt 96.4 kg (212 lb 9.6 oz)   SpO2 98%   BMI 27.30 kg/m²     Physical Exam  Vitals and nursing note reviewed.   Constitutional:       General: He is not in acute distress.     Appearance: Normal appearance. He is not ill-appearing.   Cardiovascular:      Rate and Rhythm: Normal rate and regular rhythm.      Chest Wall: PMI is not displaced.      Pulses: Normal pulses.      Heart sounds: Normal heart sounds.   Pulmonary:      Effort: Pulmonary effort is normal.      Breath sounds: Normal breath sounds.   Abdominal:      General: Bowel sounds are normal.      Palpations: Abdomen is soft.   Musculoskeletal:         General: Normal range of motion.   Skin:     General: Skin is warm and dry.      Capillary Refill: Capillary refill takes less than 2 seconds.   Neurological:      Mental Status: He is alert and oriented to person, place, and time.   Psychiatric:         Mood and Affect: Mood normal.         " Behavior: Behavior normal.         Thought Content: Thought content normal.         Judgment: Judgment normal.          ARINA Lantigua

## 2024-04-23 NOTE — PROGRESS NOTES
[facility] encounter, brief      RD met briefly with Jay Jay Acuna to check in and offer continued nutritional lifestyle support.  Pt reported doing well despite some over-indulging over recent holidays and birthday celebrations.  He continues to walk 3-4 miles most days when the weather is nice.  He recently started eating homemade salads for lunch in place of soup, and continues to try to eat healthy for weight loss.  Positively reinforced cooking for self and limiting soup intake. He had no questions or nutritional concerns to share with RD at this time, but was welcomed to reach out to clinic as needed.  Pt was advised of upcoming RD coverage.     Chantelle Saunders, MS, RD, LDN

## 2024-04-23 NOTE — ASSESSMENT & PLAN NOTE
T. Bili and direct bili slightly elevated.   Could be related to autoimmune or NAFLD.  Continue to monitor   Follow with GI   No changes in ART

## 2024-04-23 NOTE — ASSESSMENT & PLAN NOTE
BP acceptable today.  Continue HTN regimen  Continue to monitor BP at home  Continue to follow with Cardiology

## 2024-04-23 NOTE — H&P (VIEW-ONLY)
Assessment and Plan:   Reviewed all labs and notes from GI and Colorectal.   Problem List Items Addressed This Visit          Cardiovascular and Mediastinum    HTN (hypertension)     BP acceptable today.  Continue HTN regimen  Continue to monitor BP at home  Continue to follow with Cardiology             Respiratory    Hiatal hernia     Recent GI fo/u and decided to wait until EGD next year in  to determine next steps if needed.  Continue PPI with good relief  Does use Pepcid at night for breakthrough GERD or when he will eat foods that cause GERD  Continue to monitor             Digestive    Non-alcoholic fatty liver disease     T. Bili and direct bili slightly elevated.   Could be related to autoimmune or NAFLD.  Continue to monitor   Follow with GI   No changes in ART               Other    HIV disease (HCC) - Primary     Doing well on Biktarvy with an undetectable VL.  Pt reports 100% medication compliance on HAART.  Stressed the importance of 100% medication adherence  Monitor CD4. HIV RNA, CMP, and CBCD for virologic response and drug toxicities  Follow up with Dr. Campa or Dr. Barrett   HIV Counseling:    Viral Load: not detected    CD4 Count: 712      Denies side effects.  Stressed the importance of adherence.  Continue follow up in the ID clinic with Dr. Campa or Dr. Barrett.    Reviewed the most recent labs, including the CD4 and viral load.  Discussed the risks and benefits of treatment options, instructions for management, importance of treatment adherence, and reduction of risk factors.  Educated on possible medication side effects.    Counseled on routes of HIV transmission, including the risk of  infection.  Emphasized that viral suppression is the best method to prevent HIV transmission.  At this time the pt denies the need for HIV testing of anyone in their life.    Total encounter time was greater than 35 minutes.  Greater than 20 minutes were spent on counseling and patient  education.  Pt voices understanding and agreement with treatment plan.             Hyperlipidemia     LDL at goal.  Continue statin  Continue to monitor Lipid panel every 6 months for now          Other Visit Diagnoses       Medicare annual wellness visit, subsequent                 Preventive health issues were discussed with patient, and age appropriate screening tests were ordered as noted in patient's After Visit Summary.  Personalized health advice and appropriate referrals for health education or preventive services given if needed, as noted in patient's After Visit Summary.     History of Present Illness:     Patient presents for a Medicare Wellness Visit and for management of HIV.  Jay Jay has been doing well and does not have any health concerns at this time.  Jay Jay inquired about his planned fistulotomy scheduled for 5/7/24 with Dr. Chamorro.     Recent visit with dermatology for removal of skin tag and dark mole on buttock.     He does not endorse any fever, chills, nausea, vomiting, cough, SOB, diarrhea, or night sweats.      HPI   Patient Care Team:  ARINA Lantigua as PCP - General (Nurse Practitioner)  ARINA Gunter MD     Review of Systems:     Review of Systems   Constitutional: Negative.    Respiratory: Negative.     Cardiovascular: Negative.    Gastrointestinal: Negative.    Neurological: Negative.    Psychiatric/Behavioral: Negative.          Problem List:     Patient Active Problem List   Diagnosis    HIV disease (HCC)    Family history of prostate cancer in father    Prostate nodule    HTN (hypertension)    Hyperlipidemia    Non-alcoholic fatty liver disease    GERD (gastroesophageal reflux disease)    Palpitations    Splenomegaly    Thrombocytopenia (HCC)    Polycythemia    Family history of substance abuse    Neutropenia (HCC)    H/O ETOH abuse    Leukopenia    Locking of both knees    Degenerative cervical disc    Fluid level behind tympanic membrane of  both ears    Bladder wall thickening    Right lower quadrant abdominal pain    Hiatal hernia    Weight loss    S/P hernia repair    Impaired fasting glucose    Gastric wall thickening    Esophagitis    Periaortic lymphadenopathy    Syphilis    Transaminitis    Perianal cyst      Past Medical and Surgical History:     Past Medical History:   Diagnosis Date    Cancer (HCC)     GERD (gastroesophageal reflux disease)     HIV (human immunodeficiency virus infection) (HCC)     Hyperlipidemia     Hypertension     Kidney stone     Non-alcoholic fatty liver disease     PONV (postoperative nausea and vomiting)     Prostate cancer (HCC)     Sleep apnea      Past Surgical History:   Procedure Laterality Date    COLONOSCOPY      HERNIA REPAIR      NY BX PROSTATE STRTCTC SATURATION SAMPLING IMG GID N/A 01/25/2022    Procedure: TRANSPERINEAL ULTRASOUND GUIDED BIOPSY PROSTATE;  Surgeon: Shon Beltrán MD;  Location: [facility];  Service: Urology    NY PROSTATE NEEDLE BIOPSY ANY APPROACH N/A 10/13/2020    Procedure: TRANSPERINEAL PROSTATE BIOPSY;  Surgeon: Shon Beltrán MD;  Location: [facility];  Service: Urology    ROOT CANAL        Family History:     Family History   Problem Relation Age of Onset    Prostate cancer Father     Esophageal cancer Father     Heart disease Father     Colon cancer Cousin         mothers side      Social History:     Social History     Socioeconomic History    Marital status: Single     Spouse name: None    Number of children: None    Years of education: None    Highest education level: None   Occupational History    None   Tobacco Use    Smoking status: Never    Smokeless tobacco: Never   Vaping Use    Vaping status: Never Used   Substance and Sexual Activity    Alcohol use: Not Currently     Comment: used to be a heavy drinker, quit 15 years ago    Drug use: Not Currently     Types: Marijuana, Cocaine     Comment: used to use drugs, consisting of crystal meth, cocaine, and marijuana, denied  any IV drug use in the past    Sexual activity: Not Currently     Partners: Male   Other Topics Concern    None   Social History Narrative    None     Social Determinants of Health     Financial Resource Strain: Not on file   Food Insecurity: No Food Insecurity (4/23/2024)    Hunger Vital Sign     Worried About Running Out of Food in the Last Year: Never true     Ran Out of Food in the Last Year: Never true   Transportation Needs: Not on file   Physical Activity: Not on file   Stress: Not on file   Social Connections: Not on file   Intimate Partner Violence: Not on file   Housing Stability: Not on file      Medications and Allergies:     Current Outpatient Medications   Medication Sig Dispense Refill    atorvastatin (LIPITOR) 40 mg tablet Take 1 tablet (40 mg total) by mouth daily 90 tablet 3    benzoyl peroxide 5 % external wash Apply 237 g topically if needed (as needed)      bictegravir-emtricitab-tenofovir alafenamide (Biktarvy) -25 MG tablet TAKE 1 TABLET BY MOUTH DAILY WITH BREAKFAST 90 tablet 1    cholecalciferol (VITAMIN D3) 1,000 units tablet Take 1,000 Units by mouth daily      cyanocobalamin (VITAMIN B-12) 500 MCG tablet Take 1,000 mcg by mouth daily      docusate sodium (COLACE) 100 mg capsule Take 100 mg by mouth 3 (three) times a day as needed for constipation      famotidine (PEPCID) 20 mg tablet TAKE 1 TABLET BY MOUTH DAILY (Patient taking differently: Take 20 mg by mouth daily As needed) 90 tablet 1    lisinopril (ZESTRIL) 20 mg tablet Take 1 tablet (20 mg total) by mouth daily 90 tablet 1    metoprolol succinate (TOPROL-XL) 50 mg 24 hr tablet take 1 tablet by mouth once daily 90 tablet 1    Multiple Vitamin (multivitamin) capsule Take 1 capsule by mouth daily      mupirocin (BACTROBAN) 2 % ointment Apply 1 g topically if needed (as needed)      Omega-3 1000 MG CAPS Take 2 g by mouth daily      omeprazole (PriLOSEC) 40 MG capsule Take 1 capsule (40 mg total) by mouth daily 90 capsule 1     potassium citrate (UROCIT-K) 5 MEQ (540 MG) Take 1 tablet (5 mEq total) by mouth in the morning 90 tablet 1    psyllium (METAMUCIL) 58.6 % packet Take 1 packet by mouth daily       No current facility-administered medications for this visit.     Allergies   Allergen Reactions    Epinephrine Palpitations and Shortness Of Breath    Sulfa Antibiotics Other (See Comments)     Eye irritation      Immunizations:     Immunization History   Administered Date(s) Administered    COVID-19 MODERNA VACC 0.5 ML IM 01/19/2021, 02/15/2021, 10/31/2021, 04/04/2022    COVID-19 Moderna Vac BIVALENT 12 Yr+ IM 0.5 ML 10/03/2022    COVID-19 Novavax 2023 vaccine IM 10/23/2023    H1N1, All Formulations 11/16/2009    Hep A, adult 02/06/2008, 08/22/2008    Hep B, adult 02/06/2008, 04/07/2008, 08/22/2008    INFLUENZA 10/15/2019, 10/07/2021, 11/06/2023    Influenza Quadrivalent, 6-35 Months IM 11/04/2015, 10/07/2016    Influenza, Seasonal Vaccine, Quadrivalent, Adjuvanted, .5e 10/07/2021    Influenza, seasonal, injectable 09/29/2014, 10/10/2022    Meningococcal Conjugate (MCV4O) 10/12/2023    Pneumococcal Conjugate 13-Valent 09/09/2013    Pneumococcal Conjugate Vaccine 20-valent (Pcv20), Polysace 04/18/2023    Pneumococcal Polysaccharide PPV23 01/16/2008, 04/13/2015    Respiratory Syncytial Virus Vaccine (Recombinant) 11/19/2023    Tdap 01/01/2005, 10/01/2012    Tuberculin Skin Test-PPD Intradermal 06/10/2009    Zoster Vaccine Recombinant 01/21/2023, 05/20/2023    meningococcal ACYW-135 TT Conjugate 06/16/2023      Health Maintenance:         Topic Date Due    Colorectal Cancer Screening  02/24/2025    Hepatitis C Screening  Completed         Topic Date Due    COVID-19 Vaccine (7 - 2023-24 season) 12/18/2023      Medicare Screening Tests and Risk Assessments:     Jay Jay is here for his Subsequent Wellness visit.     Health Risk Assessment:   Patient rates overall health as good. Patient feels that their physical health rating is same. Patient is  very satisfied with their life. Eyesight was rated as slightly worse. Hearing was rated as same. Patient feels that their emotional and mental health rating is same. Patients states they are never, rarely angry. Patient states they are sometimes unusually tired/fatigued. Pain experienced in the last 7 days has been none. Patient states that he has experienced no weight loss or gain in last 6 months.     Fall Risk Screening:   In the past year, patient has experienced: no history of falling in past year      Home Safety:  Patient does not have trouble with stairs inside or outside of their home. Patient has working smoke alarms and has working carbon monoxide detector. Home safety hazards include: none.     Nutrition:   Current diet is Low Saturated Fat and No Added Salt.     Medications:   Patient is currently taking over-the-counter supplements. OTC medications include: see medication list. Fish oil, vitamin D3, colace, vitamin B12, multivatamins and metamucill.    Activities of Daily Living (ADLs)/Instrumental Activities of Daily Living (IADLs):   Walk and transfer into and out of bed and chair?: Yes  Dress and groom yourself?: Yes    Bathe or shower yourself?: Yes    Feed yourself? Yes  Do your laundry/housekeeping?: Yes  Manage your money, pay your bills and track your expenses?: Yes  Make your own meals?: Yes    Do your own shopping?: Yes    Previous Hospitalizations:   Any hospitalizations or ED visits within the last 12 months?: Yes    How many hospitalizations have you had in the last year?: 1-2    Advance Care Planning:   Living will: No    Durable POA for healthcare: No      Cognitive Screening:   Provider or family/friend/caregiver concerned regarding cognition?: No    PREVENTIVE SCREENINGS      Cardiovascular Screening:    General: Screening Not Indicated and History Lipid Disorder      Diabetes Screening:     General: Screening Current      Colorectal Cancer Screening:     General: Screening Current      " Prostate Cancer Screening:    General: History Prostate Cancer      Osteoporosis Screening:    General: Screening Not Indicated      Abdominal Aortic Aneurysm (AAA) Screening:    Risk factors include: age between 65-76 yo        General: Screening Not Indicated      Lung Cancer Screening:     General: Screening Not Indicated      Hepatitis C Screening:    General: Screening Current    Hep C Screening Accepted: Yes      Screening, Brief Intervention, and Referral to Treatment (SBIRT)    Screening  Typical number of drinks in a day: 0    Brief Intervention  Alcohol & drug use screenings were reviewed. No concerns regarding substance use disorder identified.     Other Counseling Topics:   Car/seat belt/driving safety, skin self-exam, sunscreen and calcium and vitamin D intake and regular weightbearing exercise.     No results found.     Physical Exam:     /78 (BP Location: Right arm, Patient Position: Sitting, Cuff Size: Standard)   Pulse 61   Temp 98.9 °F (37.2 °C) (Tympanic)   Resp 17   Ht 6' 2\" (1.88 m)   Wt 96.4 kg (212 lb 9.6 oz)   SpO2 98%   BMI 27.30 kg/m²     Physical Exam  Vitals and nursing note reviewed.   Constitutional:       General: He is not in acute distress.     Appearance: Normal appearance. He is not ill-appearing.   Cardiovascular:      Rate and Rhythm: Normal rate and regular rhythm.      Chest Wall: PMI is not displaced.      Pulses: Normal pulses.      Heart sounds: Normal heart sounds.   Pulmonary:      Effort: Pulmonary effort is normal.      Breath sounds: Normal breath sounds.   Abdominal:      General: Bowel sounds are normal.      Palpations: Abdomen is soft.   Musculoskeletal:         General: Normal range of motion.   Skin:     General: Skin is warm and dry.      Capillary Refill: Capillary refill takes less than 2 seconds.   Neurological:      Mental Status: He is alert and oriented to person, place, and time.   Psychiatric:         Mood and Affect: Mood normal.         " Behavior: Behavior normal.         Thought Content: Thought content normal.         Judgment: Judgment normal.          ARINA Lantigua

## 2024-04-23 NOTE — ASSESSMENT & PLAN NOTE
Doing well on Biktarvy with an undetectable VL.  Pt reports 100% medication compliance on HAART.  Stressed the importance of 100% medication adherence  Monitor CD4. HIV RNA, CMP, and CBCD for virologic response and drug toxicities  Follow up with Dr. Campa or Dr. Barrett   HIV Counseling:    Viral Load: not detected    CD4 Count: 712      Denies side effects.  Stressed the importance of adherence.  Continue follow up in the ID clinic with Dr. Campa or Dr. Barrett.    Reviewed the most recent labs, including the CD4 and viral load.  Discussed the risks and benefits of treatment options, instructions for management, importance of treatment adherence, and reduction of risk factors.  Educated on possible medication side effects.    Counseled on routes of HIV transmission, including the risk of  infection.  Emphasized that viral suppression is the best method to prevent HIV transmission.  At this time the pt denies the need for HIV testing of anyone in their life.    Total encounter time was greater than 35 minutes.  Greater than 20 minutes were spent on counseling and patient education.  Pt voices understanding and agreement with treatment plan.

## 2024-04-23 NOTE — PATIENT INSTRUCTIONS
Medicare Preventive Visit Patient Instructions  Thank you for completing your Welcome to Medicare Visit or Medicare Annual Wellness Visit today. Your next wellness visit will be due in one year (4/24/2025).  The screening/preventive services that you may require over the next 5-10 years are detailed below. Some tests may not apply to you based off risk factors and/or age. Screening tests ordered at today's visit but not completed yet may show as past due. Also, please note that scanned in results may not display below.  Preventive Screenings:  Service Recommendations Previous Testing/Comments   Colorectal Cancer Screening  Colonoscopy    Fecal Occult Blood Test (FOBT)/Fecal Immunochemical Test (FIT)  Fecal DNA/Cologuard Test  Flexible Sigmoidoscopy Age: 45-75 years old   Colonoscopy: every 10 years (May be performed more frequently if at higher risk)  OR  FOBT/FIT: every 1 year  OR  Cologuard: every 3 years  OR  Sigmoidoscopy: every 5 years  Screening may be recommended earlier than age 45 if at higher risk for colorectal cancer. Also, an individualized decision between you and your healthcare provider will decide whether screening between the ages of 76-85 would be appropriate. Colonoscopy: 02/24/2020  FOBT/FIT: Not on file  Cologuard: Not on file  Sigmoidoscopy: Not on file    Screening Current     Prostate Cancer Screening Individualized decision between patient and health care provider in men between ages of 55-69   Medicare will cover every 12 months beginning on the day after your 50th birthday PSA: 4.55 ng/mL     History Prostate Cancer     Hepatitis C Screening Once for adults born between 1945 and 1965  More frequently in patients at high risk for Hepatitis C Hep C Antibody: 12/29/2023    Screening Current   Diabetes Screening 1-2 times per year if you're at risk for diabetes or have pre-diabetes Fasting glucose: 117 mg/dL (4/19/2024)  A1C: 5.5 % (12/29/2023)  Screening Current   Cholesterol Screening Once  every 5 years if you don't have a lipid disorder. May order more often based on risk factors. Lipid panel: 04/19/2024  Screening Not Indicated  History Lipid Disorder      Other Preventive Screenings Covered by Medicare:  Abdominal Aortic Aneurysm (AAA) Screening: covered once if your at risk. You're considered to be at risk if you have a family history of AAA or a male between the age of 65-75 who smoking at least 100 cigarettes in your lifetime.  Lung Cancer Screening: covers low dose CT scan once per year if you meet all of the following conditions: (1) Age 55-77; (2) No signs or symptoms of lung cancer; (3) Current smoker or have quit smoking within the last 15 years; (4) You have a tobacco smoking history of at least 20 pack years (packs per day x number of years you smoked); (5) You get a written order from a healthcare provider.  Glaucoma Screening: covered annually if you're considered high risk: (1) You have diabetes OR (2) Family history of glaucoma OR (3)  aged 50 and older OR (4)  American aged 65 and older  Osteoporosis Screening: covered every 2 years if you meet one of the following conditions: (1) Have a vertebral abnormality; (2) On glucocorticoid therapy for more than 3 months; (3) Have primary hyperparathyroidism; (4) On osteoporosis medications and need to assess response to drug therapy.  HIV Screening: covered annually if you're between the age of 15-65. Also covered annually if you are younger than 15 and older than 65 with risk factors for HIV infection. For pregnant patients, it is covered up to 3 times per pregnancy.    Immunizations:  Immunization Recommendations   Influenza Vaccine Annual influenza vaccination during flu season is recommended for all persons aged >= 6 months who do not have contraindications   Pneumococcal Vaccine   * Pneumococcal conjugate vaccine = PCV13 (Prevnar 13), PCV15 (Vaxneuvance), PCV20 (Prevnar 20)  * Pneumococcal polysaccharide vaccine  = PPSV23 (Pneumovax) Adults 19-65 yo with certain risk factors or if 65+ yo  If never received any pneumonia vaccine: recommend Prevnar 20 (PCV20)  Give PCV20 if previously received 1 dose of PCV13 or PPSV23   Hepatitis B Vaccine 3 dose series if at intermediate or high risk (ex: diabetes, end stage renal disease, liver disease)   Respiratory syncytial virus (RSV) Vaccine - COVERED BY MEDICARE PART D  * RSVPreF3 (Arexvy) CDC recommends that adults 60 years of age and older may receive a single dose of RSV vaccine using shared clinical decision-making (SCDM)   Tetanus (Td) Vaccine - COST NOT COVERED BY MEDICARE PART B Following completion of primary series, a booster dose should be given every 10 years to maintain immunity against tetanus. Td may also be given as tetanus wound prophylaxis.   Tdap Vaccine - COST NOT COVERED BY MEDICARE PART B Recommended at least once for all adults. For pregnant patients, recommended with each pregnancy.   Shingles Vaccine (Shingrix) - COST NOT COVERED BY MEDICARE PART B  2 shot series recommended in those 19 years and older who have or will have weakened immune systems or those 50 years and older     Health Maintenance Due:      Topic Date Due   • Colorectal Cancer Screening  02/24/2025   • Hepatitis C Screening  Completed     Immunizations Due:      Topic Date Due   • COVID-19 Vaccine (7 - 2023-24 season) 12/18/2023     Advance Directives   What are advance directives?  Advance directives are legal documents that state your wishes and plans for medical care. These plans are made ahead of time in case you lose your ability to make decisions for yourself. Advance directives can apply to any medical decision, such as the treatments you want, and if you want to donate organs.   What are the types of advance directives?  There are many types of advance directives, and each state has rules about how to use them. You may choose a combination of any of the following:  Living will:  This is  a written record of the treatment you want. You can also choose which treatments you do not want, which to limit, and which to stop at a certain time. This includes surgery, medicine, IV fluid, and tube feedings.   Durable power of  for healthcare (DPAHC):  This is a written record that states who you want to make healthcare choices for you when you are unable to make them for yourself. This person, called a proxy, is usually a family member or a friend. You may choose more than 1 proxy.  Do not resuscitate (DNR) order:  A DNR order is used in case your heart stops beating or you stop breathing. It is a request not to have certain forms of treatment, such as CPR. A DNR order may be included in other types of advance directives.  Medical directive:  This covers the care that you want if you are in a coma, near death, or unable to make decisions for yourself. You can list the treatments you want for each condition. Treatment may include pain medicine, surgery, blood transfusions, dialysis, IV or tube feedings, and a ventilator (breathing machine).  Values history:  This document has questions about your views, beliefs, and how you feel and think about life. This information can help others choose the care that you would choose.  Why are advance directives important?  An advance directive helps you control your care. Although spoken wishes may be used, it is better to have your wishes written down. Spoken wishes can be misunderstood, or not followed. Treatments may be given even if you do not want them. An advance directive may make it easier for your family to make difficult choices about your care.   Weight Management   Why it is important to manage your weight:  Being overweight increases your risk of health conditions such as heart disease, high blood pressure, type 2 diabetes, and certain types of cancer. It can also increase your risk for osteoarthritis, sleep apnea, and other respiratory problems. Aim  for a slow, steady weight loss. Even a small amount of weight loss can lower your risk of health problems.  How to lose weight safely:  A safe and healthy way to lose weight is to eat fewer calories and get regular exercise. You can lose up about 1 pound a week by decreasing the number of calories you eat by 500 calories each day.   Healthy meal plan for weight management:  A healthy meal plan includes a variety of foods, contains fewer calories, and helps you stay healthy. A healthy meal plan includes the following:  Eat whole-grain foods more often.  A healthy meal plan should contain fiber. Fiber is the part of grains, fruits, and vegetables that is not broken down by your body. Whole-grain foods are healthy and provide extra fiber in your diet. Some examples of whole-grain foods are whole-wheat breads and pastas, oatmeal, brown rice, and bulgur.  Eat a variety of vegetables every day.  Include dark, leafy greens such as spinach, kale, mandie greens, and mustard greens. Eat yellow and orange vegetables such as carrots, sweet potatoes, and winter squash.   Eat a variety of fruits every day.  Choose fresh or canned fruit (canned in its own juice or light syrup) instead of juice. Fruit juice has very little or no fiber.  Eat low-fat dairy foods.  Drink fat-free (skim) milk or 1% milk. Eat fat-free yogurt and low-fat cottage cheese. Try low-fat cheeses such as mozzarella and other reduced-fat cheeses.  Choose meat and other protein foods that are low in fat.  Choose beans or other legumes such as split peas or lentils. Choose fish, skinless poultry (chicken or turkey), or lean cuts of red meat (beef or pork). Before you cook meat or poultry, cut off any visible fat.   Use less fat and oil.  Try baking foods instead of frying them. Add less fat, such as margarine, sour cream, regular salad dressing and mayonnaise to foods. Eat fewer high-fat foods. Some examples of high-fat foods include french fries, doughnuts, ice  cream, and cakes.  Eat fewer sweets.  Limit foods and drinks that are high in sugar. This includes candy, cookies, regular soda, and sweetened drinks.  Exercise:  Exercise at least 30 minutes per day on most days of the week. Some examples of exercise include walking, biking, dancing, and swimming. You can also fit in more physical activity by taking the stairs instead of the elevator or parking farther away from stores. Ask your healthcare provider about the best exercise plan for you.      © Copyright [employer] 2018 Information is for End User's use only and may not be sold, redistributed or otherwise used for commercial purposes. All illustrations and images included in CareNotes® are the copyrighted property of [employer]M., Inc. or [employer]

## 2024-04-24 NOTE — TELEPHONE ENCOUNTER
Contacted and spoke with Jay Jay and his partner Antony.    Discussed with Jay Jay that Dr Beltrán requested for both of their appointments to be scheduled together.     Antony's MRI scheduled 4/30/2024. Plan to wait for those results and then determine when Antony needs to be seen and hopefully the office can coordinate both appointments.    Postpone encounter until 4/30/2024.

## 2024-04-29 NOTE — RESULT ENCOUNTER NOTE
Inform patient via [employer].  Please review the pathology/lab result of further discussion.    Copied from [employer] message :       Christophe Goyal,     Your liver numbers were essentially normal. The bilirubin was similar to before but this is likely related to a benign condition called [place] syndrome where this level goes high due to various stressors.     Best regards,     Torey Rubio MD

## 2024-04-30 ENCOUNTER — ANESTHESIA EVENT (OUTPATIENT)
Dept: PERIOP | Facility: AMBULARY SURGERY CENTER | Age: 69
End: 2024-04-30
Payer: MEDICARE

## 2024-04-30 NOTE — PRE-PROCEDURE INSTRUCTIONS
Pre-Surgery Instructions:   Medication Instructions    atorvastatin (LIPITOR) 40 mg tablet Take day of surgery.    bictegravir-emtricitab-tenofovir alafenamide (Biktarvy) -25 MG tablet Take night before surgery    cholecalciferol (VITAMIN D3) 1,000 units tablet Stop taking 7 days prior to surgery.    cyanocobalamin (VITAMIN B-12) 500 MCG tablet Stop taking 7 days prior to surgery.    docusate sodium (COLACE) 100 mg capsule Uses PRN- OK to take day of surgery    famotidine (PEPCID) 20 mg tablet Take day of surgery.    lisinopril (ZESTRIL) 20 mg tablet Hold day of surgery.    metoprolol succinate (TOPROL-XL) 50 mg 24 hr tablet Take day of surgery.    Multiple Vitamin (multivitamin) capsule Stop taking 7 days prior to surgery.    Omega-3 1000 MG CAPS Stop taking 7 days prior to surgery.    omeprazole (PriLOSEC) 40 MG capsule Take day of surgery.    potassium citrate (UROCIT-K) 5 MEQ (540 MG) Hold day of surgery.    psyllium (METAMUCIL) 58.6 % packet Hold day of surgery.   Medication instructions for day surgery reviewed. Please use only a sip of water to take your instructed medications. Avoid all over the counter vitamins, supplements and NSAIDS for one week prior to surgery per anesthesia guidelines. Tylenol is ok to take as needed.     You will receive a call one business day prior to surgery with an arrival time and hospital directions. If your surgery is scheduled on a Monday, the hospital will be calling you on the Friday prior to your surgery. If you have not heard from anyone by 8pm, please call the hospital supervisor through the hospital  at [phone]. ([place] [phone] or [place] [phone]).    Do not eat or drink anything after midnight the night before your surgery, including candy, mints, lifesavers, or chewing gum. Do not drink alcohol 24hrs before your surgery. Try not to smoke at least 24hrs before your surgery.       Follow the pre surgery showering instructions as listed in  the “My Surgical Experience Booklet” or otherwise provided by your surgeon's office. Do not use a blade to shave the surgical area 1 week before surgery. It is okay to use a clean electric clippers up to 24 hours before surgery. Do not apply any lotions, creams, including makeup, cologne, deodorant, or perfumes after showering on the day of your surgery. Do not use dry shampoo, hair spray, hair gel, or any type of hair products.     No contact lenses, eye make-up, or artificial eyelashes. Remove nail polish, including gel polish, and any artificial, gel, or acrylic nails if possible. Remove all jewelry including rings and body piercing jewelry.     Wear causal clothing that is easy to take on and off. Consider your type of surgery.    Keep any valuables, jewelry, piercings at home. Please bring any specially ordered equipment (sling, braces) if indicated.    Arrange for a responsible person to drive you to and from the hospital on the day of your surgery. Please confirm the visitor policy for the day of your procedure when you receive your phone call with an arrival time.     Call the surgeon's office with any new illnesses, exposures, or additional questions prior to surgery.    Please reference your “My Surgical Experience Booklet” for additional information to prepare for your upcoming surgery.

## 2024-05-07 ENCOUNTER — HOSPITAL ENCOUNTER (OUTPATIENT)
Facility: AMBULARY SURGERY CENTER | Age: 69
Setting detail: OUTPATIENT SURGERY
Discharge: HOME | End: 2024-05-07
Attending: COLON & RECTAL SURGERY | Admitting: COLON & RECTAL SURGERY
Payer: MEDICARE

## 2024-05-07 ENCOUNTER — ANESTHESIA (OUTPATIENT)
Dept: PERIOP | Facility: AMBULARY SURGERY CENTER | Age: 69
End: 2024-05-07
Payer: MEDICARE

## 2024-05-07 VITALS
RESPIRATION RATE: 18 BRPM | TEMPERATURE: 97.2 F | BODY MASS INDEX: 26.32 KG/M2 | HEART RATE: 63 BPM | DIASTOLIC BLOOD PRESSURE: 79 MMHG | SYSTOLIC BLOOD PRESSURE: 128 MMHG | OXYGEN SATURATION: 99 % | WEIGHT: 205 LBS

## 2024-05-07 DIAGNOSIS — K60.3 ANAL FISTULA: Primary | ICD-10-CM

## 2024-05-07 PROCEDURE — 46280 REMOVE ANAL FIST COMPLEX: CPT | Performed by: COLON & RECTAL SURGERY

## 2024-05-07 PROCEDURE — 88304 TISSUE EXAM BY PATHOLOGIST: CPT | Performed by: PATHOLOGY

## 2024-05-07 RX ORDER — PROPOFOL 10 MG/ML
INJECTION, EMULSION INTRAVENOUS AS NEEDED
Status: DISCONTINUED | OUTPATIENT
Start: 2024-05-07 | End: 2024-05-07

## 2024-05-07 RX ORDER — GINSENG 100 MG
CAPSULE ORAL AS NEEDED
Status: DISCONTINUED | OUTPATIENT
Start: 2024-05-07 | End: 2024-05-07 | Stop reason: HOSPADM

## 2024-05-07 RX ORDER — BUPIVACAINE HYDROCHLORIDE 2.5 MG/ML
INJECTION, SOLUTION EPIDURAL; INFILTRATION; INTRACAUDAL AS NEEDED
Status: DISCONTINUED | OUTPATIENT
Start: 2024-05-07 | End: 2024-05-07 | Stop reason: HOSPADM

## 2024-05-07 RX ORDER — FENTANYL CITRATE 50 UG/ML
INJECTION, SOLUTION INTRAMUSCULAR; INTRAVENOUS AS NEEDED
Status: DISCONTINUED | OUTPATIENT
Start: 2024-05-07 | End: 2024-05-07

## 2024-05-07 RX ORDER — HYDROCODONE BITARTRATE AND ACETAMINOPHEN 5; 325 MG/1; MG/1
1 TABLET ORAL EVERY 6 HOURS PRN
Qty: 10 TABLET | Refills: 0 | Status: SHIPPED | OUTPATIENT
Start: 2024-05-07 | End: 2024-05-17

## 2024-05-07 RX ORDER — MIDAZOLAM HYDROCHLORIDE 2 MG/2ML
INJECTION, SOLUTION INTRAMUSCULAR; INTRAVENOUS AS NEEDED
Status: DISCONTINUED | OUTPATIENT
Start: 2024-05-07 | End: 2024-05-07

## 2024-05-07 RX ORDER — PROPOFOL 10 MG/ML
INJECTION, EMULSION INTRAVENOUS CONTINUOUS PRN
Status: DISCONTINUED | OUTPATIENT
Start: 2024-05-07 | End: 2024-05-07

## 2024-05-07 RX ORDER — MAGNESIUM HYDROXIDE 1200 MG/15ML
LIQUID ORAL AS NEEDED
Status: DISCONTINUED | OUTPATIENT
Start: 2024-05-07 | End: 2024-05-07 | Stop reason: HOSPADM

## 2024-05-07 RX ORDER — LIDOCAINE HYDROCHLORIDE 10 MG/ML
INJECTION, SOLUTION EPIDURAL; INFILTRATION; INTRACAUDAL; PERINEURAL AS NEEDED
Status: DISCONTINUED | OUTPATIENT
Start: 2024-05-07 | End: 2024-05-07 | Stop reason: HOSPADM

## 2024-05-07 RX ORDER — SODIUM CHLORIDE, SODIUM LACTATE, POTASSIUM CHLORIDE, CALCIUM CHLORIDE 600; 310; 30; 20 MG/100ML; MG/100ML; MG/100ML; MG/100ML
INJECTION, SOLUTION INTRAVENOUS CONTINUOUS PRN
Status: DISCONTINUED | OUTPATIENT
Start: 2024-05-07 | End: 2024-05-07

## 2024-05-07 RX ORDER — ONDANSETRON 2 MG/ML
4 INJECTION INTRAMUSCULAR; INTRAVENOUS ONCE AS NEEDED
Status: DISCONTINUED | OUTPATIENT
Start: 2024-05-07 | End: 2024-05-07 | Stop reason: HOSPADM

## 2024-05-07 RX ORDER — FENTANYL CITRATE/PF 50 MCG/ML
25 SYRINGE (ML) INJECTION
Status: DISCONTINUED | OUTPATIENT
Start: 2024-05-07 | End: 2024-05-07 | Stop reason: HOSPADM

## 2024-05-07 RX ADMIN — PROPOFOL 50 MG: 10 INJECTION, EMULSION INTRAVENOUS at 12:06

## 2024-05-07 RX ADMIN — FENTANYL CITRATE 25 MCG: 50 INJECTION INTRAMUSCULAR; INTRAVENOUS at 12:15

## 2024-05-07 RX ADMIN — SODIUM CHLORIDE, SODIUM LACTATE, POTASSIUM CHLORIDE, AND CALCIUM CHLORIDE: .6; .31; .03; .02 INJECTION, SOLUTION INTRAVENOUS at 12:02

## 2024-05-07 RX ADMIN — FENTANYL CITRATE 25 MCG: 50 INJECTION INTRAMUSCULAR; INTRAVENOUS at 12:10

## 2024-05-07 RX ADMIN — MIDAZOLAM 2 MG: 1 INJECTION INTRAMUSCULAR; INTRAVENOUS at 12:02

## 2024-05-07 RX ADMIN — FENTANYL CITRATE 25 MCG: 50 INJECTION INTRAMUSCULAR; INTRAVENOUS at 13:16

## 2024-05-07 RX ADMIN — FENTANYL CITRATE 25 MCG: 50 INJECTION INTRAMUSCULAR; INTRAVENOUS at 13:07

## 2024-05-07 RX ADMIN — PROPOFOL 100 MCG/KG/MIN: 10 INJECTION, EMULSION INTRAVENOUS at 12:06

## 2024-05-07 RX ADMIN — FENTANYL CITRATE 50 MCG: 50 INJECTION INTRAMUSCULAR; INTRAVENOUS at 12:06

## 2024-05-07 NOTE — INTERVAL H&P NOTE
ASSESSMENT:    Jay Jay returns today for fistula operation.     'left lateral/anterior anal punctum, suggests external fistula opening, normal anoscopy without signs of proctitis     We discussed examination under anesthesia, possible fistulotomy, possible seton placement, anorectal surgery and in a face to face, personal informed consent the alternatives,benefits risks including not limited to bleeding, infection, risks of anesthesia, damage to sphincter/incontinence, possibility of seton placement and staged surgery. They understood these risks, signed informed consent, and wish to proceed.'     PLAN:  Examination under anesthesia, possible fistulotomy, possible seton placement,  H&P reviewed. After examining the patient I find no changes in the patients condition since the H&P had been written.  Gen:no distress  HEENT:Perrla/eomi  CV:sinus  Lung:clear bilateral  Abd:soft,nontender  Ext: no edema    Vitals:    05/07/24 1041   BP: 147/88   Pulse: 62   Resp: 18   Temp: 97.5 °F (36.4 °C)   SpO2: 100%

## 2024-05-07 NOTE — DISCHARGE INSTR - AVS FIRST PAGE
May shower/tub bathe this evening.  There are beige sutures left in place to drain the fistula tract.  There may be some bleeding/drainage, normal , may use dry gauze.  Followup in office 6-8weeks Dr. Librado Kohler as prescribed for pain not treated by ibuprofen, do not mix in tylenol.  High fiber diet with metamucil or konsyl 1 tbsp 1-2x/day, increased hydration noncaffeinated beverages  May use Miralax as needed if no bowel movements in next 24-48hours  Call if any concerns [phone]

## 2024-05-07 NOTE — ANESTHESIA PREPROCEDURE EVALUATION
Procedure:  EXAM UNDER ANESTHESIA (EUA) (Anus)  FISTULOTOMY (Anus)    Compliant with biktarvy daily, last viral load check 12/2023 undetectable  PONV    Relevant Problems   CARDIO   (+) HTN (hypertension)   (+) Hyperlipidemia      GI/HEPATIC   (+) GERD (gastroesophageal reflux disease)   (+) Hiatal hernia   (+) Non-alcoholic fatty liver disease      /RENAL   (+) Prostate nodule      HEMATOLOGY   (+) Thrombocytopenia (HCC)      MUSCULOSKELETAL   (+) Degenerative cervical disc   (+) Hiatal hernia      Endocrine   (+) Impaired fasting glucose      Behavioral Health   (+) H/O ETOH abuse      Blood   (+) Polycythemia      Surgery/Wound/Pain   (+) S/P hernia repair      Other   (+) HIV disease (HCC)   (+) Splenomegaly      EKG 2023  Sinus rhythm 65 bpm, left atrial abnormality     Physical Exam    Airway    Mallampati score: II  TM Distance: >3 FB  Neck ROM: full     Dental       Cardiovascular      Pulmonary      Other Findings        Anesthesia Plan  ASA Score- 3     Anesthesia Type- IV sedation with anesthesia with ASA Monitors.         Additional Monitors:     Airway Plan:     Comment: Prone positioning. Sedation with general anesthesia as back up.     Recent labs personally reviewed:  Lab Results       Component                Value               Date                       WBC                      4.43                12/29/2023                 WBC                      4.5                 12/29/2023                 HGB                      16.2                12/29/2023                 HGB                      16.1                12/29/2023                 PLT                      116 (L)             12/29/2023                 PLT                      125 (L)             12/29/2023            Lab Results       Component                Value               Date                       NA                       139                 12/16/2015                 K                        4.3                 04/19/2024                  BUN                      16                  04/19/2024                 CREATININE               1.06                04/19/2024                 GLUCOSE                  99                  12/16/2015            Lab Results       Component                Value               Date                       PTT                      27                  12/05/2022             Lab Results       Component                Value               Date                       INR                      0.98                12/05/2022              Blood type       I, Nasrin Mccarthy MD, have personally seen and evaluated the patient prior to anesthetic care.  I have reviewed the pre-anesthetic record, medical history, allergies, medications and any other medical records if appropriate to the anesthetic care.  If a CRNA is involved in the case, I have reviewed the CRNA assessment, if present, and agree. I consented the patient for general anesthesia with appropriate airway support as indicated. We reviewed the risks associated including PONV, sore throat, allergic reaction to anesthetics and management plan to address these issues. We discussed the indication and risks associated with any invasive monitors that would be placed. We discussed post op pain control and expectations. We discussed rare complications including hypoxia, perioperative cardiac and neurologic events, and death based on the patient's baseline risk. All questions and concerns were addressed.     .       Plan Factors-Exercise tolerance (METS): >4 METS.    Chart reviewed. EKG reviewed. Imaging results reviewed. Existing labs reviewed. Patient summary reviewed.    Patient is not a current smoker.  Patient did not smoke on day of surgery.    Obstructive sleep apnea risk education given perioperatively.        Induction- intravenous.    Postoperative Plan-     Informed Consent- Anesthetic plan and risks discussed with patient.  I personally reviewed this patient with the  CRNA. Discussed and agreed on the Anesthesia Plan with the CRNA..

## 2024-05-07 NOTE — OP NOTE
OPERATIVE REPORT  PATIENT NAME: Jay Jay Acuna    :  1955  MRN: [id number]  Pt Location: [facility] OR ROOM 06    SURGERY DATE: 2024    Surgeons and Role:     * Lbirado Chamorro MD - Primary    Preop Diagnosis:  Anal fistula [K60.3]    Post-Op Diagnosis Codes:     * Anal fistula [K60.3]    Procedure(s):  EXAM UNDER ANESTHESIA (EUA)  Anoscopy  FISTULOTOMY    Specimen(s):  ID Type Source Tests Collected by Time Destination   1 : fistula. Tissue Fistula TISSUE EXAM Librado Chamorro MD 2024 1227        Estimated Blood Loss:   Minimal    Drains:  * No LDAs found *    Anesthesia Type:   IV Sedation with Anesthesia    Operative Indications:  Anal fistula [K60.3]    Operative Findings:  -Superficial Transsphincteric left lateral anal fistula, primary fistulotomy with marsupialization     Complications:   None    Procedure and Technique:  Jay Jay returns today for fistula operation.     'left lateral/anterior anal punctum, suggests external fistula opening, normal anoscopy without signs of proctitis     We discussed examination under anesthesia, possible fistulotomy, possible seton placement, anorectal surgery and in a face to face, personal informed consent the alternatives,benefits risks including not limited to bleeding, infection, risks of anesthesia, damage to sphincter/incontinence, possibility of seton placement and staged surgery. They understood these risks, signed informed consent, and wish to proceed, was brought to the operating room.    MAC anesthesia was induced without event. Patient was placed in the prone jackknife position with attention to all extremities, bony prominences, and genitalia. Venodynes were on and running throughout the case. No antibiotics were given as none medically indicated. Buttocks were taped apart. Betadine prep and sterile drapes were placed. After timeout was taken per protocol procedure began with Marcaine/lidocaine pudendal/regional block.    Exam under anesthesia with  digital rectal examination showed normal examination and left lateral external fistula opening. This was probed superficially prior to placing dilute hydrogen peroxide per Angiocath. Under anoscopic visualization this did not egress,the smaller probe was again placed and easily dropped into the corresponding crypt directly below the external punctum.    On evaluation there was minimal superficial slips of sphincter muscle with no bulk. I elected to perform fistulotomy and this was done along the probe with electrocautery and needlepoint. The wound was curetted and then marsupialized open with 3-0 chromic suture for hemostasis. There were 2 interrupted 2-0 vicryl sutures placed on the proximal anoderm to help prevent keyhole. There was no anal stenosis and hemostasis was assured. Antibiotic ointment, 4 x 4's and mesh underwear were placed. All sponge needle and instrument counts were correct I was present and scrubbed for the entire procedure. Patient was awakened, rolled supine and brought to the recovery room in stable condition.     I was present for the entire procedure.    Patient Disposition:  PACU         SIGNATURE: Librado Chamorro MD  DATE: May 7, 2024  TIME: 12:47 PM

## 2024-05-07 NOTE — ANESTHESIA POSTPROCEDURE EVALUATION
Post-Op Assessment Note    CV Status:  Stable  Pain Score: 0    Pain management: adequate       Mental Status:  Alert and awake   Hydration Status:  Stable   PONV Controlled:  None   Airway Patency:  Patent  Two or more mitigation strategies used for obstructive sleep apnea   Post Op Vitals Reviewed: Yes    No anethesia notable event occurred.    Staff: MILLER               /56   Temp 97.1   Pulse 73   Resp 12   SpO2 98

## 2024-05-08 ENCOUNTER — TELEPHONE (OUTPATIENT)
Age: 69
End: 2024-05-08

## 2024-05-08 NOTE — TELEPHONE ENCOUNTER
Patient called to review post op questions. All questions were reviewed and answered. Is requesting Dr. Chamorro call with details regarding how procedure went. He is also questioning why his AVS indicates changes needed to medication- Biktarvy.     Please advise.

## 2024-05-10 PROCEDURE — 88304 TISSUE EXAM BY PATHOLOGIST: CPT | Performed by: PATHOLOGY

## 2024-05-10 NOTE — TELEPHONE ENCOUNTER
Patients GI provider:  Dr. Chamorro    Number to return call: [phone]    Reason for call: Pt calling stating he is still waiting for a call from Dr. Chamorro re: questions he has from his 5/7/24 surgery.    Scheduled procedure/appointment date if applicable: Apt 6/18/24

## 2024-05-16 ENCOUNTER — TELEPHONE (OUTPATIENT)
Age: 69
End: 2024-05-16

## 2024-05-16 DIAGNOSIS — N20.0 NEPHROLITHIASIS: ICD-10-CM

## 2024-05-16 DIAGNOSIS — B20 CURRENTLY ASYMPTOMATIC HIV INFECTION, WITH HISTORY OF HIV-RELATED ILLNESS (HCC): ICD-10-CM

## 2024-05-16 RX ORDER — POTASSIUM CITRATE 5 MEQ/1
5 TABLET, EXTENDED RELEASE ORAL DAILY
Qty: 30 TABLET | Refills: 0 | Status: SHIPPED | OUTPATIENT
Start: 2024-05-16

## 2024-05-16 NOTE — TELEPHONE ENCOUNTER
Jay Jay KUO completed for lipid panel.       Pt is scheduled for an appt in July, but he is asking if is ok to wait, advised as long as he feels well, it is not a problem to wait for that future appt.     Please advise

## 2024-05-17 RX ORDER — BICTEGRAVIR SODIUM, EMTRICITABINE, AND TENOFOVIR ALAFENAMIDE FUMARATE 50; 200; 25 MG/1; MG/1; MG/1
1 TABLET ORAL DAILY
Qty: 90 TABLET | Refills: 1 | Status: SHIPPED | OUTPATIENT
Start: 2024-05-17

## 2024-05-22 NOTE — TELEPHONE ENCOUNTER
"Contact was made with Mr. Acuna and he is now aware of his blood work results that are per Dr. Oconnor. Patient verbally states that he understands and no questions or concerns at this time.       \"LDL remains stable.  Numbers are better than his last lipid panel in both December and 2023 and November 2022.  Based on these numbers, I am okay with his current dose of statin.  We can discuss this further in follow-up next in July as long as he is asymptomatic.  Thank you.\"   "

## 2024-06-17 NOTE — PROGRESS NOTES
Colon and Rectal Surgery   Jay Jay Acuna 68 y.o. male MRN: [id number]   Encounter: [id number]  06/20/24   1:01 PM        ASSESSMENT:    Jay Jay returns today, approximately 6 weeks from primary fistulotomy for superficial Flores sphincteric, left lateral anal fistula, no troubles with bowel control, well granulated left sided fistulotomy site.    PLAN:  As needed follow-up  He has colonoscopy recall per Dr. Rubio for 2025      HPI  Jay Jay Acuna is a 68 y.o. male who presents for a post operative visit.     He is status post EUA, fistulotomy on 5/7/24.   Operative Findings:  -Superficial Transsphincteric left lateral anal fistula, primary fistulotomy with marsupialization  Final Diagnosis:  A. Fistula, anal, excision/fistulotomy:  - Portion of benign anal/perianal skin with acute and chronically inflamed granulation     tissue and scar compatible with fistula.  - Negative for squamous intraepithelial lesion and malignancy.     The patient reports that he has intermittent mild discomfort. Sitting is the main cause of this discomfort.   He has some discharge and some blood spotting from the area.   He has daily bowel movements. He notes that he experiences some pain and discomfort with bowel movements. He takes metamucil daily and adheres to a high fiber diet.    Last colonoscopy was performed on 02/24/2020 by Dr. Rubio with a 5 year recall.      Historical Information   Past Medical History:   Diagnosis Date    Cancer (HCC)     Colon polyp     GERD (gastroesophageal reflux disease)     HIV (human immunodeficiency virus infection) (HCC)     Hyperlipidemia     Hypertension     Kidney stone     Non-alcoholic fatty liver disease     PONV (postoperative nausea and vomiting)     Prostate cancer (HCC)     Sleep apnea      Past Surgical History:   Procedure Laterality Date    ANAL FISTULOTOMY N/A 5/7/2024    Procedure: FISTULOTOMY;  Surgeon: Librado Chamorro MD;  Location: [facility] MAIN OR;  Service: Colorectal    COLONOSCOPY       HERNIA REPAIR      WV ANRCT XM SURG REQ ANES GENERAL SPI/EDRL DX N/A 5/7/2024    Procedure: EXAM UNDER ANESTHESIA (EUA);  Surgeon: Librado Chamorro MD;  [facility];  Service: Colorectal    WV BX PROSTATE STRTCTC SATURATION SAMPLING IMG GID N/A 01/25/2022    Procedure: TRANSPERINEAL ULTRASOUND GUIDED BIOPSY PROSTATE;  Surgeon: Shon Beltrán MD;  Location: [facility];  Service: Urology    WV PROSTATE NEEDLE BIOPSY ANY APPROACH N/A 10/13/2020    Procedure: TRANSPERINEAL PROSTATE BIOPSY;  Surgeon: Shon Beltrán MD;  Location: [facility];  Service: Urology    ROOT CANAL         Meds/Allergies       Current Outpatient Medications:     atorvastatin (LIPITOR) 40 mg tablet, Take 1 tablet (40 mg total) by mouth daily, Disp: 90 tablet, Rfl: 3    benzoyl peroxide 5 % external wash, Apply 237 g topically if needed (as needed), Disp: , Rfl:     bictegravir-emtricitab-tenofovir alafenamide (Biktarvy) -25 MG tablet, Take 1 tablet by mouth in the morning, Disp: 90 tablet, Rfl: 1    cholecalciferol (VITAMIN D3) 1,000 units tablet, Take 1,000 Units by mouth daily, Disp: , Rfl:     cyanocobalamin (VITAMIN B-12) 500 MCG tablet, Take 1,000 mcg by mouth daily, Disp: , Rfl:     docusate sodium (COLACE) 100 mg capsule, Take 100 mg by mouth 3 (three) times a day as needed for constipation, Disp: , Rfl:     famotidine (PEPCID) 20 mg tablet, TAKE 1 TABLET BY MOUTH DAILY (Patient taking differently: Take 20 mg by mouth daily As needed), Disp: 90 tablet, Rfl: 1    lisinopril (ZESTRIL) 20 mg tablet, Take 1 tablet (20 mg total) by mouth daily, Disp: 90 tablet, Rfl: 1    metoprolol succinate (TOPROL-XL) 50 mg 24 hr tablet, take 1 tablet by mouth once daily, Disp: 90 tablet, Rfl: 1    Multiple Vitamin (multivitamin) capsule, Take 1 capsule by mouth daily, Disp: , Rfl:     mupirocin (BACTROBAN) 2 % ointment, Apply 1 g topically if needed (as needed), Disp: , Rfl:     Omega-3 1000 MG CAPS, Take 2 g by mouth daily, Disp:  ", Rfl:     omeprazole (PriLOSEC) 40 MG capsule, Take 1 capsule (40 mg total) by mouth daily, Disp: 90 capsule, Rfl: 1    potassium citrate (UROCIT-K) 5 MEQ (540 MG), TAKE ONE TABLET BY MOUTH EVERY MORNING, Disp: 30 tablet, Rfl: 0    psyllium (METAMUCIL) 58.6 % packet, Take 1 packet by mouth daily, Disp: , Rfl:       Allergies   Allergen Reactions    Epinephrine Palpitations and Shortness Of Breath    Sulfa Antibiotics Other (See Comments)     Eye irritation         Social History   Social History     Substance and Sexual Activity   Alcohol Use Not Currently    Comment: used to be a heavy drinker, quit 15 years ago     Social History     Substance and Sexual Activity   Drug Use Not Currently    Types: Marijuana, Cocaine    Comment: used to use drugs, consisting of crystal meth, cocaine, and marijuana, denied any IV drug use in the past     Social History     Tobacco Use   Smoking Status Never   Smokeless Tobacco Never         Family History:   Family History   Problem Relation Age of Onset    Prostate cancer Father     Esophageal cancer Father     Heart disease Father     Colon cancer Cousin         mothers side     Review of Systems    Objective   Current Vitals:   Vitals:    06/18/24 1003   BP: 154/84   Weight: 95.3 kg (210 lb)   Height: 6' 2\" (1.88 m)     Physical Exam:  General:no distress  Pulm:no increased work of breathing  Rectal:normal perianal skin, granulating left lateral fistulotomy site         "

## 2024-06-18 ENCOUNTER — OFFICE VISIT (OUTPATIENT)
Age: 69
End: 2024-06-18

## 2024-06-18 VITALS
DIASTOLIC BLOOD PRESSURE: 84 MMHG | SYSTOLIC BLOOD PRESSURE: 154 MMHG | HEIGHT: 74 IN | WEIGHT: 210 LBS | BODY MASS INDEX: 26.95 KG/M2

## 2024-06-18 DIAGNOSIS — K60.3 ANAL FISTULA: Primary | ICD-10-CM

## 2024-06-18 PROCEDURE — 99024 POSTOP FOLLOW-UP VISIT: CPT | Performed by: COLON & RECTAL SURGERY

## 2024-06-19 PROBLEM — K60.3 ANAL FISTULA: Status: ACTIVE | Noted: 2024-06-19

## 2024-06-19 PROBLEM — K60.30 ANAL FISTULA: Status: ACTIVE | Noted: 2024-06-19

## 2024-06-20 NOTE — PATIENT INSTRUCTIONS
ASSESSMENT:    Jay Jay returns today, approximately 6 weeks from primary fistulotomy for superficial Flores sphincteric, left lateral anal fistula, no troubles with bowel control, well granulated left sided fistulotomy site.    PLAN:  As needed follow-up  He has colonoscopy recall per Dr. Rubio for 2025

## 2024-06-24 ENCOUNTER — APPOINTMENT (OUTPATIENT)
Dept: LAB | Facility: HOSPITAL | Age: 69
End: 2024-06-24
Payer: MEDICARE

## 2024-06-24 DIAGNOSIS — D69.6 THROMBOCYTOPENIA (HCC): ICD-10-CM

## 2024-06-24 DIAGNOSIS — D75.1 POLYCYTHEMIA: ICD-10-CM

## 2024-06-24 DIAGNOSIS — B20 HUMAN IMMUNODEFICIENCY VIRUS (HIV) DISEASE (HCC): ICD-10-CM

## 2024-06-24 LAB
ALBUMIN SERPL BCG-MCNC: 4.3 G/DL (ref 3.5–5)
ALP SERPL-CCNC: 45 U/L (ref 34–104)
ALT SERPL W P-5'-P-CCNC: 34 U/L (ref 7–52)
ANION GAP SERPL CALCULATED.3IONS-SCNC: 3 MMOL/L (ref 4–13)
AST SERPL W P-5'-P-CCNC: 34 U/L (ref 13–39)
BASOPHILS # BLD AUTO: 0.01 THOUSANDS/ÂΜL (ref 0–0.1)
BASOPHILS NFR BLD AUTO: 0 % (ref 0–1)
BILIRUB SERPL-MCNC: 1.11 MG/DL (ref 0.2–1)
BUN SERPL-MCNC: 12 MG/DL (ref 5–25)
CALCIUM SERPL-MCNC: 9.2 MG/DL (ref 8.4–10.2)
CHLORIDE SERPL-SCNC: 105 MMOL/L (ref 96–108)
CO2 SERPL-SCNC: 29 MMOL/L (ref 21–32)
CREAT SERPL-MCNC: 1.11 MG/DL (ref 0.6–1.3)
EOSINOPHIL # BLD AUTO: 0.06 THOUSAND/ÂΜL (ref 0–0.61)
EOSINOPHIL NFR BLD AUTO: 2 % (ref 0–6)
ERYTHROCYTE [DISTWIDTH] IN BLOOD BY AUTOMATED COUNT: 11.8 % (ref 11.6–15.1)
GFR SERPL CREATININE-BSD FRML MDRD: 68 ML/MIN/1.73SQ M
GLUCOSE P FAST SERPL-MCNC: 106 MG/DL (ref 65–99)
HCT VFR BLD AUTO: 42 % (ref 36.5–49.3)
HGB BLD-MCNC: 15.2 G/DL (ref 12–17)
IMM GRANULOCYTES # BLD AUTO: 0.01 THOUSAND/UL (ref 0–0.2)
IMM GRANULOCYTES NFR BLD AUTO: 0 % (ref 0–2)
LYMPHOCYTES # BLD AUTO: 1.55 THOUSANDS/ÂΜL (ref 0.6–4.47)
LYMPHOCYTES NFR BLD AUTO: 41 % (ref 14–44)
MCH RBC QN AUTO: 34.3 PG (ref 26.8–34.3)
MCHC RBC AUTO-ENTMCNC: 36.2 G/DL (ref 31.4–37.4)
MCV RBC AUTO: 95 FL (ref 82–98)
MONOCYTES # BLD AUTO: 0.31 THOUSAND/ÂΜL (ref 0.17–1.22)
MONOCYTES NFR BLD AUTO: 8 % (ref 4–12)
NEUTROPHILS # BLD AUTO: 1.86 THOUSANDS/ÂΜL (ref 1.85–7.62)
NEUTS SEG NFR BLD AUTO: 49 % (ref 43–75)
NRBC BLD AUTO-RTO: 0 /100 WBCS
PLATELET # BLD AUTO: 109 THOUSANDS/UL (ref 149–390)
PMV BLD AUTO: 11.5 FL (ref 8.9–12.7)
POTASSIUM SERPL-SCNC: 4.1 MMOL/L (ref 3.5–5.3)
PROT SERPL-MCNC: 6.6 G/DL (ref 6.4–8.4)
RBC # BLD AUTO: 4.43 MILLION/UL (ref 3.88–5.62)
SODIUM SERPL-SCNC: 137 MMOL/L (ref 135–147)
WBC # BLD AUTO: 3.8 THOUSAND/UL (ref 4.31–10.16)

## 2024-06-24 PROCEDURE — 80053 COMPREHEN METABOLIC PANEL: CPT

## 2024-06-24 PROCEDURE — 36415 COLL VENOUS BLD VENIPUNCTURE: CPT

## 2024-06-24 PROCEDURE — 85025 COMPLETE CBC W/AUTO DIFF WBC: CPT

## 2024-06-24 PROCEDURE — 87536 HIV-1 QUANT&REVRSE TRNSCRPJ: CPT

## 2024-06-24 PROCEDURE — 86361 T CELL ABSOLUTE COUNT: CPT

## 2024-06-25 LAB
BASOPHILS # BLD AUTO: 0 X10E3/UL (ref 0–0.2)
BASOPHILS NFR BLD AUTO: 1 %
CD3+CD4+ CELLS # BLD: 556 /UL (ref 359–1519)
CD3+CD4+ CELLS NFR BLD: 39.7 % (ref 30.8–58.5)
EOSINOPHIL # BLD AUTO: 0.1 X10E3/UL (ref 0–0.4)
EOSINOPHIL NFR BLD AUTO: 1 %
ERYTHROCYTE [DISTWIDTH] IN BLOOD BY AUTOMATED COUNT: 12.3 % (ref 11.6–15.4)
HCT VFR BLD AUTO: 44.9 % (ref 37.5–51)
HGB BLD-MCNC: 15.1 G/DL (ref 13–17.7)
IMM GRANULOCYTES # BLD: 0 X10E3/UL (ref 0–0.1)
IMM GRANULOCYTES NFR BLD: 1 %
LYMPHOCYTES # BLD AUTO: 1.4 X10E3/UL (ref 0.7–3.1)
LYMPHOCYTES NFR BLD AUTO: 40 %
MCH RBC QN AUTO: 33.9 PG (ref 26.6–33)
MCHC RBC AUTO-ENTMCNC: 33.6 G/DL (ref 31.5–35.7)
MCV RBC AUTO: 101 FL (ref 79–97)
MONOCYTES # BLD AUTO: 0.3 X10E3/UL (ref 0.1–0.9)
MONOCYTES NFR BLD AUTO: 9 %
NEUTROPHILS # BLD AUTO: 1.8 X10E3/UL (ref 1.4–7)
NEUTROPHILS NFR BLD AUTO: 48 %
PLATELET # BLD AUTO: 111 X10E3/UL (ref 150–450)
RBC # BLD AUTO: 4.46 X10E6/UL (ref 4.14–5.8)
WBC # BLD AUTO: 3.6 X10E3/UL (ref 3.4–10.8)

## 2024-06-26 LAB — HIV1 RNA # PLAS NAA DL=20: ABNORMAL {COPIES}/ML

## 2024-06-27 DIAGNOSIS — B20 HIV DISEASE (HCC): Primary | ICD-10-CM

## 2024-06-27 DIAGNOSIS — R73.01 IMPAIRED FASTING GLUCOSE: ICD-10-CM

## 2024-06-27 DIAGNOSIS — Z72.89 OTHER PROBLEMS RELATED TO LIFESTYLE: ICD-10-CM

## 2024-06-27 DIAGNOSIS — Z11.59 ENCOUNTER FOR SCREENING FOR OTHER VIRAL DISEASES: ICD-10-CM

## 2024-06-27 DIAGNOSIS — Z11.1 SCREENING-PULMONARY TB: ICD-10-CM

## 2024-06-27 DIAGNOSIS — Z11.3 ENCOUNTER FOR SCREENING FOR BACTERIAL SEXUALLY TRANSMITTED DISEASE: ICD-10-CM

## 2024-06-27 DIAGNOSIS — Z20.2 CONTACT WITH AND (SUSPECTED) EXPOSURE TO INFECTIONS WITH A PREDOMINANTLY SEXUAL MODE OF TRANSMISSION: ICD-10-CM

## 2024-06-28 ENCOUNTER — OFFICE VISIT (OUTPATIENT)
Dept: SURGERY | Facility: CLINIC | Age: 69
End: 2024-06-28
Payer: MEDICARE

## 2024-06-28 ENCOUNTER — TREATMENT (OUTPATIENT)
Dept: SURGERY | Facility: CLINIC | Age: 69
End: 2024-06-28

## 2024-06-28 ENCOUNTER — DOCUMENTATION (OUTPATIENT)
Dept: SURGERY | Facility: CLINIC | Age: 69
End: 2024-06-28

## 2024-06-28 VITALS
OXYGEN SATURATION: 99 % | TEMPERATURE: 97.3 F | SYSTOLIC BLOOD PRESSURE: 133 MMHG | DIASTOLIC BLOOD PRESSURE: 63 MMHG | BODY MASS INDEX: 26.96 KG/M2 | HEART RATE: 64 BPM | HEIGHT: 74 IN

## 2024-06-28 DIAGNOSIS — K60.3 ANAL FISTULA: ICD-10-CM

## 2024-06-28 DIAGNOSIS — E78.2 MIXED HYPERLIPIDEMIA: ICD-10-CM

## 2024-06-28 DIAGNOSIS — F32.A DEPRESSION, UNSPECIFIED DEPRESSION TYPE: Primary | ICD-10-CM

## 2024-06-28 DIAGNOSIS — B20 HIV DISEASE (HCC): Primary | ICD-10-CM

## 2024-06-28 DIAGNOSIS — D69.6 THROMBOCYTOPENIA (HCC): ICD-10-CM

## 2024-06-28 PROCEDURE — G2211 COMPLEX E/M VISIT ADD ON: HCPCS | Performed by: STUDENT IN AN ORGANIZED HEALTH CARE EDUCATION/TRAINING PROGRAM

## 2024-06-28 PROCEDURE — 99214 OFFICE O/P EST MOD 30 MIN: CPT | Performed by: STUDENT IN AN ORGANIZED HEALTH CARE EDUCATION/TRAINING PROGRAM

## 2024-06-28 NOTE — PROGRESS NOTES
Progress Note - Infectious Disease   Jay Jay Acuna 68 y.o. male MRN: [id number]  Unit/Bed#:  Encounter: [id number]      Impression/Plan:    1. HIV. Well controlled on Biktarvy with 100% adherence, no side effects. CD4 556, VL <20.    -continue Biktarvy   -Patient was provided medication, adherence and prevention education   -check labs in 5 months to assess for virologic control,drug toxicity, co-infections   -follow up in 6 months    2. Prostate cancer. Undergoing active surveillance with urology. PSA has been stable.   -ongoing urology follow up    3.  Dyslipidemia.  LDL 53. Continue atorvastatin, cardiology follow up    4. History of polysubstance abuse. In recovery for 15 years.    6. Vaccinations. Hepatitis A and B immune. UTD other vaccinations    7.  History of syphilis.  History of syphilitic meningitis, patient also underwent treatment with 3 doses of benzathine penicillin G in 2016.  RPR nonreactive.  Patient not sexually active and no concern for new infection   -TPPA positivity due to prior infection, RPR remains negative so no need for retreatment   -Continue to monitor RPR    8. Thrombocytopenia.  Chronic, stable.  Likely related to HIV.  Will continue to monitor CBC.    My recommendations were discussed with the patient in detail who verbalized understanding. Patient care coordinated with ARINA Haynes.      Subjective:  Routine follow-up for HIV.  Patient claims 100% adherence with Biktarvy. Patient denies any notable side effects.  He denies any fever, chills, abdominal pain, nausea, diarrhea.  The patient was seen by GI for a perianal fistula and underwent EUA, fistulotomy on 5/7/2024.  He denies any further bleeding from the area or discomfort.  The patient reports his weight is stable although he is trying to lose weight.  He walks 2 to 3 miles a day with his partner.    ROS:  A complete review of systems is negative other than that noted above in the subjective    Followup portions patient history  "reviewed and updated as:  Allergies, current medications, past medical history, past social history, past surgical history, and the problem list    Objective:  Vitals:  Vitals:    06/28/24 0726   BP: 133/63   BP Location: Right arm   Patient Position: Sitting   Cuff Size: Standard   Pulse: 64   Temp: (!) 97.3 °F (36.3 °C)   TempSrc: Tympanic   SpO2: 99%   Height: 6' 2\" (1.88 m)       Physical Exam:   General Appearance:  Alert, interactive, appearing well,  nontoxic, no acute distress.   Neck:   Supple without lymphadenopathy, no thyromegaly or masses   Throat: Oropharynx moist without lesions.    Lungs:   Clear to auscultation bilaterally; no wheezes, rhonchi or rales; respirations unlabored   Heart:  RRR; no murmur, rub or gallop   Abdomen:   Soft, non-distended, positive bowel sounds.  No RLQ tenderness to palpation   Extremities: No clubbing, cyanosis or edema   Skin: No new rashes or lesions. No draining wounds noted.       Labs, Imaging, & Other studies:   All pertinent labs and imaging studies were personally reviewed    Lab Results   Component Value Date     12/16/2015    K 4.1 06/24/2024     06/24/2024    CO2 29 06/24/2024    ANIONGAP 6 12/16/2015    BUN 12 06/24/2024    CREATININE 1.11 06/24/2024    GLUCOSE 99 12/16/2015    GLUF 106 (H) 06/24/2024    CALCIUM 9.2 06/24/2024    AST 34 06/24/2024    ALT 34 06/24/2024    ALKPHOS 45 06/24/2024    PROT 7.2 12/16/2015    BILITOT 0.91 12/16/2015    EGFR 68 06/24/2024     Lab Results   Component Value Date    WBC 3.80 (L) 06/24/2024    WBC 3.6 06/24/2024    HGB 15.2 06/24/2024    HGB 15.1 06/24/2024    HCT 42.0 06/24/2024    HCT 44.9 06/24/2024    MCV 95 06/24/2024     (H) 06/24/2024     (L) 06/24/2024     (L) 06/24/2024     Lab Results   Component Value Date    HEPCAB Non-reactive 12/29/2023     Lab Results   Component Value Date    HAV Reactive (A) 11/21/2022    HEPBIGM Non-reactive 12/05/2022    HEPBCAB Non-reactive 12/05/2022    " HEPCAB Non-reactive 12/29/2023     Lab Results   Component Value Date    RPR Non-Reactive 12/29/2023     CD4 ABS   Date/Time Value Ref Range Status   06/24/2024 07:10  359 - 1519 /uL Final     HIV-1 RNA by PCR, Qn   Date/Time Value Ref Range Status   06/12/2023 07:33 AM <20 copies/mL Final     Comment:     HIV-1 RNA detected  The reportable range for this assay is 20 to 10,000,000  copies HIV-1 RNA/mL.           Current Outpatient Medications:     atorvastatin (LIPITOR) 40 mg tablet, Take 1 tablet (40 mg total) by mouth daily, Disp: 90 tablet, Rfl: 3    bictegravir-emtricitab-tenofovir alafenamide (Biktarvy) -25 MG tablet, Take 1 tablet by mouth in the morning, Disp: 90 tablet, Rfl: 1    cholecalciferol (VITAMIN D3) 1,000 units tablet, Take 1,000 Units by mouth daily, Disp: , Rfl:     cyanocobalamin (VITAMIN B-12) 500 MCG tablet, Take 1,000 mcg by mouth daily, Disp: , Rfl:     docusate sodium (COLACE) 100 mg capsule, Take 100 mg by mouth 3 (three) times a day as needed for constipation, Disp: , Rfl:     famotidine (PEPCID) 20 mg tablet, TAKE 1 TABLET BY MOUTH DAILY (Patient taking differently: Take 20 mg by mouth daily As needed), Disp: 90 tablet, Rfl: 1    lisinopril (ZESTRIL) 20 mg tablet, Take 1 tablet (20 mg total) by mouth daily, Disp: 90 tablet, Rfl: 1    metoprolol succinate (TOPROL-XL) 50 mg 24 hr tablet, take 1 tablet by mouth once daily, Disp: 90 tablet, Rfl: 1    Multiple Vitamin (multivitamin) capsule, Take 1 capsule by mouth daily, Disp: , Rfl:     mupirocin (BACTROBAN) 2 % ointment, Apply 1 g topically if needed (as needed), Disp: , Rfl:     Omega-3 1000 MG CAPS, Take 2 g by mouth daily, Disp: , Rfl:     omeprazole (PriLOSEC) 40 MG capsule, Take 1 capsule (40 mg total) by mouth daily, Disp: 90 capsule, Rfl: 1    potassium citrate (UROCIT-K) 5 MEQ (540 MG), TAKE ONE TABLET BY MOUTH EVERY MORNING, Disp: 30 tablet, Rfl: 0    psyllium (METAMUCIL) 58.6 % packet, Take 1 packet by mouth daily,  How Severe Is Your Acne?: mild Disp: , Rfl:     benzoyl peroxide 5 % external wash, Apply 237 g topically if needed (as needed), Disp: , Rfl:        Is This A New Presentation, Or A Follow-Up?: Follow Up Acne

## 2024-06-28 NOTE — PROGRESS NOTES
[facility] Annual Sexual Health Assessment    Jay Jay was seen by Prevention Nurse in conjunction with his ID appointment.     Pt is established patient.    Explained to patient that we complete a sexual health assessment once yearly.    He was agreeable to talking with me today.     Jay Jay has been with his same partner for 45 years since they were both 23 years old. He reports they are abstinent by choice. Acknowledged that at this point in their relationship they are not as interested in the physical aspect of sex and they have other ways of expressing intimacy in their daily lives. He feels very comfortable with this setup and feels satisfied in his relationship and feels confident partner is as well.

## 2024-06-28 NOTE — PROGRESS NOTES
Assessment/Plan: Pt was approached by [facility] within ID clinic to explore multidimensional stability by completing the PHQ-9 screening. During today's contact, pt disclosed being stable by focusing on his self-care and enjoying his snf. Pt shared been sustaining his sobriety for 17 years, relying on extended support within the AA group.    Pt's emotions and cognitions were validated throughout contact, along with receiving positive reinforcements for his willingness to share his current stability, and self-care habits. Before session's completion, the depression screening was assessed by pt, scoring (PHQ-9=0) as a display of minimal depressive traits without SI or HI. At the end of contact, pt was encouraged to continue benefiting from ongoing  services which pt agreed to benefit from as needed.      [facility]     Today patient present with No chief complaint on file.    Patient would likely benefit from: Addressing stressors that may trigger reservations towards relapse.   Consider/focus/continue: Monitoring pt's emotional, cognitive, and behavioral health's stability.   Stage of change: Pre-contemplation  Plan/ Behavioral Recommendations: Ongoing  interventions per pt's coordinated care, and/or pt's request for services.       There are no diagnoses linked to this encounter.      Subjective:     Patient ID: Jay Jay Acuna is a 68 y.o. male.    HPI    History of Present Illness:      Patient is being seen for annual behavioral health assessment.   Patient denies current behavioral health concerns.    [unfilled]       Review of Systems      Objective:     Physical Exam      [facility]    Orientation     Person: yes    Place: yes    Time: yes    Appearance    Well Developed: yes healthy    Uncomfortable: no    Normal Body Odor: yes    Smells of Feces: no    Smells of Urine: no    Disheveled: no    Well Nourished: yes overweight    Grooming Unkempt: no    Poor Eye Contact: no    Hirsute:  no    Looks Tired: no    Acutely Exhausted: noappears younger    Mood and Affect:     Appropriate: yes    Euthymicyes    Irritable: no    Angry: no    Anxious: no    Depressed:no    Blunted:no    Labile: no    Restricted: no    Harm to Self or Others: None reported by pt.     Substance Abuse: ETOH Use Disorder, in Full Remission (17 years).

## 2024-08-01 DIAGNOSIS — I10 PRIMARY HYPERTENSION: ICD-10-CM

## 2024-08-01 RX ORDER — LISINOPRIL 20 MG/1
20 TABLET ORAL DAILY
Qty: 90 TABLET | Refills: 1 | Status: SHIPPED | OUTPATIENT
Start: 2024-08-01

## 2024-08-05 DIAGNOSIS — N20.0 NEPHROLITHIASIS: ICD-10-CM

## 2024-08-05 RX ORDER — POTASSIUM CITRATE 5 MEQ/1
5 TABLET, EXTENDED RELEASE ORAL DAILY
Qty: 30 TABLET | Refills: 11 | Status: SHIPPED | OUTPATIENT
Start: 2024-08-05

## 2024-08-06 ENCOUNTER — OFFICE VISIT (OUTPATIENT)
Dept: SURGERY | Facility: CLINIC | Age: 69
End: 2024-08-06
Payer: MEDICARE

## 2024-08-06 VITALS
HEART RATE: 63 BPM | TEMPERATURE: 98.3 F | WEIGHT: 210.4 LBS | OXYGEN SATURATION: 98 % | RESPIRATION RATE: 16 BRPM | SYSTOLIC BLOOD PRESSURE: 138 MMHG | BODY MASS INDEX: 27 KG/M2 | DIASTOLIC BLOOD PRESSURE: 72 MMHG | HEIGHT: 74 IN

## 2024-08-06 DIAGNOSIS — Z87.19 H/O ANAL FISTULOTOMY: ICD-10-CM

## 2024-08-06 DIAGNOSIS — D72.810 LYMPHOPENIA: ICD-10-CM

## 2024-08-06 DIAGNOSIS — E78.2 MIXED HYPERLIPIDEMIA: ICD-10-CM

## 2024-08-06 DIAGNOSIS — B20 HIV DISEASE (HCC): Primary | ICD-10-CM

## 2024-08-06 DIAGNOSIS — K20.90 ESOPHAGITIS: ICD-10-CM

## 2024-08-06 DIAGNOSIS — Z98.890 H/O ANAL FISTULOTOMY: ICD-10-CM

## 2024-08-06 DIAGNOSIS — I10 PRIMARY HYPERTENSION: ICD-10-CM

## 2024-08-06 PROBLEM — D70.9 NEUTROPENIA (HCC): Status: RESOLVED | Noted: 2022-12-13 | Resolved: 2024-08-06

## 2024-08-06 PROCEDURE — G2211 COMPLEX E/M VISIT ADD ON: HCPCS | Performed by: NURSE PRACTITIONER

## 2024-08-06 PROCEDURE — 99214 OFFICE O/P EST MOD 30 MIN: CPT | Performed by: NURSE PRACTITIONER

## 2024-08-06 RX ORDER — FAMOTIDINE 20 MG/1
20 TABLET, FILM COATED ORAL DAILY
Qty: 90 TABLET | Refills: 1 | Status: SHIPPED | OUTPATIENT
Start: 2024-08-06

## 2024-08-06 NOTE — ASSESSMENT & PLAN NOTE
Doing well on Biktarvy with an undetectable VL.  Pt reports 100% medication compliance on HAART.  Stressed the importance of 100% medication adherence  Monitor CD4. HIV RNA, CMP, and CBCD for virologic response and drug toxicities  Follow up with Dr. Campa or Dr. Barrett   HIV Counseling:    Viral Load: < 20    CD4 Count: 556      Denies side effects.  Stressed the importance of adherence.  Continue follow up in the ID clinic with Dr. Campa or Dr. Barrett.    Reviewed the most recent labs, including the CD4 and viral load.  Discussed the risks and benefits of treatment options, instructions for management, importance of treatment adherence, and reduction of risk factors.  Educated on possible medication side effects.    Counseled on routes of HIV transmission, including the risk of  infection.  Emphasized that viral suppression is the best method to prevent HIV transmission.  At this time the pt denies the need for HIV testing of anyone in their life.    Total encounter time was greater than 35 minutes.  Greater than 20 minutes were spent on counseling and patient education.  Pt voices understanding and agreement with treatment plan.

## 2024-08-06 NOTE — PROGRESS NOTES
Ambulatory Visit  Name: Jay Jay Acuna      : 1955      MRN: [id number]  Encounter Provider: ARINA Lantigua  Encounter Date: 2024   Encounter department: [facility]    Assessment & Plan   1. HIV disease (HCC)  Assessment & Plan:  Doing well on Biktarvy with an undetectable VL.  Pt reports 100% medication compliance on HAART.  Stressed the importance of 100% medication adherence  Monitor CD4. HIV RNA, CMP, and CBCD for virologic response and drug toxicities  Follow up with Dr. Campa or Dr. Barrett   HIV Counseling:    Viral Load: < 20    CD4 Count: 556      Denies side effects.  Stressed the importance of adherence.  Continue follow up in the ID clinic with Dr. Campa or Dr. Barrett.    Reviewed the most recent labs, including the CD4 and viral load.  Discussed the risks and benefits of treatment options, instructions for management, importance of treatment adherence, and reduction of risk factors.  Educated on possible medication side effects.    Counseled on routes of HIV transmission, including the risk of  infection.  Emphasized that viral suppression is the best method to prevent HIV transmission.  At this time the pt denies the need for HIV testing of anyone in their life.    Total encounter time was greater than 35 minutes.  Greater than 20 minutes were spent on counseling and patient education.  Pt voices understanding and agreement with treatment plan.      2. Lymphopenia  Assessment & Plan:  Stable.  WBC: 3.80 and ANC: 1.86.  Most likely due to HIV and is inconsequential does not require any further workup at this time.   Continue to monitor CBCD  ANC > 1000  3. Primary hypertension  Assessment & Plan:  BP at goal.  Reviewed BP log from home and well controlled BP.  Scan into media.  Continue HTN regimen  Continue to follow with Cardiology  4. H/O anal fistulotomy  Assessment & Plan:  Doing   Recent fistulotomy on 24 by Dr. Chamorro.  No signs of cancer.    Continue to monitor   5. Mixed hyperlipidemia  Assessment & Plan:  Stable.  Due for lipid panel with next labs  Continue statin    Orders:  -     Lipid panel; Future      History of Present Illness     Jay Jay Acuna is a 68 y.o. male who presents for 4 month follow up visit for management of HIV and chronic health care conditions.  Jay Jay reports he has been doing good. He was having some seepage from the fistulotomy but has seemed to resolve.  No other concerns.     He does not endorse any fever, chills, nausea, vomiting, cough, SOB, diarrhea, or night sweats.      Review of Systems   HENT: Negative.     Respiratory: Negative.     Cardiovascular: Negative.    Gastrointestinal: Negative.    Neurological: Negative.    Psychiatric/Behavioral: Negative.       Current Outpatient Medications on File Prior to Visit   Medication Sig Dispense Refill    atorvastatin (LIPITOR) 40 mg tablet Take 1 tablet (40 mg total) by mouth daily 90 tablet 3    benzoyl peroxide 5 % external wash Apply 237 g topically if needed (as needed)      bictegravir-emtricitab-tenofovir alafenamide (Biktarvy) -25 MG tablet Take 1 tablet by mouth in the morning 90 tablet 1    cholecalciferol (VITAMIN D3) 1,000 units tablet Take 1,000 Units by mouth daily      cyanocobalamin (VITAMIN B-12) 500 MCG tablet Take 1,000 mcg by mouth daily      docusate sodium (COLACE) 100 mg capsule Take 100 mg by mouth 3 (three) times a day as needed for constipation      famotidine (PEPCID) 20 mg tablet TAKE 1 TABLET BY MOUTH DAILY (Patient taking differently: Take 20 mg by mouth daily As needed) 90 tablet 1    lisinopril (ZESTRIL) 20 mg tablet Take 1 tablet (20 mg total) by mouth daily 90 tablet 1    metoprolol succinate (TOPROL-XL) 50 mg 24 hr tablet take 1 tablet by mouth once daily 90 tablet 1    Multiple Vitamin (multivitamin) capsule Take 1 capsule by mouth daily      mupirocin (BACTROBAN) 2 % ointment Apply 1 g topically if needed (as needed)      Omega-3 1000  "MG CAPS Take 2 g by mouth daily      omeprazole (PriLOSEC) 40 MG capsule Take 1 capsule (40 mg total) by mouth daily 90 capsule 1    potassium citrate (UROCIT-K) 5 MEQ (540 MG) TAKE ONE TABLET BY MOUTH EVERY MORNING 30 tablet 11    psyllium (METAMUCIL) 58.6 % packet Take 1 packet by mouth daily       No current facility-administered medications on file prior to visit.      Objective     /72 (BP Location: Right arm, Patient Position: Sitting, Cuff Size: Large)   Pulse 63   Temp 98.3 °F (36.8 °C) (Tympanic)   Resp 16   Ht 6' 2\" (1.88 m)   Wt 95.4 kg (210 lb 6.4 oz)   SpO2 98%   BMI 27.01 kg/m²     Physical Exam  Nursing note reviewed.   Constitutional:       Appearance: Normal appearance.   Cardiovascular:      Rate and Rhythm: Normal rate and regular rhythm.      Chest Wall: PMI is not displaced.      Pulses: Normal pulses.      Heart sounds: Normal heart sounds.   Pulmonary:      Effort: Pulmonary effort is normal.      Breath sounds: Normal breath sounds.   Abdominal:      General: Bowel sounds are normal.      Palpations: Abdomen is soft.   Skin:     General: Skin is warm and dry.      Capillary Refill: Capillary refill takes less than 2 seconds.   Neurological:      Mental Status: He is alert and oriented to person, place, and time.   Psychiatric:         Mood and Affect: Mood normal.         Behavior: Behavior normal.         Thought Content: Thought content normal.         Judgment: Judgment normal.       Administrative Statements         BMI Counseling: Body mass index is 27.01 kg/m². The BMI is above normal. Nutrition recommendations include reducing portion sizes, decreasing overall calorie intake, 3-5 servings of fruits/vegetables daily, reducing fast food intake, consuming healthier snacks, decreasing soda and/or juice intake, moderation in carbohydrate intake, and increasing intake of lean protein. Exercise recommendations include moderate aerobic physical activity for 150 minutes/week and " strength training exercises.

## 2024-08-06 NOTE — ASSESSMENT & PLAN NOTE
Stable.  WBC: 3.80 and ANC: 1.86.  Most likely due to HIV and is inconsequential does not require any further workup at this time.   Continue to monitor CBCD  ANC > 1000

## 2024-08-06 NOTE — ASSESSMENT & PLAN NOTE
BP at goal.  Reviewed BP log from home and well controlled BP.  Scan into media.  Continue HTN regimen  Continue to follow with Cardiology   1-2 cups/cans per day

## 2024-08-09 DIAGNOSIS — K21.00 GASTROESOPHAGEAL REFLUX DISEASE WITH ESOPHAGITIS WITHOUT HEMORRHAGE: ICD-10-CM

## 2024-08-09 RX ORDER — OMEPRAZOLE 40 MG/1
40 CAPSULE, DELAYED RELEASE ORAL DAILY
Qty: 90 CAPSULE | Refills: 1 | Status: SHIPPED | OUTPATIENT
Start: 2024-08-09

## 2024-08-09 RX ORDER — OMEPRAZOLE 40 MG/1
40 CAPSULE, DELAYED RELEASE ORAL DAILY
Qty: 90 CAPSULE | Refills: 1 | Status: SHIPPED | OUTPATIENT
Start: 2024-08-09 | End: 2024-08-09 | Stop reason: SDUPTHER

## 2024-08-09 NOTE — TELEPHONE ENCOUNTER
Reason for call:   [x] Refill   [] Prior Auth  [] Other:     Office:   [] PCP/Provider -   [x] Specialty/Provider - Gastro    Medication: omeprazole (PriLOSEC) 40 MG capsule     Dose/Frequency: Take 1 capsule (40 mg total) by mouth daily     Quantity: 90    Pharmacy:  [id number] - [place] - [address]     Does the patient have enough for 3 days?   [x] Yes   [] No - Send as HP to POD     Statement Selected

## 2024-08-22 ENCOUNTER — TELEPHONE (OUTPATIENT)
Dept: GASTROENTEROLOGY | Facility: MEDICAL CENTER | Age: 69
End: 2024-08-22

## 2024-08-22 ENCOUNTER — OFFICE VISIT (OUTPATIENT)
Dept: GASTROENTEROLOGY | Facility: MEDICAL CENTER | Age: 69
End: 2024-08-22
Payer: MEDICARE

## 2024-08-22 VITALS
TEMPERATURE: 97.8 F | HEIGHT: 74 IN | WEIGHT: 205 LBS | DIASTOLIC BLOOD PRESSURE: 84 MMHG | HEART RATE: 71 BPM | SYSTOLIC BLOOD PRESSURE: 162 MMHG | BODY MASS INDEX: 26.31 KG/M2 | OXYGEN SATURATION: 98 %

## 2024-08-22 DIAGNOSIS — K20.90 ESOPHAGITIS: ICD-10-CM

## 2024-08-22 DIAGNOSIS — K21.9 GASTROESOPHAGEAL REFLUX DISEASE WITHOUT ESOPHAGITIS: Primary | ICD-10-CM

## 2024-08-22 DIAGNOSIS — Z86.010 HISTORY OF COLON POLYPS: ICD-10-CM

## 2024-08-22 DIAGNOSIS — R13.19 OTHER DYSPHAGIA: ICD-10-CM

## 2024-08-22 PROCEDURE — 99214 OFFICE O/P EST MOD 30 MIN: CPT | Performed by: NURSE PRACTITIONER

## 2024-08-22 RX ORDER — FAMOTIDINE 20 MG/1
20 TABLET, FILM COATED ORAL DAILY
Qty: 90 TABLET | Refills: 1 | Status: SHIPPED | OUTPATIENT
Start: 2024-08-22

## 2024-08-22 NOTE — PROGRESS NOTES
[facility] - Outpatient Follow-up Note  Jay Jay Acuna 68 y.o. male MRN: [id number]  Encounter: [id number]          ASSESSMENT AND PLAN:      1.  Hiatal hernia  2.  GERD  3.  Dysphagia    Longstanding history of GERD.  Reporting some intermittent breakthrough reflux if he eats red sauce or eats later at night.  He does take omeprazole 40 mg daily in the morning.  He does use Pepcid 20 mg as needed which helps.  He has had multiple episodes of a questionable dysphagia and midesophagus that causes seconds of choking and then resolves.  Does not occur at night.  It resolves on its own.  He is due for a colonoscopy 2/24 and he reports he was told by Dr. Rubio that he should have a repeat EGD also at that time.  Father had esophageal cancer.  He does report increased heartburn/reflux early morning after breakfast.  Patient is agreeable to take Pepcid 20 mg in the in the evening regularly.    -Antireflux diet  -EGD with colonoscopy  -Continue omeprazole 40 mg in the morning  -Pepcid 20 mg daily in the evening  -Consider esophageal manometry testing.  Patient like to hold on this until his EGD is repeated 2/25  -Instructed patient to call office in a few weeks with update once he has been taking the Pepcid on a regular basis.    4.  NAFLD    Recent LFTs normal other than total bili 1.11.  Does have a history of Gilbert's disease.  Currently on a diet and trying to lose weight.  No abdominal pain.    -Continue monitor LFTs every 6 months  -Low-fat/low-cholesterol diet and weight reduction    5.  Personal history of colon polyps    BMs are brown and formed daily.  Denies any melena or medic easier.  Last colonoscopy was 2/20 which noted a polyp.  Polyp was a tubular adenoma.  He then for recall colonoscopy 2/25.    -Colonoscopy with GoLytely/Dulcolax prep 2/25 with Dr. Rubio    I reviewed with patient/family potential risks of endoscopic evaluation including possible infection, bleeding or  perforation.  Patient/family verbalized understanding of potential risks and agreed to procedure(s).  ______________________________________________________________________    SUBJECTIVE: 68-year-old male here for follow-up.  He was last seen by Dr. Rubio 2/24 for history of hiatal hernia, GERD, nonalcoholic fatty liver disease and gastric wall thickening.    Longstanding history of GERD.  Reporting some intermittent breakthrough reflux if he eats red sauce or eats later at night.  He does take omeprazole 40 mg daily in the morning.  He does use Pepcid 20 mg as needed which helps.  He has had multiple episodes of a questionable dysphagia and midesophagus that causes seconds of choking and then resolves.  Does not occur at night.  It resolves on its own.  He is due for a colonoscopy 2/24 and he reports he was told by Dr. Rubio that he should have a repeat EGD also at that time.  Father had esophageal cancer.  He does report increased heartburn/reflux early morning after breakfast.    BMs are brown and formed daily.  Denies any melena or medic easier.  Last colonoscopy was 2/20 which noted a polyp.  Polyp was a tubular adenoma.  He then for recall colonoscopy 2/25.      History of fatty liver disease.  Recent LFTs normal other than T. bili 1.11.  Does have a history of Gilbert's disease.  No abdominal pain.  Currently dieting and losing weight slowly.      Labs 6/24-CMP normal other than glucose 106, total bili 1.11 (history of Gilbert's disease), CBC normal other than WBCs 3.8 and platelets 109    Prior EGD/colonoscopy     EGD 10/23-grade B esophagitis with nodularity at the GE junction.  Small hiatal hernia.  Stomach normal.  Mild erythematous mucosa in the duodenal bulb and second part of the duodenum.  Biopsies were negative for celiac and H. pylori.  GE junction noted polypoid gastric mucosal with foveolar hyperplasia.  Negative for intestinal metaplasia.  Differential includes hyperplastic polyp versus reactive  gastropathy.    EGD 4/22-salmon-colored tongue noted extending from the GE junction.  Normal-appearing gastric mucosa.  Mild duodenitis in the bulb.  Biopsies were negative for H. pylori and Gorman's esophagus.    Colonoscopy 2/20-small sessile polyp less than 5 mm in the ascending colon.  Repeat colonoscopy in 5 years due to personal history of colon polyps.  Biopsy noted tubular adenoma.    REVIEW OF SYSTEMS IS OTHERWISE NEGATIVE.  10 point review of systems negative other than per HPI      Historical Information   Past Medical History:   Diagnosis Date   • Cancer (HCC)    • Colon polyp    • GERD (gastroesophageal reflux disease)    • HIV (human immunodeficiency virus infection) (HCC)    • Hyperlipidemia    • Hypertension    • Kidney stone    • Non-alcoholic fatty liver disease    • PONV (postoperative nausea and vomiting)    • Prostate cancer (HCC)    • Sleep apnea      Past Surgical History:   Procedure Laterality Date   • ANAL FISTULOTOMY N/A 5/7/2024    Procedure: FISTULOTOMY;  Surgeon: Librado Chamorro MD;  [facility];  Service: Colorectal   • COLONOSCOPY     • HERNIA REPAIR     • AZ ANRCT XM SURG REQ ANES GENERAL SPI/EDRL DX N/A 5/7/2024    Procedure: EXAM UNDER ANESTHESIA (EUA);  Surgeon: Librado Chamorro MD;  [facility];  Service: Colorectal   • AZ BX PROSTATE STRTCTC SATURATION SAMPLING IMG GID N/A 01/25/2022    Procedure: TRANSPERINEAL ULTRASOUND GUIDED BIOPSY PROSTATE;  Surgeon: Shon Beltrán MD;  [facility];  Service: Urology   • AZ PROSTATE NEEDLE BIOPSY ANY APPROACH N/A 10/13/2020    Procedure: TRANSPERINEAL PROSTATE BIOPSY;  Surgeon: Shon Beltrán MD;  Location[facility];  Service: Urology   • ROOT CANAL       Social History   Social History     Substance and Sexual Activity   Alcohol Use Not Currently    Comment: used to be a heavy drinker, quit 15 years ago     Social History     Substance and Sexual Activity   Drug Use Not Currently   •  "Types: Marijuana, Cocaine    Comment: used to use drugs, consisting of crystal meth, cocaine, and marijuana, denied any IV drug use in the past     Social History     Tobacco Use   Smoking Status Never   Smokeless Tobacco Never     Family History   Problem Relation Age of Onset   • Prostate cancer Father    • Esophageal cancer Father    • Heart disease Father    • Colon cancer Cousin         mothers side       Meds/Allergies       Current Outpatient Medications:   •  atorvastatin (LIPITOR) 40 mg tablet  •  benzoyl peroxide 5 % external wash  •  bictegravir-emtricitab-tenofovir alafenamide (Biktarvy) -25 MG tablet  •  cholecalciferol (VITAMIN D3) 1,000 units tablet  •  cyanocobalamin (VITAMIN B-12) 500 MCG tablet  •  docusate sodium (COLACE) 100 mg capsule  •  famotidine (PEPCID) 20 mg tablet  •  lisinopril (ZESTRIL) 20 mg tablet  •  metoprolol succinate (TOPROL-XL) 50 mg 24 hr tablet  •  Multiple Vitamin (multivitamin) capsule  •  mupirocin (BACTROBAN) 2 % ointment  •  Omega-3 1000 MG CAPS  •  omeprazole (PriLOSEC) 40 MG capsule  •  potassium citrate (UROCIT-K) 5 MEQ (540 MG)  •  psyllium (METAMUCIL) 58.6 % packet    Allergies   Allergen Reactions   • Epinephrine Palpitations and Shortness Of Breath   • Sulfa Antibiotics Other (See Comments)     Eye irritation           Objective     Blood pressure 162/84, pulse 71, temperature 97.8 °F (36.6 °C), height 6' 2\" (1.88 m), weight 93 kg (205 lb), SpO2 98%. Body mass index is 26.32 kg/m².      PHYSICAL EXAM:      General Appearance:   Alert, cooperative, no distress   HEENT:   Normocephalic, atraumatic, anicteric.     Neck:  Supple, symmetrical, trachea midline   Lungs:   Clear to auscultation bilaterally; no rales, rhonchi or wheezing; respirations unlabored    Heart::   Regular rate and rhythm; no murmur, rub, or gallop.   Abdomen:   Soft, non-tender, non-distended; normal bowel sounds; no masses, no organomegaly    Genitalia:   Deferred    Rectal:   Deferred  "   Extremities:  No cyanosis, clubbing or edema    Pulses:  2+ and symmetric    Skin:  No jaundice, rashes, or lesions    Lymph nodes:  No palpable cervical lymphadenopathy        Lab Results:   No visits with results within 1 Day(s) from this visit.   Latest known visit with results is:   Appointment on 06/24/2024   Component Date Value   • WBC 06/24/2024 3.80 (L)    • RBC 06/24/2024 4.43    • Hemoglobin 06/24/2024 15.2    • Hematocrit 06/24/2024 42.0    • MCV 06/24/2024 95    • MCH 06/24/2024 34.3    • MCHC 06/24/2024 36.2    • RDW 06/24/2024 11.8    • MPV 06/24/2024 11.5    • Platelets 06/24/2024 109 (L)    • nRBC 06/24/2024 0    • Segmented % 06/24/2024 49    • Immature Grans % 06/24/2024 0    • Lymphocytes % 06/24/2024 41    • Monocytes % 06/24/2024 8    • Eosinophils Relative 06/24/2024 2    • Basophils Relative 06/24/2024 0    • Absolute Neutrophils 06/24/2024 1.86    • Absolute Immature Grans 06/24/2024 0.01    • Absolute Lymphocytes 06/24/2024 1.55    • Absolute Monocytes 06/24/2024 0.31    • Eosinophils Absolute 06/24/2024 0.06    • Basophils Absolute 06/24/2024 0.01    • CD4 ABS 06/24/2024 556    • CD4 % [place] T Cell 06/24/2024 39.7    • White Blood Cell Count 06/24/2024 3.6    • Red Blood Cell Count 06/24/2024 4.46    • Hemoglobin 06/24/2024 15.1    • HCT 06/24/2024 44.9    • MCV 06/24/2024 101 (H)    • MCH 06/24/2024 33.9 (H)    • MCHC 06/24/2024 33.6    • RDW 06/24/2024 12.3    • Platelet Count 06/24/2024 111 (L)    • Neutrophils 06/24/2024 48    • Lymphocytes 06/24/2024 40    • Monocytes 06/24/2024 9    • Eosinophils 06/24/2024 1    • Basophils PCT 06/24/2024 1    • Neutrophils (Absolute) 06/24/2024 1.8    • Lymphocytes (Absolute) 06/24/2024 1.4    • Monocytes (Absolute) 06/24/2024 0.3    • Eosinophils (Absolute) 06/24/2024 0.1    • Basophils ABS 06/24/2024 0.0    • Immature Granulocytes (A* 06/24/2024 0.0    • Immature Granulocytes 06/24/2024 1    • Sodium 06/24/2024 137    • Potassium 06/24/2024 4.1     • Chloride 06/24/2024 105    • CO2 06/24/2024 29    • ANION GAP 06/24/2024 3 (L)    • BUN 06/24/2024 12    • Creatinine 06/24/2024 1.11    • Glucose, Fasting 06/24/2024 106 (H)    • Calcium 06/24/2024 9.2    • AST 06/24/2024 34    • ALT 06/24/2024 34    • Alkaline Phosphatase 06/24/2024 45    • Total Protein 06/24/2024 6.6    • Albumin 06/24/2024 4.3    • Total Bilirubin 06/24/2024 1.11 (H)    • eGFR 06/24/2024 68    • HIV-1 TARGET 06/24/2024 Detected <20 cp/mL (A)          Radiology Results:   No results found.

## 2024-08-22 NOTE — TELEPHONE ENCOUNTER
Scheduled date of Colonoscopy & EGD (as of today) February 19  Physician performing: Dr. Rubio   [facility] of [facility]  [facility]  Instructions given to patient: Carol  Clearances: N/A  Diabetic: N/A

## 2024-08-26 NOTE — TELEPHONE ENCOUNTER
Pt called refill line wondering if solution would be called into the pharmacy now or once it gets closer to time of Colonoscopy. He would like to get it at [id number] - [place] - [address]     Please contact pt to further discuss at [phone]

## 2024-08-27 DIAGNOSIS — Z12.11 SCREENING FOR COLON CANCER: Primary | ICD-10-CM

## 2024-08-27 RX ORDER — BISACODYL 5 MG/1
TABLET, DELAYED RELEASE ORAL
Qty: 4 TABLET | Refills: 0 | Status: SHIPPED | OUTPATIENT
Start: 2024-08-27

## 2024-09-16 DIAGNOSIS — C61 PROSTATE CANCER (HCC): Primary | ICD-10-CM

## 2024-09-19 ENCOUNTER — NURSE TRIAGE (OUTPATIENT)
Age: 69
End: 2024-09-19

## 2024-09-19 DIAGNOSIS — R13.10 DYSPHAGIA, UNSPECIFIED TYPE: Primary | ICD-10-CM

## 2024-09-19 NOTE — TELEPHONE ENCOUNTER
I see that patient's procedures are not until 2/25.  I did put an order in for a video swallow with speech.  I think we should start with this and see if we need to do anything more before the EGD or possibly move up the procedure date.  Thanks.

## 2024-09-19 NOTE — TELEPHONE ENCOUNTER
"LOV: 8/22/24    HX:Hiatal hernia, GERD, Dysphagia    Pt calling with update since starting Omeprazole and Pepcid.  Pt reports he feels better, has less acid.  He is also following Low FODMAP diet and eating more slowly.  Pt expressed concern about 2 \"choking episodes.\"  Pt reports he was out to eat, some small crumbs of food caused him to choke and it felt like he was \"breathing through a pinhole\" and \"felt like I was going to die.\"  Pt states he's had 2 episodes of choking and states it feels like \"the flap in my throat goes the wrong way.\"      Pt would like to know if any recommendations for the dysphagia, next step.  Please advise.     Reason for Disposition   Coughing spells occur during or within 2 hours after eating/feedings    Answer Assessment - Initial Assessment Questions  1. SYMPTOM: \"Are you having difficulty swallowing liquids, solids, or both?\"      Dysphagia- feels like a pinhole at times  2. ONSET: \"When did the swallowing problems begin?\"       Ongoing   3. CAUSE: \"What do you think is causing the problem?\"       Hiatal hernia, but   4. CHRONIC/RECURRENT: \"Is this a new problem for you?\"  If no, ask: \"How long have you had this problem?\" (e.g., days, weeks, months)       Yes   5. OTHER SYMPTOMS: \"Do you have any other symptoms?\" (e.g., difficulty breathing, sore throat, swollen tongue, chest pain)      Denies    Protocols used: Swallowing Difficulty-ADULT-[place]    "

## 2024-09-24 DIAGNOSIS — E78.5 DYSLIPIDEMIA: ICD-10-CM

## 2024-09-24 RX ORDER — ATORVASTATIN CALCIUM 40 MG/1
40 TABLET, FILM COATED ORAL DAILY
Qty: 30 TABLET | Refills: 0 | Status: SHIPPED | OUTPATIENT
Start: 2024-09-24

## 2024-09-27 ENCOUNTER — TELEPHONE (OUTPATIENT)
Age: 69
End: 2024-09-27

## 2024-09-27 NOTE — TELEPHONE ENCOUNTER
Patient called to see when he should have PSA done. Appt upcoming on 11/15. Advised patient to have it within 30 days of appt. Patient appreciative.

## 2024-10-03 ENCOUNTER — HOSPITAL ENCOUNTER (OUTPATIENT)
Dept: RADIOLOGY | Facility: HOSPITAL | Age: 69
Discharge: HOME | End: 2024-10-03
Payer: MEDICARE

## 2024-10-03 DIAGNOSIS — R13.10 DYSPHAGIA, UNSPECIFIED TYPE: ICD-10-CM

## 2024-10-03 PROCEDURE — 92611 MOTION FLUOROSCOPY/SWALLOW: CPT

## 2024-10-03 PROCEDURE — 74230 X-RAY XM SWLNG FUNCJ C+: CPT

## 2024-10-03 NOTE — PROCEDURES
Video Swallow Study      Patient Name: Jay Jay Acuna  Today's Date: 10/3/2024        Past Medical History  Past Medical History:   Diagnosis Date    Cancer (HCC)     Colon polyp     GERD (gastroesophageal reflux disease)     HIV (human immunodeficiency virus infection) (HCC)     Hyperlipidemia     Hypertension     Kidney stone     Non-alcoholic fatty liver disease     PONV (postoperative nausea and vomiting)     Prostate cancer (HCC)     Sleep apnea         Past Surgical History  Past Surgical History:   Procedure Laterality Date    ANAL FISTULOTOMY N/A 5/7/2024    Procedure: FISTULOTOMY;  Surgeon: Librado Chamorro MD;  [facility];  Service: Colorectal    COLONOSCOPY      HERNIA REPAIR      VA ANRCT XM SURG REQ ANES GENERAL SPI/EDRL DX N/A 5/7/2024    Procedure: EXAM UNDER ANESTHESIA (EUA);  Surgeon: Librado Chamorro MD;  [facility];  Service: Colorectal    VA BX PROSTATE STRTCTC SATURATION SAMPLING IMG GID N/A 01/25/2022    Procedure: TRANSPERINEAL ULTRASOUND GUIDED BIOPSY PROSTATE;  Surgeon: Shon Beltrán MD;  [facility];  Service: Urology    VA PROSTATE NEEDLE BIOPSY ANY APPROACH N/A 10/13/2020    Procedure: TRANSPERINEAL PROSTATE BIOPSY;  Surgeon: Shon Beltrán MD;  [facility];  Service: Urology    ROOT CANAL     Modified (Video) Barium Swallow Study    Summary:  Images are on PACS for review.     Oral stage:WNL for mastication, bolus formation, control, and transfer.    Pharyngeal stage:WNL for prompt swallow, velar closure, epiglottic inversion, anterior hyoid excursion, laryngeal elevation, tongue base retraction, pharyngeal constriction, and passage through UES. No pharyngeal retention. No penetration or aspiration. Small CP prominence. Small cervical osteophyte at C 5.    Per gross esophageal screen: Minimal residue w/ solids. Cleared w/ liquids.  Pill passed through to the stomach. No retrograde flow observed.      Recommendations:  Diet:Regular  Liquids:thin  Meds:as tolerated. Denied difficulty.   Strategies: limit distractions. Alternate solids w/ small sips of liquids as needed.   Frequent/thorough oral care  Upright position  F/u ST tx: No  Reflux Precautions. Stated wedge cushion is helpful for his reflux and his sleep apnea. Admits to snacking at night before bed.  Consider consult with: Consider ENT consult for direct visualization. Refer back to GI.   Pt has f/u procedures w/ GI provider.   Results reviewed with: pt and later his partner arrived  Repeat MBS as necessary  If a dedicated assessment of the esophagus is desired:  Consider barium motility study (SLB) or UGI if appropriate.     Pt is a 68yom referred for MBS by GI, Pt described 2 coughing episodes at which time he was eating crunchy/crispy type foods and started coughing. [place] like his airway was closing.? If he may have been having a spasm vs inattention/talking while he was eating. Pt stated he often has reduced attention. Noted on MBS his tongue base rests against the epiglottis w/ no vallecular space. The space widens when he takes PO.     Functional Oral Intake Scale (FOIS)  Jimmie Avendaño PhD, F-KODI  (Does not include liquids)  7. Total oral diet with no restriction.     H&P/pertinent provider notes: (PMH noted above)  SUBJECTIVE: 68-year-old male here for follow-up.  He was last seen by Dr. Rubio 2/24 for history of hiatal hernia, GERD, nonalcoholic fatty liver disease and gastric wall thickening.  Longstanding history of GERD.  Reporting some intermittent breakthrough reflux if he eats red sauce or eats later at night.  He does take omeprazole 40 mg daily in the morning.  He does use Pepcid 20 mg as needed which helps.  He has had multiple episodes of a questionable dysphagia and midesophagus that causes seconds of choking and then resolves.  Does not occur at night.  It resolves on its own.  He is due for a colonoscopy 2/24 and he reports he  was told by Dr. Rubio that he should have a repeat EGD also at that time.  Father had esophageal cancer.  He does report increased heartburn/reflux early morning after breakfast.    Special Studies:  11/10/23 CT chest:  Stable small, nonpathologically enlarged retroperitoneal lymph node, likely of no clinical significance.  Nonobstructing left renal calculus.  Prostatomegaly.    EGD  10/16/23:  MPRESSION:  Grade B esophagitis in the lower third of the esophagus and GE junction along with an area of nodularity at the GE junction. Biopsies done.   Small hiatal hernia (hill 3).   The stomach appeared normal. Performed random biopsy.  Mild erythematous mucosa in the duodenal bulb and 2nd part of the duodenum; performed cold forceps biopsy  RECOMMENDATION:    Await pathology results     Omeprazole 40 mg daily.   Follow up in office.     Previous MBS:  none    Food allergies:  none   Current diet:  regular   Premorbid diet:  regular   Dentition:  natural   O2 requirement:  RA   Oral mech: WNL   Vocal quality/speech: WNL, denied any hoarseness   Cognitive status: Alert       Consistencies administered: Puree, nutrigrain bar, hard cookie, bread, barium pill w/ thin, thin, nectar.    Pt was viewed standing laterally and AP

## 2024-10-04 DIAGNOSIS — R13.10 DYSPHAGIA, UNSPECIFIED TYPE: Primary | ICD-10-CM

## 2024-10-04 DIAGNOSIS — R13.19 ESOPHAGEAL DYSPHAGIA: Primary | ICD-10-CM

## 2024-10-07 DIAGNOSIS — I47.10 SVT (SUPRAVENTRICULAR TACHYCARDIA) (HCC): ICD-10-CM

## 2024-10-07 DIAGNOSIS — R00.2 PALPITATIONS: ICD-10-CM

## 2024-10-08 RX ORDER — METOPROLOL SUCCINATE 50 MG/1
50 TABLET, EXTENDED RELEASE ORAL DAILY
Qty: 90 TABLET | Refills: 0 | Status: SHIPPED | OUTPATIENT
Start: 2024-10-08

## 2024-10-20 DIAGNOSIS — E78.5 DYSLIPIDEMIA: ICD-10-CM

## 2024-10-21 RX ORDER — ATORVASTATIN CALCIUM 40 MG/1
40 TABLET, FILM COATED ORAL DAILY
Qty: 90 TABLET | Refills: 0 | Status: SHIPPED | OUTPATIENT
Start: 2024-10-21

## 2024-10-22 ENCOUNTER — TELEPHONE (OUTPATIENT)
Dept: SURGERY | Facility: CLINIC | Age: 69
End: 2024-10-22

## 2024-10-22 NOTE — TELEPHONE ENCOUNTER
Spoke with patient regarding annual re-cert.     Patient aware we will meet at his next PCP appointment .

## 2024-10-24 ENCOUNTER — HOSPITAL ENCOUNTER (OUTPATIENT)
Dept: RADIOLOGY | Facility: HOSPITAL | Age: 69
Discharge: HOME | End: 2024-10-24
Payer: MEDICARE

## 2024-10-24 DIAGNOSIS — R13.19 ESOPHAGEAL DYSPHAGIA: ICD-10-CM

## 2024-10-24 PROCEDURE — 74220 X-RAY XM ESOPHAGUS 1CNTRST: CPT

## 2024-10-25 ENCOUNTER — OFFICE VISIT (OUTPATIENT)
Dept: CARDIOLOGY CLINIC | Facility: CLINIC | Age: 69
End: 2024-10-25
Payer: MEDICARE

## 2024-10-25 VITALS
DIASTOLIC BLOOD PRESSURE: 70 MMHG | HEART RATE: 60 BPM | WEIGHT: 209 LBS | SYSTOLIC BLOOD PRESSURE: 110 MMHG | BODY MASS INDEX: 26.83 KG/M2

## 2024-10-25 DIAGNOSIS — I10 ESSENTIAL HYPERTENSION: ICD-10-CM

## 2024-10-25 DIAGNOSIS — E78.5 DYSLIPIDEMIA: ICD-10-CM

## 2024-10-25 DIAGNOSIS — R00.2 PALPITATIONS: Primary | ICD-10-CM

## 2024-10-25 DIAGNOSIS — I77.810 THORACIC AORTIC ECTASIA (HCC): ICD-10-CM

## 2024-10-25 DIAGNOSIS — I47.10 SVT (SUPRAVENTRICULAR TACHYCARDIA) (HCC): ICD-10-CM

## 2024-10-25 PROCEDURE — 93000 ELECTROCARDIOGRAM COMPLETE: CPT | Performed by: INTERNAL MEDICINE

## 2024-10-25 PROCEDURE — 99214 OFFICE O/P EST MOD 30 MIN: CPT | Performed by: INTERNAL MEDICINE

## 2024-10-25 NOTE — PROGRESS NOTES
Cardiology Office Note  MD Krzysztof Vargas MD Jason Kaplan, DO, [facility]  MD Fernie Ramirez DO, [facility]  Jimmie Guzman DO, [facility]  ----------------------------------------------------------------  [address] [address]    Jay Jay Acuna 68 y.o. male MRN: [id number]  Unit/Bed#:  Encounter: [id number]      History of Present Illness:  It was a pleasure to see Jay Jay Acuna in the office today for follow-up CV evaluation.  He has a past medical history of hypertension, dyslipidemia, MING, GERD, non alcoholic fatty liver disease and HIV.  He established care with us in April 2022. Patient began to experience symptoms of discomfort in the chest and palpitations starting back in November 2021.  The patient follows up with Gastroenterology for some symptoms of abdominal discomfort with radiation into the chest.  At that time, he began to experience left-sided discomfort in the chest.  The discomfort was described as both an aching and heaviness sensation.  It did not radiate and was not associated with other symptoms.  Symptoms also occur after he eats.  Additionally, he experienced some palpitations which were described as a fluttering in the chest.  These would come and go lasting between 1-2 minutes.  Both the chest discomfort and palpitations seem to resolve on their own.  Neither of the symptoms worsened with physical activity or necessarily improved with rest.  And of note he also occasionally experiences some shortness of breath.  Does have a family history of coronary disease with his father having had stents placed in his 70s.  Due to the patient's symptoms, he was sent for event recorder, stress test and echocardiogram.  Event recorder demonstrated sinus rhythm with rare atrial and ventricular ectopy and a single run of nonsustained VT and 13 runs of nonsustained PSVT.  Triggered events correlated with PSVT.  Echocardiogram showed normal left  ventricular function without significant valvular disease.  Stress test was found to be negative for myocardial ischemia.  The patient was increased on metoprolol due to persistent palpitations and runs of nonsustained SVT causing symptoms.  Since the increase, his palpitation burden had decreased and he had been feeling much improved.  In the office today, palpitations remain very minimal in nature.  They do not extend for significant periods of time.      Overall, patient denies any significant changes compared to his prior encounter..  He has been tracking his blood pressures and heart rates as well as his weights.  All of these seem to be stable.  He denies any chest pain, pressure, tightness or squeezing.  Denies lightheadedness or dizziness.  Denies lower extremity swelling, orthopnea or paroxysmal nocturnal dyspnea.  The patient feels to be in his usual state of health.    Review of Systems:  Review of Systems   Constitutional: Negative for decreased appetite, fever, weight gain and weight loss.   HENT:  Negative for congestion and sore throat.    Eyes:  Negative for visual disturbance.   Cardiovascular:  Negative for chest pain, dyspnea on exertion, leg swelling, near-syncope and palpitations.   Respiratory:  Negative for cough and shortness of breath.    Hematologic/Lymphatic: Negative for bleeding problem.   Skin:  Negative for rash.   Musculoskeletal:  Negative for myalgias and neck pain.   Gastrointestinal:  Negative for abdominal pain and nausea.   Neurological:  Negative for light-headedness and weakness.   Psychiatric/Behavioral:  Negative for depression.        Past Medical History:   Diagnosis Date    Cancer (HCC)     Colon polyp     GERD (gastroesophageal reflux disease)     HIV (human immunodeficiency virus infection) (HCC)     Hyperlipidemia     Hypertension     Kidney stone     Non-alcoholic fatty liver disease     PONV (postoperative nausea and vomiting)     Prostate cancer (HCC)     Sleep  apnea        Past Surgical History:   Procedure Laterality Date    ANAL FISTULOTOMY N/A 5/7/2024    Procedure: FISTULOTOMY;  Surgeon: Librado Chamorro MD;  Location: [facility];  Service: Colorectal    COLONOSCOPY      HERNIA REPAIR      AK ANRCT XM SURG REQ ANES GENERAL SPI/EDRL DX N/A 5/7/2024    Procedure: EXAM UNDER ANESTHESIA (EUA);  Surgeon: Librado Chamorro MD;  Location: [facility];  Service: Colorectal    AK BX PROSTATE STRTCTC SATURATION SAMPLING IMG GID N/A 01/25/2022    Procedure: TRANSPERINEAL ULTRASOUND GUIDED BIOPSY PROSTATE;  Surgeon: Shon Beltrán MD;  Location: [facility];  Service: Urology    AK PROSTATE NEEDLE BIOPSY ANY APPROACH N/A 10/13/2020    Procedure: TRANSPERINEAL PROSTATE BIOPSY;  Surgeon: Shon Beltrán MD;  Location: [facility];  Service: Urology    ROOT CANAL         Social History     Socioeconomic History    Marital status: Single     Spouse name: Not on file    Number of children: Not on file    Years of education: Not on file    Highest education level: Not on file   Occupational History    Not on file   Tobacco Use    Smoking status: Never    Smokeless tobacco: Never   Vaping Use    Vaping status: Never Used   Substance and Sexual Activity    Alcohol use: Not Currently     Comment: used to be a heavy drinker, quit 15 years ago    Drug use: Not Currently     Types: Marijuana, Cocaine     Comment: used to use drugs, consisting of crystal meth, cocaine, and marijuana, denied any IV drug use in the past    Sexual activity: Not Currently     Partners: Male   Other Topics Concern    Not on file   Social History Narrative    Not on file     Social Determinants of Health     Financial Resource Strain: Not on file   Food Insecurity: No Food Insecurity (8/6/2024)    Nursing - Inadequate Food Risk Classification     Worried About Running Out of Food in the Last Year: Never true     Ran Out of Food in the Last Year: Never true     Ran Out of Food in the Last Year: Not on  file   Transportation Needs: Not on file   Physical Activity: Not on file   Stress: Not on file   Social Connections: Not on file   Intimate Partner Violence: Not on file   Housing Stability: Not on file       Family History   Problem Relation Age of Onset    Prostate cancer Father     Esophageal cancer Father     Heart disease Father     Colon cancer Cousin         mothers side       Allergies   Allergen Reactions    Epinephrine Palpitations and Shortness Of Breath    Sulfa Antibiotics Other (See Comments)     Eye irritation         Current Outpatient Medications:     atorvastatin (LIPITOR) 40 mg tablet, TAKE 1 TABLET BY MOUTH ONCE DAILY, Disp: 90 tablet, Rfl: 0    benzoyl peroxide 5 % external wash, Apply 237 g topically if needed (as needed), Disp: , Rfl:     bictegravir-emtricitab-tenofovir alafenamide (Biktarvy) -25 MG tablet, Take 1 tablet by mouth in the morning, Disp: 90 tablet, Rfl: 1    bisacodyl (DULCOLAX) 5 mg EC tablet, Take as directed by GI office, Disp: 4 tablet, Rfl: 0    cholecalciferol (VITAMIN D3) 1,000 units tablet, Take 1,000 Units by mouth daily, Disp: , Rfl:     cyanocobalamin (VITAMIN B-12) 500 MCG tablet, Take 1,000 mcg by mouth daily, Disp: , Rfl:     docusate sodium (COLACE) 100 mg capsule, Take 100 mg by mouth 3 (three) times a day as needed for constipation, Disp: , Rfl:     famotidine (PEPCID) 20 mg tablet, Take 1 tablet (20 mg total) by mouth daily, Disp: 90 tablet, Rfl: 1    lisinopril (ZESTRIL) 20 mg tablet, Take 1 tablet (20 mg total) by mouth daily, Disp: 90 tablet, Rfl: 1    metoprolol succinate (TOPROL-XL) 50 mg 24 hr tablet, TAKE ONE TABLET BY MOUTH ONCE DAILY, Disp: 90 tablet, Rfl: 0    Multiple Vitamin (multivitamin) capsule, Take 1 capsule by mouth daily, Disp: , Rfl:     mupirocin (BACTROBAN) 2 % ointment, Apply 1 g topically if needed (as needed), Disp: , Rfl:     Omega-3 1000 MG CAPS, Take 2 g by mouth daily, Disp: , Rfl:     omeprazole (PriLOSEC) 40 MG capsule,  Take 1 capsule (40 mg total) by mouth daily, Disp: 90 capsule, Rfl: 1    polyethylene glycol (GOLYTELY) 4000 mL solution, Take as directed by GI office, Disp: 4000 mL, Rfl: 0    potassium citrate (UROCIT-K) 5 MEQ (540 MG), TAKE ONE TABLET BY MOUTH EVERY MORNING, Disp: 30 tablet, Rfl: 11    psyllium (METAMUCIL) 58.6 % packet, Take 1 packet by mouth daily, Disp: , Rfl:     Vitals:    10/25/24 1514   BP: 110/70   BP Location: Right arm   Patient Position: Sitting   Cuff Size: Adult   Pulse: 60   Weight: 94.8 kg (209 lb)         Body mass index is 26.83 kg/m².    PHYSICAL EXAMINATION:  Gen: Awake, Alert, NAD   Head/eyes: AT/NC, pupils equal and round, Anicteric  ENT: mmm  Neck: Supple, No elevated JVP, trachea midline  Resp: CTA bilaterally no w/r/r  CV: RRR +S1, S2, No m/r/g  Abd: Soft, NT/ND + BS  Ext: no LE edema bilaterally  Neuro: Follows commands, moves all extermities  Psych: Appropriate affect, happy mood, cooperative attitude, non-combative  Skin: warm; no rash, erythema or venous stasis changes on exposed skin    --------------------------------------------------------------------------------  TREADMILL STRESS  Results for orders placed during the hospital encounter of 18    Stress test only, exercise    Narrative  [address] [address] [phone]    Stress Electrocardiography during Exercise    Name: DUC BHARDWAJ  MR #: [id number]  Account #: [id number]  Study date: 2018  : 1955  Age: 62 years  Gender: Male  Height: 73 in  Weight: 212 lb  BSA: 2.21 m squared    Allergies: SULFA ANTIBIOTICS    Diagnosis: R07.9 - Chest pain, unspecified    Primary Physician:  JOHNSON NARVAEZ  Referring Physician:  JOHNSON NARVAEZ  RN:  Sona Castle RN BSN  Technician:  Sharon Corbin  GROUP:  [facility]  Interpreting Physician:  Fernie Tello DO  Report Prepared By::  Sona Castle RN BSN    CLINICAL QUESTION: Detection of  coronary artery disease.    HISTORY: The patient is a 62 year old  male. Chest pain status: chest pain. Coronary artery disease risk factors: dyslipidemia, hypertension. Cardiovascular history: none significant. Co-morbidity: history of cocaine abuse.  Medications: a beta blocker, an ACE inhibitor/ARB, aspirin, and a lipid lowering agent.    PHYSICAL EXAM: Baseline physical exam screening: normal lung exam.    REST ECG: Normal sinus rhythm. Normal baseline ECG.    PROCEDURE: Treadmill exercise testing was performed, using the Sally protocol. Stress electrocardiographic evaluation was performed.    SALLY PROTOCOL:  HR bpm SBP mmHg DBP mmHg Symptoms  Baseline 67 119 77 none  Stage 1 96 150 69 none  Stage 2 115 156 68 none  Stage 3 146 174 82 mild dyspnea  Stage 4 160 149 78 dyspnea, fatigue  Recovery 1 94 165 70 subsiding  Recovery 2 89 158 72 none  Recovery 3 86 139 74 none  No medications or fluids given.    STRESS SUMMARY: 02 sat at rest 99% and at peak stress 98%. Duration of exercise was 9 min and 30 sec. The patient exercised to protocol stage 4. Maximal work rate was 10.9 METs. Maximal heart rate during stress was 160 bpm ( 101 % of  maximal predicted heart rate). The heart rate response to stress was normal. There was normal resting blood pressure with an appropriate response to stress. The rate-pressure product for the peak heart rate and blood pressure was 44934.  There was no chest pain during stress. The stress test was terminated due to achievement of maximal (symptom limited) exercise and achievement of target heart rate. The stress ECG was negative for ischemia. There were no stress arrhythmias  or conduction abnormalities.    SUMMARY:  -  Stress results: Duration of exercise was 9 min and 30 sec. Target heart rate was achieved. There was no chest pain during stress.  -  ECG conclusions: The stress ECG was negative for ischemia.    IMPRESSIONS: Normal study after maximal exercise. No chest pain  or ischemic ECG changes with exercise. Normal blood pressure and heart rate response to exercise with no evidence of exercise induced hypoxia. Good functional capacity.    Prepared and signed by    Fernie Tello DO  Signed 01/22/2018 15:23:34    Exercise stress test is negative for myocardial ischemia, JACOBO 9:30, 101% MPHR, 10.9 METs, January 2018     --------------------------------------------------------------------------------  NUCLEAR STRESS TEST: No results found for this or any previous visit.    No results found for this or any previous visit.      --------------------------------------------------------------------------------  CATH:  No results found for this or any previous visit.    --------------------------------------------------------------------------------  ECHO:   No results found for this or any previous visit.    No results found for this or any previous visit.    --------------------------------------------------------------------------------  HOLTER  No results found for this or any previous visit.    No results found for this or any previous visit.    --------------------------------------------------------------------------------  CAROTIDS  No results found for this or any previous visit.     --------------------------------------------------------------------------------  ECGs:  No results found for this visit on 10/25/24.       Lab Results   Component Value Date    WBC 3.80 (L) 06/24/2024    WBC 3.6 06/24/2024    HGB 15.2 06/24/2024    HGB 15.1 06/24/2024    HCT 42.0 06/24/2024    HCT 44.9 06/24/2024    MCV 95 06/24/2024     (H) 06/24/2024     (L) 06/24/2024     (L) 06/24/2024      Lab Results   Component Value Date    SODIUM 137 06/24/2024    K 4.1 06/24/2024     06/24/2024    CO2 29 06/24/2024    BUN 12 06/24/2024    CREATININE 1.11 06/24/2024    GLUC 86 06/29/2023    CALCIUM 9.2 06/24/2024      Lab Results   Component Value Date    HGBA1C 5.5 12/29/2023      Lab  "Results   Component Value Date    CHOL 117 06/16/2015    CHOL 170 08/06/2014     Lab Results   Component Value Date    HDL 33 (L) 04/19/2024    HDL 37 (L) 12/29/2023    HDL 36 (L) 11/21/2022     Lab Results   Component Value Date    LDLCALC 53 04/19/2024    LDLCALC 56 12/29/2023    LDLCALC 70 11/21/2022     Lab Results   Component Value Date    TRIG 105 04/19/2024    TRIG 129 12/29/2023    TRIG 101 11/21/2022     No results found for: \"CHOLHDL\"   Lab Results   Component Value Date    INR 0.98 12/05/2022    PROTIME 13.0 12/05/2022        1. Palpitations  2. SVT (supraventricular tachycardia) (HCC)  3. Essential hypertension  -     POCT ECG  4. Dyslipidemia        IMPRESSION:  Precordial chest pain - resolved  Exercise nuclear stress test appears negative for myocardial ischemia, ET 10 min, 97% MPHR, 11.7 METs, gated EF 64%, June 2022  Palpitations   Event recorder w/ SR avg HR 67 bpm, rare APCs, atrial couplets/triplets, rare VPCs, ventricular couplets/triplets, nonsustained VT (x1) 4 beats at 210 bpm, nonsustained PSVT (x13) 14 beats at 190 bpm, triggered events correlated with nonsustained PSVT, April 2022  Dyspnea on exertion  LVEF 65%, mild MILAN, normal diastolic function, mild RV dilatation, trace AR/MR, mild TR, ascending aortic ectasia 3.8 cm, June 2022  Hypertension  Dyslipidemia w/ LDL 53 mg/dL, HDL 33 mg/dL,  mg/dL, April 2024  ASCVD  MING  GERD   Non alcoholic fatty liver disease  History of prostate CA - active, January 2020  History of HIV    PLAN:  It was a pleasure to see Jay Jay in the office today for follow-up CV evaluation.  He is here today for routine CV follow-up.  Palpitations have decreased overall in frequency.  Since our last encounter, he is doing well overall.  He has no chest pain concerning for angina and no signs or symptoms of heart failure.  Clinically he examines to be euvolemic.  Blood pressure and heart rate are currently stable.  He is tolerating his current medications without " "any reported adverse effects including the atorvastatin after the switch.  He can perform greater than 4 METS on daily basis without significant exertional symptoms.  ECG is nonischemic.  Based on his clinical presentation, I have the following recommendations:    1.  Recommend 30 minutes a day, 5 days a week of moderate intensity activity to build cardiovascular endurance.  2.  Would encourage a heart healthy diet low in sodium and carbohydrate.  3.  Continue high intensity statin.  Goal LDL is less than 70 mg/dL.  Repeat lipid panel is scheduled.  If not to goal, can uptitrate further to 80 mg daily or call the office to discuss further titration.  4.  Continue current antihypertensive regimen including Toprol-XL and lisinopril.  5.  He is otherwise up-to-date with CV testing.  6.  Would consider repeat echocardiogram in 1 year prior to her follow-up encounter.  7.  We will follow-up with him in 1 year to review the results of testing.    As always, please do not hesitate to call with any questions.       Portions of the record may have been created with voice recognition software. Occasional wrong word or \"sound a like\" substitutions may have occurred due to the inherent limitations of voice recognition software. Read the chart carefully and recognize, using context, where substitutions have occurred.      Signed: Anshu Oconnor DO, FACC, FASE, FASNC, FACP  "

## 2024-10-28 ENCOUNTER — APPOINTMENT (OUTPATIENT)
Dept: LAB | Facility: HOSPITAL | Age: 69
End: 2024-10-28
Payer: MEDICARE

## 2024-10-28 DIAGNOSIS — C61 PROSTATE CANCER (HCC): ICD-10-CM

## 2024-10-28 LAB — PSA SERPL-MCNC: 6.4 NG/ML (ref 0–4)

## 2024-10-28 PROCEDURE — 84153 ASSAY OF PSA TOTAL: CPT

## 2024-10-28 PROCEDURE — 36415 COLL VENOUS BLD VENIPUNCTURE: CPT

## 2024-11-08 ENCOUNTER — TELEPHONE (OUTPATIENT)
Age: 69
End: 2024-11-08

## 2024-11-08 DIAGNOSIS — R13.10 DYSPHAGIA, UNSPECIFIED TYPE: Primary | ICD-10-CM

## 2024-11-08 NOTE — TELEPHONE ENCOUNTER
Called patient to advise him that all testing was complete and relayed ARINA Whitehead message.  He will contact ENT.  Thankful for referral.  No further questions at this time.

## 2024-11-08 NOTE — TELEPHONE ENCOUNTER
Patient calling for clarification regarding orders.  Patient has had 2 tests completed and wants to make sure there is a 3rd different test ordered.  He feels that he already got the one test.   Please advise.

## 2024-11-08 NOTE — TELEPHONE ENCOUNTER
I am sorry.  I do see that it was completed.  I was looking at the video swallow with speech.  This x-ray just showed a hiatal hernia.  We can refer him to ENT if he would like for now.  If the symptoms persist then we might need to consider a manometry study.

## 2024-11-15 ENCOUNTER — OFFICE VISIT (OUTPATIENT)
Dept: UROLOGY | Facility: MEDICAL CENTER | Age: 69
End: 2024-11-15
Payer: MEDICARE

## 2024-11-15 VITALS
BODY MASS INDEX: 26.83 KG/M2 | HEIGHT: 74 IN | DIASTOLIC BLOOD PRESSURE: 80 MMHG | HEART RATE: 64 BPM | OXYGEN SATURATION: 98 % | SYSTOLIC BLOOD PRESSURE: 110 MMHG

## 2024-11-15 DIAGNOSIS — N40.1 BPH WITH URINARY OBSTRUCTION: ICD-10-CM

## 2024-11-15 DIAGNOSIS — C61 PROSTATE CANCER (HCC): Primary | ICD-10-CM

## 2024-11-15 DIAGNOSIS — N32.89 BLADDER WALL THICKENING: ICD-10-CM

## 2024-11-15 DIAGNOSIS — N20.0 NEPHROLITHIASIS: ICD-10-CM

## 2024-11-15 DIAGNOSIS — N13.8 BPH WITH URINARY OBSTRUCTION: ICD-10-CM

## 2024-11-15 LAB
POST-VOID RESIDUAL VOLUME, ML POC: 16 ML
SL AMB  POCT GLUCOSE, UA: ABNORMAL
SL AMB LEUKOCYTE ESTERASE,UA: ABNORMAL
SL AMB POCT BILIRUBIN,UA: ABNORMAL
SL AMB POCT BLOOD,UA: ABNORMAL
SL AMB POCT CLARITY,UA: CLEAR
SL AMB POCT COLOR,UA: YELLOW
SL AMB POCT KETONES,UA: ABNORMAL
SL AMB POCT NITRITE,UA: ABNORMAL
SL AMB POCT PH,UA: 7
SL AMB POCT SPECIFIC GRAVITY,UA: 1.01
SL AMB POCT URINE PROTEIN: ABNORMAL
SL AMB POCT UROBILINOGEN: 1

## 2024-11-15 PROCEDURE — 99214 OFFICE O/P EST MOD 30 MIN: CPT | Performed by: UROLOGY

## 2024-11-15 PROCEDURE — 81003 URINALYSIS AUTO W/O SCOPE: CPT | Performed by: UROLOGY

## 2024-11-15 PROCEDURE — 51798 US URINE CAPACITY MEASURE: CPT | Performed by: UROLOGY

## 2024-11-15 RX ORDER — FINASTERIDE 5 MG/1
5 TABLET, FILM COATED ORAL DAILY
Qty: 90 TABLET | Refills: 1 | Status: SHIPPED | OUTPATIENT
Start: 2024-11-15

## 2024-11-15 NOTE — ASSESSMENT & PLAN NOTE
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

## 2024-11-15 NOTE — PROGRESS NOTES
11/15/2024    Jay Jay Acuna  1955  [id number]    1. Prostate cancer (HCC)  Assessment & Plan:  Low risk PCa on active surveillance  GG in 1 core on biopsy in 10/2020  Negative MRI in 11/2021 and negative prostate biopsy in 1/2022  Most recent PSA up to 6.4   GERARD negative  - schedule for interval MRI prostate  Orders:  -     POCT urine dip auto non-scope  -     MRI prostate multiparametric wo w contrast; Future; Expected date: 11/15/2024  -     POCT Measure PVR  2. Bladder wall thickening  3. Nephrolithiasis  Assessment & Plan:  History stones  ESWL x 2 in the past  Last imaging: CT scan in 11/2023 showed 3 mm left renal stone  - schedule for renal sonogram  Orders:  -     US kidney and bladder with pvr; Future; Expected date: 11/15/2024  4. BPH with urinary obstruction  Assessment & Plan:  BPH with 64 cc prostate on MRI in 2021  Nocturia x 2-4  PVR 31 cc on bladder scan  - interested in starting finasteride 5 mg    5-Tiki decrease serum PSA by about 50% after 3 months.  It can take up to 6 months to see the full benefit of the medication from a urinary symptom perspective.  Overall 5-Tiki result in a 15-32% reduction in prostatic volume.  Of note, la lower dose of finasteride 1 mg for alopecia also results in a 40% reduction in serum PSA levels.      The PLESS trial demonstrated the efficacy of finasteride with a 57% risk reduction in acute urinary retention and a 55% risk reduction in the need for BPH-related surgery in addition to significant improvements in urinary flow rates and reduced prostatic volume after 4 years.  The MTOPS trial demonstrated a 68% risk reduction in acute urinary retention and a 64% risk reduction in the need for BPH-related surgery in men taking finasteride as compared to placebo.  The REDUCE trial and CombAT trial have shown similar reductions in urinary retention and need for BPH -related surgery for dutasteride.  5-Tiki are also an effective treatment to reduce prostate-related  bleeding by inhibiting prostatic VEGF and are considered an option to reduce blood loss during BPH surgery.      Side-effects of 5-Tiki include erectile dysfunction, loss of libido, decrease in semen volume and ejaculatory dysfunction.  Overall rates of sexual dysfunction with 5-Tiki are low (10% ED, 1.9% loss of libido, 1.4% with decrease in semen volume in the REDUCE trial).  Additional side effects include gynecomastia in 1.9% of men, which is reversible in most cases with discontinuation of treatment.  .     Post-finasteride syndrome (PFS) is a controversial and poorly defined constellation of physical and psychological symptoms that persist after discontinuation of 5-MARK.  Symptoms potentially associated with PFS are ED, loss of libido, male infertility, depression, somnolence, headache, and gynecomastia among others.  Most of the data is anecdotal and from case-reports rather than prospective trials and is susceptible to many forms of bias (including a lack of baseline sexual function prior to starting or discontinuing medication).  PFS is also more closely linked to the 1 mg dose used for alopecia rather than the 5 mg dose used for BPH.  The results of well-designed and controlled studies do NOT support the existence of an association between finasteride and persistent sexual dysfunction following drug discontinuation.      Current date have found that 5-MARK are associated with an overall 25% decreased risk of prostate cancer, but may be associated with higher grade disease in patients diagnosed with prostate cancer.       Orders:  -     finasteride (PROSCAR) 5 mg tablet; Take 1 tablet (5 mg total) by mouth daily  -     Urinalysis with microscopic       History of Present Illness  69 y.o. male with a history of GG 1 prostate cancer on active surveillance    Most recent prostate biopsy in 1/2022: negative for cancer, 1 core of ASAP  Prior prostate biopsy in 10/2020:  GG 1 in 1 core  Most recent MRI prostate  (11/2021):  64 cc prostate, PI-RADS 2  Most recent PSA 6.4 on 10/28/2024    Currently not taking any BPH medications  Reports slow flow first void of the day  Daytime frequency, occasional urgency, no UUI  Nocturia x 2-4  No hematuria or dysuria    CT scan in 11/2023 showed stable 3 mm stone in left kidney, no hydronephrosis  No stone related events over the past year  Has history of ESWL x 2 in the past      AUA Symptom Score  AUA SYMPTOM SCORE      Flowsheet Row Most Recent Value   AUA SYMPTOM SCORE    How often have you had a sensation of not emptying your bladder completely after you finished urinating? 1 (P)     How often have you had to urinate again less than two hours after you finished urinating? 3 (P)     How often have you found you stopped and started again several times when you urinate? 1 (P)     How often have you found it difficult to postpone urination? 1 (P)     How often have you had a weak urinary stream? 1 (P)     How often have you had to push or strain to begin urination? 0 (P)     How many times did you most typically get up to urinate from the time you went to bed at night until the time you got up in the morning? 5 (P)     Quality of Life: If you were to spend the rest of your life with your urinary condition just the way it is now, how would you feel about that? 3 (P)     AUA SYMPTOM SCORE 12 (P)              Review of Systems   All other systems reviewed and are negative.      Past Medical History  Past Medical History:   Diagnosis Date    Cancer (HCC)     Colon polyp     GERD (gastroesophageal reflux disease)     HIV (human immunodeficiency virus infection) (HCC)     Hyperlipidemia     Hypertension     Kidney stone     Non-alcoholic fatty liver disease     PONV (postoperative nausea and vomiting)     Prostate cancer (HCC)     Sleep apnea        Past Social History  Past Surgical History:   Procedure Laterality Date    ANAL FISTULOTOMY N/A 5/7/2024    Procedure: FISTULOTOMY;  Surgeon:  Librado Chamorro MD;  Location: [facility];  Service: Colorectal    COLONOSCOPY      HERNIA REPAIR      KS ANRCT XM SURG REQ ANES GENERAL SPI/EDRL DX N/A 5/7/2024    Procedure: EXAM UNDER ANESTHESIA (EUA);  Surgeon: Librado Chamorro MD;  Location: [facility];  Service: Colorectal    KS BX PROSTATE STRTCTC SATURATION SAMPLING IMG GID N/A 01/25/2022    Procedure: TRANSPERINEAL ULTRASOUND GUIDED BIOPSY PROSTATE;  Surgeon: Shon Beltrán MD;  Location: [facility];  Service: Urology    KS PROSTATE NEEDLE BIOPSY ANY APPROACH N/A 10/13/2020    Procedure: TRANSPERINEAL PROSTATE BIOPSY;  Surgeon: Shon Beltrán MD;  Location: [facility];  Service: Urology    ROOT CANAL         Past Family History  Family History   Problem Relation Age of Onset    Prostate cancer Father     Esophageal cancer Father     Heart disease Father     Colon cancer Cousin         mothers side       Past Social history  Social History     Socioeconomic History    Marital status: Single     Spouse name: Not on file    Number of children: Not on file    Years of education: Not on file    Highest education level: Not on file   Occupational History    Not on file   Tobacco Use    Smoking status: Never    Smokeless tobacco: Never   Vaping Use    Vaping status: Never Used   Substance and Sexual Activity    Alcohol use: Not Currently     Comment: used to be a heavy drinker, quit 15 years ago    Drug use: Not Currently     Types: Marijuana, Cocaine     Comment: used to use drugs, consisting of crystal meth, cocaine, and marijuana, denied any IV drug use in the past    Sexual activity: Not Currently     Partners: Male   Other Topics Concern    Not on file   Social History Narrative    Not on file     Social Drivers of Health     Financial Resource Strain: Not on file   Food Insecurity: No Food Insecurity (8/6/2024)    Nursing - Inadequate Food Risk Classification     Worried About Running Out of Food in the Last Year: Never true     Ran Out  of Food in the Last Year: Never true     Ran Out of Food in the Last Year: Not on file   Transportation Needs: Not on file   Physical Activity: Not on file   Stress: Not on file   Social Connections: Not on file   Intimate Partner Violence: Not on file   Housing Stability: Not on file       Current Medications  Current Outpatient Medications   Medication Sig Dispense Refill    atorvastatin (LIPITOR) 40 mg tablet TAKE 1 TABLET BY MOUTH ONCE DAILY 90 tablet 0    benzoyl peroxide 5 % external wash Apply 237 g topically if needed (as needed)      bictegravir-emtricitab-tenofovir alafenamide (Biktarvy) -25 MG tablet Take 1 tablet by mouth in the morning 90 tablet 1    cholecalciferol (VITAMIN D3) 1,000 units tablet Take 1,000 Units by mouth daily      cyanocobalamin (VITAMIN B-12) 500 MCG tablet Take 1,000 mcg by mouth daily      docusate sodium (COLACE) 100 mg capsule Take 100 mg by mouth 3 (three) times a day as needed for constipation      famotidine (PEPCID) 20 mg tablet Take 1 tablet (20 mg total) by mouth daily 90 tablet 1    finasteride (PROSCAR) 5 mg tablet Take 1 tablet (5 mg total) by mouth daily 90 tablet 1    lisinopril (ZESTRIL) 20 mg tablet Take 1 tablet (20 mg total) by mouth daily 90 tablet 1    metoprolol succinate (TOPROL-XL) 50 mg 24 hr tablet TAKE ONE TABLET BY MOUTH ONCE DAILY 90 tablet 0    Multiple Vitamin (multivitamin) capsule Take 1 capsule by mouth daily      mupirocin (BACTROBAN) 2 % ointment Apply 1 g topically if needed (as needed)      Omega-3 1000 MG CAPS Take 2 g by mouth daily      omeprazole (PriLOSEC) 40 MG capsule Take 1 capsule (40 mg total) by mouth daily 90 capsule 1    potassium citrate (UROCIT-K) 5 MEQ (540 MG) TAKE ONE TABLET BY MOUTH EVERY MORNING 30 tablet 11    psyllium (METAMUCIL) 58.6 % packet Take 1 packet by mouth daily      bisacodyl (DULCOLAX) 5 mg EC tablet Take as directed by GI office 4 tablet 0    polyethylene glycol (GOLYTELY) 4000 mL solution Take as  "directed by GI office 4000 mL 0     No current facility-administered medications for this visit.       Allergies  Allergies   Allergen Reactions    Epinephrine Palpitations and Shortness Of Breath    Sulfa Antibiotics Other (See Comments)     Eye irritation       Past Medical History, Social History, Family History, medications and allergies were reviewed.    Vitals  Vitals:    11/15/24 0842   BP: 110/80   BP Location: Left arm   Patient Position: Sitting   Cuff Size: Standard   Pulse: 64   SpO2: 98%   Height: 6' 2\" (1.88 m)       Physical Exam  Constitutional:       Appearance: Normal appearance. He is normal weight.   HENT:      Head: Normocephalic.      Nose: Nose normal. No congestion.   Eyes:      Conjunctiva/sclera: Conjunctivae normal.   Cardiovascular:      Rate and Rhythm: Normal rate.   Pulmonary:      Effort: Pulmonary effort is normal. No respiratory distress.   Abdominal:      General: Abdomen is flat. There is no distension.      Palpations: Abdomen is soft.      Tenderness: There is no right CVA tenderness or left CVA tenderness.   Genitourinary:     Penis: Normal.       Testes: Normal.      Comments: GERARD: prostate enlarged, smooth, non-tender, no nodules   Musculoskeletal:      Comments: Normal gait   Skin:     General: Skin is warm and dry.   Neurological:      General: No focal deficit present.      Mental Status: He is alert and oriented to person, place, and time.   Psychiatric:         Mood and Affect: Mood normal.         Results  Lab Results   Component Value Date    PSA 6.400 (H) 10/28/2024    PSA 4.55 (H) 04/22/2024    PSA 4.57 (H) 10/12/2023     Lab Results   Component Value Date    GLUCOSE 99 12/16/2015    CALCIUM 9.2 06/24/2024     12/16/2015    K 4.1 06/24/2024    CO2 29 06/24/2024     06/24/2024    BUN 12 06/24/2024    CREATININE 1.11 06/24/2024     Lab Results   Component Value Date    WBC 3.80 (L) 06/24/2024    WBC 3.6 06/24/2024    HGB 15.2 06/24/2024    HGB 15.1 " 06/24/2024    HCT 42.0 06/24/2024    HCT 44.9 06/24/2024    MCV 95 06/24/2024     (H) 06/24/2024     (L) 06/24/2024     (L) 06/24/2024       Office Urine Dip  Recent Results (from the past hour)   POCT urine dip auto non-scope    Collection Time: 11/15/24  8:53 AM   Result Value Ref Range     COLOR,UA yellow     CLARITY,UA clear     SPECIFIC GRAVITY,UA 1.015      PH,UA 7.0     LEUKOCYTE ESTERASE,UA trace     NITRITE,UA neg     GLUCOSE,  mg/dl     KETONES,UA neg     BILIRUBIN,UA neg     BLOOD,UA trace-intact     POCT URINE PROTEIN neg     SL AMB POCT UROBILINOGEN 1.0    POCT Measure PVR    Collection Time: 11/15/24  9:35 AM   Result Value Ref Range    POST-VOID RESIDUAL VOLUME, ML POC 16 mL   ]

## 2024-11-15 NOTE — ASSESSMENT & PLAN NOTE
BPH with 64 cc prostate on MRI in 2021  Nocturia x 2-4  PVR 31 cc on bladder scan  - interested in starting finasteride 5 mg    5-Tiki decrease serum PSA by about 50% after 3 months.  It can take up to 6 months to see the full benefit of the medication from a urinary symptom perspective.  Overall 5-Tiki result in a 15-32% reduction in prostatic volume.  Of note, la lower dose of finasteride 1 mg for alopecia also results in a 40% reduction in serum PSA levels.      The PLESS trial demonstrated the efficacy of finasteride with a 57% risk reduction in acute urinary retention and a 55% risk reduction in the need for BPH-related surgery in addition to significant improvements in urinary flow rates and reduced prostatic volume after 4 years.  The MTOPS trial demonstrated a 68% risk reduction in acute urinary retention and a 64% risk reduction in the need for BPH-related surgery in men taking finasteride as compared to placebo.  The REDUCE trial and CombAT trial have shown similar reductions in urinary retention and need for BPH -related surgery for dutasteride.  5-Tiki are also an effective treatment to reduce prostate-related bleeding by inhibiting prostatic VEGF and are considered an option to reduce blood loss during BPH surgery.      Side-effects of 5-Tiki include erectile dysfunction, loss of libido, decrease in semen volume and ejaculatory dysfunction.  Overall rates of sexual dysfunction with 5-Tiki are low (10% ED, 1.9% loss of libido, 1.4% with decrease in semen volume in the REDUCE trial).  Additional side effects include gynecomastia in 1.9% of men, which is reversible in most cases with discontinuation of treatment.  .     Post-finasteride syndrome (PFS) is a controversial and poorly defined constellation of physical and psychological symptoms that persist after discontinuation of 5-MARK.  Symptoms potentially associated with PFS are ED, loss of libido, male infertility, depression, somnolence, headache, and  gynecomastia among others.  Most of the data is anecdotal and from case-reports rather than prospective trials and is susceptible to many forms of bias (including a lack of baseline sexual function prior to starting or discontinuing medication).  PFS is also more closely linked to the 1 mg dose used for alopecia rather than the 5 mg dose used for BPH.  The results of well-designed and controlled studies do NOT support the existence of an association between finasteride and persistent sexual dysfunction following drug discontinuation.      Current date have found that 5-MARK are associated with an overall 25% decreased risk of prostate cancer, but may be associated with higher grade disease in patients diagnosed with prostate cancer.

## 2024-11-15 NOTE — ASSESSMENT & PLAN NOTE
History stones  ESWL x 2 in the past  Last imaging: CT scan in 11/2023 showed 3 mm left renal stone  - schedule for renal sonogram

## 2024-11-20 ENCOUNTER — TELEPHONE (OUTPATIENT)
Age: 69
End: 2024-11-20

## 2024-11-20 NOTE — TELEPHONE ENCOUNTER
Patient calling to get input from Dr. Fajardo in regards to finasteride. He had read a side effect of it causing a serious form of prostate cancer which can spread very quickly    Tamsulosin a better alternative?    Please advise CB #[phone]

## 2024-11-21 ENCOUNTER — TELEPHONE (OUTPATIENT)
Dept: UROLOGY | Facility: MEDICAL CENTER | Age: 69
End: 2024-11-21

## 2024-11-21 NOTE — TELEPHONE ENCOUNTER
Patient called in stating he would not be interested in any medication until he has a discussion with Dr. Fajardo. Patient would like a call to discuss. Please advise.

## 2024-11-21 NOTE — TELEPHONE ENCOUNTER
Patient called in following up with his phone call yesterday in regards to the medication question. Please advise.

## 2024-11-21 NOTE — TELEPHONE ENCOUNTER
Spoke with patient over the phone regarding benefits and risks of finasteride treatment.  Discussed overall risk reduction in incidence of prostate cancer with finasteride by about 25%, however, potentially increased risk of higher risk prostate cancer in those patients identified with prostate cancer.      Patient elects not to start finasteride at this time and will continue to monitor his urinary symptoms.

## 2024-11-22 NOTE — TELEPHONE ENCOUNTER
Call placed to pt and spoke with him. He informed nurse that Dr. Fajardo called and spoke to pt and all his questions were answered. He h ad no further questions a this time.

## 2024-11-22 NOTE — TELEPHONE ENCOUNTER
Patient can discuss his concerns and different treatment options with Dr. Fajardo at his upcoming visit in 4 weeks.

## 2024-12-02 ENCOUNTER — OFFICE VISIT (OUTPATIENT)
Dept: SURGERY | Facility: CLINIC | Age: 69
End: 2024-12-02
Payer: MEDICARE

## 2024-12-02 ENCOUNTER — PATIENT OUTREACH (OUTPATIENT)
Dept: SURGERY | Facility: CLINIC | Age: 69
End: 2024-12-02

## 2024-12-02 ENCOUNTER — DOCUMENTATION (OUTPATIENT)
Dept: SURGERY | Facility: CLINIC | Age: 69
End: 2024-12-02

## 2024-12-02 VITALS
RESPIRATION RATE: 18 BRPM | BODY MASS INDEX: 27.08 KG/M2 | HEIGHT: 74 IN | DIASTOLIC BLOOD PRESSURE: 65 MMHG | OXYGEN SATURATION: 98 % | WEIGHT: 211 LBS | SYSTOLIC BLOOD PRESSURE: 132 MMHG | TEMPERATURE: 98.4 F | HEART RATE: 61 BPM

## 2024-12-02 DIAGNOSIS — D69.6 THROMBOCYTOPENIA (HCC): ICD-10-CM

## 2024-12-02 DIAGNOSIS — D72.810 LYMPHOPENIA: ICD-10-CM

## 2024-12-02 DIAGNOSIS — K76.0 NON-ALCOHOLIC FATTY LIVER DISEASE: ICD-10-CM

## 2024-12-02 DIAGNOSIS — K21.00 GASTROESOPHAGEAL REFLUX DISEASE WITH ESOPHAGITIS WITHOUT HEMORRHAGE: ICD-10-CM

## 2024-12-02 DIAGNOSIS — K44.9 HIATAL HERNIA: ICD-10-CM

## 2024-12-02 DIAGNOSIS — R17 SERUM TOTAL BILIRUBIN ELEVATED: ICD-10-CM

## 2024-12-02 DIAGNOSIS — I10 PRIMARY HYPERTENSION: ICD-10-CM

## 2024-12-02 DIAGNOSIS — B20 HIV DISEASE (HCC): Primary | ICD-10-CM

## 2024-12-02 DIAGNOSIS — R00.2 PALPITATIONS: ICD-10-CM

## 2024-12-02 DIAGNOSIS — R97.20 ELEVATED PSA: ICD-10-CM

## 2024-12-02 PROCEDURE — 99214 OFFICE O/P EST MOD 30 MIN: CPT | Performed by: NURSE PRACTITIONER

## 2024-12-02 PROCEDURE — G2211 COMPLEX E/M VISIT ADD ON: HCPCS | Performed by: NURSE PRACTITIONER

## 2024-12-02 NOTE — ASSESSMENT & PLAN NOTE
Doing well on Biktarvy with an undetectable VL.  Pt reports 100% medication compliance on HAART.  Stressed the importance of 100% medication adherence  Monitor CD4. HIV RNA, CMP, and CBCD to monitor for any developing virologic response, treatment failure, or drug toxicities  Follow up with Dr. Campa or Dr. Barrett   HIV Counseling:    Viral Load: < 20    CD4 Count: 556      Denies side effects.  Stressed the importance of adherence.  Continue follow up in the ID clinic with Dr. Campa or Dr. Barrett.    Reviewed the most recent labs, including the CD4 and viral load.  Discussed the risks and benefits of treatment options, instructions for management, importance of treatment adherence, and reduction of risk factors.  Educated on possible medication side effects.    Counseled on routes of HIV transmission, including the risk of  infection.  Emphasized that viral suppression is the best method to prevent HIV transmission.  At this time the pt denies the need for HIV testing of anyone in their life.    Total encounter time was greater than 35 minutes.  Greater than 20 minutes were spent on counseling and patient education.  Pt voices understanding and agreement with treatment plan.

## 2024-12-02 NOTE — ASSESSMENT & PLAN NOTE
Remains stable.  T. Bili: 1.11.  He does follow with GI on a regular basis.  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  He does have NAFLD and is being closely monitored.  Continue to monitor for now   No further workup is needed

## 2024-12-02 NOTE — ASSESSMENT & PLAN NOTE
Known h/o GG1 prostate cancer with active surveillance.  PSA now 6.4 up from 4.55.  Follows with Urology  Scheduled for MRI multiparametric on 12/10  Recheck PSA   GERARD normal on exam per urology note  Does not wish to take finasteride due to side effects

## 2024-12-02 NOTE — ASSESSMENT & PLAN NOTE
Stable.  Liver enzymes are normal except for T. Bili being mildly elevated.  Stressed importance of exercising for 30 minutes 5 days a week per recommended guidelines.  Stressed importance of eating a diet low in fat, carbohydrates, cholesterol, sugar intake, monitor portion control and eating more fruits and vegetables.   Follow with GI

## 2024-12-02 NOTE — ASSESSMENT & PLAN NOTE
Less frequent than before but continue to happen infrequently.  Last only for a few seconds.   Continue Metoprolol 50 mg XL daily  Recent f/u with cardiology was done

## 2024-12-02 NOTE — PROGRESS NOTES
Patient seen for his annual certification Patient doing well, taking medication daily without missing any doses.Patient was las seen by dental on 8/7 Dr Dyson and has a f/u appointment.   Patient signed and over income sheet.  Patient provided copy of his [employer] card effective Jan 1,2025 for his part D insurance.    Acuity scale :1

## 2024-12-02 NOTE — ASSESSMENT & PLAN NOTE
Learning to eat smaller meals and to avoid late eating.  Continue to monitor   Focus on life style eating changes  Focus on eating smaller meals and remaining upright for 1 hour  He does sleep on 45 degree angle

## 2024-12-02 NOTE — ASSESSMENT & PLAN NOTE
Stable.  Plt: 109  Unclear etiology and may be related to splenomegaly.  Work up was done by hematology.  Continue to monitor for now

## 2024-12-02 NOTE — PROGRESS NOTES
"[facility] Annual Nutrition Assessment    Jay Jay seen by RD in conjunction with PCP annual physical     Jay Jay is established patient (last annual was 01/12/2024)    PMHx:  GERD, HTN, HLD< Hx of ETOH       Clinical Data/Client History    CD4 count:  556  Viral load:  <20  ART:   Biktarvy    , Patient Navigator: Mae     Food assistance:  N/A    Living situation: House or apartment     Psychosocial factors: remission from MURPHY    Mobility:  self ambulates    Physical activity: walks 3-4 miles per day       Oral health concerns:  has been to the dentist recently denied oral health concerns       Typical food/beverage intake:    Breakfast cereal, 1/4 cup blueberries, almond milk  Lunch salad, low sodium salad dressing, 1/2 turkey sandwich from [employer]  Dinner salmon, salad, 2 non-starch vegetables   Snacks rice cakes, yogurt, nuts  Beverages water, tea, coffee    Appetite: Unchanged from normal    OTC vitamin, mineral, herbal supplements: B-complex, metamucil, fish oil    Oral/enteral nutrition supplements:  N/A    GI problems: heartburn and unchanged from normal-trys to follow FODMAP diet    Food allergies/intolerances:  avoids some foods per FODMAP diet     Weight history:  Wt Readings from Last 3 Encounters:   12/02/24 95.7 kg (211 lb)   10/25/24 94.8 kg (209 lb)   08/22/24 93 kg (205 lb)     208(4/30/2024)  Current body weight:  211  Height:  74\"  BMI:  27  IBW:  190  %IBW  110%    Weight change: No significant weight change in the last 8 months.       Nutrition-related labs:    Lab Results   Component Value Date    HGBA1C 5.5 12/29/2023     Lab Results   Component Value Date    SODIUM 137 06/24/2024    K 4.1 06/24/2024     06/24/2024    CO2 29 06/24/2024    BUN 12 06/24/2024    CREATININE 1.11 06/24/2024    GLUC 86 06/29/2023    CALCIUM 9.2 06/24/2024     Lab Results   Component Value Date    CHOLESTEROL 107 04/19/2024    CHOLESTEROL 119 12/29/2023    CHOLESTEROL 126 11/21/2022     Lab Results   Component " Value Date    HDL 33 (L) 04/19/2024    HDL 37 (L) 12/29/2023    HDL 36 (L) 11/21/2022     Lab Results   Component Value Date    TRIG 105 04/19/2024    TRIG 129 12/29/2023    TRIG 101 11/21/2022     Lab Results   Component Value Date    NONHDLC 82 12/29/2023    NONHDLC 99 07/15/2022    NONHDLC 97 07/19/2021         Current medications:     Current Outpatient Medications:     atorvastatin (LIPITOR) 40 mg tablet, TAKE 1 TABLET BY MOUTH ONCE DAILY, Disp: 90 tablet, Rfl: 0    benzoyl peroxide 5 % external wash, Apply 237 g topically if needed (as needed), Disp: , Rfl:     bictegravir-emtricitab-tenofovir alafenamide (Biktarvy) -25 MG tablet, Take 1 tablet by mouth in the morning, Disp: 90 tablet, Rfl: 1    bisacodyl (DULCOLAX) 5 mg EC tablet, Take as directed by GI office, Disp: 4 tablet, Rfl: 0    cholecalciferol (VITAMIN D3) 1,000 units tablet, Take 1,000 Units by mouth daily, Disp: , Rfl:     cyanocobalamin (VITAMIN B-12) 500 MCG tablet, Take 1,000 mcg by mouth daily, Disp: , Rfl:     docusate sodium (COLACE) 100 mg capsule, Take 100 mg by mouth 3 (three) times a day as needed for constipation, Disp: , Rfl:     famotidine (PEPCID) 20 mg tablet, Take 1 tablet (20 mg total) by mouth daily, Disp: 90 tablet, Rfl: 1    finasteride (PROSCAR) 5 mg tablet, Take 1 tablet (5 mg total) by mouth daily, Disp: 90 tablet, Rfl: 1    lisinopril (ZESTRIL) 20 mg tablet, Take 1 tablet (20 mg total) by mouth daily, Disp: 90 tablet, Rfl: 1    metoprolol succinate (TOPROL-XL) 50 mg 24 hr tablet, TAKE ONE TABLET BY MOUTH ONCE DAILY, Disp: 90 tablet, Rfl: 0    Multiple Vitamin (multivitamin) capsule, Take 1 capsule by mouth daily, Disp: , Rfl:     mupirocin (BACTROBAN) 2 % ointment, Apply 1 g topically if needed (as needed), Disp: , Rfl:     Omega-3 1000 MG CAPS, Take 2 g by mouth daily, Disp: , Rfl:     omeprazole (PriLOSEC) 40 MG capsule, Take 1 capsule (40 mg total) by mouth daily, Disp: 90 capsule, Rfl: 1    polyethylene glycol  (GOLYTELY) 4000 mL solution, Take as directed by GI office, Disp: 4000 mL, Rfl: 0    potassium citrate (UROCIT-K) 5 MEQ (540 MG), TAKE ONE TABLET BY MOUTH EVERY MORNING, Disp: 30 tablet, Rfl: 11    psyllium (METAMUCIL) 58.6 % packet, Take 1 packet by mouth daily, Disp: , Rfl:       Physical findings/skin integrity:  appears well nourished       Nutrition Diagnosis    Problem: no nutrition diagnosis at this time      Estimated Nutritional Needs    [facility]EE: 1806 kcal     ~5912-2828 kcal (based on: REE x 1.4, -500 kcal; 27 kcal/kg BW, -500 kcal)  ~77-96 g protein (based on: 0.8-1 g/kg BW)  ~2871 ml fluid (based on: 30 ml/kg BW)      Current intake estimation: meeting needs       Nutrition Intervention/Recommendations    Nutrition education intervention: not indicated     Goals:  -no significant weight change  -Stable nutrition related labs  -continue balanced diet     Monitoring/Evaluation:  -goals  -weight trend  -labs     Visit Summary  Jay Jay was seen by RD during PCP visit for his annual nutrition assessment as required by the [facility]. Client continues a healthy balanced diet. He tries to follow a FODMAP diet plan to prevent GI issues. He also continues walking even in the winter. Client had no nutrition questions or concerns. Labs and weight is stable.   -Loni Mendoza RDN,LDN

## 2024-12-02 NOTE — PROGRESS NOTES
Name: Jay Jay Acuna      : 1955      MRN: [id number]  Encounter Provider: ARINA Lantigua  Encounter Date: 2024   Encounter department: [facility] AT [facility]  :  Assessment & Plan  HIV disease (HCC)  Doing well on Biktarvy with an undetectable VL.  Pt reports 100% medication compliance on HAART.  Stressed the importance of 100% medication adherence  Monitor CD4. HIV RNA, CMP, and CBCD to monitor for any developing virologic response, treatment failure, or drug toxicities  Follow up with Dr. Campa or Dr. Barrett   HIV Counseling:    Viral Load: < 20    CD4 Count: 556      Denies side effects.  Stressed the importance of adherence.  Continue follow up in the ID clinic with Dr. Campa or Dr. Barrett.    Reviewed the most recent labs, including the CD4 and viral load.  Discussed the risks and benefits of treatment options, instructions for management, importance of treatment adherence, and reduction of risk factors.  Educated on possible medication side effects.    Counseled on routes of HIV transmission, including the risk of  infection.  Emphasized that viral suppression is the best method to prevent HIV transmission.  At this time the pt denies the need for HIV testing of anyone in their life.    Total encounter time was greater than 35 minutes.  Greater than 20 minutes were spent on counseling and patient education.  Pt voices understanding and agreement with treatment plan.             Serum total bilirubin elevated  Remains stable.  T. Bili: 1.11.  He does follow with GI on a regular basis.  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  He does have NAFLD and is being closely monitored.  Continue to monitor for now   No further workup is needed           Non-alcoholic fatty liver disease  Stable.  Liver enzymes are normal except for T. Bili being mildly elevated.  Stressed importance of exercising for 30 minutes 5 days a week per recommended guidelines.  Stressed importance of  eating a diet low in fat, carbohydrates, cholesterol, sugar intake, monitor portion control and eating more fruits and vegetables.   Follow with GI          Primary hypertension  BP is at goal.   Continue HTN regimen  Follow with Cardiology   Continue to monitor BP at home         Elevated PSA  Known h/o GG1 prostate cancer with active surveillance.  PSA now 6.4 up from 4.55.  Follows with Urology  Scheduled for MRI multiparametric on 12/10  Recheck PSA   GERARD normal on exam per urology note  Does not wish to take finasteride due to side effects        Hiatal hernia  Learning to eat smaller meals and to avoid late eating.  Continue to monitor   Focus on life style eating changes  Focus on eating smaller meals and remaining upright for 1 hour  He does sleep on 45 degree angle         Lymphopenia  Stable.  WBC: 3.80 and ANC: 1.86.  Most likely due to HIV and is inconsequential does not require any further workup at this time.   Continue to monitor CBCD  ANC > 1000            Thrombocytopenia (HCC)  Stable.  Plt: 109  Unclear etiology and may be related to splenomegaly.  Work up was done by hematology.  Continue to monitor for now            Palpitations  Less frequent than before but continue to happen infrequently.  Last only for a few seconds.   Continue Metoprolol 50 mg XL daily  Recent f/u with cardiology was done         Gastroesophageal reflux disease with esophagitis without hemorrhage  Persistent HB.  Known Hiatal hernia.  Scheduled for EGD and colonoscopy on 2/19/25  Continue PPI  Focus on eating smaller meals and remaining upright after eating             VBS:   Summary:  Images are on PACS for review.      Oral stage:WNL for mastication, bolus formation, control, and transfer.     Pharyngeal stage:WNL for prompt swallow, velar closure, epiglottic inversion, anterior hyoid excursion, laryngeal elevation, tongue base retraction, pharyngeal constriction, and passage through UES. No pharyngeal retention. No  penetration or aspiration. Small CP prominence. Small cervical osteophyte at C 5.     Per gross esophageal screen: Minimal residue w/ solids. Cleared w/ liquids.  Pill passed through to the stomach. No retrograde flow observed.   History of Present Illness     HPI  Jay Jay Acuna is a 69 y.o. male who presents for 4 month visit for management of HIV and chronic health care conditions.  Jay Jay has been seen by several specialists since last visit: GI, cardiology, and urology.  He did have VBS for difficulty in swallowing crunchy food and need to clear his throat.  He has been cleared to continue with regular diet and thin liquids. His PSA level has risen to 6.5 up from baseline of (4.2-4.9).  He does have h/o GG 1 prostate cancer and is on active surveillance with PSA being ordered and managed by his urologist.  He is scheduled from MRI prostate.      Jay Jay reports he had 2 chocking incidents while eating and felt like he couldn't breath with and flap was open.  He was at [employer] skinny bread sticks and [employer] with coating on [employer].  He does have appt with ENT.      He has infrequent palpations but do appear less than they used to be.  Lasting for few a few seconds.  Denies any other chest pain.     He does not endorse any fever, chills, nausea, vomiting, cough, SOB, diarrhea, or night sweats.        History obtained from: patient    Review of Systems   Constitutional: Negative.    HENT: Negative.     Respiratory: Negative.     Cardiovascular: Negative.    Gastrointestinal: Negative.    Neurological: Negative.    Psychiatric/Behavioral: Negative.       Current Outpatient Medications on File Prior to Visit   Medication Sig Dispense Refill    atorvastatin (LIPITOR) 40 mg tablet TAKE 1 TABLET BY MOUTH ONCE DAILY 90 tablet 0    benzoyl peroxide 5 % external wash Apply 237 g topically if needed (as needed)      bictegravir-emtricitab-tenofovir alafenamide (Biktarvy) -25 MG tablet Take 1 tablet by mouth  "in the morning 90 tablet 1    bisacodyl (DULCOLAX) 5 mg EC tablet Take as directed by GI office 4 tablet 0    cholecalciferol (VITAMIN D3) 1,000 units tablet Take 1,000 Units by mouth daily      cyanocobalamin (VITAMIN B-12) 500 MCG tablet Take 1,000 mcg by mouth daily      docusate sodium (COLACE) 100 mg capsule Take 100 mg by mouth 3 (three) times a day as needed for constipation      famotidine (PEPCID) 20 mg tablet Take 1 tablet (20 mg total) by mouth daily 90 tablet 1    lisinopril (ZESTRIL) 20 mg tablet Take 1 tablet (20 mg total) by mouth daily 90 tablet 1    metoprolol succinate (TOPROL-XL) 50 mg 24 hr tablet TAKE ONE TABLET BY MOUTH ONCE DAILY 90 tablet 0    Multiple Vitamin (multivitamin) capsule Take 1 capsule by mouth daily      mupirocin (BACTROBAN) 2 % ointment Apply 1 g topically if needed (as needed)      Omega-3 1000 MG CAPS Take 2 g by mouth daily      omeprazole (PriLOSEC) 40 MG capsule Take 1 capsule (40 mg total) by mouth daily 90 capsule 1    polyethylene glycol (GOLYTELY) 4000 mL solution Take as directed by GI office 4000 mL 0    potassium citrate (UROCIT-K) 5 MEQ (540 MG) TAKE ONE TABLET BY MOUTH EVERY MORNING 30 tablet 11    psyllium (METAMUCIL) 58.6 % packet Take 1 packet by mouth daily      finasteride (PROSCAR) 5 mg tablet Take 1 tablet (5 mg total) by mouth daily 90 tablet 1     No current facility-administered medications on file prior to visit.         Objective   /65 (BP Location: Left arm, Patient Position: Sitting, Cuff Size: Large)   Pulse 61   Temp 98.4 °F (36.9 °C) (Tympanic)   Resp 18   Ht 6' 2\" (1.88 m)   Wt 95.7 kg (211 lb)   SpO2 98%   BMI 27.09 kg/m²      Physical Exam  Vitals and nursing note reviewed.   Constitutional:       General: He is not in acute distress.     Appearance: Normal appearance. He is not ill-appearing.   Cardiovascular:      Rate and Rhythm: Normal rate and regular rhythm.      Chest Wall: PMI is not displaced.      Pulses: Normal pulses. "      Heart sounds: Normal heart sounds.   Pulmonary:      Effort: Pulmonary effort is normal.      Breath sounds: Normal breath sounds.   Abdominal:      General: Bowel sounds are normal.      Palpations: Abdomen is soft.   Musculoskeletal:         General: Normal range of motion.   Skin:     General: Skin is warm and dry.      Capillary Refill: Capillary refill takes less than 2 seconds.   Neurological:      Mental Status: He is alert and oriented to person, place, and time.   Psychiatric:         Mood and Affect: Mood normal.         Behavior: Behavior normal.         Thought Content: Thought content normal.         Judgment: Judgment normal.

## 2024-12-02 NOTE — ASSESSMENT & PLAN NOTE
BP is at goal.   Continue HTN regimen  Follow with Cardiology   Continue to monitor BP at home

## 2024-12-03 PROBLEM — H16.229 KERATOCONJUNCT SICCA, NOT SPECIFIED AS SJOGREN'S, UNSP EYE: Status: ACTIVE | Noted: 2024-12-03

## 2024-12-03 PROBLEM — H04.129: Status: ACTIVE | Noted: 2024-12-03

## 2024-12-09 ENCOUNTER — APPOINTMENT (OUTPATIENT)
Dept: LAB | Facility: HOSPITAL | Age: 69
End: 2024-12-09
Payer: MEDICARE

## 2024-12-09 ENCOUNTER — PATIENT MESSAGE (OUTPATIENT)
Dept: UROLOGY | Facility: MEDICAL CENTER | Age: 69
End: 2024-12-09

## 2024-12-09 DIAGNOSIS — E78.2 MIXED HYPERLIPIDEMIA: ICD-10-CM

## 2024-12-09 LAB
ALBUMIN SERPL BCG-MCNC: 4.5 G/DL (ref 3.5–5)
ALP SERPL-CCNC: 48 U/L (ref 34–104)
ALT SERPL W P-5'-P-CCNC: 38 U/L (ref 7–52)
ANION GAP SERPL CALCULATED.3IONS-SCNC: 5 MMOL/L (ref 4–13)
AST SERPL W P-5'-P-CCNC: 30 U/L (ref 13–39)
BACTERIA UR QL AUTO: ABNORMAL /HPF
BASOPHILS # BLD AUTO: 0.02 THOUSANDS/ÂΜL (ref 0–0.1)
BASOPHILS NFR BLD AUTO: 1 % (ref 0–1)
BILIRUB SERPL-MCNC: 1.12 MG/DL (ref 0.2–1)
BILIRUB UR QL STRIP: NEGATIVE
BUN SERPL-MCNC: 16 MG/DL (ref 5–25)
CALCIUM SERPL-MCNC: 9.6 MG/DL (ref 8.4–10.2)
CHLORIDE SERPL-SCNC: 103 MMOL/L (ref 96–108)
CHOLEST SERPL-MCNC: 97 MG/DL (ref ?–200)
CLARITY UR: CLEAR
CO2 SERPL-SCNC: 30 MMOL/L (ref 21–32)
COLOR UR: YELLOW
CREAT SERPL-MCNC: 1.1 MG/DL (ref 0.6–1.3)
EOSINOPHIL # BLD AUTO: 0.05 THOUSAND/ÂΜL (ref 0–0.61)
EOSINOPHIL NFR BLD AUTO: 2 % (ref 0–6)
ERYTHROCYTE [DISTWIDTH] IN BLOOD BY AUTOMATED COUNT: 11.9 % (ref 11.6–15.1)
EST. AVERAGE GLUCOSE BLD GHB EST-MCNC: 100 MG/DL
GFR SERPL CREATININE-BSD FRML MDRD: 68 ML/MIN/1.73SQ M
GLUCOSE P FAST SERPL-MCNC: 107 MG/DL (ref 65–99)
GLUCOSE UR STRIP-MCNC: NEGATIVE MG/DL
HBA1C MFR BLD: 5.1 %
HCT VFR BLD AUTO: 45.7 % (ref 36.5–49.3)
HCV AB SER QL: NORMAL
HDLC SERPL-MCNC: 33 MG/DL
HGB BLD-MCNC: 16.5 G/DL (ref 12–17)
HGB UR QL STRIP.AUTO: NEGATIVE
IMM GRANULOCYTES # BLD AUTO: 0 THOUSAND/UL (ref 0–0.2)
IMM GRANULOCYTES NFR BLD AUTO: 0 % (ref 0–2)
KETONES UR STRIP-MCNC: NEGATIVE MG/DL
LDLC SERPL CALC-MCNC: 53 MG/DL (ref 0–100)
LEUKOCYTE ESTERASE UR QL STRIP: ABNORMAL
LYMPHOCYTES # BLD AUTO: 1.23 THOUSANDS/ÂΜL (ref 0.6–4.47)
LYMPHOCYTES NFR BLD AUTO: 37 % (ref 14–44)
MCH RBC QN AUTO: 35.3 PG (ref 26.8–34.3)
MCHC RBC AUTO-ENTMCNC: 36.1 G/DL (ref 31.4–37.4)
MCV RBC AUTO: 98 FL (ref 82–98)
MONOCYTES # BLD AUTO: 0.29 THOUSAND/ÂΜL (ref 0.17–1.22)
MONOCYTES NFR BLD AUTO: 9 % (ref 4–12)
NEUTROPHILS # BLD AUTO: 1.76 THOUSANDS/ÂΜL (ref 1.85–7.62)
NEUTS SEG NFR BLD AUTO: 51 % (ref 43–75)
NITRITE UR QL STRIP: NEGATIVE
NON-SQ EPI CELLS URNS QL MICRO: ABNORMAL /HPF
NONHDLC SERPL-MCNC: 64 MG/DL
NRBC BLD AUTO-RTO: 0 /100 WBCS
PH UR STRIP.AUTO: 6.5 [PH]
PLATELET # BLD AUTO: 113 THOUSANDS/UL (ref 149–390)
PMV BLD AUTO: 11.9 FL (ref 8.9–12.7)
POTASSIUM SERPL-SCNC: 4.3 MMOL/L (ref 3.5–5.3)
PROT SERPL-MCNC: 6.8 G/DL (ref 6.4–8.4)
PROT UR STRIP-MCNC: NEGATIVE MG/DL
RBC # BLD AUTO: 4.68 MILLION/UL (ref 3.88–5.62)
RBC #/AREA URNS AUTO: ABNORMAL /HPF
SODIUM SERPL-SCNC: 138 MMOL/L (ref 135–147)
SP GR UR STRIP.AUTO: 1.02 (ref 1–1.03)
TREPONEMA PALLIDUM IGG+IGM AB [PRESENCE] IN SERUM OR PLASMA BY IMMUNOASSAY: REACTIVE
TRIGL SERPL-MCNC: 55 MG/DL (ref ?–150)
UROBILINOGEN UR STRIP-ACNC: <2 MG/DL
WBC # BLD AUTO: 3.35 THOUSAND/UL (ref 4.31–10.16)
WBC #/AREA URNS AUTO: ABNORMAL /HPF

## 2024-12-09 PROCEDURE — 80061 LIPID PANEL: CPT

## 2024-12-10 ENCOUNTER — HOSPITAL ENCOUNTER (OUTPATIENT)
Facility: MEDICAL CENTER | Age: 69
Discharge: HOME | End: 2024-12-10
Payer: MEDICARE

## 2024-12-10 ENCOUNTER — HOSPITAL ENCOUNTER (OUTPATIENT)
Dept: ULTRASOUND IMAGING | Facility: MEDICAL CENTER | Age: 69
Discharge: HOME | End: 2024-12-10
Payer: MEDICARE

## 2024-12-10 DIAGNOSIS — N20.0 NEPHROLITHIASIS: ICD-10-CM

## 2024-12-10 DIAGNOSIS — C61 PROSTATE CANCER (HCC): ICD-10-CM

## 2024-12-10 LAB
BASOPHILS # BLD AUTO: 0 X10E3/UL (ref 0–0.2)
BASOPHILS NFR BLD AUTO: 1 %
CD3+CD4+ CELLS # BLD: 491 /UL (ref 359–1519)
CD3+CD4+ CELLS NFR BLD: 40.9 % (ref 30.8–58.5)
EOSINOPHIL # BLD AUTO: 0 X10E3/UL (ref 0–0.4)
EOSINOPHIL NFR BLD AUTO: 1 %
ERYTHROCYTE [DISTWIDTH] IN BLOOD BY AUTOMATED COUNT: 12.5 % (ref 11.6–15.4)
GAMMA INTERFERON BACKGROUND BLD IA-ACNC: 0.62 IU/ML
HCT VFR BLD AUTO: 47.7 % (ref 37.5–51)
HGB BLD-MCNC: 16.4 G/DL (ref 13–17.7)
IMM GRANULOCYTES # BLD: 0 X10E3/UL (ref 0–0.1)
IMM GRANULOCYTES NFR BLD: 0 %
LYMPHOCYTES # BLD AUTO: 1.2 X10E3/UL (ref 0.7–3.1)
LYMPHOCYTES NFR BLD AUTO: 36 %
M TB IFN-G BLD-IMP: NEGATIVE
M TB IFN-G CD4+ BCKGRND COR BLD-ACNC: -0.06 IU/ML
M TB IFN-G CD4+ BCKGRND COR BLD-ACNC: -0.1 IU/ML
MCH RBC QN AUTO: 34.6 PG (ref 26.6–33)
MCHC RBC AUTO-ENTMCNC: 34.4 G/DL (ref 31.5–35.7)
MCV RBC AUTO: 101 FL (ref 79–97)
MITOGEN IGNF BCKGRD COR BLD-ACNC: 9.38 IU/ML
MONOCYTES # BLD AUTO: 0.3 X10E3/UL (ref 0.1–0.9)
MONOCYTES NFR BLD AUTO: 9 %
NEUTROPHILS # BLD AUTO: 1.8 X10E3/UL (ref 1.4–7)
NEUTROPHILS NFR BLD AUTO: 53 %
NRBC BLD AUTO-RTO: 1 % (ref 0–0)
PLATELET # BLD AUTO: 108 X10E3/UL (ref 150–450)
RBC # BLD AUTO: 4.74 X10E6/UL (ref 4.14–5.8)
RPR SER QL: NORMAL
WBC # BLD AUTO: 3.4 X10E3/UL (ref 3.4–10.8)

## 2024-12-10 PROCEDURE — 76770 US EXAM ABDO BACK WALL COMP: CPT

## 2024-12-10 PROCEDURE — 76377 3D RENDER W/INTRP POSTPROCES: CPT

## 2024-12-10 PROCEDURE — A9585 GADOBUTROL INJECTION: HCPCS | Performed by: UROLOGY

## 2024-12-10 PROCEDURE — 72197 MRI PELVIS W/O & W/DYE: CPT

## 2024-12-10 RX ORDER — GADOBUTROL 604.72 MG/ML
9 INJECTION INTRAVENOUS
Status: COMPLETED | OUTPATIENT
Start: 2024-12-10 | End: 2024-12-10

## 2024-12-10 RX ADMIN — GADOBUTROL 9 ML: 604.72 INJECTION INTRAVENOUS at 14:00

## 2024-12-11 ENCOUNTER — RESULTS FOLLOW-UP (OUTPATIENT)
Dept: OTHER | Facility: HOSPITAL | Age: 69
End: 2024-12-11

## 2024-12-11 LAB
HIV1 RNA # PLAS NAA DL=20: NOT DETECTED {COPIES}/ML
T PALLIDUM AB SER QL AGGL: REACTIVE

## 2024-12-19 ENCOUNTER — PROCEDURE VISIT (OUTPATIENT)
Dept: UROLOGY | Facility: MEDICAL CENTER | Age: 69
End: 2024-12-19
Payer: MEDICARE

## 2024-12-19 VITALS
WEIGHT: 205 LBS | HEIGHT: 74 IN | HEART RATE: 65 BPM | DIASTOLIC BLOOD PRESSURE: 80 MMHG | OXYGEN SATURATION: 99 % | BODY MASS INDEX: 26.31 KG/M2 | SYSTOLIC BLOOD PRESSURE: 140 MMHG

## 2024-12-19 DIAGNOSIS — N13.8 BPH WITH URINARY OBSTRUCTION: Primary | ICD-10-CM

## 2024-12-19 DIAGNOSIS — C61 PROSTATE CANCER (HCC): ICD-10-CM

## 2024-12-19 DIAGNOSIS — R97.20 ELEVATED PSA: ICD-10-CM

## 2024-12-19 DIAGNOSIS — N40.1 BPH WITH URINARY OBSTRUCTION: Primary | ICD-10-CM

## 2024-12-19 DIAGNOSIS — R31.29 MICROSCOPIC HEMATURIA: ICD-10-CM

## 2024-12-19 DIAGNOSIS — N20.0 KIDNEY STONE: ICD-10-CM

## 2024-12-19 LAB
SL AMB  POCT GLUCOSE, UA: ABNORMAL
SL AMB LEUKOCYTE ESTERASE,UA: ABNORMAL
SL AMB POCT BILIRUBIN,UA: ABNORMAL
SL AMB POCT BLOOD,UA: ABNORMAL
SL AMB POCT CLARITY,UA: CLEAR
SL AMB POCT COLOR,UA: YELLOW
SL AMB POCT KETONES,UA: ABNORMAL
SL AMB POCT NITRITE,UA: ABNORMAL
SL AMB POCT PH,UA: 7
SL AMB POCT SPECIFIC GRAVITY,UA: 1.01
SL AMB POCT URINE PROTEIN: ABNORMAL
SL AMB POCT UROBILINOGEN: 0.2

## 2024-12-19 PROCEDURE — 81003 URINALYSIS AUTO W/O SCOPE: CPT | Performed by: UROLOGY

## 2024-12-19 PROCEDURE — 99214 OFFICE O/P EST MOD 30 MIN: CPT | Performed by: UROLOGY

## 2024-12-19 RX ORDER — CYCLOSPORINE 0.5 MG/ML
EMULSION OPHTHALMIC
COMMUNITY
Start: 2024-11-21

## 2024-12-19 NOTE — ASSESSMENT & PLAN NOTE
BPH  78 cc prostate on MRI  Not currently taking any BPH medication  - not interested in finasteride due to potential side-effects  - discussed alternative options of tadalafil and alpha-blockers versus observation or surgical intervention  - will consider tadalafil or alpha-blocker, but will continue observation for the time being  Orders:    POCT urine dip auto non-scope

## 2024-12-19 NOTE — PROGRESS NOTES
Name: Jay Jay Acuna      : 1955      MRN: [id number]  Encounter Provider: ARINA Lantigua  Encounter Date: 2024   Encounter department: ASC AT [facility]  :  Assessment & Plan  HIV disease (HCC)  Doing well on Biktarvy with an undetectable VL.  He has been undetectable for years but does not wish change to annual ID visits.    Pt reports 100% medication compliance on HAART.  Stressed the importance of 100% medication adherence  Monitor CD4. HIV RNA, CMP, and CBCD to monitor for any developing virologic response, treatment failure, or drug toxicities  Follow up with Dr. Campa or Dr. Barrett in 6 months  HIV Counseling:    Viral Load: not detected     CD4 Count: 491      Denies side effects.  Stressed the importance of adherence.  Continue follow up in the ID clinic with Dr. Campa or Dr. Barrett.    Reviewed the most recent labs, including the CD4 and viral load.  Discussed the risks and benefits of treatment options, instructions for management, importance of treatment adherence, and reduction of risk factors.  Educated on possible medication side effects.    Counseled on routes of HIV transmission, including the risk of  infection.  Emphasized that viral suppression is the best method to prevent HIV transmission.  At this time the pt denies the need for HIV testing of anyone in their life.    Total encounter time was greater than 35 minutes.  Greater than 20 minutes were spent on counseling and patient education.  Pt voices understanding and agreement with treatment plan.             Syphilis  RPR remains non reactive.   Continue to monitor RPR        Primary hypertension  BP mildly elevated at visit.   He checks his BP daily and has BP at home has been stellar.  Continue HTN meds  Continue to monitor BP at home  Follow with PCP         Thrombocytopenia (HCC)  Stable.  Plt: 109  Unclear etiology and may be related to splenomegaly.  Work up was done by hematology.  Continue  to monitor for now          Lymphopenia  Stable.  WBC: 3.35 and ANC: 1.76.  ANC > 1000.  Prior work up done by Dr. Martinez 4/2023  No need for further work up given ANC > 1000  Continue to monitor CBCD every 6 months               Patient was provided medication, adherence and prevention education.    History of Present Illness   Routine follow-up for HIV.  Patient claims 100% adherence with Biktarvy. Patient denies any notable side effects.  Overall the feeling well.  The patient denies any fever chills or sweats, denies any nausea vomiting or diarrhea, denies any cough or shortness of breath.    ROS: A complete review of systems are negative other than that noted in the HPI   Current Outpatient Medications on File Prior to Visit   Medication Sig Dispense Refill    atorvastatin (LIPITOR) 40 mg tablet TAKE 1 TABLET BY MOUTH ONCE DAILY 90 tablet 0    benzoyl peroxide 5 % external wash Apply 237 g topically if needed (as needed)      bictegravir-emtricitab-tenofovir alafenamide (Biktarvy) -25 MG tablet Take 1 tablet by mouth in the morning 90 tablet 1    cholecalciferol (VITAMIN D3) 1,000 units tablet Take 1,000 Units by mouth daily      cyanocobalamin (VITAMIN B-12) 500 MCG tablet Take 1,000 mcg by mouth daily      docusate sodium (COLACE) 100 mg capsule Take 100 mg by mouth 3 (three) times a day as needed for constipation      famotidine (PEPCID) 20 mg tablet Take 1 tablet (20 mg total) by mouth daily 90 tablet 1    lisinopril (ZESTRIL) 20 mg tablet Take 1 tablet (20 mg total) by mouth daily 90 tablet 1    metoprolol succinate (TOPROL-XL) 50 mg 24 hr tablet TAKE ONE TABLET BY MOUTH ONCE DAILY 90 tablet 0    Multiple Vitamin (multivitamin) capsule Take 1 capsule by mouth daily      mupirocin (BACTROBAN) 2 % ointment Apply 1 g topically if needed (as needed)      Omega-3 1000 MG CAPS Take 2 g by mouth daily      omeprazole (PriLOSEC) 40 MG capsule Take 1 capsule (40 mg total) by mouth daily 90 capsule 1     "potassium citrate (UROCIT-K) 5 MEQ (540 MG) TAKE ONE TABLET BY MOUTH EVERY MORNING 30 tablet 11    psyllium (METAMUCIL) 58.6 % packet Take 1 packet by mouth daily      Restasis 0.05 % ophthalmic emulsion       bisacodyl (DULCOLAX) 5 mg EC tablet Take as directed by GI office (Patient not taking: Reported on 12/20/2024) 4 tablet 0    polyethylene glycol (GOLYTELY) 4000 mL solution Take as directed by GI office (Patient not taking: Reported on 12/20/2024) 4000 mL 0     No current facility-administered medications on file prior to visit.         Objective   /68 (BP Location: Left arm, Patient Position: Sitting, Cuff Size: Large)   Pulse 60   Temp 98.3 °F (36.8 °C) (Tympanic)   Resp 17   Ht 6' 2\" (1.88 m)   Wt 92.9 kg (204 lb 12.8 oz)   SpO2 99%   BMI 26.29 kg/m²     General: Alert, interactive, appearing well, nontoxic, no acute distress.  Neck: Supple without lymphadenopathy, no thyromegaly or masses  Throat: Oropharynx moist without lesions.   Lungs: Clear to auscultation bilaterally; no wheezes, rhonchi or rales; respirations unlabored  Heart: RRR; no murmur, rub or gallop  Abdomen: Soft, non-tender, non-distended, positive bowel sounds.    Extremities: No clubbing, cyanosis or edema  Skin: No new rashes or lesions. No draining wounds noted.    Lab Results: I have personally reviewed pertinent labs.  Lab Results   Component Value Date     12/16/2015    K 4.3 12/09/2024     12/09/2024    CO2 30 12/09/2024    ANIONGAP 6 12/16/2015    BUN 16 12/09/2024    CREATININE 1.10 12/09/2024    GLUCOSE 99 12/16/2015    GLUF 107 (H) 12/09/2024    CALCIUM 9.6 12/09/2024    AST 30 12/09/2024    ALT 38 12/09/2024    ALKPHOS 48 12/09/2024    PROT 7.2 12/16/2015    BILITOT 0.91 12/16/2015    EGFR 68 12/09/2024     Lab Results   Component Value Date    WBC 3.35 (L) 12/09/2024    WBC 3.4 12/09/2024    HGB 16.5 12/09/2024    HGB 16.4 12/09/2024    HCT 45.7 12/09/2024    HCT 47.7 12/09/2024    MCV 98 12/09/2024    "  (H) 12/09/2024     (L) 12/09/2024     (L) 12/09/2024     Lab Results   Component Value Date    HEPCAB Non-reactive 12/09/2024     Lab Results   Component Value Date    HAV Reactive (A) 11/21/2022    HEPBIGM Non-reactive 12/05/2022    HEPBCAB Non-reactive 12/05/2022    HEPCAB Non-reactive 12/09/2024     Lab Results   Component Value Date    RPR Non-Reactive 12/09/2024     CD4 ABS   Date/Time Value Ref Range Status   12/09/2024 07:08  359 - 1519 /uL Final     HIV-1 RNA by PCR, Qn   Date/Time Value Ref Range Status   06/12/2023 07:33 AM <20 copies/mL Final     Comment:     HIV-1 RNA detected  The reportable range for this assay is 20 to 10,000,000  copies HIV-1 RNA/mL.     HIV-1 TARGET   Date/Time Value Ref Range Status   12/09/2024 07:08 AM Not Detected Not Detected Final     Reviewed MRI prostate multiparametric wo w contrast 12/2024 with pt.

## 2024-12-19 NOTE — ASSESSMENT & PLAN NOTE
Doing well on Biktarvy with an undetectable VL.  He has been undetectable for years but does not wish change to annual ID visits.    Pt reports 100% medication compliance on HAART.  Stressed the importance of 100% medication adherence  Monitor CD4. HIV RNA, CMP, and CBCD to monitor for any developing virologic response, treatment failure, or drug toxicities  Follow up with Dr. Campa or Dr. Barrett in 6 months  HIV Counseling:    Viral Load: not detected     CD4 Count: 491      Denies side effects.  Stressed the importance of adherence.  Continue follow up in the ID clinic with Dr. Campa or Dr. Barrett.    Reviewed the most recent labs, including the CD4 and viral load.  Discussed the risks and benefits of treatment options, instructions for management, importance of treatment adherence, and reduction of risk factors.  Educated on possible medication side effects.    Counseled on routes of HIV transmission, including the risk of  infection.  Emphasized that viral suppression is the best method to prevent HIV transmission.  At this time the pt denies the need for HIV testing of anyone in their life.    Total encounter time was greater than 35 minutes.  Greater than 20 minutes were spent on counseling and patient education.  Pt voices understanding and agreement with treatment plan.

## 2024-12-19 NOTE — PROGRESS NOTES
Name: Jay Jay Acuna      : 1955      MRN: [id number]  Encounter Provider: Jimmie Fajardo MD  Encounter Date: 2024   Encounter department: [facility]  :  Assessment & Plan  BPH with urinary obstruction  BPH  78 cc prostate on MRI  Not currently taking any BPH medication  - not interested in finasteride due to potential side-effects  - discussed alternative options of tadalafil and alpha-blockers versus observation or surgical intervention  - will consider tadalafil or alpha-blocker, but will continue observation for the time being  Orders:    POCT urine dip auto non-scope    Microscopic hematuria  2-4 RBC/hpf  Had CT chest/abdomen/pelvis in 2023 with normal kidneys, 3 mm left kidney stone  Interval renal sonogram (12/10/2024):  stable 3 mm left renal stone, no masses, no hydro  - schedule for cystoscopy        Kidney stone  History stones  ESWL x 2 in the past  CT scan in 2023 showed 3 mm left renal stone  Renal sonogram (2024): stable 3 mm left renal stone  - continue observation at this time    Prostate cancer (HCC)  Low risk PCa on active surveillance  GG in 1 core on biopsy in 10/2020  Negative MRI in 2021 and negative prostate biopsy in 2022  Most recent PSA up to 6.4   GERARD negative  Interval MRI (12/10/2024): PI-RADS 2, 78 cc prostate, PSAD 0.08  - repeat PSA in 6 months   - consider interval prostate biopsy in         History of Present Illness   Jay Jay Acuna is a 69 y.o. male who presents for follow up    Completed MRI prostate that did not show any focal area of concern for prostate cancer, prostate enlarged to 78 cc    Also completed renal sonogram that showed stable 3 mm renal stone with no hydronephrosis    UA last visit showed 2-4 RBC/hpf      AUA SYMPTOM SCORE      Flowsheet Row Most Recent Value   AUA SYMPTOM SCORE    How often have you had a sensation of not emptying your bladder completely after you finished urinating? 4 (P)     How  often have you had to urinate again less than two hours after you finished urinating? 3 (P)     How often have you found you stopped and started again several times when you urinate? 1 (P)     How often have you found it difficult to postpone urination? 3 (P)     How often have you had a weak urinary stream? 2 (P)     How often have you had to push or strain to begin urination? 0 (P)     How many times did you most typically get up to urinate from the time you went to bed at night until the time you got up in the morning? 5 (P)     Quality of Life: If you were to spend the rest of your life with your urinary condition just the way it is now, how would you feel about that? 3 (P)     AUA SYMPTOM SCORE 18 (P)            Review of Systems   All other systems reviewed and are negative.    Past Medical History   Past Medical History:   Diagnosis Date    Cancer (HCC)     Colon polyp     GERD (gastroesophageal reflux disease)     HIV (human immunodeficiency virus infection) (HCC)     Hyperlipidemia     Hypertension     Kidney stone     Non-alcoholic fatty liver disease     PONV (postoperative nausea and vomiting)     Prostate cancer (HCC)     Sleep apnea      Past Surgical History:   Procedure Laterality Date    ANAL FISTULOTOMY N/A 5/7/2024    Procedure: FISTULOTOMY;  Surgeon: Librado Chamorro MD;  [facility];  Service: Colorectal    COLONOSCOPY      HERNIA REPAIR      KS ANRCT XM SURG REQ ANES GENERAL SPI/EDRL DX N/A 5/7/2024    Procedure: EXAM UNDER ANESTHESIA (EUA);  Surgeon: Librado Chamorro MD;  [facility];  Service: Colorectal    KS BX PROSTATE STRTCTC SATURATION SAMPLING IMG GID N/A 01/25/2022    Procedure: TRANSPERINEAL ULTRASOUND GUIDED BIOPSY PROSTATE;  Surgeon: Shon Beltrán MD;  [facility];  Service: Urology    KS PROSTATE NEEDLE BIOPSY ANY APPROACH N/A 10/13/2020    Procedure: TRANSPERINEAL PROSTATE BIOPSY;  Surgeon: Shon Beltrán MD;  [facility];   Service: Urology    ROOT CANAL       Family History   Problem Relation Age of Onset    Prostate cancer Father     Esophageal cancer Father     Heart disease Father     Colon cancer Cousin         mothers side      reports that he has never smoked. He has never used smokeless tobacco. He reports that he does not currently use alcohol. He reports that he does not currently use drugs after having used the following drugs: Marijuana and Cocaine.  Current Outpatient Medications on File Prior to Visit   Medication Sig Dispense Refill    atorvastatin (LIPITOR) 40 mg tablet TAKE 1 TABLET BY MOUTH ONCE DAILY 90 tablet 0    bictegravir-emtricitab-tenofovir alafenamide (Biktarvy) -25 MG tablet Take 1 tablet by mouth in the morning 90 tablet 1    cholecalciferol (VITAMIN D3) 1,000 units tablet Take 1,000 Units by mouth daily      cyanocobalamin (VITAMIN B-12) 500 MCG tablet Take 1,000 mcg by mouth daily      docusate sodium (COLACE) 100 mg capsule Take 100 mg by mouth 3 (three) times a day as needed for constipation      famotidine (PEPCID) 20 mg tablet Take 1 tablet (20 mg total) by mouth daily 90 tablet 1    lisinopril (ZESTRIL) 20 mg tablet Take 1 tablet (20 mg total) by mouth daily 90 tablet 1    metoprolol succinate (TOPROL-XL) 50 mg 24 hr tablet TAKE ONE TABLET BY MOUTH ONCE DAILY 90 tablet 0    Multiple Vitamin (multivitamin) capsule Take 1 capsule by mouth daily      mupirocin (BACTROBAN) 2 % ointment Apply 1 g topically if needed (as needed)      Omega-3 1000 MG CAPS Take 2 g by mouth daily      omeprazole (PriLOSEC) 40 MG capsule Take 1 capsule (40 mg total) by mouth daily 90 capsule 1    potassium citrate (UROCIT-K) 5 MEQ (540 MG) TAKE ONE TABLET BY MOUTH EVERY MORNING 30 tablet 11    psyllium (METAMUCIL) 58.6 % packet Take 1 packet by mouth daily      Restasis 0.05 % ophthalmic emulsion       benzoyl peroxide 5 % external wash Apply 237 g topically if needed (as needed) (Patient not taking: Reported on  "12/19/2024)      bisacodyl (DULCOLAX) 5 mg EC tablet Take as directed by GI office (Patient not taking: Reported on 12/19/2024) 4 tablet 0    polyethylene glycol (GOLYTELY) 4000 mL solution Take as directed by GI office (Patient not taking: Reported on 12/19/2024) 4000 mL 0    [DISCONTINUED] finasteride (PROSCAR) 5 mg tablet Take 1 tablet (5 mg total) by mouth daily 90 tablet 1     No current facility-administered medications on file prior to visit.     Allergies   Allergen Reactions    Epinephrine Palpitations and Shortness Of Breath    Sulfa Antibiotics Other (See Comments)     Eye irritation         Objective   /80 (BP Location: Left arm, Patient Position: Sitting, Cuff Size: Adult)   Pulse 65   Ht 6' 2\" (1.88 m)   Wt 93 kg (205 lb)   SpO2 99%   BMI 26.32 kg/m²     Physical Exam  Vitals and nursing note reviewed.   Constitutional:       General: He is not in acute distress.     Appearance: He is well-developed.   HENT:      Head: Normocephalic and atraumatic.   Eyes:      Conjunctiva/sclera: Conjunctivae normal.   Cardiovascular:      Rate and Rhythm: Normal rate and regular rhythm.      Heart sounds: No murmur heard.  Pulmonary:      Effort: Pulmonary effort is normal. No respiratory distress.      Breath sounds: Normal breath sounds.   Abdominal:      Palpations: Abdomen is soft.      Tenderness: There is no abdominal tenderness.   Musculoskeletal:         General: No swelling.      Cervical back: Neck supple.   Skin:     General: Skin is warm and dry.      Capillary Refill: Capillary refill takes less than 2 seconds.   Neurological:      Mental Status: He is alert.   Psychiatric:         Mood and Affect: Mood normal.          Results  Lab Results   Component Value Date    PSA 6.400 (H) 10/28/2024    PSA 4.55 (H) 04/22/2024    PSA 4.57 (H) 10/12/2023     Lab Results   Component Value Date    GLUCOSE 99 12/16/2015    CALCIUM 9.6 12/09/2024     12/16/2015    K 4.3 12/09/2024    CO2 30 12/09/2024 "     12/09/2024    BUN 16 12/09/2024    CREATININE 1.10 12/09/2024     Lab Results   Component Value Date    WBC 3.35 (L) 12/09/2024    WBC 3.4 12/09/2024    HGB 16.5 12/09/2024    HGB 16.4 12/09/2024    HCT 45.7 12/09/2024    HCT 47.7 12/09/2024    MCV 98 12/09/2024     (H) 12/09/2024     (L) 12/09/2024     (L) 12/09/2024       Office Urine Dip  Recent Results (from the past hour)   POCT urine dip auto non-scope    Collection Time: 12/19/24  1:09 PM   Result Value Ref Range     COLOR,UA yellow     CLARITY,UA clear     SPECIFIC GRAVITY,UA 1.01      PH,UA 7.0     LEUKOCYTE ESTERASE,UA Trace     NITRITE,UA Neg     GLUCOSE, UA Neg     KETONES,UA Neg     BILIRUBIN,UA Neg     BLOOD,UA NEg     POCT URINE PROTEIN Neg     SL AMB POCT UROBILINOGEN 0.2    ]

## 2024-12-19 NOTE — ASSESSMENT & PLAN NOTE
Low risk PCa on active surveillance  GG in 1 core on biopsy in 10/2020  Negative MRI in 11/2021 and negative prostate biopsy in 1/2022  Most recent PSA up to 6.4   GERARD negative  Interval MRI (12/10/2024): PI-RADS 2, 78 cc prostate, PSAD 0.08  - repeat PSA in 6 months   - consider interval prostate biopsy in 2025

## 2024-12-19 NOTE — PATIENT INSTRUCTIONS
The two medications that we discussed today      Daily tadalafil   Tamsulosin     The online resource that I use for medical information is most commonly UpToDate

## 2024-12-20 ENCOUNTER — OFFICE VISIT (OUTPATIENT)
Dept: SURGERY | Facility: CLINIC | Age: 69
End: 2024-12-20
Payer: MEDICARE

## 2024-12-20 VITALS
TEMPERATURE: 98.3 F | SYSTOLIC BLOOD PRESSURE: 143 MMHG | BODY MASS INDEX: 26.28 KG/M2 | WEIGHT: 204.8 LBS | HEIGHT: 74 IN | HEART RATE: 60 BPM | OXYGEN SATURATION: 99 % | RESPIRATION RATE: 17 BRPM | DIASTOLIC BLOOD PRESSURE: 68 MMHG

## 2024-12-20 DIAGNOSIS — B20 HIV DISEASE (HCC): Primary | ICD-10-CM

## 2024-12-20 DIAGNOSIS — B20 HUMAN IMMUNODEFICIENCY VIRUS (HIV) DISEASE (HCC): Primary | ICD-10-CM

## 2024-12-20 DIAGNOSIS — D72.810 LYMPHOPENIA: ICD-10-CM

## 2024-12-20 DIAGNOSIS — A53.9 SYPHILIS: ICD-10-CM

## 2024-12-20 DIAGNOSIS — R17 SERUM TOTAL BILIRUBIN ELEVATED: ICD-10-CM

## 2024-12-20 DIAGNOSIS — D69.6 THROMBOCYTOPENIA (HCC): ICD-10-CM

## 2024-12-20 DIAGNOSIS — D75.1 POLYCYTHEMIA: ICD-10-CM

## 2024-12-20 DIAGNOSIS — I10 PRIMARY HYPERTENSION: ICD-10-CM

## 2024-12-20 PROCEDURE — 99215 OFFICE O/P EST HI 40 MIN: CPT | Performed by: NURSE PRACTITIONER

## 2024-12-20 NOTE — ASSESSMENT & PLAN NOTE
BP mildly elevated at visit.   He checks his BP daily and has BP at home has been stellar.  Continue HTN meds  Continue to monitor BP at home  Follow with PCP

## 2024-12-20 NOTE — ASSESSMENT & PLAN NOTE
Stable.  WBC: 3.35 and ANC: 1.76.  ANC > 1000.  Prior work up done by Dr. Martinez 4/2023  No need for further work up given ANC > 1000  Continue to monitor CBCD every 6 months

## 2025-01-02 PROBLEM — H16.229 KERATOCONJUNCT SICCA, NOT SPECIFIED AS SJOGREN'S, UNSP EYE: Status: RESOLVED | Noted: 2024-12-03 | Resolved: 2025-01-02

## 2025-01-03 DIAGNOSIS — R00.2 PALPITATIONS: ICD-10-CM

## 2025-01-03 DIAGNOSIS — I47.10 SVT (SUPRAVENTRICULAR TACHYCARDIA) (HCC): ICD-10-CM

## 2025-01-06 RX ORDER — METOPROLOL SUCCINATE 50 MG/1
50 TABLET, EXTENDED RELEASE ORAL DAILY
Qty: 90 TABLET | Refills: 1 | Status: SHIPPED | OUTPATIENT
Start: 2025-01-06

## 2025-01-14 RX ORDER — CYCLOSPORINE 0 G/ML
1 SOLUTION/ DROPS OPHTHALMIC; TOPICAL DAILY
COMMUNITY

## 2025-01-25 DIAGNOSIS — I10 PRIMARY HYPERTENSION: ICD-10-CM

## 2025-01-27 RX ORDER — LISINOPRIL 20 MG/1
20 TABLET ORAL DAILY
Qty: 90 TABLET | Refills: 2 | Status: SHIPPED | OUTPATIENT
Start: 2025-01-27

## 2025-01-28 ENCOUNTER — TELEPHONE (OUTPATIENT)
Age: 70
End: 2025-01-28

## 2025-01-28 ENCOUNTER — PROCEDURE VISIT (OUTPATIENT)
Dept: UROLOGY | Facility: MEDICAL CENTER | Age: 70
End: 2025-01-28
Payer: MEDICARE

## 2025-01-28 VITALS
BODY MASS INDEX: 25.8 KG/M2 | OXYGEN SATURATION: 98 % | DIASTOLIC BLOOD PRESSURE: 80 MMHG | SYSTOLIC BLOOD PRESSURE: 122 MMHG | WEIGHT: 201 LBS | HEIGHT: 74 IN | HEART RATE: 66 BPM

## 2025-01-28 DIAGNOSIS — N13.8 BPH WITH URINARY OBSTRUCTION: ICD-10-CM

## 2025-01-28 DIAGNOSIS — N20.0 KIDNEY STONE: ICD-10-CM

## 2025-01-28 DIAGNOSIS — Z72.89 OTHER PROBLEMS RELATED TO LIFESTYLE: ICD-10-CM

## 2025-01-28 DIAGNOSIS — Z11.3 ENCOUNTER FOR SCREENING FOR BACTERIAL SEXUALLY TRANSMITTED DISEASE: ICD-10-CM

## 2025-01-28 DIAGNOSIS — D72.89 OTHER SPECIFIED DISORDERS OF WHITE BLOOD CELLS: ICD-10-CM

## 2025-01-28 DIAGNOSIS — Z20.2 CONTACT WITH AND (SUSPECTED) EXPOSURE TO INFECTIONS WITH A PREDOMINANTLY SEXUAL MODE OF TRANSMISSION: ICD-10-CM

## 2025-01-28 DIAGNOSIS — R31.29 MICROSCOPIC HEMATURIA: Primary | ICD-10-CM

## 2025-01-28 DIAGNOSIS — N40.1 BPH WITH URINARY OBSTRUCTION: ICD-10-CM

## 2025-01-28 DIAGNOSIS — C61 PROSTATE CANCER (HCC): ICD-10-CM

## 2025-01-28 DIAGNOSIS — B20 HUMAN IMMUNODEFICIENCY VIRUS (HIV) DISEASE (HCC): Primary | ICD-10-CM

## 2025-01-28 LAB
SL AMB  POCT GLUCOSE, UA: ABNORMAL
SL AMB LEUKOCYTE ESTERASE,UA: ABNORMAL
SL AMB POCT BILIRUBIN,UA: ABNORMAL
SL AMB POCT BLOOD,UA: ABNORMAL
SL AMB POCT CLARITY,UA: CLEAR
SL AMB POCT COLOR,UA: YELLOW
SL AMB POCT KETONES,UA: ABNORMAL
SL AMB POCT NITRITE,UA: ABNORMAL
SL AMB POCT PH,UA: 6
SL AMB POCT SPECIFIC GRAVITY,UA: <=1.005
SL AMB POCT URINE PROTEIN: ABNORMAL
SL AMB POCT UROBILINOGEN: 0.2

## 2025-01-28 PROCEDURE — 52000 CYSTOURETHROSCOPY: CPT | Performed by: UROLOGY

## 2025-01-28 PROCEDURE — 81003 URINALYSIS AUTO W/O SCOPE: CPT | Performed by: UROLOGY

## 2025-01-28 PROCEDURE — 99214 OFFICE O/P EST MOD 30 MIN: CPT | Performed by: UROLOGY

## 2025-01-28 RX ORDER — TADALAFIL 5 MG/1
5 TABLET ORAL DAILY
Qty: 30 TABLET | Refills: 5 | Status: SHIPPED | OUTPATIENT
Start: 2025-01-28

## 2025-01-28 NOTE — ASSESSMENT & PLAN NOTE
BPH  78 cc prostate on MRI  Not currently taking any BPH medication  Cystoscopy (1/28/25):  severe lateral lobe hyperplasia   - not interested in finasteride due to potential side-effects  - patient would like to start tadalafil 5 mg, script sent to pharmacy   - side effects of tadalafil discussed with patient

## 2025-01-28 NOTE — PROGRESS NOTES
Name: Jay Jay Acuna      : 1955      MRN: [id number]  Encounter Provider: Jimmie Fajardo MD  Encounter Date: 2025   Encounter department: [facility]OLOG[facility] [place]  :  Assessment & Plan  Microscopic hematuria  2-4 RBC/hpf  Had CT chest/abdomen/pelvis in 2023 with normal kidneys, 3 mm left kidney stone  Interval renal sonogram (12/10/2024):  stable 3 mm left renal stone, no masses, no hydro  Cystoscopy (25): enlarged prostate, normal appearing bladder  - repeat UA in 1 year     Orders:    POCT urine dip auto non-scope    Cystoscopy    BPH with urinary obstruction  BPH  78 cc prostate on MRI  Not currently taking any BPH medication  Cystoscopy (25):  severe lateral lobe hyperplasia   - not interested in finasteride due to potential side-effects  - patient would like to start tadalafil 5 mg, script sent to pharmacy   - side effects of tadalafil discussed with patient    Prostate cancer (HCC)  Low risk PCa on active surveillance  GG in 1 core on biopsy in 10/2020  Negative MRI in 2021 and negative prostate biopsy in 2022  Most recent PSA up to 6.4   GERARD negative  Interval MRI (12/10/2024): PI-RADS 2, 78 cc prostate, PSAD 0.08  - repeat PSA in 6 months   - consider interval prostate biopsy in   Orders:    PSA Total, Diagnostic; Future    Kidney stone  History stones  ESWL x 2 in the past  CT scan in 2023 showed 3 mm left renal stone  Renal sonogram (2024): stable 3 mm left renal stone  - continue observation at this time           History of Present Illness   Jay Jay Acuna is a 69 y.o. male who presents for cystoscopy for microscopic hematuria and management of BPH and prostate cancer    No new complaints at this time  No gross hematuria     AUA SYMPTOM SCORE      Flowsheet Row Most Recent Value   AUA SYMPTOM SCORE    How often have you had a sensation of not emptying your bladder completely after you finished urinating? 2 (P)     How often have you had to urinate  again less than two hours after you finished urinating? 3 (P)     How often have you found you stopped and started again several times when you urinate? 1 (P)     How often have you found it difficult to postpone urination? 2 (P)     How often have you had a weak urinary stream? 1 (P)     How often have you had to push or strain to begin urination? 0 (P)     How many times did you most typically get up to urinate from the time you went to bed at night until the time you got up in the morning? 4 (P)     Quality of Life: If you were to spend the rest of your life with your urinary condition just the way it is now, how would you feel about that? 4 (P)     AUA SYMPTOM SCORE 13 (P)            Review of Systems   All other systems reviewed and are negative.    Past Medical History   Past Medical History:   Diagnosis Date    Cancer (HCC)     Colon polyp     GERD (gastroesophageal reflux disease)     HIV (human immunodeficiency virus infection) (HCC)     Hyperlipidemia     Hypertension     Kidney stone     Non-alcoholic fatty liver disease     PONV (postoperative nausea and vomiting)     Prostate cancer (HCC)     Sleep apnea      Past Surgical History:   Procedure Laterality Date    ANAL FISTULOTOMY N/A 5/7/2024    Procedure: FISTULOTOMY;  Surgeon: Librado Chamorro MD;  [facility];  Service: Colorectal    COLONOSCOPY      HERNIA REPAIR      PA ANRCT XM SURG REQ ANES GENERAL SPI/EDRL DX N/A 5/7/2024    Procedure: EXAM UNDER ANESTHESIA (EUA);  Surgeon: Librado Chamorro MD;  [facility];  Service: Colorectal    PA BX PROSTATE STRTCTC SATURATION SAMPLING IMG GID N/A 01/25/2022    Procedure: TRANSPERINEAL ULTRASOUND GUIDED BIOPSY PROSTATE;  Surgeon: Shon Beltrán MD;  [facility];  Service: Urology    PA PROSTATE NEEDLE BIOPSY ANY APPROACH N/A 10/13/2020    Procedure: TRANSPERINEAL PROSTATE BIOPSY;  Surgeon: Shon Beltrán MD;  [facility];  Service: Urology    ROOT CANAL        Family History   Problem Relation Age of Onset    Prostate cancer Father     Esophageal cancer Father     Heart disease Father     Colon cancer Cousin         mothers side      reports that he has never smoked. He has never used smokeless tobacco. He reports that he does not currently use alcohol. He reports that he does not currently use drugs after having used the following drugs: Marijuana and Cocaine.  Current Outpatient Medications on File Prior to Visit   Medication Sig Dispense Refill    atorvastatin (LIPITOR) 40 mg tablet TAKE 1 TABLET BY MOUTH ONCE DAILY 90 tablet 0    benzoyl peroxide 5 % external wash Apply 237 g topically if needed (as needed)      bictegravir-emtricitab-tenofovir alafenamide (Biktarvy) -25 MG tablet Take 1 tablet by mouth in the morning 90 tablet 1    cholecalciferol (VITAMIN D3) 1,000 units tablet Take 1,000 Units by mouth daily      cyanocobalamin (VITAMIN B-12) 500 MCG tablet Take 1,000 mcg by mouth daily      cycloSPORINE, PF, (Cequa) 0.09 % SOLN Apply 1 1e11 [employer] to eye in the morning      docusate sodium (COLACE) 100 mg capsule Take 100 mg by mouth 3 (three) times a day as needed for constipation      famotidine (PEPCID) 20 mg tablet Take 1 tablet (20 mg total) by mouth daily 90 tablet 1    lisinopril (ZESTRIL) 20 mg tablet Take 1 tablet (20 mg total) by mouth daily 90 tablet 2    metoprolol succinate (TOPROL-XL) 50 mg 24 hr tablet TAKE ONE TABLET BY MOUTH ONCE DAILY 90 tablet 1    Multiple Vitamin (multivitamin) capsule Take 1 capsule by mouth daily      mupirocin (BACTROBAN) 2 % ointment Apply 1 g topically if needed (as needed)      Omega-3 1000 MG CAPS Take 2 g by mouth daily      omeprazole (PriLOSEC) 40 MG capsule Take 1 capsule (40 mg total) by mouth daily 90 capsule 1    potassium citrate (UROCIT-K) 5 MEQ (540 MG) TAKE ONE TABLET BY MOUTH EVERY MORNING 30 tablet 11    psyllium (METAMUCIL) 58.6 % packet Take 1 packet by mouth daily      bisacodyl  "(DULCOLAX) 5 mg EC tablet Take as directed by GI office (Patient not taking: Reported on 1/28/2025) 4 tablet 0    polyethylene glycol (GOLYTELY) 4000 mL solution Take as directed by GI office (Patient not taking: Reported on 12/19/2024) 4000 mL 0     No current facility-administered medications on file prior to visit.     Allergies   Allergen Reactions    Epinephrine Palpitations and Shortness Of Breath    Sulfa Antibiotics Other (See Comments)     Eye irritation         Objective   /80 (BP Location: Left arm, Patient Position: Sitting, Cuff Size: Standard)   Pulse 66   Ht 6' 2\" (1.88 m)   Wt 91.2 kg (201 lb)   SpO2 98%   BMI 25.81 kg/m²     Physical Exam  Vitals and nursing note reviewed.   Constitutional:       General: He is not in acute distress.     Appearance: He is well-developed.   HENT:      Head: Normocephalic and atraumatic.   Eyes:      Conjunctiva/sclera: Conjunctivae normal.   Cardiovascular:      Rate and Rhythm: Normal rate and regular rhythm.      Heart sounds: No murmur heard.  Pulmonary:      Effort: Pulmonary effort is normal. No respiratory distress.      Breath sounds: Normal breath sounds.   Abdominal:      Palpations: Abdomen is soft.      Tenderness: There is no abdominal tenderness.   Musculoskeletal:         General: No swelling.      Cervical back: Neck supple.   Skin:     General: Skin is warm and dry.      Capillary Refill: Capillary refill takes less than 2 seconds.   Neurological:      Mental Status: He is alert.   Psychiatric:         Mood and Affect: Mood normal.          Results  Lab Results   Component Value Date    PSA 6.400 (H) 10/28/2024    PSA 4.55 (H) 04/22/2024    PSA 4.57 (H) 10/12/2023     Lab Results   Component Value Date    GLUCOSE 99 12/16/2015    CALCIUM 9.6 12/09/2024     12/16/2015    K 4.3 12/09/2024    CO2 30 12/09/2024     12/09/2024    BUN 16 12/09/2024    CREATININE 1.10 12/09/2024     Lab Results   Component Value Date    WBC 3.35 (L) " 12/09/2024    WBC 3.4 12/09/2024    HGB 16.5 12/09/2024    HGB 16.4 12/09/2024    HCT 45.7 12/09/2024    HCT 47.7 12/09/2024    MCV 98 12/09/2024     (H) 12/09/2024     (L) 12/09/2024     (L) 12/09/2024       Office Urine Dip  Recent Results (from the past hour)   POCT urine dip auto non-scope    Collection Time: 01/28/25  9:06 AM   Result Value Ref Range     COLOR,UA yellow     CLARITY,UA clear     SPECIFIC GRAVITY,UA <=1.005      PH,UA 6.0     LEUKOCYTE ESTERASE,UA neg     NITRITE,UA small     GLUCOSE, UA neg     KETONES,UA neg     BILIRUBIN,UA neg     BLOOD,UA trace-intact     POCT URINE PROTEIN neg     SL AMB POCT UROBILINOGEN 0.2    ]

## 2025-01-28 NOTE — TELEPHONE ENCOUNTER
[facility]    via    [x]CMM-KEY: [id number]      [x]PA sent as URGENT    All office notes, labs and other pertaining documents and studies sent. Clinical questions answered. Awaiting determination from insurance company.     Turnaround time for your insurance to make a decision on your Prior Authorization can take 7-21 business days.

## 2025-01-28 NOTE — ASSESSMENT & PLAN NOTE
Low risk PCa on active surveillance  GG in 1 core on biopsy in 10/2020  Negative MRI in 11/2021 and negative prostate biopsy in 1/2022  Most recent PSA up to 6.4   GERARD negative  Interval MRI (12/10/2024): PI-RADS 2, 78 cc prostate, PSAD 0.08  - repeat PSA in 6 months   - consider interval prostate biopsy in 2025  Orders:    PSA Total, Diagnostic; Future

## 2025-01-28 NOTE — TELEPHONE ENCOUNTER
PA for TADALAFIL 5 MG  APPROVED     Date(s) approved UNTIL 12/31/2099        Patient advised by          []Deeplinkhart Message  []Phone call   [x]LMOM  []L/M to call office as no active Communication consent on file  []Unable to leave detailed message as VM not approved on Communication consent       Pharmacy advised by    [x]Fax  []Phone call    Approval letter scanned into Media Yes

## 2025-01-28 NOTE — PROGRESS NOTES
"   Cystoscopy     Date/Time  1/28/2025 9:00 AM     Performed by  Jimmie Fajardo MD   Authorized by  Jimmie Fajardo MD     Universal Protocol:  procedure performed by consultantConsent: Verbal consent obtained. Written consent obtained.  Risks and benefits: risks, benefits and alternatives were discussed  Consent given by: patient  Time out: Immediately prior to procedure a \"time out\" was called to verify the correct patient, procedure, equipment, support staff and site/side marked as required.  Timeout called at: 1/28/2025 9:16 AM.  Patient understanding: patient states understanding of the procedure being performed  Patient consent: the patient's understanding of the procedure matches consent given  Procedure consent: procedure consent matches procedure scheduled  Relevant documents: relevant documents present and verified  Test results: test results available and properly labeled  Site marked: the operative site was not marked  Radiology Images displayed and confirmed. If images not available, report reviewed: imaging studies available  Required items: required blood products, implants, devices, and special equipment available  Patient identity confirmed: verbally with patient      Procedure Details:  Procedure type: cystoscopy    Patient tolerance: Patient tolerated the procedure well with no immediate complications    Additional Procedure Details: Office Cystoscopy Procedure Note    Indication:    Hematuria    Informed consent   The risks, benefits, complications, treatment options, and expected outcomes were discussed with the patient. The patient concurred with the proposed plan and provided informed consent.    Anesthesia  Lidocaine jelly 2%    Antibiotic prophylaxis   None    Procedure  The patient was placed in the supineposition, was prepped and draped in the usual manner using sterile technique, and 2% lidocaine jelly instilled into the urethra.  A 17 F flexible cystoscope was then inserted into the " urethra and the urethra and bladder carefully examined.  The following findings were noted:    Findings:  Urethra:  Normal  Prostate:  severe lateral lobe hypertrophy,  Bladder:  Normal mucosa, no trabeculation, no diverticulum, no stones, no tumors  Ureteral orifices:  Orthotopic with clear efflux of urine  Other findings:  None, retroflexed view confirms    Specimens: None                 Complications:    None; patient tolerated the procedure well           Disposition: To home            Condition: Stable

## 2025-02-03 DIAGNOSIS — R26.2 DIFFICULTY WALKING: ICD-10-CM

## 2025-02-03 DIAGNOSIS — M50.30 DEGENERATIVE CERVICAL DISC: Primary | ICD-10-CM

## 2025-02-03 DIAGNOSIS — K60.30 ANAL FISTULA: ICD-10-CM

## 2025-02-03 DIAGNOSIS — M54.16 LUMBAR RADICULOPATHY, CHRONIC: ICD-10-CM

## 2025-02-04 ENCOUNTER — PATIENT MESSAGE (OUTPATIENT)
Dept: UROLOGY | Facility: MEDICAL CENTER | Age: 70
End: 2025-02-04

## 2025-02-05 ENCOUNTER — ANESTHESIA (OUTPATIENT)
Dept: ANESTHESIOLOGY | Facility: HOSPITAL | Age: 70
End: 2025-02-05

## 2025-02-05 ENCOUNTER — ANESTHESIA EVENT (OUTPATIENT)
Dept: ANESTHESIOLOGY | Facility: HOSPITAL | Age: 70
End: 2025-02-05

## 2025-02-07 ENCOUNTER — CONSULT (OUTPATIENT)
Age: 70
End: 2025-02-07
Payer: MEDICARE

## 2025-02-07 ENCOUNTER — APPOINTMENT (OUTPATIENT)
Age: 70
End: 2025-02-07
Payer: MEDICARE

## 2025-02-07 VITALS — BODY MASS INDEX: 25.8 KG/M2 | WEIGHT: 201 LBS | HEIGHT: 74 IN

## 2025-02-07 DIAGNOSIS — M54.16 LUMBAR RADICULOPATHY, CHRONIC: ICD-10-CM

## 2025-02-07 DIAGNOSIS — M54.16 LUMBAR RADICULOPATHY, CHRONIC: Primary | ICD-10-CM

## 2025-02-07 DIAGNOSIS — M47.816 LUMBAR SPONDYLOSIS: ICD-10-CM

## 2025-02-07 PROCEDURE — 72110 X-RAY EXAM L-2 SPINE 4/>VWS: CPT

## 2025-02-07 PROCEDURE — 99204 OFFICE O/P NEW MOD 45 MIN: CPT | Performed by: STUDENT IN AN ORGANIZED HEALTH CARE EDUCATION/TRAINING PROGRAM

## 2025-02-07 PROCEDURE — 72200 X-RAY EXAM SI JOINTS: CPT

## 2025-02-07 RX ORDER — MELOXICAM 15 MG/1
15 TABLET ORAL DAILY PRN
Qty: 30 TABLET | Refills: 1 | Status: SHIPPED | OUTPATIENT
Start: 2025-02-07 | End: 2025-02-07

## 2025-02-07 RX ORDER — MELOXICAM 15 MG/1
7.5 TABLET ORAL DAILY PRN
Qty: 30 TABLET | Refills: 1 | Status: SHIPPED | OUTPATIENT
Start: 2025-02-07 | End: 2025-03-09

## 2025-02-07 NOTE — PROGRESS NOTES
Assessment:  1. Lumbar radiculopathy, chronic    2. Lumbar spondylosis        Plan:    Jay Jay Acuna is a 69 y.o. male who presents for evaluation of low back pain radiating down RLE consistent with lumbar spondylosis and lumbar radiculitis. We discussed imaging, rehabilitation, optimization of medications and potential interventions. The following plan was formulated with the patient:    - X-rays lumbar spine and SI joints  - Meloxicam 15mg daily PRN. Advised not to take with other NSAIDS.  - Referral to physical therapy  - Discussed Gabapentin as possible agent for neuropathic pain, pt would like to defer for now  - Will obtain MRI Lumbar spine w/o contrast if symptoms persist  - Follow up in 6-8 weeks     Orders Placed This Encounter   Procedures    XR spine lumbar minimum 4 views non injury     Standing Status:   Future     Expected Date:   2/7/2025     Expiration Date:   2/7/2029     Scheduling Instructions:      Bring along any outside films relating to this procedure.           Reason for Exam::   low back pain    XR sacroiliac joints < 3 views     Standing Status:   Future     Expected Date:   2/7/2025     Expiration Date:   2/7/2029     Scheduling Instructions:      Bring along any outside films relating to this procedure.           Reason for Exam::   low back pain    Ambulatory referral to Physical Therapy     Standing Status:   Future     Expiration Date:   2/7/2026     Referral Priority:   Routine     Referral Type:   Physical Therapy     Referral Reason:   Specialty Services Required     Requested Specialty:   Physical Therapy     Number of Visits Requested:   1     Expiration Date:   2/7/2026       New Medications Ordered This Visit   Medications    meloxicam (MOBIC) 15 mg tablet     Sig: Take 0.5 tablets (7.5 mg total) by mouth daily as needed for moderate pain     Dispense:  30 tablet     Refill:  1     My impressions and treatment recommendations were discussed in detail with the patient, who  verbalized understanding and had no further questions.    History of Present Illness:    Referred by: Kika Haynes, LEANDRA*     Jay Jay Acuna is a 69 y.o. male who presents for initial evaluation of low back pain radiating down the right lower extremity posteriorly. Pain started 6 weeks ago and is not associated with any inciting event or trauma.  He describes the pain as sharp, dull/aching of moderate severity. Pain is rated 4/10 and interferes with daily activities. This pain is intermittent. Exacerbating factors include bending, sitting, walking.     He has tried chiropractic sessions with mild improvement.    Pain is causing significant functional limitation resulting in diminished quality of life and impaired age appropriate ADLs.  Denies fever, chills, numbness/tingling, bowel/bladder dysfunction, motor weakness.    I have personally reviewed and/or updated the patient's past medical history, past surgical history, family history, social history, current medications, allergies, and vital signs today.     Review of Systems:    Review of Systems   Constitutional:  Negative for chills and fever.   HENT:  Negative for ear pain and sore throat.    Eyes:  Negative for pain and visual disturbance.   Respiratory:  Negative for cough and shortness of breath.    Cardiovascular:  Negative for chest pain and palpitations.   Gastrointestinal:  Negative for abdominal pain and vomiting.   Genitourinary:  Positive for frequency. Negative for dysuria and hematuria.   Musculoskeletal:  Positive for arthralgias, back pain and gait problem.   Skin:  Negative for color change and rash.   Neurological:  Negative for seizures and syncope.   All other systems reviewed and are negative.      Past Medical History:   Diagnosis Date    Cancer (HCC)     Colon polyp     GERD (gastroesophageal reflux disease)     HIV (human immunodeficiency virus infection) (HCC)     Hyperlipidemia     Hypertension     Kidney stone     Non-alcoholic  fatty liver disease     PONV (postoperative nausea and vomiting)     Prostate cancer (HCC)     Sleep apnea        Past Surgical History:   Procedure Laterality Date    ANAL FISTULOTOMY N/A 5/7/2024    Procedure: FISTULOTOMY;  Surgeon: Librado Chamorro MD;  Location: [facility];  Service: Colorectal    COLONOSCOPY      HERNIA REPAIR      ME ANRCT XM SURG REQ ANES GENERAL SPI/EDRL DX N/A 5/7/2024    Procedure: EXAM UNDER ANESTHESIA (EUA);  Surgeon: Librado Chamorro MD;  Location: [facility];  Service: Colorectal    ME BX PROSTATE STRTCTC SATURATION SAMPLING IMG GID N/A 01/25/2022    Procedure: TRANSPERINEAL ULTRASOUND GUIDED BIOPSY PROSTATE;  Surgeon: Shon Beltrán MD;  [facility];  Service: Urology    ME PROSTATE NEEDLE BIOPSY ANY APPROACH N/A 10/13/2020    Procedure: TRANSPERINEAL PROSTATE BIOPSY;  Surgeon: Shon Beltrán MD;  Location: [facility];  Service: Urology    ROOT CANAL         Family History   Problem Relation Age of Onset    Prostate cancer Father     Esophageal cancer Father     Heart disease Father     Colon cancer Cousin         mothers side       Social History     Occupational History    Not on file   Tobacco Use    Smoking status: Never    Smokeless tobacco: Never   Vaping Use    Vaping status: Never Used   Substance and Sexual Activity    Alcohol use: Not Currently     Comment: used to be a heavy drinker, quit 15 years ago    Drug use: Not Currently     Types: Marijuana, Cocaine     Comment: used to use drugs, consisting of crystal meth, cocaine, and marijuana, denied any IV drug use in the past    Sexual activity: Not Currently     Partners: Male         Current Outpatient Medications:     atorvastatin (LIPITOR) 40 mg tablet, TAKE 1 TABLET BY MOUTH ONCE DAILY, Disp: 90 tablet, Rfl: 0    benzoyl peroxide 5 % external wash, Apply 237 g topically if needed (as needed), Disp: , Rfl:     bictegravir-emtricitab-tenofovir alafenamide (Biktarvy) -25 MG tablet, Take 1  tablet by mouth in the morning, Disp: 90 tablet, Rfl: 1    cholecalciferol (VITAMIN D3) 1,000 units tablet, Take 1,000 Units by mouth daily, Disp: , Rfl:     cyanocobalamin (VITAMIN B-12) 500 MCG tablet, Take 1,000 mcg by mouth daily, Disp: , Rfl:     cycloSPORINE, PF, (Cequa) 0.09 % SOLN, Apply 1 1e11 Vector Genomes to eye in the morning, Disp: , Rfl:     docusate sodium (COLACE) 100 mg capsule, Take 100 mg by mouth 3 (three) times a day as needed for constipation, Disp: , Rfl:     famotidine (PEPCID) 20 mg tablet, Take 1 tablet (20 mg total) by mouth daily, Disp: 90 tablet, Rfl: 1    lisinopril (ZESTRIL) 20 mg tablet, Take 1 tablet (20 mg total) by mouth daily, Disp: 90 tablet, Rfl: 2    meloxicam (MOBIC) 15 mg tablet, Take 0.5 tablets (7.5 mg total) by mouth daily as needed for moderate pain, Disp: 30 tablet, Rfl: 1    metoprolol succinate (TOPROL-XL) 50 mg 24 hr tablet, TAKE ONE TABLET BY MOUTH ONCE DAILY, Disp: 90 tablet, Rfl: 1    Multiple Vitamin (multivitamin) capsule, Take 1 capsule by mouth daily, Disp: , Rfl:     mupirocin (BACTROBAN) 2 % ointment, Apply 1 g topically if needed (as needed), Disp: , Rfl:     Omega-3 1000 MG CAPS, Take 2 g by mouth daily, Disp: , Rfl:     omeprazole (PriLOSEC) 40 MG capsule, Take 1 capsule (40 mg total) by mouth daily, Disp: 90 capsule, Rfl: 1    potassium citrate (UROCIT-K) 5 MEQ (540 MG), TAKE ONE TABLET BY MOUTH EVERY MORNING, Disp: 30 tablet, Rfl: 11    psyllium (METAMUCIL) 58.6 % packet, Take 1 packet by mouth daily, Disp: , Rfl:     tadalafil (CIALIS) 5 MG tablet, Take 1 tablet (5 mg total) by mouth in the morning, Disp: 30 tablet, Rfl: 5    bisacodyl (DULCOLAX) 5 mg EC tablet, Take as directed by GI office (Patient not taking: Reported on 12/20/2024), Disp: 4 tablet, Rfl: 0    polyethylene glycol (GOLYTELY) 4000 mL solution, Take as directed by GI office (Patient not taking: Reported on 2/7/2025), Disp: 4000 mL, Rfl: 0    Allergies   Allergen Reactions    Epinephrine  "Palpitations and Shortness Of Breath    Sulfa Antibiotics Other (See Comments)     Eye irritation       Physical Exam:  Ht 6' 2\" (1.88 m)   Wt 91.2 kg (201 lb)   BMI 25.81 kg/m²     Constitutional: normal, well developed, well nourished, alert, in no distress and non-toxic and no overt pain behavior.  HEENT: grossly intact  Neck: supple, symmetric, trachea midline and no masses   Pulmonary:even and unlabored  Cardiovascular:No edema or pitting edema present  Skin:Normal without rashes or lesions and well hydrated  Psychiatric:Mood and affect appropriate  Neurologic:Cranial Nerves II-XII grossly intact    Lumbar Musculoskeletal and Neurologic Exam:    Inspection: no atrophy of the LE musculature    Gait: non-antalgic gait    ROM: limited AROM of the lumbar spine in all planes; FROM at bilateral hips    Sensation: SILT bilateral L2-S1 dermatomes    Strength:       Right Left  Hip Flexion  5 5  Knee Extension  5 5  Ankle Dorsiflexion 5 5  Great Toe Extension 5 5  Ankle Plantar Flexion 5 5      Reflexes: 2+ in bilateral knees and ankles.    Palpation: -TTP over bilateral greater trochanters and bilateral SI Joint. Diffusely tender over lumbar paraspinals    Provocative tests:    Seated Slump negative bilaterally   Ordonez's: postive b/l  Sacral Compression/thrust test, MARGARITA, and Gaenslen's tests: positive on right  FADIR: negative bilaterally       Imaging        "

## 2025-02-08 DIAGNOSIS — E78.5 DYSLIPIDEMIA: ICD-10-CM

## 2025-02-10 RX ORDER — ATORVASTATIN CALCIUM 40 MG/1
40 TABLET, FILM COATED ORAL DAILY
Qty: 90 TABLET | Refills: 1 | Status: SHIPPED | OUTPATIENT
Start: 2025-02-10

## 2025-02-12 ENCOUNTER — TELEPHONE (OUTPATIENT)
Dept: PAIN MEDICINE | Facility: CLINIC | Age: 70
End: 2025-02-12

## 2025-02-12 DIAGNOSIS — B20 CURRENTLY ASYMPTOMATIC HIV INFECTION, WITH HISTORY OF HIV-RELATED ILLNESS (HCC): ICD-10-CM

## 2025-02-12 RX ORDER — BICTEGRAVIR SODIUM, EMTRICITABINE, AND TENOFOVIR ALAFENAMIDE FUMARATE 50; 200; 25 MG/1; MG/1; MG/1
1 TABLET ORAL
Qty: 90 TABLET | Refills: 1 | Status: SHIPPED | OUTPATIENT
Start: 2025-02-12

## 2025-02-12 NOTE — TELEPHONE ENCOUNTER
Caller: Jay Jay    Doctor: Dr. Mata    Reason for call: calling for update made aware Xray are not ready yet he understood you will call once available    Call back#: [phone]

## 2025-02-13 NOTE — TELEPHONE ENCOUNTER
Caller: Patient    Doctor: Dr. KESSLER    Reason for call: Patient Would like to speak with RN     Would like to know why he was ref to PT with out knowing his XR Results     Call back#:

## 2025-02-13 NOTE — TELEPHONE ENCOUNTER
--WAL pt--    Both xray results are final.      Pt asking for the results before his PT appt on Monday at 7:45 am and wants to know if it is okay to start PT based on the results.  Please advise.

## 2025-02-13 NOTE — TELEPHONE ENCOUNTER
--awaiting xray results--    S/W pt.  Pt stated his xrays results are still not in and he has PT on Monday.  He wants the results for PT.  Advised will call the reading room to get them read.  Advised PT can see the results in the computer as well.  Advised [facility] is out of the office to Monday.  Pt appreciative.    S/W Efraín at [facility] reading room.  He will get both xrays read.

## 2025-02-17 ENCOUNTER — EVALUATION (OUTPATIENT)
Age: 70
End: 2025-02-17
Payer: MEDICARE

## 2025-02-17 DIAGNOSIS — M54.16 LUMBAR RADICULOPATHY, CHRONIC: Primary | ICD-10-CM

## 2025-02-17 PROCEDURE — 97110 THERAPEUTIC EXERCISES: CPT | Performed by: PHYSICAL THERAPIST

## 2025-02-17 PROCEDURE — 97140 MANUAL THERAPY 1/> REGIONS: CPT | Performed by: PHYSICAL THERAPIST

## 2025-02-17 PROCEDURE — 97163 PT EVAL HIGH COMPLEX 45 MIN: CPT | Performed by: PHYSICAL THERAPIST

## 2025-02-17 NOTE — PROGRESS NOTES
"PT Evaluation     Today's date: 2025  Patient name: Jay Jay Acuna  : 1955  MRN: [id number]  Referring provider: Carolyn Mata MD  Dx:   Encounter Diagnosis     ICD-10-CM    1. Lumbar radiculopathy, chronic  M54.16 Ambulatory referral to Physical Therapy                     Assessment/Plan    Subjective Evaluation    History of Present Illness  Mechanism of injury: Patient reports     Pain   Low Back   Currently 2/10  At Best 2/10  At Worst 5-6/10  Patient described the pain in the low back as a discomfort.  The pain is in both sides of the low back but primarily the R.  The pain will travel down the leg causing a dull ache but not past the knee.    AGGS: sleeping (first thing in the morning), sitting for an extended period of time, slouched sitting   EASES: walking, Advil, Meloxicam (prescribed but not taken), bridges and cobras   Patient's Goal:  \"I want to be able to get up without pain from a seated position and sleep without pain.\"     Objective     General Comments:      Lumbar Comments  UQS:   Dermatomes L5 on R is less sharp than the L   Myotomes WNL   Reflexes 2     Observation: decreased lumbar lordosis     Palpation:            Precautions:       Manuals                                                                 Neuro Re-Ed                                                                                                        Ther Ex                                                                                                                     Ther Activity                                       Gait Training                                       Modalities                                            "

## 2025-02-19 ENCOUNTER — ANESTHESIA (OUTPATIENT)
Dept: GASTROENTEROLOGY | Facility: MEDICAL CENTER | Age: 70
End: 2025-02-19
Payer: MEDICARE

## 2025-02-19 ENCOUNTER — HOSPITAL ENCOUNTER (OUTPATIENT)
Dept: GASTROENTEROLOGY | Facility: MEDICAL CENTER | Age: 70
Setting detail: OUTPATIENT SURGERY
Discharge: HOME | End: 2025-02-19
Payer: MEDICARE

## 2025-02-19 ENCOUNTER — ANESTHESIA EVENT (OUTPATIENT)
Dept: GASTROENTEROLOGY | Facility: MEDICAL CENTER | Age: 70
End: 2025-02-19
Payer: MEDICARE

## 2025-02-19 VITALS
WEIGHT: 201 LBS | DIASTOLIC BLOOD PRESSURE: 70 MMHG | HEIGHT: 74 IN | OXYGEN SATURATION: 99 % | BODY MASS INDEX: 25.8 KG/M2 | HEART RATE: 63 BPM | SYSTOLIC BLOOD PRESSURE: 123 MMHG | TEMPERATURE: 97.1 F | RESPIRATION RATE: 20 BRPM

## 2025-02-19 DIAGNOSIS — R13.19 OTHER DYSPHAGIA: ICD-10-CM

## 2025-02-19 DIAGNOSIS — Z86.0100 HISTORY OF COLON POLYPS: ICD-10-CM

## 2025-02-19 DIAGNOSIS — K21.9 GASTROESOPHAGEAL REFLUX DISEASE WITHOUT ESOPHAGITIS: ICD-10-CM

## 2025-02-19 PROCEDURE — 45385 COLONOSCOPY W/LESION REMOVAL: CPT | Performed by: INTERNAL MEDICINE

## 2025-02-19 PROCEDURE — 43239 EGD BIOPSY SINGLE/MULTIPLE: CPT | Performed by: INTERNAL MEDICINE

## 2025-02-19 PROCEDURE — 88305 TISSUE EXAM BY PATHOLOGIST: CPT | Performed by: PATHOLOGY

## 2025-02-19 RX ORDER — PROPOFOL 10 MG/ML
INJECTION, EMULSION INTRAVENOUS CONTINUOUS PRN
Status: DISCONTINUED | OUTPATIENT
Start: 2025-02-19 | End: 2025-02-19

## 2025-02-19 RX ORDER — HYDROMORPHONE HYDROCHLORIDE 2 MG/ML
0.5 INJECTION, SOLUTION INTRAMUSCULAR; INTRAVENOUS; SUBCUTANEOUS
Status: DISCONTINUED | OUTPATIENT
Start: 2025-02-19 | End: 2025-02-23 | Stop reason: HOSPADM

## 2025-02-19 RX ORDER — LABETALOL HYDROCHLORIDE 5 MG/ML
5 INJECTION, SOLUTION INTRAVENOUS
Status: DISCONTINUED | OUTPATIENT
Start: 2025-02-19 | End: 2025-02-23 | Stop reason: HOSPADM

## 2025-02-19 RX ORDER — ALBUTEROL SULFATE 0.83 MG/ML
2.5 SOLUTION RESPIRATORY (INHALATION) ONCE AS NEEDED
Status: DISCONTINUED | OUTPATIENT
Start: 2025-02-19 | End: 2025-02-23 | Stop reason: HOSPADM

## 2025-02-19 RX ORDER — ONDANSETRON 2 MG/ML
4 INJECTION INTRAMUSCULAR; INTRAVENOUS ONCE AS NEEDED
Status: DISCONTINUED | OUTPATIENT
Start: 2025-02-19 | End: 2025-02-23 | Stop reason: HOSPADM

## 2025-02-19 RX ORDER — PROPOFOL 10 MG/ML
INJECTION, EMULSION INTRAVENOUS AS NEEDED
Status: DISCONTINUED | OUTPATIENT
Start: 2025-02-19 | End: 2025-02-19

## 2025-02-19 RX ORDER — FENTANYL CITRATE/PF 50 MCG/ML
25 SYRINGE (ML) INJECTION
Status: DISCONTINUED | OUTPATIENT
Start: 2025-02-19 | End: 2025-02-23 | Stop reason: HOSPADM

## 2025-02-19 RX ORDER — LIDOCAINE HYDROCHLORIDE 20 MG/ML
INJECTION, SOLUTION EPIDURAL; INFILTRATION; INTRACAUDAL; PERINEURAL AS NEEDED
Status: DISCONTINUED | OUTPATIENT
Start: 2025-02-19 | End: 2025-02-19

## 2025-02-19 RX ORDER — LIDOCAINE HYDROCHLORIDE 10 MG/ML
0.5 INJECTION, SOLUTION EPIDURAL; INFILTRATION; INTRACAUDAL; PERINEURAL ONCE AS NEEDED
Status: DISCONTINUED | OUTPATIENT
Start: 2025-02-19 | End: 2025-02-23 | Stop reason: HOSPADM

## 2025-02-19 RX ORDER — SODIUM CHLORIDE, SODIUM LACTATE, POTASSIUM CHLORIDE, CALCIUM CHLORIDE 600; 310; 30; 20 MG/100ML; MG/100ML; MG/100ML; MG/100ML
125 INJECTION, SOLUTION INTRAVENOUS CONTINUOUS
Status: DISCONTINUED | OUTPATIENT
Start: 2025-02-19 | End: 2025-02-23 | Stop reason: HOSPADM

## 2025-02-19 RX ORDER — PROMETHAZINE HYDROCHLORIDE 25 MG/ML
12.5 INJECTION, SOLUTION INTRAMUSCULAR; INTRAVENOUS ONCE AS NEEDED
Status: DISCONTINUED | OUTPATIENT
Start: 2025-02-19 | End: 2025-02-23 | Stop reason: HOSPADM

## 2025-02-19 RX ADMIN — SODIUM CHLORIDE, SODIUM LACTATE, POTASSIUM CHLORIDE, AND CALCIUM CHLORIDE 125 ML/HR: .6; .31; .03; .02 INJECTION, SOLUTION INTRAVENOUS at 10:20

## 2025-02-19 RX ADMIN — PROPOFOL 150 MG: 10 INJECTION, EMULSION INTRAVENOUS at 10:28

## 2025-02-19 RX ADMIN — LIDOCAINE HYDROCHLORIDE 80 MG: 20 INJECTION, SOLUTION EPIDURAL; INFILTRATION; INTRACAUDAL at 10:28

## 2025-02-19 RX ADMIN — PROPOFOL 100 MCG/KG/MIN: 10 INJECTION, EMULSION INTRAVENOUS at 10:33

## 2025-02-19 RX ADMIN — Medication 40 MG: at 10:40

## 2025-02-19 RX ADMIN — PROPOFOL 30 MG: 10 INJECTION, EMULSION INTRAVENOUS at 10:31

## 2025-02-19 NOTE — ANESTHESIA PREPROCEDURE EVALUATION
Procedure:  COLONOSCOPY  EGD    Compliant with biktarvy daily, last viral load check 12/2023 undetectable  PONV    Relevant Problems   CARDIO   (+) HTN (hypertension)   (+) Hyperlipidemia      GI/HEPATIC   (+) GERD (gastroesophageal reflux disease)   (+) Hiatal hernia   (+) Non-alcoholic fatty liver disease      /RENAL   (+) BPH with urinary obstruction   (+) Nephrolithiasis   (+) Prostate cancer (HCC)   (+) Prostate nodule      HEMATOLOGY   (+) Thrombocytopenia (HCC)      MUSCULOSKELETAL   (+) Degenerative cervical disc   (+) Hiatal hernia      Other   (+) Splenomegaly      EKG 2023  Sinus rhythm 65 bpm, left atrial abnormality     Physical Exam    Airway    Mallampati score: II  TM Distance: >3 FB  Neck ROM: full     Dental       Cardiovascular      Pulmonary      Other Findings        Anesthesia Plan  ASA Score- 3     Anesthesia Type- IV sedation with anesthesia with ASA Monitors.         Additional Monitors:     Airway Plan:     Comment: I have seen the patient and reviewed the history.  Patient to receive IV sedation with full ASA monitors.  Risks discussed with the patient, consent signed.  .       Plan Factors-Exercise tolerance (METS): >4 METS.    Chart reviewed.    Patient summary reviewed.    Patient is not a current smoker.  Patient did not smoke on day of surgery.    Obstructive sleep apnea risk education given perioperatively.        Induction- intravenous.    Postoperative Plan-         Informed Consent- Anesthetic plan and risks discussed with patient.  I personally reviewed this patient with the CRNA. Discussed and agreed on the Anesthesia Plan with the CRNA..

## 2025-02-19 NOTE — ANESTHESIA POSTPROCEDURE EVALUATION
Post-Op Assessment Note    CV Status:  Stable    Pain management: adequate       Mental Status:  Alert and awake   Hydration Status:  Euvolemic   PONV Controlled:  Controlled   Airway Patency:  Patent     Post Op Vitals Reviewed: Yes    No anethesia notable event occurred.    Staff: CRNA           Last Filed PACU Vitals:  Vitals Value Taken Time   Temp 97.8    Pulse 69 02/19/25 1102   /68 02/19/25 1102   Resp 18 02/19/25 1102   SpO2 99 % 02/19/25 1102

## 2025-02-19 NOTE — H&P
History and Physical - [facility]  Jay Jay Acuna 69 y.o. male MRN: [id number]    HPI: Jay Jay Acuna is a 69 y.o. year old male who presents with GERD and family h/o esophageal cancer and personal h/o colon polyps in 2020.       Review of Systems    Historical Information   Past Medical History:   Diagnosis Date    Cancer (HCC)     Colon polyp     GERD (gastroesophageal reflux disease)     HIV (human immunodeficiency virus infection) (HCC)     Hyperlipidemia     Hypertension     Kidney stone     Non-alcoholic fatty liver disease     PONV (postoperative nausea and vomiting)     Prostate cancer (HCC)     Sleep apnea      Past Surgical History:   Procedure Laterality Date    ANAL FISTULOTOMY N/A 5/7/2024    Procedure: FISTULOTOMY;  Surgeon: Librado Chamorro MD;  Location: [facility];  Service: Colorectal    COLONOSCOPY      HERNIA REPAIR      MS ANRCT XM SURG REQ ANES GENERAL SPI/EDRL DX N/A 5/7/2024    Procedure: EXAM UNDER ANESTHESIA (EUA);  Surgeon: Librado Chamorro MD;  Location: [facility];  Service: Colorectal    MS BX PROSTATE STRTCTC SATURATION SAMPLING IMG GID N/A 01/25/2022    Procedure: TRANSPERINEAL ULTRASOUND GUIDED BIOPSY PROSTATE;  Surgeon: Shon Beltrán MD;  Location[facility];  Service: Urology    MS PROSTATE NEEDLE BIOPSY ANY APPROACH N/A 10/13/2020    Procedure: TRANSPERINEAL PROSTATE BIOPSY;  Surgeon: Shon Beltrán MD;  Location[facility];  Service: Urology    ROOT CANAL       Social History   Social History     Substance and Sexual Activity   Alcohol Use Not Currently    Comment: used to be a heavy drinker, quit 15 years ago     Social History     Substance and Sexual Activity   Drug Use Not Currently    Types: Marijuana, Cocaine    Comment: used to use drugs, consisting of crystal meth, cocaine, and marijuana, denied any IV drug use in the past     Social History     Tobacco Use   Smoking Status Never   Smokeless Tobacco Never     Family History    Problem Relation Age of Onset    Prostate cancer Father     Esophageal cancer Father     Heart disease Father     Colon cancer Cousin         mothers side       Meds/Allergies     Not in a hospital admission.    Allergies   Allergen Reactions    Epinephrine Palpitations and Shortness Of Breath    Sulfa Antibiotics Other (See Comments)     Eye irritation       Objective     There were no vitals taken for this visit.      PHYSICAL EXAM    Gen: NAD  CV: RRR  CHEST: Clear  ABD: soft, NT/ND  EXT: no edema  Neuro: AAO      ASSESSMENT/PLAN:  This is a 69 y.o. year old male here for EGD - GERD/ family h/o esopahgeal cancer and colonoscopy for personal h/o colon polyps.     PLAN:   Procedure: EGd/ Colonoscopy.

## 2025-02-20 ENCOUNTER — OFFICE VISIT (OUTPATIENT)
Age: 70
End: 2025-02-20
Payer: MEDICARE

## 2025-02-20 DIAGNOSIS — M54.16 LUMBAR RADICULOPATHY, CHRONIC: Primary | ICD-10-CM

## 2025-02-20 PROCEDURE — 97112 NEUROMUSCULAR REEDUCATION: CPT

## 2025-02-20 PROCEDURE — 97110 THERAPEUTIC EXERCISES: CPT

## 2025-02-20 PROCEDURE — 97140 MANUAL THERAPY 1/> REGIONS: CPT

## 2025-02-23 NOTE — PROGRESS NOTES
"PT Evaluation     Today's date: 2025  Patient name: Jay Jay Acuna  : 1955  MRN: [id number]  Referring provider: Carolyn Mata MD  Dx:   Encounter Diagnosis     ICD-10-CM    1. Lumbar radiculopathy, chronic  M54.16                      Assessment/Plan    Subjective Evaluation    History of Present Illness  Mechanism of injury: Patient reports     Pain   Low Back   Currently 2/10  At Best 2/10  At Worst 5-6/10  Patient described the pain in the low back as a discomfort.  The pain is in both sides of the low back but primarily the R.  The pain will travel down the leg causing a dull ache but not past the knee.    AGGS: sleeping (first thing in the morning), sitting for an extended period of time, slouched sitting   EASES: walking, Advil, Meloxicam (prescribed but not taken), bridges and cobras   Patient's Goal:  \"I want to be able to get up without pain from a seated position and sleep without pain.\"     Objective     General Comments:      Lumbar Comments  UQS:   Dermatomes L5 on R is less sharp than the L   Myotomes WNL   Reflexes 2     Observation: decreased lumbar lordosis     Palpation:            Precautions:       Manuals                                                                 Neuro Re-Ed                                                                                                        Ther Ex                                                                                                                     Ther Activity                                       Gait Training                                       Modalities                                            "

## 2025-02-25 ENCOUNTER — RESULTS FOLLOW-UP (OUTPATIENT)
Dept: GASTROENTEROLOGY | Facility: MEDICAL CENTER | Age: 70
End: 2025-02-25

## 2025-02-25 ENCOUNTER — OFFICE VISIT (OUTPATIENT)
Age: 70
End: 2025-02-25
Payer: MEDICARE

## 2025-02-25 DIAGNOSIS — M54.16 LUMBAR RADICULOPATHY, CHRONIC: Primary | ICD-10-CM

## 2025-02-25 PROCEDURE — 97110 THERAPEUTIC EXERCISES: CPT

## 2025-02-25 PROCEDURE — 88305 TISSUE EXAM BY PATHOLOGIST: CPT | Performed by: PATHOLOGY

## 2025-02-25 PROCEDURE — 97112 NEUROMUSCULAR REEDUCATION: CPT

## 2025-02-25 NOTE — RESULT ENCOUNTER NOTE
Inform patient via [employer].  Please review the pathology/lab result of further discussion.    Copied from [employer] message :       Christophe Goyal,     The biopsies from the esophagus were normal. The colon polyps were benign but precancerous. They were removed. You will need a repeat colonoscopy in 5 years.     Best regards,     Torey Rubio MD

## 2025-02-25 NOTE — PROGRESS NOTES
"Daily Note     Today's date: 2025  Patient name: Jay Jay Acuna  : 1955  MRN: [id number]  Referring provider: Carolyn Mata MD  Dx:   Encounter Diagnosis     ICD-10-CM    1. Lumbar radiculopathy, chronic  M54.16           Start Time: 1535  Stop Time: 1630  Total time in clinic (min): 55 minutes    Subjective: Pt reports still having pain across lower lumbar area with radicular pain/discomfort into R glute.      Objective: See treatment diary below      Assessment: Tolerated treatment well. Continued with program as outlined below, with focus on core stability and posterior chain strengthening.  Verbal/tactile cuing required for proper form and muscle engagement during multif press and modified good morning.  Limited eccentric control to chair during STS, which did improve with additional reps.  Patient would benefit from continued PT to further reduce stress to lumbar spine, centralize/abolish symptoms, and maximize overall function with ADL's.        Plan: Continue per plan of care.      Plan: Continue per plan of care.     POC expires Unit limit Auth Expiration date PT/OT/ST + Visit Limit?   25 BOMN  25 10                              Visit/Unit Tracking  AUTH Status:  Date 25-25 Used 1 2 3             Remaining  9 8 7              Precautions: PMH includes HIV, HTN, hiatal hernia, GERD and history of low back pain    Daily Treatment Log  Manuals           SL SI Oscillations  [facility]          Breaking Bread  [facility]          SL CG LAD   [facility]                       Neuro Re-Ed            Prone GS  20x5\"            PBKHE   20x5\"            Alt UE/Alt LE  20x5\"            Bridges with TB  20x5\"   RTB 20x5\"           MF Press   Black   30x ea Black   30x ea          MF Twist   Black 30x ea Black   30x ea          STS with GS   30x  X10 w ecc focus                                    Ther Ex           Nu Step  NV         " "  SLR x 4   SLR Flex  2x10           Prayer Stretch  10x10\"  10x10\"          Seated sciatic nv glide   5\"x20          Modified good morning   10# KB  2x10                                                 Ther Activity                                       Gait Training                                       Modalities                P 10' pre                             "

## 2025-02-26 ENCOUNTER — APPOINTMENT (OUTPATIENT)
Dept: LAB | Facility: HOSPITAL | Age: 70
End: 2025-02-26
Payer: MEDICARE

## 2025-02-26 DIAGNOSIS — J38.5 LARYNGOSPASM: ICD-10-CM

## 2025-02-26 DIAGNOSIS — R13.10 DYSPHAGIA, UNSPECIFIED TYPE: ICD-10-CM

## 2025-02-26 LAB — B BURGDOR IGG+IGM SER QL IA: NEGATIVE

## 2025-02-26 PROCEDURE — 86364 TISS TRNSGLTMNASE EA IG CLAS: CPT

## 2025-02-26 PROCEDURE — 86258 DGP ANTIBODY EACH IG CLASS: CPT

## 2025-02-26 PROCEDURE — 86038 ANTINUCLEAR ANTIBODIES: CPT

## 2025-02-26 PROCEDURE — 86618 LYME DISEASE ANTIBODY: CPT

## 2025-02-26 PROCEDURE — 86225 DNA ANTIBODY NATIVE: CPT

## 2025-02-26 PROCEDURE — 86003 ALLG SPEC IGE CRUDE XTRC EA: CPT

## 2025-02-26 PROCEDURE — 36415 COLL VENOUS BLD VENIPUNCTURE: CPT

## 2025-02-27 ENCOUNTER — OFFICE VISIT (OUTPATIENT)
Age: 70
End: 2025-02-27
Payer: MEDICARE

## 2025-02-27 DIAGNOSIS — M54.16 LUMBAR RADICULOPATHY, CHRONIC: Primary | ICD-10-CM

## 2025-02-27 LAB
DSDNA IGG SERPL IA-ACNC: <0.9 IU/ML (ref ?–15)
NUCLEAR IGG SER IA-RTO: 0.1 RATIO (ref ?–1)

## 2025-02-27 PROCEDURE — 97140 MANUAL THERAPY 1/> REGIONS: CPT | Performed by: PHYSICAL THERAPY ASSISTANT

## 2025-02-27 PROCEDURE — 97112 NEUROMUSCULAR REEDUCATION: CPT | Performed by: PHYSICAL THERAPY ASSISTANT

## 2025-02-27 PROCEDURE — 97110 THERAPEUTIC EXERCISES: CPT | Performed by: PHYSICAL THERAPY ASSISTANT

## 2025-02-27 NOTE — PROGRESS NOTES
"Daily Note     Today's date: 2025  Patient name: Jay Jay Acuna  : 1955  MRN: [id number]  Referring provider: Carolyn Mata MD  Dx:   Encounter Diagnosis     ICD-10-CM    1. Lumbar radiculopathy, chronic  M54.16                      Subjective: Pt reports still having pain across lower lumbar area with radicular pain/discomfort into R glute.      Objective: See treatment diary below      Assessment: Tolerated treatment well. Continued with program as outlined below, with focus on core stability and posterior chain strengthening.  Verbal/tactile cuing required for proper form and muscle engagement during multif press and modified good morning.  Limited eccentric control to chair during STS, which did improve with additional reps.  Patient would benefit from continued PT to further reduce stress to lumbar spine, centralize/abolish symptoms, and maximize overall function with ADL's.        Plan: Continue per plan of care.      Plan: Continue per plan of care.     POC expires Unit limit Auth Expiration date PT/OT/ST + Visit Limit?   25 BOMN  25 10                              Visit/Unit Tracking  AUTH Status:  Date 25-25 Used 1 2 3 4            Remaining  9 8 7              Precautions: PMH includes HIV, HTN, hiatal hernia, GERD and history of low back pain    Daily Treatment Log  Manuals          SL SI Oscillations  GRC GRC          Breaking Bread  GRC GRC          SL CG LAD   GRC GRC          FMP Lumbar Pattern    GRC         Lumbar PA Mobs    GRC         Neuro Re-Ed           Prone GS  20x5\"   10x10\"         PBKHE   20x5\"            Alt UE/Alt LE  20x5\"            Bridges with TB  20x5\"   RTB 20x5\"  20x5\"   RTB         MF Press   Black   30x ea Black   30x ea          MF Twist   Black 30x ea Black   30x ea          STS with GS   30x  X10 w ecc focus                                    Ther Ex           Nu " "Step  NV           SLR x 4   SLR Flex  2x10           Prayer Stretch  10x10\"  10x10\"          Seated sciatic nv glide   5\"x20          Modified good morning   10# KB  2x10                                                 Ther Activity                                       Gait Training                                       Modalities                [facility] 10' pre                             "

## 2025-03-03 ENCOUNTER — APPOINTMENT (OUTPATIENT)
Age: 70
End: 2025-03-03
Payer: MEDICARE

## 2025-03-04 ENCOUNTER — APPOINTMENT (OUTPATIENT)
Age: 70
End: 2025-03-04
Payer: MEDICARE

## 2025-03-04 NOTE — ASSESSMENT & PLAN NOTE
Persistent HB.  Known Hiatal hernia.  Scheduled for EGD and colonoscopy on 2/19/25  Continue PPI  Focus on eating smaller meals and remaining upright after eating            Pre-procedure checklist reviewed and pre-sedation note complete.    MD aware of maximum contrast dose of 270 mL.

## 2025-03-05 LAB
GLIADIN IGG SER IA-ACNC: 2 UNITS (ref 0–19)
GLIADIN PEPTIDE IGG SER-ACNC: 1 UNITS (ref 0–19)
Lab: NORMAL
NOTE: NORMAL
TEST INFORMATION: NORMAL
TTG IGA SER-ACNC: 3 U/ML (ref 0–3)
WHEAT IGE QN: <0.1 KU/L

## 2025-03-06 ENCOUNTER — OFFICE VISIT (OUTPATIENT)
Age: 70
End: 2025-03-06
Payer: MEDICARE

## 2025-03-06 DIAGNOSIS — M54.16 LUMBAR RADICULOPATHY, CHRONIC: Primary | ICD-10-CM

## 2025-03-06 PROCEDURE — 97110 THERAPEUTIC EXERCISES: CPT | Performed by: PHYSICAL THERAPIST

## 2025-03-06 PROCEDURE — 97112 NEUROMUSCULAR REEDUCATION: CPT | Performed by: PHYSICAL THERAPIST

## 2025-03-06 PROCEDURE — 97140 MANUAL THERAPY 1/> REGIONS: CPT | Performed by: PHYSICAL THERAPIST

## 2025-03-09 NOTE — PROGRESS NOTES
"Daily Note     Today's date: 3/8/2025  Patient name: Jay Jay Acuna  : 1955  MRN: [id number]  Referring provider: Carolyn Mata MD  Dx:   Encounter Diagnosis     ICD-10-CM    1. Lumbar radiculopathy, chronic  M54.16                      Subjective: Pt reports still having pain across lower lumbar area with radicular pain/discomfort into R glute.      Objective: See treatment diary below      Assessment: Tolerated treatment well. Continued with program as outlined below, with focus on core stability and posterior chain strengthening.  Verbal/tactile cuing required for proper form and muscle engagement during multif press and modified good morning.  Limited eccentric control to chair during STS, which did improve with additional reps.  Patient would benefit from continued PT to further reduce stress to lumbar spine, centralize/abolish symptoms, and maximize overall function with ADL's.        Plan: Continue per plan of care.      Plan: Continue per plan of care.     POC expires Unit limit Auth Expiration date PT/OT/ST + Visit Limit?   25 BOMN  25 10                              Visit/Unit Tracking  AUTH Status:  Date 25-25 Used 1 2 3 4            Remaining  9 8 7              Precautions: PMH includes HIV, HTN, hiatal hernia, GERD and history of low back pain    Daily Treatment Log  Manuals          SL SI Oscillations  GRC GRC          Breaking Bread  GRC GRC          SL CG LAD   GRC GRC          FMP Lumbar Pattern    GRC         Lumbar PA Mobs    GRC         Neuro Re-Ed           Prone GS  20x5\"   10x10\"         PBKHE   20x5\"            Alt UE/Alt LE  20x5\"            Bridges with TB  20x5\"   RTB 20x5\"  20x5\"   RTB         MF Press   Black   30x ea Black   30x ea          MF Twist   Black 30x ea Black   30x ea          STS with GS   30x  X10 w ecc focus                                    Ther Ex           Nu " "Step  NV           SLR x 4   SLR Flex  2x10           Prayer Stretch  10x10\"  10x10\"          Seated sciatic nv glide   5\"x20          Modified good morning   10# KB  2x10                                                 Ther Activity                                       Gait Training                                       Modalities                [facility] 10' pre                             "

## 2025-03-10 ENCOUNTER — OFFICE VISIT (OUTPATIENT)
Age: 70
End: 2025-03-10
Payer: MEDICARE

## 2025-03-10 DIAGNOSIS — M54.16 LUMBAR RADICULOPATHY, CHRONIC: Primary | ICD-10-CM

## 2025-03-10 PROCEDURE — 97110 THERAPEUTIC EXERCISES: CPT | Performed by: PHYSICAL THERAPIST

## 2025-03-10 PROCEDURE — 97140 MANUAL THERAPY 1/> REGIONS: CPT | Performed by: PHYSICAL THERAPIST

## 2025-03-10 PROCEDURE — 97112 NEUROMUSCULAR REEDUCATION: CPT | Performed by: PHYSICAL THERAPIST

## 2025-03-13 ENCOUNTER — OFFICE VISIT (OUTPATIENT)
Age: 70
End: 2025-03-13
Payer: MEDICARE

## 2025-03-13 DIAGNOSIS — M54.16 LUMBAR RADICULOPATHY, CHRONIC: Primary | ICD-10-CM

## 2025-03-13 PROCEDURE — 97112 NEUROMUSCULAR REEDUCATION: CPT | Performed by: PHYSICAL THERAPIST

## 2025-03-13 PROCEDURE — 97110 THERAPEUTIC EXERCISES: CPT | Performed by: PHYSICAL THERAPIST

## 2025-03-13 PROCEDURE — 97140 MANUAL THERAPY 1/> REGIONS: CPT | Performed by: PHYSICAL THERAPIST

## 2025-03-16 NOTE — PROGRESS NOTES
"Daily Note     Today's date: 3/15/2025  Patient name: Jay Jay Acuna  : 1955  MRN: [id number]  Referring provider: Carolyn Mata MD  Dx:   Encounter Diagnosis     ICD-10-CM    1. Lumbar radiculopathy, chronic  M54.16                      Subjective: Pt reports still having pain across lower lumbar area with radicular pain/discomfort into R glute.      Objective: See treatment diary below      Assessment: Tolerated treatment well. Continued with program as outlined below, with focus on core stability and posterior chain strengthening.  Verbal/tactile cuing required for proper form and muscle engagement during multif press and modified good morning.  Limited eccentric control to chair during STS, which did improve with additional reps.  Patient would benefit from continued PT to further reduce stress to lumbar spine, centralize/abolish symptoms, and maximize overall function with ADL's.        Plan: Continue per plan of care.      Plan: Continue per plan of care.     POC expires Unit limit Auth Expiration date PT/OT/ST + Visit Limit?   25 BOMN  25 10                              Visit/Unit Tracking  AUTH Status:  Date 25-25 Used 1 2 3 4            Remaining  9 8 7              Precautions: PMH includes HIV, HTN, hiatal hernia, GERD and history of low back pain    Daily Treatment Log  Manuals          SL SI Oscillations  GRC GRC          Breaking Bread  GRC GRC          SL CG LAD   GRC GRC          FMP Lumbar Pattern    GRC         Lumbar PA Mobs    GRC         Neuro Re-Ed           Prone GS  20x5\"   10x10\"         PBKHE   20x5\"            Alt UE/Alt LE  20x5\"            Bridges with TB  20x5\"   RTB 20x5\"  20x5\"   RTB         MF Press   Black   30x ea Black   30x ea          MF Twist   Black 30x ea Black   30x ea          STS with GS   30x  X10 w ecc focus                                    Ther Ex           Nu " "Step  NV           SLR x 4   SLR Flex  2x10           Prayer Stretch  10x10\"  10x10\"          Seated sciatic nv glide   5\"x20          Modified good morning   10# KB  2x10                                                 Ther Activity                                       Gait Training                                       Modalities                [facility] 10' pre                             "

## 2025-03-16 NOTE — PROGRESS NOTES
"Daily Note     Today's date: 3/15/2025  Patient name: Jay Jay Acuna  : 1955  MRN: [id number]  Referring provider: Carolyn Mata MD  Dx:   Encounter Diagnosis     ICD-10-CM    1. Lumbar radiculopathy, chronic  M54.16                      Subjective: Pt reports still having pain across lower lumbar area with radicular pain/discomfort into R glute.      Objective: See treatment diary below      Assessment: Tolerated treatment well. Continued with program as outlined below, with focus on core stability and posterior chain strengthening.  Verbal/tactile cuing required for proper form and muscle engagement during multif press and modified good morning.  Limited eccentric control to chair during STS, which did improve with additional reps.  Patient would benefit from continued PT to further reduce stress to lumbar spine, centralize/abolish symptoms, and maximize overall function with ADL's.        Plan: Continue per plan of care.      Plan: Continue per plan of care.     POC expires Unit limit Auth Expiration date PT/OT/ST + Visit Limit?   25 BOMN  25 10                              Visit/Unit Tracking  AUTH Status:  Date 25-25 Used 1 2 3 4            Remaining  9 8 7              Precautions: PMH includes HIV, HTN, hiatal hernia, GERD and history of low back pain    Daily Treatment Log  Manuals          SL SI Oscillations  GRC GRC          Breaking Bread  GRC GRC          SL CG LAD   GRC GRC          FMP Lumbar Pattern    GRC         Lumbar PA Mobs    GRC         Neuro Re-Ed           Prone GS  20x5\"   10x10\"         PBKHE   20x5\"            Alt UE/Alt LE  20x5\"            Bridges with TB  20x5\"   RTB 20x5\"  20x5\"   RTB         MF Press   Black   30x ea Black   30x ea          MF Twist   Black 30x ea Black   30x ea          STS with GS   30x  X10 w ecc focus                                    Ther Ex           Nu " "Step  NV           SLR x 4   SLR Flex  2x10           Prayer Stretch  10x10\"  10x10\"          Seated sciatic nv glide   5\"x20          Modified good morning   10# KB  2x10                                                 Ther Activity                                       Gait Training                                       Modalities                [facility] 10' pre                             "

## 2025-03-17 ENCOUNTER — APPOINTMENT (OUTPATIENT)
Age: 70
End: 2025-03-17
Payer: MEDICARE

## 2025-03-18 ENCOUNTER — OFFICE VISIT (OUTPATIENT)
Age: 70
End: 2025-03-18
Payer: MEDICARE

## 2025-03-18 DIAGNOSIS — M54.16 LUMBAR RADICULOPATHY, CHRONIC: Primary | ICD-10-CM

## 2025-03-18 PROCEDURE — 97140 MANUAL THERAPY 1/> REGIONS: CPT | Performed by: PHYSICAL THERAPIST

## 2025-03-18 PROCEDURE — 97112 NEUROMUSCULAR REEDUCATION: CPT | Performed by: PHYSICAL THERAPIST

## 2025-03-18 PROCEDURE — 97110 THERAPEUTIC EXERCISES: CPT | Performed by: PHYSICAL THERAPIST

## 2025-03-20 ENCOUNTER — OFFICE VISIT (OUTPATIENT)
Age: 70
End: 2025-03-20
Payer: MEDICARE

## 2025-03-20 DIAGNOSIS — M54.16 LUMBAR RADICULOPATHY, CHRONIC: Primary | ICD-10-CM

## 2025-03-20 PROCEDURE — 97110 THERAPEUTIC EXERCISES: CPT | Performed by: PHYSICAL THERAPIST

## 2025-03-20 PROCEDURE — 97112 NEUROMUSCULAR REEDUCATION: CPT | Performed by: PHYSICAL THERAPIST

## 2025-03-20 PROCEDURE — 97140 MANUAL THERAPY 1/> REGIONS: CPT | Performed by: PHYSICAL THERAPIST

## 2025-03-23 NOTE — PROGRESS NOTES
"Daily Note     Today's date: 3/22/2025  Patient name: Jay Jay Acuna  : 1955  MRN: [id number]  Referring provider: Carolyn Mata MD  Dx:   Encounter Diagnosis     ICD-10-CM    1. Lumbar radiculopathy, chronic  M54.16                      Subjective: Pt reports still having pain across lower lumbar area with radicular pain/discomfort into R glute.      Objective: See treatment diary below      Assessment: Tolerated treatment well. Continued with program as outlined below, with focus on core stability and posterior chain strengthening.  Verbal/tactile cuing required for proper form and muscle engagement during multif press and modified good morning.  Limited eccentric control to chair during STS, which did improve with additional reps.  Patient would benefit from continued PT to further reduce stress to lumbar spine, centralize/abolish symptoms, and maximize overall function with ADL's.        Plan: Continue per plan of care.      Plan: Continue per plan of care.     POC expires Unit limit Auth Expiration date PT/OT/ST + Visit Limit?   25 BOMN  25 10                              Visit/Unit Tracking  AUTH Status:  Date 25-25 Used 1 2 3 4            Remaining  9 8 7              Precautions: PMH includes HIV, HTN, hiatal hernia, GERD and history of low back pain    Daily Treatment Log  Manuals          SL SI Oscillations  GRC GRC          Breaking Bread  GRC GRC          SL CG LAD   GRC GRC          FMP Lumbar Pattern    GRC         Lumbar PA Mobs    GRC         Neuro Re-Ed           Prone GS  20x5\"   10x10\"         PBKHE   20x5\"            Alt UE/Alt LE  20x5\"            Bridges with TB  20x5\"   RTB 20x5\"  20x5\"   RTB         MF Press   Black   30x ea Black   30x ea          MF Twist   Black 30x ea Black   30x ea          STS with GS   30x  X10 w ecc focus                                    Ther Ex           Nu " "Step  NV           SLR x 4   SLR Flex  2x10           Prayer Stretch  10x10\"  10x10\"          Seated sciatic nv glide   5\"x20          Modified good morning   10# KB  2x10                                                 Ther Activity                                       Gait Training                                       Modalities                [facility] 10' pre                             "

## 2025-03-23 NOTE — PROGRESS NOTES
"Daily Note     Today's date: 3/22/2025  Patient name: Jay Jay Acuna  : 1955  MRN: [id number]  Referring provider: Carolyn Mata MD  Dx:   Encounter Diagnosis     ICD-10-CM    1. Lumbar radiculopathy, chronic  M54.16                      Subjective: Pt reports still having pain across lower lumbar area with radicular pain/discomfort into R glute.      Objective: See treatment diary below      Assessment: Tolerated treatment well. Continued with program as outlined below, with focus on core stability and posterior chain strengthening.  Verbal/tactile cuing required for proper form and muscle engagement during multif press and modified good morning.  Limited eccentric control to chair during STS, which did improve with additional reps.  Patient would benefit from continued PT to further reduce stress to lumbar spine, centralize/abolish symptoms, and maximize overall function with ADL's.        Plan: Continue per plan of care.      Plan: Continue per plan of care.     POC expires Unit limit Auth Expiration date PT/OT/ST + Visit Limit?   25 BOMN  25 10                              Visit/Unit Tracking  AUTH Status:  Date 25-25 Used 1 2 3 4            Remaining  9 8 7              Precautions: PMH includes HIV, HTN, hiatal hernia, GERD and history of low back pain    Daily Treatment Log  Manuals          SL SI Oscillations  GRC GRC          Breaking Bread  GRC GRC          SL CG LAD   GRC GRC          FMP Lumbar Pattern    GRC         Lumbar PA Mobs    GRC         Neuro Re-Ed           Prone GS  20x5\"   10x10\"         PBKHE   20x5\"            Alt UE/Alt LE  20x5\"            Bridges with TB  20x5\"   RTB 20x5\"  20x5\"   RTB         MF Press   Black   30x ea Black   30x ea          MF Twist   Black 30x ea Black   30x ea          STS with GS   30x  X10 w ecc focus                                    Ther Ex           Nu " "Step  NV           SLR x 4   SLR Flex  2x10           Prayer Stretch  10x10\"  10x10\"          Seated sciatic nv glide   5\"x20          Modified good morning   10# KB  2x10                                                 Ther Activity                                       Gait Training                                       Modalities                [facility] 10' pre                             "

## 2025-03-25 ENCOUNTER — OFFICE VISIT (OUTPATIENT)
Age: 70
End: 2025-03-25
Payer: MEDICARE

## 2025-03-25 DIAGNOSIS — M54.16 LUMBAR RADICULOPATHY, CHRONIC: Primary | ICD-10-CM

## 2025-03-25 PROCEDURE — 97140 MANUAL THERAPY 1/> REGIONS: CPT | Performed by: PHYSICAL THERAPIST

## 2025-03-27 ENCOUNTER — OFFICE VISIT (OUTPATIENT)
Age: 70
End: 2025-03-27
Payer: MEDICARE

## 2025-03-27 DIAGNOSIS — M54.16 LUMBAR RADICULOPATHY, CHRONIC: Primary | ICD-10-CM

## 2025-03-27 PROCEDURE — 97110 THERAPEUTIC EXERCISES: CPT | Performed by: PHYSICAL THERAPIST

## 2025-03-27 PROCEDURE — 97140 MANUAL THERAPY 1/> REGIONS: CPT | Performed by: PHYSICAL THERAPIST

## 2025-03-27 PROCEDURE — 97112 NEUROMUSCULAR REEDUCATION: CPT | Performed by: PHYSICAL THERAPIST

## 2025-03-27 NOTE — PROGRESS NOTES
PT Re-Evaluation   Today's date: 3/27/2025  Patient name: Jay Jay Acuna  : 1955  MRN: [id number]  Referring provider: Carolyn Mata MD  Dx:   Encounter Diagnosis     ICD-10-CM    1. Lumbar radiculopathy, chronic  M54.16         Start Time: 0830  Stop Time: 0915  Total time in clinic (min): 45 minutes    Assessment  Impairments: abnormal coordination, abnormal muscle firing, abnormal or restricted ROM, activity intolerance, impaired physical strength, lacks appropriate home exercise program, pain with function and poor body mechanics  Symptom irritability: moderate    Assessment details: Jay Jay Acuna is a 69 y.o. male who presents with complaints of  Lumbar radiculopathy, chronic  (primary encounter diagnosis).  No further referral appears necessary at this time based upon examination results.  Patient presents to PT with impaired strength, impaired ROM, decreased flexibility and impaired ability to complete IADLs.  Prognosis is good given HEP compliance and PT 2-3x/wk.  Positive prognostic indicators include positive attitude toward recovery.   Please contact me if you have any questions or recommendations.  Thank you for the opportunity to share in  [facility] care.       Barriers to therapy: PMH includes HIV, Cancer and GERD  Understanding of Dx/Px/POC: good     Prognosis: good    Goals  Short Term:  Pt will report decreased levels of pain by at least 2 subjective ratings in 4 weeks  Pt will demonstrate improved ROM by at least 10 degrees in 4 weeks  Pt will demonstrate improved strength by 1/2 grade  Long Term:   Pt will be independent in their HEP in 8 weeks  Pt will be be pain free with IADL's  Pt will demonstrate improved FOTO, > 80         Plan  Patient would benefit from: skilled physical therapy  Planned modality interventions: thermotherapy: hydrocollator packs, cryotherapy, electrical stimulation/Russian stimulation and low level laser therapy    Planned therapy interventions: activity modification,  "joint mobilization, manual therapy, motor coordination training, neuromuscular re-education, patient education, self care, therapeutic activities, therapeutic exercise, graded activity, home exercise program, abdominal trunk stabilization, IASTM, balance, flexibility, functional ROM exercises, stretching and strengthening    Frequency: 2x week  Plan of Care beginning date: 3/27/2025  Plan of Care expiration date: 6/27/2025  Treatment plan discussed with: patient  Plan details: Prognosis above is given PT services 2x/week tapering to 1x/week over the next 3 months and home program adherence.      Subjective Evaluation    History of Present Illness  Mechanism of injury: Per MD Consult on 2/7: Jay Jay Acuna is a 69 y.o. male who presents for initial evaluation of low back pain radiating down the right lower extremity posteriorly. Pain started 6 weeks ago and is not associated with any inciting event or trauma.  He describes the pain as sharp, dull/aching of moderate severity. Pain is rated 4/10 and interferes with daily activities. This pain is intermittent. Exacerbating factors include bending, sitting, walking.  He has tried chiropractic sessions with mild improvement.  Pain is causing significant functional limitation resulting in diminished quality of life and impaired age appropriate ADLs.  Denies fever, chills, numbness/tingling, bowel/bladder dysfunction, motor weakness.  @RE Kavitha is doing very well but still presents with deficits in his glutes and lumbar AROM.  PT would like to work with him longer to further improve his back strength and ROM.    Pain   Low Back   Currently 0/10  At Best 0/10  At Worst 8/10  Patient reports the pain is a radiating pain across the low back.    AGGS: bending over, twisting  EASES: exercise, rest, manual therapy   Patient's Goal: \"I want to be pain free.\"        Objective     General Comments:      Lumbar Comments  LQS: WNL     Observation: decreased lumbar curvatures " "    Palpation: increased tone in the R lumbar musculature     ROM   Lumbar AROM   Flexion 50% limited   Extension 25% limited   Rotation WNL   SB WNL   Hip PROM WNL except IR limited 25% bilaterally   Knee PROM WNL     Strength   Iliopsoas 4/5 bilaterally  ER 5/5 bilaterally   IR 5/5 bilaterally  PGM 4/5 bilaterally   Glute Max L 4/5 R 3-/5   Quads 5/5 bilaterally   Hamstrings 4/5 bilaterally     Joint Mobility   Hypomobile L4 with R LSG    Special Tests   [place] -   SLR -   Cross SLR -   Slump -   Daily Note     Today's date: 3/27/2025  Patient name: Jay Jay Acuna  : 1955  MRN: [id number]  Referring provider: Carolyn Mata MD  Dx:   Encounter Diagnosis     ICD-10-CM    1. Lumbar radiculopathy, chronic  M54.16           Start Time: 830  Stop Time: 915  Total time in clinic (min): 45 minutes    Subjective: Patient reports he is feeling much better but he gets occasional pain when he rotates.  PT added chops (upward/downward) to his exercises today.      Objective: See treatment diary below    Assessment: Tolerated treatment well.  PT added FMP Lumbar Flexion.  He responded favorably.  Patient would benefit from continued PT    Plan: Continue per plan of care.     Precautions: PMH includes HIV, HTN, hiatal hernia, GERD and history of low back pain    Daily Treatment Log  Manuals  3/6 3/10 3/13 3/18 3/20 3/25 3/27   SL SI Oscillations  [facility]      Breaking Bread  [facility]    SL CG LAD   [facility]    FMP Lumbar Pattern    [facility]   Lumbar PA Mobs    [facility]    RE              Neuro Re-Ed  2/20 2/25 2/27 3/6 3/10 3/13 3/18 3/20 3/25 3/27   Prone GS  20x5\"   10x10\" 10x10\" 10x10\" 20x5\" 20x5\" 20x5\"     PBKHE   20x5\"    20x5\" 20x5\" 20x5\"       Alt UE/Alt LE  20x5\"    20x5\" 20x5\" 20x5\"       Bridges with TB  20x5\"   RTB 20x5\"  20x5\"   RTB 20x5\"  RTB 20x5\"  RTB Swiss Ball   20x5\" Swiss Ball 20x5\" Swiss Ball 20x5\" Swiss Ball 20x5\"  " "  MF Press   Black   30x ea Black   30x ea Black   30x ea Black   30x ea Black   30x ea Black 30x ea Black 30x ea Black 30x ea     MF Twist   Black 30x ea Black   30x ea Black   30x ea Black 30x ea Black 30x ea Black 30x ea Black 30x ea Black 30x ea     STS with GS   30x  X10 w ecc focus 2X10 w ecc focus 2X10 w ecc focus         Pball Crushers    20x5\" 20x5\" 20x5\" 20x5\" 30x5\" 30x5\"     Pball Diagonal    20x5\" ea 20x5\" ea 20x5\" ea 20x5\" ea 20x5\" ea 20x5\" ea     Sup HS Curls         Swiss Ball   20x5\" Swiss Ball   20x5\" Swiss Ball   20x5\"     Chops Down           12.5#K  3x10   Chops Up           12.5#K  3x10                                             Ther Ex 2/17 2/20 2/25 2/27 3/6 3/10 3/13 3/18 3/20 3/25 3/27   Nu Step/Bike   NV   10'  10'  10'  10'  10' 10' 10'    SLR x 4   SLR Flex  2x10  2x10 ea 2x10 ea 2x10 ea 2x10 ea 3x10 ea 3x10 ea 3x10 ea 3x10 ea   Prayer Stretch  10x10\"  10x10\" 10x10\" 10x10\" 10x10\" 10x10\" 10x10\" 10x10\" 10x10\" 10x10\"   Seated sciatic nv glide   5\"x20           Modified good morning   10# KB  2x10 10# KB  2x10 10# KB  2x10         Rows       #15K  3x10 #15K  3x10 #16K  3x10 #16K  3x10 #17K  3x10 #18K  3x10   Low Rows      #15K  3x10 #15K  3x10 #16K  3x10 #16K  3x10 #17K  3x10 #18K  3x10   Pulldowns       #15K  3x10 #15K  3x10 #16K  3x10 #16K  3x10 #17K  3x10 #18K  3x10   Leg Press         #45  3x10 #55  3x10 #55  3x10                 Ther Activity                                          Gait Training                                          Modalities                 [facility] 10' pre MHP 10' pre MHP 10' pre MHP 10' pre MHP 10' pre MHP 10' pre MHP 10' pre MHP 10' pre MHP 10' pre                    "

## 2025-03-30 NOTE — PROGRESS NOTES
"Daily Note     Today's date: 3/29/2025  Patient name: Jya Jay Acuna  : 1955  MRN: [id number]  Referring provider: Carolyn Mata MD  Dx:   Encounter Diagnosis     ICD-10-CM    1. Lumbar radiculopathy, chronic  M54.16                      Subjective: Pt reports still having pain across lower lumbar area with radicular pain/discomfort into R glute.      Objective: See treatment diary below      Assessment: Tolerated treatment well. Continued with program as outlined below, with focus on core stability and posterior chain strengthening.  Verbal/tactile cuing required for proper form and muscle engagement during multif press and modified good morning.  Limited eccentric control to chair during STS, which did improve with additional reps.  Patient would benefit from continued PT to further reduce stress to lumbar spine, centralize/abolish symptoms, and maximize overall function with ADL's.        Plan: Continue per plan of care.      Plan: Continue per plan of care.     POC expires Unit limit Auth Expiration date PT/OT/ST + Visit Limit?   25 BOMN  25 10                              Visit/Unit Tracking  AUTH Status:  Date 25-25 Used 1 2 3 4            Remaining  9 8 7              Precautions: PMH includes HIV, HTN, hiatal hernia, GERD and history of low back pain    Daily Treatment Log  Manuals          SL SI Oscillations  GRC GRC          Breaking Bread  GRC GRC          SL CG LAD   GRC GRC          FMP Lumbar Pattern    GRC         Lumbar PA Mobs    GRC         Neuro Re-Ed           Prone GS  20x5\"   10x10\"         PBKHE   20x5\"            Alt UE/Alt LE  20x5\"            Bridges with TB  20x5\"   RTB 20x5\"  20x5\"   RTB         MF Press   Black   30x ea Black   30x ea          MF Twist   Black 30x ea Black   30x ea          STS with GS   30x  X10 w ecc focus                                    Ther Ex           Nu " "Step  NV           SLR x 4   SLR Flex  2x10           Prayer Stretch  10x10\"  10x10\"          Seated sciatic nv glide   5\"x20          Modified good morning   10# KB  2x10                                                 Ther Activity                                       Gait Training                                       Modalities                [facility] 10' pre                             "

## 2025-03-31 ENCOUNTER — APPOINTMENT (OUTPATIENT)
Age: 70
End: 2025-03-31
Payer: MEDICARE

## 2025-03-31 ENCOUNTER — RESULTS FOLLOW-UP (OUTPATIENT)
Dept: PAIN MEDICINE | Facility: CLINIC | Age: 70
End: 2025-03-31

## 2025-03-31 ENCOUNTER — OFFICE VISIT (OUTPATIENT)
Age: 70
End: 2025-03-31
Payer: MEDICARE

## 2025-03-31 DIAGNOSIS — G89.29 CHRONIC BILATERAL LOW BACK PAIN WITHOUT SCIATICA: Primary | ICD-10-CM

## 2025-03-31 DIAGNOSIS — M47.816 SPONDYLOSIS OF LUMBAR REGION WITHOUT MYELOPATHY OR RADICULOPATHY: ICD-10-CM

## 2025-03-31 DIAGNOSIS — G89.29 CHRONIC BILATERAL LOW BACK PAIN WITHOUT SCIATICA: ICD-10-CM

## 2025-03-31 DIAGNOSIS — M54.50 CHRONIC BILATERAL LOW BACK PAIN WITHOUT SCIATICA: ICD-10-CM

## 2025-03-31 DIAGNOSIS — M54.50 CHRONIC BILATERAL LOW BACK PAIN WITHOUT SCIATICA: Primary | ICD-10-CM

## 2025-03-31 PROCEDURE — 99214 OFFICE O/P EST MOD 30 MIN: CPT | Performed by: STUDENT IN AN ORGANIZED HEALTH CARE EDUCATION/TRAINING PROGRAM

## 2025-03-31 PROCEDURE — 73521 X-RAY EXAM HIPS BI 2 VIEWS: CPT

## 2025-03-31 NOTE — PROGRESS NOTES
"Assessment:  1. Chronic bilateral low back pain without sciatica    2. Spondylosis of lumbar region without myelopathy or radiculopathy        Plan:    Jay Jay Acuna is a 69 y.o. male who presents for follow up office visit in regards to low back pain c/w lumbar spondylosis. We discussed imaging, rehabilitation, optimization of medications and potential interventions. The following plan was formulated with the patient:    - MRI Lumbar Spine w/o contrast  - X-rays hips  - Continue PT  - Meloxicam 15mg daily PRN. Advised not to take with other NSAIDS.  - Follow up in 6-8 weeks      Orders Placed This Encounter   Procedures    MRI lumbar spine wo contrast     Standing Status:   Future     Expected Date:   3/31/2025     Expiration Date:   3/31/2029     Scheduling Instructions:      There is no preparation for this test. Please leave your jewelry and valuables at home, wedding rings are the exception. All patients will be required to change into a hospital gown and pants.  Street clothes are not permitted in the MRI.  Magnetic nail polish must be removed prior to arrival for your test. Please bring your insurance cards, a form of photo ID and a list of your medications with you. Arrive 15 minutes prior to your appointment time in order to register. Please bring any prior CT or MRI studies of this area that were not performed at a [facility] facility.            To schedule this appointment, please contact Central Scheduling at [phone].            Prior to your appointment, please make sure you complete the MRI Screening Form when you e-Check in for your appointment. This will be available starting 7 days before your appointment in Eligible. You may receive an e-mail with an activation code if you do not have a Eligible account. If you do not have access to a device, we will complete your screening at your appointment.     Reason for Exam:   low back pain     For OP exams needed \"URGENT\", choose the appropriate " timeframe below and call Central Scheduling at [phone]. No need to speak with a Radiologist.:   Not URGENT     What is the patient's sedation requirement?:   No Sedation     Does the patient need medication for Claustrophobia? If yes, order medication at this point.:   No     Does the patient wear a life vest, have an implanted cardiac device, a stimulation device, a sleep apnea stimulator, or a breast tissue expansion device?:   No     Release to patient through Mychart:   Immediate    XR hips bilateral 2 vw w pelvis if performed     Standing Status:   Future     Number of Occurrences:   1     Expected Date:   3/31/2025     Expiration Date:   3/31/2029     Scheduling Instructions:      Bring along any outside films relating to this procedure.          Ambulatory referral to Physical Therapy     Standing Status:   Future     Expiration Date:   3/31/2026     Referral Priority:   Routine     Referral Type:   Physical Therapy     Referral Reason:   Specialty Services Required     Requested Specialty:   Physical Therapy     Number of Visits Requested:   1     Expiration Date:   3/31/2026       No orders of the defined types were placed in this encounter.      My impressions and treatment recommendations were discussed in detail with the patient, who verbalized understanding and had no further questions.    History of Present Illness:  Jay Jay Acuna is a 69 y.o. male who presents for a follow up office visit in regards to low back pain. Since last visit he completed PT with improvement in symptoms. His pain is better with PT.   He describes the pain as sharp, shooting pain intermittently of mild severity. Pain is rated 3/10 (at times 8/10) and interferes with daily activities. Worse with bending/standing up.    Diagnostic studies include Xray Lumbar spine with lumbar lordosis straightening, L4-S1 posterior facet arthopathy, L3-4 disc space narrowing. SI joints unremarkable. I have personally reviewed pertinent films  in PACS.    Pain is causing significant functional limitation resulting in diminished quality of life and impaired age appropriate ADLs.  Denies fever, chills, numbness/tingling, bowel/bladder dysfunction, motor weakness.    I have personally reviewed and/or updated the patient's past medical history, past surgical history, family history, social history, current medications, allergies, and vital signs today.     Review of Systems   Constitutional:  Negative for chills and fever.   HENT:  Negative for ear pain and sore throat.    Eyes:  Negative for pain and visual disturbance.   Respiratory:  Negative for cough and shortness of breath.    Cardiovascular:  Negative for chest pain and palpitations.   Gastrointestinal:  Negative for abdominal pain and vomiting.   Genitourinary:  Negative for dysuria and hematuria.   Musculoskeletal:  Positive for arthralgias, back pain and myalgias.   Skin:  Negative for color change and rash.   Neurological:  Negative for seizures and syncope.   All other systems reviewed and are negative.      Patient Active Problem List   Diagnosis    HIV disease (HCC)    Family history of prostate cancer in father    Elevated PSA    Prostate nodule    HTN (hypertension)    Hyperlipidemia    Non-alcoholic fatty liver disease    GERD (gastroesophageal reflux disease)    Palpitations    Splenomegaly    Thrombocytopenia (HCC)    Polycythemia    Family history of substance abuse    H/O ETOH abuse    Lymphopenia    Locking of both knees    Degenerative cervical disc    Fluid level behind tympanic membrane of both ears    Bladder wall thickening    Right lower quadrant abdominal pain    Hiatal hernia    Weight loss    H/O anal fistulotomy    Impaired fasting glucose    Gastric wall thickening    Esophagitis    Periaortic lymphadenopathy    Syphilis    Transaminitis    Perianal cyst    Anal fistula    Prostate cancer (HCC)    BPH with urinary obstruction    Nephrolithiasis    Serum total bilirubin  elevated    Aqueous tear deficiency       Past Medical History:   Diagnosis Date    Cancer (HCC)     Colon polyp     GERD (gastroesophageal reflux disease)     HIV (human immunodeficiency virus infection) (HCC)     Hyperlipidemia     Hypertension     Kidney stone     Non-alcoholic fatty liver disease     PONV (postoperative nausea and vomiting)     Prostate cancer (HCC)     Sleep apnea        Past Surgical History:   Procedure Laterality Date    ANAL FISTULOTOMY N/A 05/07/2024    Procedure: FISTULOTOMY;  Surgeon: Librado Chamorro MD;  Location: [facility];  Service: Colorectal    COLONOSCOPY      HERNIA REPAIR      FL ANRCT XM SURG REQ ANES GENERAL SPI/EDRL DX N/A 05/07/2024    Procedure: EXAM UNDER ANESTHESIA (EUA);  Surgeon: Librado Chamorro MD;  Location: [facility];  Service: Colorectal    FL BX PROSTATE STRTCTC SATURATION SAMPLING IMG GID N/A 01/25/2022    Procedure: TRANSPERINEAL ULTRASOUND GUIDED BIOPSY PROSTATE;  Surgeon: Shon Beltrán MD;  Location: [facility];  Service: Urology    FL PROSTATE NEEDLE BIOPSY ANY APPROACH N/A 10/13/2020    Procedure: TRANSPERINEAL PROSTATE BIOPSY;  Surgeon: Shon Beltrán MD;  Location: [facility];  Service: Urology    ROOT CANAL      SINUS SURGERY      exploratory, larger sinus cavity on one side       Family History   Problem Relation Age of Onset    Prostate cancer Father     Esophageal cancer Father     Heart disease Father     Colon cancer Cousin         mothers side       Social History     Occupational History    Not on file   Tobacco Use    Smoking status: Never    Smokeless tobacco: Never   Vaping Use    Vaping status: Never Used   Substance and Sexual Activity    Alcohol use: Not Currently     Comment: used to be a heavy drinker, quit 15 years ago    Drug use: Not Currently     Types: Marijuana, Cocaine     Comment: used to use drugs, consisting of crystal meth, cocaine, and marijuana, denied any IV drug use in the past    Sexual activity: Not  Currently     Partners: Male       Current Outpatient Medications on File Prior to Visit   Medication Sig    atorvastatin (LIPITOR) 40 mg tablet TAKE 1 TABLET BY MOUTH ONCE DAILY    benzoyl peroxide 5 % external wash Apply 237 g topically if needed (as needed)    bictegravir-emtricitab-tenofovir alafenamide (Biktarvy) -25 MG tablet TAKE 1 TABLET BY MOUTH DAILY WITH BREAKFAST    cholecalciferol (VITAMIN D3) 1,000 units tablet Take 1,000 Units by mouth daily    cyanocobalamin (VITAMIN B-12) 500 MCG tablet Take 1,000 mcg by mouth daily    cycloSPORINE, PF, (Cequa) 0.09 % SOLN Apply 1 1e11 Vector [employer]omes to eye in the morning    docusate sodium (COLACE) 100 mg capsule Take 100 mg by mouth 3 (three) times a day as needed for constipation    famotidine (PEPCID) 40 MG tablet Take 1 tablet (40 mg total) by mouth daily    lisinopril (ZESTRIL) 20 mg tablet Take 1 tablet (20 mg total) by mouth daily    metoprolol succinate (TOPROL-XL) 50 mg 24 hr tablet TAKE ONE TABLET BY MOUTH ONCE DAILY    Multiple Vitamin (multivitamin) capsule Take 1 capsule by mouth daily    mupirocin (BACTROBAN) 2 % ointment Apply 1 g topically if needed (as needed)    Omega-3 1000 MG CAPS Take 2 g by mouth daily    omeprazole (PriLOSEC) 40 MG capsule Take 1 capsule (40 mg total) by mouth daily    potassium citrate (UROCIT-K) 5 MEQ (540 MG) TAKE ONE TABLET BY MOUTH EVERY MORNING    psyllium (METAMUCIL) 58.6 % packet Take 1 packet by mouth daily    bisacodyl (DULCOLAX) 5 mg EC tablet Take as directed by GI office (Patient not taking: Reported on 2/24/2025)    famotidine (PEPCID) 20 mg tablet Take 1 tablet (20 mg total) by mouth daily    meloxicam (MOBIC) 15 mg tablet Take 0.5 tablets (7.5 mg total) by mouth daily as needed for moderate pain    polyethylene glycol (GOLYTELY) 4000 mL solution Take as directed by GI office     No current facility-administered medications on file prior to visit.       Allergies   Allergen Reactions    Epinephrine  Palpitations and Shortness Of Breath    Sulfa Antibiotics Other (See Comments)     Eye irritation       Physical Exam:    There were no vitals taken for this visit.    Constitutional: normal, well developed, well nourished, alert, in no distress and non-toxic and no overt pain behavior.  HEENT: grossly intact  Neck: supple, symmetric, trachea midline and no masses   Pulmonary:even and unlabored  Cardiovascular:No edema or pitting edema present  Skin:Normal without rashes or lesions and well hydrated  Psychiatric:Mood and affect appropriate  Neurologic:Cranial Nerves II-XII grossly intact    Lumbar Musculoskeletal and Neurologic Exam:    Inspection: no atrophy of the LE musculature    Gait: non-antalgic gait    ROM: limited AROM of the lumbar spine in all planes; FROM at bilateral hips    Sensation: SILT bilateral L2-S1 dermatomes    Strength:       Right Left  Hip Flexion  5 5  Knee Extension  5 5  Ankle Dorsiflexion 5 5  Great Toe Extension 5 5  Ankle Plantar Flexion 5 5      Reflexes: 2+ in bilateral knees and ankles.    Palpation: -TTP over bilateral greater trochanters and bilateral SI Joint.   Provocative tests:     Seated Slump negative bilaterally    Ordonez's: negative bilaterally     Sacral Compression/thrust test, MARGARITA, and Gaenslen's tests: negative bilaterally     FADIR: negative bilaterally       Imaging  X-ray Lumbar Spine 2/7/25  No acute fracture. Intact pedicles.  Five non-rib-bearing lumbar vertebral bodies.   L3-L4, L4-5 slight retrolisthesis. Anatomic alignment otherwise. Lumbar lordosis straightening.   L4-L5, L5-S1 posterior facet arthropathy. L3-L4 moderate disc space narrowing with osteophytes.   Unremarkable soft tissues.     IMPRESSION:   Degenerative changes.   No acute osseous abnormality.    Xray SI joints 2/7/25  The SI joints appear symmetric without evidence of focal erosions or joint space widening.  Sacral arcuate lines appear intact.   No fracture or pathologic bone lesions seen.    Included portions of the pelvis and lumbar spine are unremarkable.   IMPRESSION:   Unremarkable SI joints.

## 2025-03-31 NOTE — TELEPHONE ENCOUNTER
----- Message from Carolyn Mata MD sent at 3/31/2025 11:17 AM EDT -----  No significant hip degenerative changes on xray  ----- Message -----  From: Interface, Radiology Results In  Sent: 3/31/2025  11:00 AM EDT  To: Carolyn Mata MD

## 2025-04-01 ENCOUNTER — OFFICE VISIT (OUTPATIENT)
Age: 70
End: 2025-04-01
Payer: MEDICARE

## 2025-04-01 DIAGNOSIS — M54.16 LUMBAR RADICULOPATHY, CHRONIC: Primary | ICD-10-CM

## 2025-04-01 PROCEDURE — 97112 NEUROMUSCULAR REEDUCATION: CPT | Performed by: PHYSICAL THERAPIST

## 2025-04-01 PROCEDURE — 97140 MANUAL THERAPY 1/> REGIONS: CPT | Performed by: PHYSICAL THERAPIST

## 2025-04-01 PROCEDURE — 97110 THERAPEUTIC EXERCISES: CPT | Performed by: PHYSICAL THERAPIST

## 2025-04-03 ENCOUNTER — OFFICE VISIT (OUTPATIENT)
Age: 70
End: 2025-04-03
Payer: MEDICARE

## 2025-04-03 DIAGNOSIS — M54.16 LUMBAR RADICULOPATHY, CHRONIC: Primary | ICD-10-CM

## 2025-04-03 PROCEDURE — 97112 NEUROMUSCULAR REEDUCATION: CPT

## 2025-04-03 PROCEDURE — 97110 THERAPEUTIC EXERCISES: CPT

## 2025-04-03 NOTE — PROGRESS NOTES
"Daily Note   Today's date: 25  Patient name: Jay Jay Acuna  : 1955  MRN: [id number]  Referring provider: Carolyn Mata MD  Dx:   Encounter Diagnosis     ICD-10-CM    1. Lumbar radiculopathy, chronic  M54.16         Start Time: 0830  Stop Time: 0915  Total time in clinic (min): 45 minutes    Subjective: Patient reports he was a little sore after last PT session.       Objective: See treatment diary below    Assessment: Tolerated treatment well.  PT added several new exercises.  Patient would benefit from continued PT    Plan: Continue per plan of care.     Precautions: PMH includes HIV, HTN, hiatal hernia, GERD and history of low back pain    Daily Treatment Log  Manuals 4/1 4/3  2/27 3/6 3/10 3/13 3/18 3/20 3/25 3/27   SL SI Oscillations        GRC      Breaking Bread      GRC    GRC    SL CG LAD        GRC  GRC GRC    FMP Lumbar Pattern    GRC       GR   Lumbar PA Mobs    GRC GRC GRC GRC GRC GRC GRC    RE              Neuro Re-Ed 4/1 4/3  2/27 3/6 3/10 3/13 3/18 3/20 3/25 3/27   Prone GS    10x10\" 10x10\" 10x10\" 20x5\" 20x5\" 20x5\"     PBKHE  20x5\"    20x5\" 20x5\" 20x5\"       Alt UE/Alt LE 20x5\"    20x5\" 20x5\" 20x5\"       Bridges with TB 20x5\"   20x5\"   RTB 20x5\"  RTB 20x5\"  RTB Swiss Ball   20x5\" Swiss Ball 20x5\" Swiss Ball 20x5\" Swiss Ball 20x5\"    Hip Add Iso 20x5\"             Prone Hip ER Yoga Block 20x5\"              MF Press     Black   30x ea Black   30x ea Black   30x ea Black 30x ea Black 30x ea Black 30x ea     MF Twist     Black   30x ea Black 30x ea Black 30x ea Black 30x ea Black 30x ea Black 30x ea     STS with GS     2X10 w ecc focus 2X10 w ecc focus         Pball Crushers 20x5\"   20x5\" 20x5\" 20x5\" 20x5\" 30x5\" 30x5\"     Pball Diagonal 20x5\" ea   20x5\" ea 20x5\" ea 20x5\" ea 20x5\" ea 20x5\" ea 20x5\" ea     Sup HS Curls         Swiss Ball   20x5\" Swiss Ball   20x5\" Swiss Ball   20x5\"     Chops Down Frankie  #12.5  3x10 ea Weed  #12.5  3x10 ea         12.5#K  3x10   Chops Up Weed " "#12.5  3x10  ea [place]child  #12.5  3x10 ea         12.5#K  3x10                                             Ther Ex 4/1 4/3  2/27 3/6 3/10 3/13 3/18 3/20 3/25 3/27   Nu Step/Bike  10'  10'    10'  10'  10'  10'  10' 10' 10'    SLR x 4     2x10 ea 2x10 ea 2x10 ea 2x10 ea 3x10 ea 3x10 ea 3x10 ea 3x10 ea   Prayer Stretch  10x10\" ea  10x10\" 10x10\" 10x10\" 10x10\" 10x10\" 10x10\" 10x10\" 10x10\"   Seated sciatic nv glide              Modified good morning    10# KB  2x10 10# KB  2x10         Rows  #18K  3x10 #18.5K  3x10    #15K  3x10 #15K  3x10 #16K  3x10 #16K  3x10 #17K  3x10 #18K  3x10   Low Rows #18K  3x10 #18.5K  3x10    #15K  3x10 #15K  3x10 #16K  3x10 #16K  3x10 #17K  3x10 #18K  3x10   Pulldowns  #18K  3x10 #18.5K  3x10    #15K  3x10 #15K  3x10 #16K  3x10 #16K  3x10 #17K  3x10 #18K  3x10   Leg Press #55  3x10 #55  3x10       #45  3x10 #55  3x10 #55  3x10                 Ther Activity                                          Gait Training                                          Modalities 4/1 4/3  2/27 3/6 3/10 3/13 3/18 3/20 3/25 3/27    MHP   10' pre MHP   10' pre  MHP 10' pre MHP 10' pre MHP 10' pre MHP 10' pre MHP 10' pre MHP 10' pre MHP 10' pre MHP 10' pre                    "

## 2025-04-06 NOTE — PROGRESS NOTES
PT Re-Evaluation   Today's date: 3/27/2025  Patient name: Jay Jay Acuna  : 1955  MRN: [id number]  Referring provider: Carolyn Mata MD  Dx:   Encounter Diagnosis     ICD-10-CM    1. Lumbar radiculopathy, chronic  M54.16                    Assessment  Impairments: abnormal coordination, abnormal muscle firing, abnormal or restricted ROM, activity intolerance, impaired physical strength, lacks appropriate home exercise program, pain with function and poor body mechanics  Symptom irritability: moderate    Assessment details: Jay Jay Acuna is a 69 y.o. male who presents with complaints of  Lumbar radiculopathy, chronic  (primary encounter diagnosis).  No further referral appears necessary at this time based upon examination results.  Patient presents to PT with impaired strength, impaired ROM, decreased flexibility and impaired ability to complete IADLs.  Prognosis is good given HEP compliance and PT 2-3x/wk.  Positive prognostic indicators include positive attitude toward recovery.   Please contact me if you have any questions or recommendations.  Thank you for the opportunity to share in  [facility].       Barriers to therapy: PMH includes HIV, Cancer and GERD  Understanding of Dx/Px/POC: good     Prognosis: good    Goals  Short Term:  Pt will report decreased levels of pain by at least 2 subjective ratings in 4 weeks  Pt will demonstrate improved ROM by at least 10 degrees in 4 weeks  Pt will demonstrate improved strength by 1/2 grade  Long Term:   Pt will be independent in their HEP in 8 weeks  Pt will be be pain free with IADL's  Pt will demonstrate improved FOTO, > 80         Plan  Patient would benefit from: skilled physical therapy  Planned modality interventions: thermotherapy: hydrocollator packs, cryotherapy, electrical stimulation/Russian stimulation and low level laser therapy    Planned therapy interventions: activity modification, joint mobilization, manual therapy, motor coordination training,  "neuromuscular re-education, patient education, self care, therapeutic activities, therapeutic exercise, graded activity, home exercise program, abdominal trunk stabilization, IASTM, balance, flexibility, functional ROM exercises, stretching and strengthening    Frequency: 2x week  Plan of Care beginning date: 3/27/2025  Plan of Care expiration date: 6/27/2025  Treatment plan discussed with: patient  Plan details: Prognosis above is given PT services 2x/week tapering to 1x/week over the next 3 months and home program adherence.      Subjective Evaluation    History of Present Illness  Mechanism of injury: Per MD Consult on 2/7: Jay Jay Acuna is a 69 y.o. male who presents for initial evaluation of low back pain radiating down the right lower extremity posteriorly. Pain started 6 weeks ago and is not associated with any inciting event or trauma.  He describes the pain as sharp, dull/aching of moderate severity. Pain is rated 4/10 and interferes with daily activities. This pain is intermittent. Exacerbating factors include bending, sitting, walking.  He has tried chiropractic sessions with mild improvement.  Pain is causing significant functional limitation resulting in diminished quality of life and impaired age appropriate ADLs.  Denies fever, chills, numbness/tingling, bowel/bladder dysfunction, motor weakness.  @RE Kavitha is doing very well but still presents with deficits in his glutes and lumbar AROM.  PT would like to work with him longer to further improve his back strength and ROM.    Pain   Low Back   Currently 0/10  At Best 0/10  At Worst 8/10  Patient reports the pain is a radiating pain across the low back.    AGGS: bending over, twisting  EASES: exercise, rest, manual therapy   Patient's Goal: \"I want to be pain free.\"        Objective     General Comments:      Lumbar Comments  LQS: WNL     Observation: decreased lumbar curvatures     Palpation: increased tone in the R lumbar musculature     ROM " "  Lumbar AROM   Flexion 50% limited   Extension 25% limited   Rotation WNL   SB WNL   Hip PROM WNL except IR limited 25% bilaterally   Knee PROM WNL     Strength   Iliopsoas 4/5 bilaterally  ER 5/5 bilaterally   IR 5/5 bilaterally  PGM 4/5 bilaterally   Glute Max L 4/5 R 3-/5   Quads 5/5 bilaterally   Hamstrings 4/5 bilaterally     Joint Mobility   Hypomobile L4 with R LSG    Special Tests   [place] -   SLR -   Cross SLR -   Slump -   Daily Note     Today's date: 3/27/2025  Patient name: Jay Jay Acuna  : 1955  MRN: [id number]  Referring provider: Carolyn Mata MD  Dx:   Encounter Diagnosis     ICD-10-CM    1. Lumbar radiculopathy, chronic  M54.16                      Subjective: Patient reports he is feeling much better but he gets occasional pain when he rotates.  PT added chops (upward/downward) to his exercises today.      Objective: See treatment diary below    Assessment: Tolerated treatment well.  PT added FMP Lumbar Flexion.  He responded favorably.  Patient would benefit from continued PT    Plan: Continue per plan of care.     Precautions: PMH includes HIV, HTN, hiatal hernia, GERD and history of low back pain    Daily Treatment Log  Manuals 2/17 2/20 2/25 2/27 3/6 3/10 3/13 3/18 3/20 3/25 3/27   SL SI Oscillations  [facility]      Breaking Bread  C [facility]    SL CG LAD   C [facility]    FMP Lumbar Pattern    [facility]   Lumbar PA Mobs    [facility]    RE              Neuro Re-Ed   3/6 3/10 3/13 3/18 3/20 3/25 3/27   Prone GS  20x5\"   10x10\" 10x10\" 10x10\" 20x5\" 20x5\" 20x5\"     PBKHE   20x5\"    20x5\" 20x5\" 20x5\"       Alt UE/Alt LE  20x5\"    20x5\" 20x5\" 20x5\"       Bridges with TB  20x5\"   RTB 20x5\"  20x5\"   RTB 20x5\"  RTB 20x5\"  RTB Swiss Ball   20x5\" Swiss Ball 20x5\" Swiss Ball 20x5\" Swiss Ball 20x5\"    MF Press   Black   30x ea Black   30x ea Black   30x ea Black   30x ea Black   30x ea Black 30x ea Black 30x ea Black 30x ea     MF " "Twist   Black 30x ea Black   30x ea Black   30x ea Black 30x ea Black 30x ea Black 30x ea Black 30x ea Black 30x ea     STS with GS   30x  X10 w ecc focus 2X10 w ecc focus 2X10 w ecc focus         Pball Crushers    20x5\" 20x5\" 20x5\" 20x5\" 30x5\" 30x5\"     Pball Diagonal    20x5\" ea 20x5\" ea 20x5\" ea 20x5\" ea 20x5\" ea 20x5\" ea     Sup HS Curls         Swiss Ball   20x5\" Swiss Ball   20x5\" Swiss Ball   20x5\"     Chops Down           12.5#K  3x10   Chops Up           12.5#K  3x10                                             Ther Ex 2/17 2/20 2/25 2/27 3/6 3/10 3/13 3/18 3/20 3/25 3/27   Nu Step/Bike   NV   10'  10'  10'  10'  10' 10' 10'    SLR x 4   SLR Flex  2x10  2x10 ea 2x10 ea 2x10 ea 2x10 ea 3x10 ea 3x10 ea 3x10 ea 3x10 ea   Prayer Stretch  10x10\"  10x10\" 10x10\" 10x10\" 10x10\" 10x10\" 10x10\" 10x10\" 10x10\" 10x10\"   Seated sciatic nv glide   5\"x20           Modified good morning   10# KB  2x10 10# KB  2x10 10# KB  2x10         Rows       #15K  3x10 #15K  3x10 #16K  3x10 #16K  3x10 #17K  3x10 #18K  3x10   Low Rows      #15K  3x10 #15K  3x10 #16K  3x10 #16K  3x10 #17K  3x10 #18K  3x10   Pulldowns       #15K  3x10 #15K  3x10 #16K  3x10 #16K  3x10 #17K  3x10 #18K  3x10   Leg Press         #45  3x10 #55  3x10 #55  3x10                 Ther Activity                                          Gait Training                                          Modalities                 MHP 10' pre MHP 10' pre MHP 10' pre MHP 10' pre MHP 10' pre MHP 10' pre MHP 10' pre MHP 10' pre MHP 10' pre                    "

## 2025-04-08 ENCOUNTER — OFFICE VISIT (OUTPATIENT)
Age: 70
End: 2025-04-08
Payer: MEDICARE

## 2025-04-08 DIAGNOSIS — M54.16 LUMBAR RADICULOPATHY, CHRONIC: Primary | ICD-10-CM

## 2025-04-08 PROCEDURE — 97110 THERAPEUTIC EXERCISES: CPT

## 2025-04-08 PROCEDURE — 97112 NEUROMUSCULAR REEDUCATION: CPT

## 2025-04-08 NOTE — PROGRESS NOTES
"Daily Note   Today's date: 25  Patient name: Jay Jay Acuna  : 1955  MRN: [id number]  Referring provider: Carolyn Mata MD  Dx:   Encounter Diagnosis     ICD-10-CM    1. Lumbar radiculopathy, chronic  M54.16                    Subjective: Patient reports that his back is doing pretty good overall. Notes that he has an MRI on .      Objective: See treatment diary below    Assessment: Tolerated treatment well.  Continued with all exercises this session focusing on strengthening today.  Patient would benefit from continued PT    Plan: Continue per plan of care.     Precautions: PMH includes HIV, HTN, hiatal hernia, GERD and history of low back pain    Daily Treatment Log  Manuals 4/1 4/3 4/8  3/13 3/18 3/20 3/25 3/27   SL SI Oscillations      GRC      Breaking Bread        GRC    SL CG LAD      GRC  GR GRC    FMP Lumbar Pattern         GRC   Lumbar PA Mobs     GRC GR GR GRC    RE            Neuro Re-Ed 4/1 4/3 4/8  3/13 3/18 3/20 3/25 3/27   Prone GS     20x5\" 20x5\" 20x5\"     PBKHE  20x5\"    20x5\"       Alt UE/Alt LE 20x5\"    20x5\"       Bridges with TB 20x5\"  20x5\"  Swiss Ball   20x5\" Swiss Ball 20x5\" Swiss Ball 20x5\" Swiss Ball 20x5\"    Hip Add Iso 20x5\"           Prone Hip ER Yoga Block 20x5\"            MF Press      Black 30x ea Black 30x ea Black 30x ea     MF Twist      Black 30x ea Black 30x ea Black 30x ea     STS with GS             Pball Crushers 20x5\"  20x5\"  20x5\" 30x5\" 30x5\"     Pball Diagonal 20x5\" ea  20x5\"  20x5\" ea 20x5\" ea 20x5\" ea     Sup HS Curls       Swiss Ball   20x5\" Swiss Ball   20x5\" Swiss Ball   20x5\"     Chops Down [place]  #12.5  3x10 ea [place]  #12.5  3x10 ea [place]  #12.5  3x10 ea      12.5#K  3x10   Chops Up [place] #12.5  3x10  ea [place]  #12.5  3x10 ea [place]  #12.5  3x10 ea      12.5#K  3x10                                       Ther Ex 4/ 4/3 4/8  3/13 3/18 3/20 3/25 3/27   Nu Step/Bike  10'  10'    10'  10'  10' 10' 10'    SLR x 4      2x10 ea 3x10 luis carlos 3x10 luis carlos " "3x10 ea 3x10 ea   Prayer Stretch  10x10\" ea 10x10\"  10x10\" 10x10\" 10x10\" 10x10\" 10x10\"   Seated sciatic nv glide            Modified good morning            Rows  #18K  3x10 #18.5K  3x10 #18.5K  3x10  #15K  3x10 #16K  3x10 #16K  3x10 #17K  3x10 #18K  3x10   Low Rows #18K  3x10 #18.5K  3x10 #18.5K  3x10  #15K  3x10 #16K  3x10 #16K  3x10 #17K  3x10 #18K  3x10   Pulldowns  #18K  3x10 #18.5K  3x10 #18.5K 3x10  #15K  3x10 #16K  3x10 #16K  3x10 #17K  3x10 #18K  3x10   Leg Press #55  3x10 #55  3x10 #65 3x10    #45  3x10 #55  3x10 #55  3x10               Ther Activity                                    Gait Training                                    Modalities 4/1 4/3 4/8  3/13 3/18 3/20 3/25 3/27    MHP   10' pre MHP   10' pre MHP   10' pre  MHP 10' pre MHP 10' pre MHP 10' pre MHP 10' pre MHP 10' pre                  "

## 2025-04-10 ENCOUNTER — OFFICE VISIT (OUTPATIENT)
Age: 70
End: 2025-04-10
Payer: MEDICARE

## 2025-04-10 DIAGNOSIS — M54.16 LUMBAR RADICULOPATHY, CHRONIC: Primary | ICD-10-CM

## 2025-04-10 PROCEDURE — 97110 THERAPEUTIC EXERCISES: CPT

## 2025-04-10 PROCEDURE — 97112 NEUROMUSCULAR REEDUCATION: CPT

## 2025-04-10 NOTE — PROGRESS NOTES
"Daily Note   Today's date: 25  Patient name: Jay Jay Acuna  : 1955  MRN: [id number]  Referring provider: Carolyn Mata MD  Dx:   Encounter Diagnosis     ICD-10-CM    1. Lumbar radiculopathy, chronic  M54.16                    Subjective: Patient reports that his back is doing pretty good overall again today noting that he really has made improvements over the last few weeks. . Notes that he has an MRI on .      Objective: See treatment diary below    Assessment: Tolerated treatment well.  Continued with all exercises this session focusing on strengthening again today.  Went from rows to leg press back to extensions and back to leg press today. Patient would benefit from continued PT    Plan: Continue per plan of care.     Precautions: PMH includes HIV, HTN, hiatal hernia, GERD and history of low back pain    Daily Treatment Log  Manuals 4/1 4/3 4/8 4/10  3/18 3/20 3/25 3/27   SL SI Oscillations      GRC      Breaking Bread        GRC    SL CG LAD        GRC GRC    FMP Lumbar Pattern         GRC   Lumbar PA Mobs      GRC GR GRC    RE            Neuro Re-Ed 4/1 4/3 4/8 4/10  3/18 3/20 3/25 3/27   Prone GS      20x5\" 20x5\"     PBKHE  20x5\"           Alt UE/Alt LE 20x5\"           Bridges with TB 20x5\"  20x5\" 20x5\" pball  Swiss Ball 20x5\" Swiss Ball 20x5\" Swiss Ball 20x5\"    Hip Add Iso 20x5\"           Prone Hip ER Yoga Block 20x5\"            MF Press       Black 30x ea Black 30x ea     MF Twist       Black 30x ea Black 30x ea     STS with GS             Pball Crushers 20x5\"  20x5\" 20x5\"  30x5\" 30x5\"     Pball Diagonal 20x5\" ea  20x5\" 20x5\"  20x5\" ea 20x5\" ea     Sup HS Curls       Swiss Ball   20x5\" Swiss Ball   20x5\" Swiss Ball   20x5\"     Chops Down [place]  #12.5  3x10 ea [place]  #12.5  3x10 ea [place]  #12.5  3x10 ea [place]  #12.5  3x10 ea     12.5#K  3x10   Chops Up [place] #12.5  3x10  ea [place]  #12.5  3x10 ea [place]  #12.5  3x10 ea [place]  #12.5  3x10 ea     12.5#K  3x10                        " "               Ther Ex 4/1 4/3 4/8 4/10  3/18 3/20 3/25 3/27   Nu Step/Bike  10'  10'     10'  10' 10' 10'    SLR x 4     2# 3x10 flx  3x10 ea 3x10 ea 3x10 ea 3x10 ea   Prayer Stretch  10x10\" ea 10x10\" 10x10\"  10x10\" 10x10\" 10x10\" 10x10\"   Seated sciatic nv glide            Modified good morning            Rows  #18K  3x10 #18.5K  3x10 #18.5K  3x10 #18.5K  3x10  #16K  3x10 #16K  3x10 #17K  3x10 #18K  3x10   Low Rows #18K  3x10 #18.5K  3x10 #18.5K  3x10 #18.5K  3x10  #16K  3x10 #16K  3x10 #17K  3x10 #18K  3x10   Pulldowns  #18K  3x10 #18.5K  3x10 #18.5K 3x10 #18.5K 3x10  #16K  3x10 #16K  3x10 #17K  3x10 #18K  3x10   Leg Press #55  3x10 #55  3x10 #65 3x10 75# + 85# 3x10 ea   #45  3x10 #55  3x10 #55  3x10               Ther Activity                                    Gait Training                                    Modalities 4/1 4/3 4/8 4/10  3/18 3/20 3/25 3/27    MHP   10' pre MHP   10' pre MHP   10' pre MHP 10' pre  MHP 10' pre MHP 10' pre MHP 10' pre MHP 10' pre                  "

## 2025-04-14 ENCOUNTER — HOSPITAL ENCOUNTER (OUTPATIENT)
Facility: MEDICAL CENTER | Age: 70
Discharge: HOME | End: 2025-04-14
Payer: MEDICARE

## 2025-04-14 DIAGNOSIS — M54.50 CHRONIC BILATERAL LOW BACK PAIN WITHOUT SCIATICA: ICD-10-CM

## 2025-04-14 DIAGNOSIS — G89.29 CHRONIC BILATERAL LOW BACK PAIN WITHOUT SCIATICA: ICD-10-CM

## 2025-04-14 PROCEDURE — 72148 MRI LUMBAR SPINE W/O DYE: CPT

## 2025-04-15 ENCOUNTER — OFFICE VISIT (OUTPATIENT)
Age: 70
End: 2025-04-15
Attending: STUDENT IN AN ORGANIZED HEALTH CARE EDUCATION/TRAINING PROGRAM
Payer: MEDICARE

## 2025-04-15 DIAGNOSIS — M54.16 LUMBAR RADICULOPATHY, CHRONIC: Primary | ICD-10-CM

## 2025-04-15 PROCEDURE — 97110 THERAPEUTIC EXERCISES: CPT

## 2025-04-15 PROCEDURE — 97112 NEUROMUSCULAR REEDUCATION: CPT

## 2025-04-15 NOTE — PROGRESS NOTES
"Daily Note   Today's date: 25  Patient name: Jay Jay Acuna  : 1955  MRN: [id number]  Referring provider: Carolyn Mata MD  Dx:   Encounter Diagnosis     ICD-10-CM    1. Lumbar radiculopathy, chronic  M54.16                    Subjective: Patient reports that he is waiting on the MRI results f/u with his MD. Notes that his back is doing pretty good overall but is feeling tired today since he didn't get a good nights sleep.     Objective: See treatment diary below    Assessment: Tolerated treatment well.  Continued with all exercises this session focusing on strengthening again today.  Pt did well overall with no increased symptoms besides fatigue. Patient would benefit from continued PT    Plan: Continue per plan of care.     Precautions: PMH includes HIV, HTN, hiatal hernia, GERD and history of low back pain    Daily Treatment Log  Manuals 4/1 4/3 4/8 4/10 4/15   3/25 3/27   SL SI Oscillations            Breaking Bread        GRC    SL CG LAD         GRC    FMP Lumbar Pattern         GRC   Lumbar PA Mobs        GRC    RE            Neuro Re-Ed 4/1 4/3 4/8 4/10 4/15   3/25 3/27   Prone GS            PBKHE  20x5\"           Alt UE/Alt LE 20x5\"           Bridges with TB 20x5\"  20x5\" 20x5\" pball 20x5\" pball   Swiss Ball 20x5\"    Hip Add Iso 20x5\"           Prone Hip ER Yoga Block 20x5\"            MF Press             MF Twist             STS with GS             Pball Crushers 20x5\"  20x5\" 20x5\" 20x5\"       Pball Diagonal 20x5\" ea  20x5\" 20x5\" 20x5\"       Sup HS Curls         Swiss Ball   20x5\"     Chops Down [place]  #12.5  3x10 ea [place]  #12.5  3x10 ea [place]  #12.5  3x10 ea [place]  #12.5  3x10 ea [place]  #12.5  3x10 ea    12.5#K  3x10   Chops Up [place] #12.5  3x10  ea [place]  #12.5  3x10 ea [place]  #12.5  3x10 ea [place]  #12.5  3x10 ea [place]  #12.5  3x10 ea    12.5#K  3x10                                       Ther Ex 4/1 4/3 4/8 4/10 4/15   3/25 3/27   Nu Step/Bike  10'  10'       10' 10'    SLR x 4  " "   2# 3x10 flx 2# 3x10 flex   3x10 ea 3x10 ea   Prayer Stretch  10x10\" ea 10x10\" 10x10\" 10x10\"   10x10\" 10x10\"   Seated sciatic nv glide            Modified good morning            Rows  #18K  3x10 #18.5K  3x10 #18.5K  3x10 #18.5K  3x10 #18.5K  3x10   #17K  3x10 #18K  3x10   Low Rows #18K  3x10 #18.5K  3x10 #18.5K  3x10 #18.5K  3x10 #18.5K  3x10   #17K  3x10 #18K  3x10   Pulldowns  #18K  3x10 #18.5K  3x10 #18.5K 3x10 #18.5K 3x10 #18.5K  3x10   #17K  3x10 #18K  3x10   Leg Press #55  3x10 #55  3x10 #65 3x10 75# + 85# 3x10 ea 65# x10  75# 2x10   #55  3x10 #55  3x10               Ther Activity                                    Gait Training                                    Modalities 4/1 4/3 4/8 4/10 4/15   3/25 3/27    MHP   10' pre MHP   10' pre MHP   10' pre MHP 10' pre MHP 10' Pre   MHP 10' pre MHP 10' pre                  "

## 2025-04-17 ENCOUNTER — OFFICE VISIT (OUTPATIENT)
Age: 70
End: 2025-04-17
Attending: STUDENT IN AN ORGANIZED HEALTH CARE EDUCATION/TRAINING PROGRAM
Payer: MEDICARE

## 2025-04-17 DIAGNOSIS — M54.16 LUMBAR RADICULOPATHY, CHRONIC: Primary | ICD-10-CM

## 2025-04-17 PROCEDURE — 97112 NEUROMUSCULAR REEDUCATION: CPT

## 2025-04-17 PROCEDURE — 97110 THERAPEUTIC EXERCISES: CPT

## 2025-04-17 NOTE — PROGRESS NOTES
"Daily Note   Today's date: 25  Patient name: Jay Jay Acuna  : 1955  MRN: [id number]  Referring provider: Carolyn Mata MD  Dx:   Encounter Diagnosis     ICD-10-CM    1. Lumbar radiculopathy, chronic  M54.16                    Subjective: Patient reports that he has been feeling good for a decent amount of time now. Notes that he was not sore following last session.     Objective: See treatment diary below    Assessment: Tolerated treatment well.  Continued with all exercises this session focusing on strengthening again today with core progressions made as charted below.  Pt did well overall with no increased symptoms besides fatigue. Patient would benefit from continued PT    Plan: Continue per plan of care.     Precautions: PMH includes HIV, HTN, hiatal hernia, GERD and history of low back pain    Daily Treatment Log  Manuals 4/1 4/3 4/8 4/10 4/15 4/17  3/25 3/27   SL SI Oscillations            Breaking Bread        GRC    SL CG LAD         GRC    FMP Lumbar Pattern         GRC   Lumbar PA Mobs        GRC    RE            Neuro Re-Ed 4/1 4/3 4/8 4/10 4/15 4/17  3/25 3/27   Prone GS            PBKHE  20x5\"           Alt UE/Alt LE 20x5\"     W pball 20x5\"      Bridges with TB 20x5\"  20x5\" 20x5\" pball 20x5\" pball 20x5\" w curl  Swiss Ball 20x5\"    Hip Add Iso 20x5\"           Prone Hip ER Yoga Block 20x5\"            MF Press             MF Twist             STS with GS             Pball Crushers 20x5\"  20x5\" 20x5\" 20x5\"       Pball Diagonal 20x5\" ea  20x5\" 20x5\" 20x5\"       Sup HS Curls         Swiss Ball   20x5\"     Chops Down [place]  #12.5  3x10 ea [place]  #12.5  3x10 ea [place]  #12.5  3x10 ea [place]  #12.5  3x10 ea [place]  #12.5  3x10 ea [place]  #12.5  3x10 ea   12.5#K  3x10   Chops Up [place] #12.5  3x10  ea [place]  #12.5  3x10 ea [place]  #12.5  3x10 ea [place]  #12.5  3x10 ea [place]  #12.5  3x10 ea Belt rotation chop ups 15#   12.5#K  3x10                                       Ther Ex 4/1 4/3 4/8 4/10 " "4/15 4/17  3/25 3/27   Nu Step/Bike  10'  10'       10' 10'    SLR x 4     2# 3x10 flx 2# 3x10 flex 2# 3x10 flex  3x10 ea 3x10 ea   Prayer Stretch  10x10\" ea 10x10\" 10x10\" 10x10\" 10x10\"  10x10\" 10x10\"   Seated sciatic nv glide            Modified good morning            Rows  #18K  3x10 #18.5K  3x10 #18.5K  3x10 #18.5K  3x10 #18.5K  3x10 #20.5K  3x10 rope  #17K  3x10 #18K  3x10   Low Rows #18K  3x10 #18.5K  3x10 #18.5K  3x10 #18.5K  3x10 #18.5K  3x10 #20.5K rope  3x10  #17K  3x10 #18K  3x10   Pulldowns  #18K  3x10 #18.5K  3x10 #18.5K 3x10 #18.5K 3x10 #18.5K  3x10 #18.5K  3x10  #17K  3x10 #18K  3x10   Leg Press #55  3x10 #55  3x10 #65 3x10 75# + 85# 3x10 ea 65# x10  75# 2x10 75# x10  85# 2x10  #55  3x10 #55  3x10               Ther Activity                                    Gait Training                                    Modalities 4/1 4/3 4/8 4/10 4/15 4/17  3/25 3/27    MHP   10' pre MHP   10' pre MHP   10' pre MHP 10' pre MHP 10' Pre MHP 10' pre  MHP 10' pre MHP 10' pre                  "

## 2025-04-17 NOTE — PROGRESS NOTES
"Daily Note   Today's date: 25  Patient name: Jay Jay Acuna  : 1955  MRN: [id number]  Referring provider: Carolyn Mata MD  Dx:   Encounter Diagnosis     ICD-10-CM    1. Lumbar radiculopathy, chronic  M54.16                    Subjective: Patient reports that he has been feeling good for a decent amount of time now. Notes that he was not sore following last session.     Objective: See treatment diary below    Assessment: Tolerated treatment well.  Continued with all exercises this session focusing on strengthening again today with core progressions made as charted below.  Pt did well overall with no increased symptoms besides fatigue. Patient would benefit from continued PT    Plan: Continue per plan of care.     Precautions: PMH includes HIV, HTN, hiatal hernia, GERD and history of low back pain    Daily Treatment Log  Manuals 4/1 4/3 4/8 4/10 4/15 4/17  3/25 3/27   SL SI Oscillations            Breaking Bread        GRC    SL CG LAD         GRC    FMP Lumbar Pattern         GRC   Lumbar PA Mobs        GRC    RE            Neuro Re-Ed 4/1 4/3 4/8 4/10 4/15 4/17  3/25 3/27   Prone GS            PBKHE  20x5\"           Alt UE/Alt LE 20x5\"     W pball 20x5\"      Bridges with TB 20x5\"  20x5\" 20x5\" pball 20x5\" pball 20x5\" w curl  Swiss Ball 20x5\"    Hip Add Iso 20x5\"           Prone Hip ER Yoga Block 20x5\"            MF Press             MF Twist             STS with GS             Pball Crushers 20x5\"  20x5\" 20x5\" 20x5\"       Pball Diagonal 20x5\" ea  20x5\" 20x5\" 20x5\"       Sup HS Curls         Swiss Ball   20x5\"     Chops Down [place]  #12.5  3x10 ea [place]  #12.5  3x10 ea [place]  #12.5  3x10 ea [place]  #12.5  3x10 ea [place]  #12.5  3x10 ea [place]  #12.5  3x10 ea   12.5#K  3x10   Chops Up [place] #12.5  3x10  ea [place]  #12.5  3x10 ea [place]  #12.5  3x10 ea [place]  #12.5  3x10 ea [place]  #12.5  3x10 ea Belt rotation chop ups 15#   12.5#K  3x10                                       Ther Ex 4/1 4/3 4/8 4/10 " "4/15 4/17  3/25 3/27   Nu Step/Bike  10'  10'       10' 10'    SLR x 4     2# 3x10 flx 2# 3x10 flex 2# 3x10 flex  3x10 ea 3x10 ea   Prayer Stretch  10x10\" ea 10x10\" 10x10\" 10x10\" 10x10\"  10x10\" 10x10\"   Seated sciatic nv glide            Modified good morning            Rows  #18K  3x10 #18.5K  3x10 #18.5K  3x10 #18.5K  3x10 #18.5K  3x10 #20.5K  3x10 rope  #17K  3x10 #18K  3x10   Low Rows #18K  3x10 #18.5K  3x10 #18.5K  3x10 #18.5K  3x10 #18.5K  3x10 #20.5K rope  3x10  #17K  3x10 #18K  3x10   Pulldowns  #18K  3x10 #18.5K  3x10 #18.5K 3x10 #18.5K 3x10 #18.5K  3x10 #18.5K  3x10  #17K  3x10 #18K  3x10   Leg Press #55  3x10 #55  3x10 #65 3x10 75# + 85# 3x10 ea 65# x10  75# 2x10 75# x10  85# 2x10  #55  3x10 #55  3x10               Ther Activity                                    Gait Training                                    Modalities 4/1 4/3 4/8 4/10 4/15 4/17  3/25 3/27    MHP   10' pre MHP   10' pre MHP   10' pre MHP 10' pre MHP 10' Pre MHP 10' pre  MHP 10' pre MHP 10' pre                  "

## 2025-04-22 ENCOUNTER — OFFICE VISIT (OUTPATIENT)
Age: 70
End: 2025-04-22
Attending: STUDENT IN AN ORGANIZED HEALTH CARE EDUCATION/TRAINING PROGRAM
Payer: MEDICARE

## 2025-04-22 ENCOUNTER — TELEPHONE (OUTPATIENT)
Dept: UROLOGY | Facility: CLINIC | Age: 70
End: 2025-04-22

## 2025-04-22 DIAGNOSIS — N20.0 NEPHROLITHIASIS: ICD-10-CM

## 2025-04-22 DIAGNOSIS — M54.16 LUMBAR RADICULOPATHY, CHRONIC: Primary | ICD-10-CM

## 2025-04-22 PROCEDURE — 97140 MANUAL THERAPY 1/> REGIONS: CPT | Performed by: PHYSICAL THERAPIST

## 2025-04-22 PROCEDURE — 97110 THERAPEUTIC EXERCISES: CPT | Performed by: PHYSICAL THERAPIST

## 2025-04-22 PROCEDURE — 97112 NEUROMUSCULAR REEDUCATION: CPT | Performed by: PHYSICAL THERAPIST

## 2025-04-22 RX ORDER — POTASSIUM CITRATE 540 MG/1
5 TABLET, EXTENDED RELEASE ORAL DAILY
Qty: 90 TABLET | Refills: 3 | Status: SHIPPED | OUTPATIENT
Start: 2025-04-22

## 2025-04-22 NOTE — TELEPHONE ENCOUNTER
Spoke to pt and advised:    Antonio Dalal now (12:56 PM)     Script sent for 90 days- no smaller pills. Can cut tablet in half- may be easy to take full dose that way

## 2025-04-22 NOTE — TELEPHONE ENCOUNTER
Pt called Rx refill line asking for this to be sent to pharmacy for 90 days but mentioned that he has a hard time swallowing  this pill because of the shape- he is asking if there is any alternatives that would be easier for him to take - he also wants 90 day supply on whatever is sent into pharmacy       Reason for call:   [x] Refill   [] Prior Auth  [] Other:     Office:   [] PCP/Provider -   [x] Specialty/Provider - [facility]     Medication:     potassium citrate (UROCIT-K) 5 MEQ (540 MG) TAKE ONE TABLET BY MOUTH EVERY MORNING          Pharmacy: [facility]     [facility]   Does the patient have enough for 3 days?   [x] Yes   [] No - Send as HP to POD    [facility] Pharmacy   Does the patient have enough for 10 days?   [] Yes   [] No - Send as HP to POD

## 2025-04-22 NOTE — TELEPHONE ENCOUNTER
Script sent for 90 days- no smaller pills. Can cut tablet in half- may be easy to take full dose that way

## 2025-04-24 ENCOUNTER — OFFICE VISIT (OUTPATIENT)
Age: 70
End: 2025-04-24
Payer: MEDICARE

## 2025-04-24 DIAGNOSIS — M54.16 LUMBAR RADICULOPATHY, CHRONIC: Primary | ICD-10-CM

## 2025-04-24 PROCEDURE — 97140 MANUAL THERAPY 1/> REGIONS: CPT | Performed by: PHYSICAL THERAPIST

## 2025-04-24 PROCEDURE — 97112 NEUROMUSCULAR REEDUCATION: CPT | Performed by: PHYSICAL THERAPIST

## 2025-04-24 PROCEDURE — 97110 THERAPEUTIC EXERCISES: CPT | Performed by: PHYSICAL THERAPIST

## 2025-04-27 NOTE — PROGRESS NOTES
"Daily Note   Today's date: 25  Patient name: Jay Jay Acuna  : 1955  MRN: [id number]  Referring provider: Carolyn Mata MD  Dx:   Encounter Diagnosis     ICD-10-CM    1. Lumbar radiculopathy, chronic  M54.16                    Subjective: Patient reports that he has been feeling good for a decent amount of time now. Notes that he was not sore following last session.     Objective: See treatment diary below    Assessment: Tolerated treatment well.  Continued with all exercises this session focusing on strengthening again today with core progressions made as charted below.  Pt did well overall with no increased symptoms besides fatigue. Patient would benefit from continued PT    Plan: Continue per plan of care.     Precautions: PMH includes HIV, HTN, hiatal hernia, GERD and history of low back pain    Daily Treatment Log  Manuals 4/1 4/3 4/8 4/10 4/15 4/17  3/25 3/27   SL SI Oscillations            Breaking Bread        GRC    SL CG LAD         GRC    FMP Lumbar Pattern         GRC   Lumbar PA Mobs        GRC    RE            Neuro Re-Ed 4/1 4/3 4/8 4/10 4/15 4/17  3/25 3/27   Prone GS            PBKHE  20x5\"           Alt UE/Alt LE 20x5\"     W pball 20x5\"      Bridges with TB 20x5\"  20x5\" 20x5\" pball 20x5\" pball 20x5\" w curl  Swiss Ball 20x5\"    Hip Add Iso 20x5\"           Prone Hip ER Yoga Block 20x5\"            MF Press             MF Twist             STS with GS             Pball Crushers 20x5\"  20x5\" 20x5\" 20x5\"       Pball Diagonal 20x5\" ea  20x5\" 20x5\" 20x5\"       Sup HS Curls         Swiss Ball   20x5\"     Chops Down [place]  #12.5  3x10 ea [place]  #12.5  3x10 ea [place]  #12.5  3x10 ea [place]  #12.5  3x10 ea [place]  #12.5  3x10 ea [place]  #12.5  3x10 ea   12.5#K  3x10   Chops Up [place] #12.5  3x10  ea [place]  #12.5  3x10 ea [place]  #12.5  3x10 ea [place]  #12.5  3x10 ea [place]  #12.5  3x10 ea Belt rotation chop ups 15#   12.5#K  3x10                                       Ther Ex 4/1 4/3 4/8 4/10 " "4/15 4/17  3/25 3/27   Nu Step/Bike  10'  10'       10' 10'    SLR x 4     2# 3x10 flx 2# 3x10 flex 2# 3x10 flex  3x10 ea 3x10 ea   Prayer Stretch  10x10\" ea 10x10\" 10x10\" 10x10\" 10x10\"  10x10\" 10x10\"   Seated sciatic nv glide            Modified good morning            Rows  #18K  3x10 #18.5K  3x10 #18.5K  3x10 #18.5K  3x10 #18.5K  3x10 #20.5K  3x10 rope  #17K  3x10 #18K  3x10   Low Rows #18K  3x10 #18.5K  3x10 #18.5K  3x10 #18.5K  3x10 #18.5K  3x10 #20.5K rope  3x10  #17K  3x10 #18K  3x10   Pulldowns  #18K  3x10 #18.5K  3x10 #18.5K 3x10 #18.5K 3x10 #18.5K  3x10 #18.5K  3x10  #17K  3x10 #18K  3x10   Leg Press #55  3x10 #55  3x10 #65 3x10 75# + 85# 3x10 ea 65# x10  75# 2x10 75# x10  85# 2x10  #55  3x10 #55  3x10               Ther Activity                                    Gait Training                                    Modalities 4/1 4/3 4/8 4/10 4/15 4/17  3/25 3/27    MHP   10' pre MHP   10' pre MHP   10' pre MHP 10' pre MHP 10' Pre MHP 10' pre  MHP 10' pre MHP 10' pre                  "

## 2025-04-27 NOTE — PROGRESS NOTES
"Daily Note   Today's date: 25  Patient name: Jay Jay Acuna  : 1955  MRN: [id number]  Referring provider: Carolyn Mata MD  Dx:   Encounter Diagnosis     ICD-10-CM    1. Lumbar radiculopathy, chronic  M54.16                    Subjective: Patient reports that he has been feeling good for a decent amount of time now. Notes that he was not sore following last session.     Objective: See treatment diary below    Assessment: Tolerated treatment well.  Continued with all exercises this session focusing on strengthening again today with core progressions made as charted below.  Pt did well overall with no increased symptoms besides fatigue. Patient would benefit from continued PT    Plan: Continue per plan of care.     Precautions: PMH includes HIV, HTN, hiatal hernia, GERD and history of low back pain    Daily Treatment Log  Manuals 4/1 4/3 4/8 4/10 4/15 4/17  3/25 3/27   SL SI Oscillations            Breaking Bread        GRC    SL CG LAD         GRC    FMP Lumbar Pattern         GRC   Lumbar PA Mobs        GRC    RE            Neuro Re-Ed 4/1 4/3 4/8 4/10 4/15 4/17  3/25 3/27   Prone GS            PBKHE  20x5\"           Alt UE/Alt LE 20x5\"     W pball 20x5\"      Bridges with TB 20x5\"  20x5\" 20x5\" pball 20x5\" pball 20x5\" w curl  Swiss Ball 20x5\"    Hip Add Iso 20x5\"           Prone Hip ER Yoga Block 20x5\"            MF Press             MF Twist             STS with GS             Pball Crushers 20x5\"  20x5\" 20x5\" 20x5\"       Pball Diagonal 20x5\" ea  20x5\" 20x5\" 20x5\"       Sup HS Curls         Swiss Ball   20x5\"     Chops Down [place]  #12.5  3x10 ea [place]  #12.5  3x10 ea [place]  #12.5  3x10 ea [place]  #12.5  3x10 ea [place]  #12.5  3x10 ea [place]  #12.5  3x10 ea   12.5#K  3x10   Chops Up [place] #12.5  3x10  ea [place]  #12.5  3x10 ea [place]  #12.5  3x10 ea [place]  #12.5  3x10 ea [place]  #12.5  3x10 ea Belt rotation chop ups 15#   12.5#K  3x10                                       Ther Ex 4/1 4/3 4/8 4/10 " "4/15 4/17  3/25 3/27   Nu Step/Bike  10'  10'       10' 10'    SLR x 4     2# 3x10 flx 2# 3x10 flex 2# 3x10 flex  3x10 ea 3x10 ea   Prayer Stretch  10x10\" ea 10x10\" 10x10\" 10x10\" 10x10\"  10x10\" 10x10\"   Seated sciatic nv glide            Modified good morning            Rows  #18K  3x10 #18.5K  3x10 #18.5K  3x10 #18.5K  3x10 #18.5K  3x10 #20.5K  3x10 rope  #17K  3x10 #18K  3x10   Low Rows #18K  3x10 #18.5K  3x10 #18.5K  3x10 #18.5K  3x10 #18.5K  3x10 #20.5K rope  3x10  #17K  3x10 #18K  3x10   Pulldowns  #18K  3x10 #18.5K  3x10 #18.5K 3x10 #18.5K 3x10 #18.5K  3x10 #18.5K  3x10  #17K  3x10 #18K  3x10   Leg Press #55  3x10 #55  3x10 #65 3x10 75# + 85# 3x10 ea 65# x10  75# 2x10 75# x10  85# 2x10  #55  3x10 #55  3x10               Ther Activity                                    Gait Training                                    Modalities 4/1 4/3 4/8 4/10 4/15 4/17  3/25 3/27    MHP   10' pre MHP   10' pre MHP   10' pre MHP 10' pre MHP 10' Pre MHP 10' pre  MHP 10' pre MHP 10' pre                  "

## 2025-04-28 ENCOUNTER — OFFICE VISIT (OUTPATIENT)
Dept: SURGERY | Facility: CLINIC | Age: 70
End: 2025-04-28
Payer: MEDICARE

## 2025-04-28 ENCOUNTER — PATIENT OUTREACH (OUTPATIENT)
Dept: SURGERY | Facility: CLINIC | Age: 70
End: 2025-04-28

## 2025-04-28 ENCOUNTER — TREATMENT (OUTPATIENT)
Dept: SURGERY | Facility: CLINIC | Age: 70
End: 2025-04-28

## 2025-04-28 VITALS
HEIGHT: 73 IN | RESPIRATION RATE: 17 BRPM | OXYGEN SATURATION: 96 % | SYSTOLIC BLOOD PRESSURE: 130 MMHG | DIASTOLIC BLOOD PRESSURE: 68 MMHG | TEMPERATURE: 98.7 F | BODY MASS INDEX: 27.83 KG/M2 | HEART RATE: 62 BPM | WEIGHT: 210 LBS

## 2025-04-28 DIAGNOSIS — R97.20 ELEVATED PSA: ICD-10-CM

## 2025-04-28 DIAGNOSIS — F32.A DEPRESSION, UNSPECIFIED DEPRESSION TYPE: Primary | ICD-10-CM

## 2025-04-28 DIAGNOSIS — I10 PRIMARY HYPERTENSION: ICD-10-CM

## 2025-04-28 DIAGNOSIS — N28.1 BILATERAL RENAL CYSTS: ICD-10-CM

## 2025-04-28 DIAGNOSIS — Z00.00 MEDICARE ANNUAL WELLNESS VISIT, SUBSEQUENT: ICD-10-CM

## 2025-04-28 DIAGNOSIS — B20 HIV DISEASE (HCC): Primary | ICD-10-CM

## 2025-04-28 DIAGNOSIS — M54.16 LUMBAR RADICULOPATHY, CHRONIC: ICD-10-CM

## 2025-04-28 DIAGNOSIS — N20.0 RENAL CALCULUS: ICD-10-CM

## 2025-04-28 PROCEDURE — 99214 OFFICE O/P EST MOD 30 MIN: CPT | Performed by: NURSE PRACTITIONER

## 2025-04-28 PROCEDURE — G0439 PPPS, SUBSEQ VISIT: HCPCS | Performed by: NURSE PRACTITIONER

## 2025-04-28 PROCEDURE — PBNCHG PB NO CHARGE PLACEHOLDER

## 2025-04-28 RX ORDER — PERFLUOROHEXYLOCTANE 1 MG/MG
1 SOLUTION OPHTHALMIC 4 TIMES DAILY
COMMUNITY
Start: 2025-04-01

## 2025-04-28 NOTE — ASSESSMENT & PLAN NOTE
Improving with PT.  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     Continue PT  Continue to follow with [facility]  Using NSAIDs as needed  Does not wish to continue with Meloxicam

## 2025-04-28 NOTE — ASSESSMENT & PLAN NOTE
Doing well on Biktarvy with an undetectable VL.  Pt reports 100% medication compliance on HAART.  Stressed the importance of 100% medication adherence  Monitor CD4. HIV RNA, CMP, and CBCD to monitor for any developing virologic response, treatment failure, or drug toxicities  Follow up with Dr. Campa or Dr. Barrett   Continue Biktarvy  HIV Counseling:    Viral Load: Undetectable    CD4 Count: 491      Denies side effects.  Stressed the importance of adherence.  Continue follow up in the ID clinic with Dr. Campa or Dr. Barertt.    Reviewed the most recent labs, including the CD4 and viral load.  Discussed the risks and benefits of treatment options, instructions for management, importance of treatment adherence, and reduction of risk factors.  Educated on possible medication side effects.    Counseled on routes of HIV transmission, including the risk of  infection.  Emphasized that viral suppression is the best method to prevent HIV transmission.  At this time the pt denies the need for HIV testing of anyone in their life.    Total encounter time was greater than 35 minutes.  Greater than 20 minutes were spent on counseling and patient education.  Pt voices understanding and agreement with treatment plan.

## 2025-04-28 NOTE — PROGRESS NOTES
Name: Jay Jay Acuna      : 1955      MRN: [id number]  Encounter Provider: ARINA Lantigua  Encounter Date: 2025   Encounter department: ASC AT [facility]  :  Assessment & Plan  HIV disease (HCC)  Doing well on Biktarvy with an undetectable VL.  Pt reports 100% medication compliance on HAART.  Stressed the importance of 100% medication adherence  Monitor CD4. HIV RNA, CMP, and CBCD to monitor for any developing virologic response, treatment failure, or drug toxicities  Follow up with Dr. Campa or Dr. Barrett   Continue Biktarvy  HIV Counseling:    Viral Load: Undetectable    CD4 Count: 491      Denies side effects.  Stressed the importance of adherence.  Continue follow up in the ID clinic with Dr. Campa or Dr. Barrett.    Reviewed the most recent labs, including the CD4 and viral load.  Discussed the risks and benefits of treatment options, instructions for management, importance of treatment adherence, and reduction of risk factors.  Educated on possible medication side effects.    Counseled on routes of HIV transmission, including the risk of  infection.  Emphasized that viral suppression is the best method to prevent HIV transmission.  At this time the pt denies the need for HIV testing of anyone in their life.    Total encounter time was greater than 35 minutes.  Greater than 20 minutes were spent on counseling and patient education.  Pt voices understanding and agreement with treatment plan.            Medicare annual wellness visit, subsequent         Primary hypertension  BP at goal.  Continue to monitor BP at home  Continue hypertensive regimen         Bilateral renal cysts  Incidental finding on MRI lumbar spine.  Orders:    Ambulatory Referral to Nephrology; Future    Renal calculus  Known h/o  Orders:    Ambulatory Referral to Nephrology; Future    Elevated PSA  Being closely monitored by Urology.  PSA monitoring by Urology         Lumbar radiculopathy,  chronic  Improving with PT.  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     Continue PT  Continue to follow with Pain and Spine  Using NSAIDs as needed  Does not wish to continue with Meloxicam            Preventive health issues were discussed with patient, and age appropriate screening tests were ordered as noted in patient's After Visit Summary. Personalized health advice and appropriate referrals for health education or preventive services given if needed, as noted in patient's After Visit Summary.    History of Present Illness     Jay Jay presents for annual MWV and for management of HIV.  Jay Jay reports he is doing better with PT and waking up stiff in am but does improve as the day goes on.  He did some yard work and is sorer but had relief with Aleve. He did see ENT and has laryngospasm.  No food relation at this time.  Stopped gluten and dairy for a couple of weeks with no change in sxs.  He was fitted with hearing aids from his brother and is hearing better.     He does not endorse any fever, chills, nausea, vomiting, cough, SOB, diarrhea, or night sweats.    HPI   Patient Care Team:  ARINA Lantigua as PCP - General (Nurse Practitioner)  ARINA Gunter MD    Review of Systems   Constitutional: Negative.    HENT:  Positive for congestion.    Respiratory: Negative.     Cardiovascular: Negative.    Gastrointestinal: Negative.    Musculoskeletal:  Positive for back pain.   Neurological: Negative.    Psychiatric/Behavioral: Negative.       Medical History Reviewed by provider this encounter:       Annual Wellness Visit Questionnaire   Jay Jay is here for his Subsequent Wellness visit.     Health Risk Assessment:   Patient rates overall health as very good. Patient feels that their physical health rating is same. Patient is very satisfied with their life. Eyesight was rated as same. Hearing was rated as slightly worse.  Patient feels that their emotional and mental health rating is same. Patients states they are never, rarely angry. Patient states they are sometimes unusually tired/fatigued. Pain experienced in the last 7 days has been some. Patient's pain rating has been 2/10. Patient states that he has experienced no weight loss or gain in last 6 months.     Fall Risk Screening:   In the past year, patient has experienced: no history of falling in past year      Home Safety:  Patient does not have trouble with stairs inside or outside of their home. Patient has working smoke alarms and has working carbon monoxide detector. Home safety hazards include: none.     Nutrition:   Current diet is Regular.     Medications:   Patient is currently taking over-the-counter supplements. OTC medications include: see medication list. Patient is able to manage medications.     Activities of Daily Living (ADLs)/Instrumental Activities of Daily Living (IADLs):   Walk and transfer into and out of bed and chair?: Yes  Dress and groom yourself?: Yes    Bathe or shower yourself?: Yes    Feed yourself? Yes  Do your laundry/housekeeping?: Yes  Manage your money, pay your bills and track your expenses?: Yes  Make your own meals?: Yes    Do your own shopping?: Yes    Previous Hospitalizations:   Any hospitalizations or ED visits within the last 12 months?: No      Advance Care Planning:   Living will: No    Durable POA for healthcare: No      Cognitive Screening:   Provider or family/friend/caregiver concerned regarding cognition?: No    Preventive Screenings      Cardiovascular Screening:    General: Screening Not Indicated and History Lipid Disorder      Diabetes Screening:     General: Screening Current      Colorectal Cancer Screening:     General: Screening Current      Prostate Cancer Screening:    General: History Prostate Cancer      Abdominal Aortic Aneurysm (AAA) Screening:    Risk factors include: age between 65-76 yo        Lung Cancer  "Screening:     General: Screening Not Indicated      Hepatitis C Screening:    General: Screening Current    Hep C Screening Accepted: Yes      Cardiovascular Risk Assessment:  Patient does not have underlying ASCVD and their cardiovascular risk was assessed today. Their cardiovascular risk factors include: hypertension, hyperlipidemia, overweight/obesity and NAFLD/MASLD.     The ASCVD Risk score (Norberto ORTEGA, et al., 2019) failed to calculate for the following reasons:    The valid total cholesterol range is 130 to 320 mg/dL    Screening, Brief Intervention, and Referral to Treatment (SBIRT)     Screening  Typical number of drinks in a day: 0    Brief Intervention  Alcohol & drug use screenings were reviewed. No concerns regarding substance use disorder identified.     Other Counseling Topics:   Car/seat belt/driving safety, skin self-exam, sunscreen and calcium and vitamin D intake and regular weightbearing exercise.     Social Drivers of Health     Food Insecurity: No Food Insecurity (4/28/2025)    Hunger Vital Sign     Worried About Running Out of Food in the Last Year: Never true     Ran Out of Food in the Last Year: Never true     No results found.    Objective   /68 (BP Location: Right arm, Patient Position: Sitting, Cuff Size: Large)   Pulse 62   Temp 98.7 °F (37.1 °C) (Tympanic)   Resp 17   Ht 6' 0.5\" (1.842 m)   Wt 95.3 kg (210 lb)   SpO2 96%   BMI 28.09 kg/m²     Physical Exam  Vitals and nursing note reviewed.   Constitutional:       General: He is not in acute distress.     Appearance: Normal appearance. He is not ill-appearing.   HENT:      Head: Normocephalic.      Right Ear: Tympanic membrane normal.      Left Ear: Tympanic membrane normal.      Nose: Nose normal.      Mouth/Throat:      Mouth: Mucous membranes are moist.      Pharynx: Oropharynx is clear.   Eyes:      Extraocular Movements: Extraocular movements intact.      Pupils: Pupils are equal, round, and reactive to light.   Neck: "      Thyroid: No thyroid mass, thyromegaly or thyroid tenderness.      Vascular: No carotid bruit.   Cardiovascular:      Rate and Rhythm: Normal rate and regular rhythm.      Chest Wall: PMI is not displaced.      Pulses: Normal pulses.      Heart sounds: Normal heart sounds.   Pulmonary:      Effort: Pulmonary effort is normal.      Breath sounds: Normal breath sounds.   Abdominal:      General: Bowel sounds are normal.      Palpations: Abdomen is soft.   Musculoskeletal:         General: Normal range of motion.   Lymphadenopathy:      Cervical: No cervical adenopathy.   Skin:     General: Skin is warm and dry.      Capillary Refill: Capillary refill takes less than 2 seconds.   Neurological:      Mental Status: He is alert and oriented to person, place, and time.   Psychiatric:         Mood and Affect: Mood normal.         Behavior: Behavior normal.         Thought Content: Thought content normal.         Judgment: Judgment normal.

## 2025-04-28 NOTE — PATIENT INSTRUCTIONS
Medicare Preventive Visit Patient Instructions  Thank you for completing your Welcome to Medicare Visit or Medicare Annual Wellness Visit today. Your next wellness visit will be due in one year (4/29/2026).  The screening/preventive services that you may require over the next 5-10 years are detailed below. Some tests may not apply to you based off risk factors and/or age. Screening tests ordered at today's visit but not completed yet may show as past due. Also, please note that scanned in results may not display below.  Preventive Screenings:  Service Recommendations Previous Testing/Comments   Colorectal Cancer Screening  Colonoscopy    Fecal Occult Blood Test (FOBT)/Fecal Immunochemical Test (FIT)  Fecal DNA/Cologuard Test  Flexible Sigmoidoscopy Age: 45-75 years old   Colonoscopy: every 10 years (May be performed more frequently if at higher risk)  OR  FOBT/FIT: every 1 year  OR  Cologuard: every 3 years  OR  Sigmoidoscopy: every 5 years  Screening may be recommended earlier than age 45 if at higher risk for colorectal cancer. Also, an individualized decision between you and your healthcare provider will decide whether screening between the ages of 76-85 would be appropriate. Colonoscopy: 02/19/2025  FOBT/FIT: Not on file  Cologuard: Not on file  Sigmoidoscopy: Not on file    Screening Current     Prostate Cancer Screening Individualized decision between patient and health care provider in men between ages of 55-69   Medicare will cover every 12 months beginning on the day after your 50th birthday PSA: 6.400 ng/mL     History Prostate Cancer     Hepatitis C Screening Once for adults born between 1945 and 1965  More frequently in patients at high risk for Hepatitis C Hep C Antibody: 12/09/2024    Screening Current   Diabetes Screening 1-2 times per year if you're at risk for diabetes or have pre-diabetes Fasting glucose: 107 mg/dL (12/9/2024)  A1C: 5.1 % (12/9/2024)  Screening Current   Cholesterol Screening Once  every 5 years if you don't have a lipid disorder. May order more often based on risk factors. Lipid panel: 12/09/2024  Screening Not Indicated  History Lipid Disorder      Other Preventive Screenings Covered by Medicare:  Abdominal Aortic Aneurysm (AAA) Screening: covered once if your at risk. You're considered to be at risk if you have a family history of AAA or a male between the age of 65-75 who smoking at least 100 cigarettes in your lifetime.  Lung Cancer Screening: covers low dose CT scan once per year if you meet all of the following conditions: (1) Age 55-77; (2) No signs or symptoms of lung cancer; (3) Current smoker or have quit smoking within the last 15 years; (4) You have a tobacco smoking history of at least 20 pack years (packs per day x number of years you smoked); (5) You get a written order from a healthcare provider.  Glaucoma Screening: covered annually if you're considered high risk: (1) You have diabetes OR (2) Family history of glaucoma OR (3)  aged 50 and older OR (4)  American aged 65 and older  Osteoporosis Screening: covered every 2 years if you meet one of the following conditions: (1) Have a vertebral abnormality; (2) On glucocorticoid therapy for more than 3 months; (3) Have primary hyperparathyroidism; (4) On osteoporosis medications and need to assess response to drug therapy.  HIV Screening: covered annually if you're between the age of 15-65. Also covered annually if you are younger than 15 and older than 65 with risk factors for HIV infection. For pregnant patients, it is covered up to 3 times per pregnancy.    Immunizations:  Immunization Recommendations   Influenza Vaccine Annual influenza vaccination during flu season is recommended for all persons aged >= 6 months who do not have contraindications   Pneumococcal Vaccine   * Pneumococcal conjugate vaccine = PCV13 (Prevnar 13), PCV15 (Vaxneuvance), PCV20 (Prevnar 20)  * Pneumococcal polysaccharide vaccine  = PPSV23 (Pneumovax) Adults 19-63 yo with certain risk factors or if 65+ yo  If never received any pneumonia vaccine: recommend Prevnar 20 (PCV20)  Give PCV20 if previously received 1 dose of PCV13 or PPSV23   Hepatitis B Vaccine 3 dose series if at intermediate or high risk (ex: diabetes, end stage renal disease, liver disease)   Respiratory syncytial virus (RSV) Vaccine - COVERED BY MEDICARE PART D  * RSVPreF3 (Arexvy) CDC recommends that adults 60 years of age and older may receive a single dose of RSV vaccine using shared clinical decision-making (SCDM)   Tetanus (Td) Vaccine - COST NOT COVERED BY MEDICARE PART B Following completion of primary series, a booster dose should be given every 10 years to maintain immunity against tetanus. Td may also be given as tetanus wound prophylaxis.   Tdap Vaccine - COST NOT COVERED BY MEDICARE PART B Recommended at least once for all adults. For pregnant patients, recommended with each pregnancy.   Shingles Vaccine (Shingrix) - COST NOT COVERED BY MEDICARE PART B  2 shot series recommended in those 19 years and older who have or will have weakened immune systems or those 50 years and older     Health Maintenance Due:      Topic Date Due   • Colorectal Cancer Screening  02/18/2030   • Hepatitis C Screening  Completed     Immunizations Due:      Topic Date Due   • COVID-19 Vaccine (8 - 2024-25 season) 04/12/2025     Advance Directives   What are advance directives?  Advance directives are legal documents that state your wishes and plans for medical care. These plans are made ahead of time in case you lose your ability to make decisions for yourself. Advance directives can apply to any medical decision, such as the treatments you want, and if you want to donate organs.   What are the types of advance directives?  There are many types of advance directives, and each state has rules about how to use them. You may choose a combination of any of the following:  Living will:  This is  a written record of the treatment you want. You can also choose which treatments you do not want, which to limit, and which to stop at a certain time. This includes surgery, medicine, IV fluid, and tube feedings.   Durable power of  for healthcare (DPAHC):  This is a written record that states who you want to make healthcare choices for you when you are unable to make them for yourself. This person, called a proxy, is usually a family member or a friend. You may choose more than 1 proxy.  Do not resuscitate (DNR) order:  A DNR order is used in case your heart stops beating or you stop breathing. It is a request not to have certain forms of treatment, such as CPR. A DNR order may be included in other types of advance directives.  Medical directive:  This covers the care that you want if you are in a coma, near death, or unable to make decisions for yourself. You can list the treatments you want for each condition. Treatment may include pain medicine, surgery, blood transfusions, dialysis, IV or tube feedings, and a ventilator (breathing machine).  Values history:  This document has questions about your views, beliefs, and how you feel and think about life. This information can help others choose the care that you would choose.  Why are advance directives important?  An advance directive helps you control your care. Although spoken wishes may be used, it is better to have your wishes written down. Spoken wishes can be misunderstood, or not followed. Treatments may be given even if you do not want them. An advance directive may make it easier for your family to make difficult choices about your care.   Weight Management   Why it is important to manage your weight:  Being overweight increases your risk of health conditions such as heart disease, high blood pressure, type 2 diabetes, and certain types of cancer. It can also increase your risk for osteoarthritis, sleep apnea, and other respiratory problems. Aim  for a slow, steady weight loss. Even a small amount of weight loss can lower your risk of health problems.  How to lose weight safely:  A safe and healthy way to lose weight is to eat fewer calories and get regular exercise. You can lose up about 1 pound a week by decreasing the number of calories you eat by 500 calories each day.   Healthy meal plan for weight management:  A healthy meal plan includes a variety of foods, contains fewer calories, and helps you stay healthy. A healthy meal plan includes the following:  Eat whole-grain foods more often.  A healthy meal plan should contain fiber. Fiber is the part of grains, fruits, and vegetables that is not broken down by your body. Whole-grain foods are healthy and provide extra fiber in your diet. Some examples of whole-grain foods are whole-wheat breads and pastas, oatmeal, brown rice, and bulgur.  Eat a variety of vegetables every day.  Include dark, leafy greens such as spinach, kale, mandie greens, and mustard greens. Eat yellow and orange vegetables such as carrots, sweet potatoes, and winter squash.   Eat a variety of fruits every day.  Choose fresh or canned fruit (canned in its own juice or light syrup) instead of juice. Fruit juice has very little or no fiber.  Eat low-fat dairy foods.  Drink fat-free (skim) milk or 1% milk. Eat fat-free yogurt and low-fat cottage cheese. Try low-fat cheeses such as mozzarella and other reduced-fat cheeses.  Choose meat and other protein foods that are low in fat.  Choose beans or other legumes such as split peas or lentils. Choose fish, skinless poultry (chicken or turkey), or lean cuts of red meat (beef or pork). Before you cook meat or poultry, cut off any visible fat.   Use less fat and oil.  Try baking foods instead of frying them. Add less fat, such as margarine, sour cream, regular salad dressing and mayonnaise to foods. Eat fewer high-fat foods. Some examples of high-fat foods include french fries, doughnuts, ice  cream, and cakes.  Eat fewer sweets.  Limit foods and drinks that are high in sugar. This includes candy, cookies, regular soda, and sweetened drinks.  Exercise:  Exercise at least 30 minutes per day on most days of the week. Some examples of exercise include walking, biking, dancing, and swimming. You can also fit in more physical activity by taking the stairs instead of the elevator or parking farther away from stores. Ask your healthcare provider about the best exercise plan for you.      © Copyright [employer] 2018 Information is for End User's use only and may not be sold, redistributed or otherwise used for commercial purposes. All illustrations and images included in CareNotes® are the copyrighted property of [employer]M., Inc[employer] or [employer]

## 2025-04-28 NOTE — PROGRESS NOTES
Met with patient for his 6 month re cert.    Patient doing well and taking medications daily. Patient was last seen by dental 3/2025, (Dr. Dyson) and doing well.     Patient aware his annual re cert is due in Dec 2024

## 2025-04-29 ENCOUNTER — OFFICE VISIT (OUTPATIENT)
Age: 70
End: 2025-04-29
Attending: STUDENT IN AN ORGANIZED HEALTH CARE EDUCATION/TRAINING PROGRAM
Payer: MEDICARE

## 2025-04-29 DIAGNOSIS — M54.16 LUMBAR RADICULOPATHY, CHRONIC: Primary | ICD-10-CM

## 2025-04-29 PROCEDURE — 97112 NEUROMUSCULAR REEDUCATION: CPT

## 2025-04-29 PROCEDURE — 97110 THERAPEUTIC EXERCISES: CPT

## 2025-04-29 NOTE — PROGRESS NOTES
"Daily Note   Today's date: 2025  Patient name: Jay Jay Acuna  : 1955  MRN: [id number]  Referring provider: Carolyn Mata MD  Dx:   Encounter Diagnosis     ICD-10-CM    1. Lumbar radiculopathy, chronic  M54.16                     Subjective: Patient reports that he still wakes up with some low back stiffness.      Objective: See treatment diary below    Assessment: Tolerated treatment well.  Continued with lumbar mobility and self stretching following heat today.  PT Patient would benefit from continued PT    Plan: Continue per plan of care.     Precautions: PMH includes HIV, HTN, hiatal hernia, GERD and history of low back pain    Daily Treatment Log  Manuals 4/1 4/3 4/8 4/10 4/15 4/17 4/22 4/24 4/29   SL SI Oscillations        GRC    Breaking Bread        GRC    SL CG LAD             FMP Lumbar Pattern       GRC     Lumbar PA Mobs            RE            Neuro Re-Ed 4/1 4/3 4/8 4/10 4/15 4/17 4/22 4/24 4/29   Prone GS            PBKHE  20x5\"           Alt UE/Alt LE 20x5\"     W pball 20x5\" W pball 20x5\"     Bridges with TB 20x5\"  20x5\" 20x5\" pball 20x5\" pball 20x5\" w curl 20x5\" w curl     Hip Add Iso 20x5\"           Prone Hip ER Yoga Block 20x5\"            MF Press         F Green  30x ea F Green  30x ea   MF Twist         F Green  30x ea F Green  30x ea   STS with GS         MF emp  F Green  30x ea    Pball Crushers 20x5\"  20x5\" 20x5\" 20x5\"       Pball Diagonal 20x5\" ea  20x5\" 20x5\" 20x5\"       Sup HS Curls             Chops Down Lovell  #12.5  3x10 ea Frankie  #12.5  3x10 ea Frankie  #12.5  3x10 ea Lovell  #12.5  3x10 ea Lovell  #12.5  3x10 ea Lovell  #12.5  3x10 ea Frankie  #12.5  3x10 ea     Chops Up Lovell #12.5  3x10  ea Lovell  #12.5  3x10 ea Frankie  #12.5  3x10 ea Frankie  #12.5  3x10 ea Frankie  #12.5  3x10 ea Belt rotation chop ups 15# Belt rotation chop ups 15#                                         Ther Ex 4/1 4/3 4/8 4/10 4/15 4/17 4/22 4/24 4/29   Nu Step/Bike  10'  10'           SLR x 4   " "  2# 3x10 flx 2# 3x10 flex 2# 3x10 flex  3#   3x10 ea 3# 3x10 ea   Prayer Stretch  10x10\" ea 10x10\" 10x10\" 10x10\" 10x10\"      Seated sciatic nv glide            Modified good morning       #20KB  2x10 #20KB  2x10 #20KB  2x10   Rows  #18K  3x10 #18.5K  3x10 #18.5K  3x10 #18.5K  3x10 #18.5K  3x10 #20.5K  3x10 rope #20.5K  3x10 rope #20.5K  3x10 rope #20.5K  3x10 rope   Low Rows #18K  3x10 #18.5K  3x10 #18.5K  3x10 #18.5K  3x10 #18.5K  3x10 #20.5K rope  3x10 #20.5K rope  3x10 #20.5K  3x10 rope #20.5K  3x10 rope   Pulldowns  #18K  3x10 #18.5K  3x10 #18.5K 3x10 #18.5K 3x10 #18.5K  3x10 #18.5K  3x10 #18.5K  3x10 #18.5K  3x10 #18.5K  3x10   Leg Press #55  3x10 #55  3x10 #65 3x10 75# + 85# 3x10 ea 65# x10  75# 2x10 75# x10  85# 2x10 75# x10  85# 2x10 75# x10  85# 3x10 85# 3x10   LTR         10x10\" 10x10\"   DKC        10x10\" 10x10\"   SKC        10x10\" ea 10x10\" ea   Piriformis Stretch        10x10\" ea 10x10\" ea               Ther Activity                                    Gait Training                                    Modalities 4/1 4/3 4/8 4/10 4/15 4/17 4/22 4/24 4/29    MHP   10' pre MHP   10' pre MHP   10' pre MHP 10' pre MHP 10' Pre MHP 10' pre MHP 10' pre MHP 10' pre MHP 10' pre                  "

## 2025-05-01 ENCOUNTER — OFFICE VISIT (OUTPATIENT)
Age: 70
End: 2025-05-01
Payer: MEDICARE

## 2025-05-01 DIAGNOSIS — M54.16 LUMBAR RADICULOPATHY, CHRONIC: Primary | ICD-10-CM

## 2025-05-01 PROCEDURE — 97140 MANUAL THERAPY 1/> REGIONS: CPT | Performed by: PHYSICAL THERAPIST

## 2025-05-01 PROCEDURE — 97110 THERAPEUTIC EXERCISES: CPT | Performed by: PHYSICAL THERAPIST

## 2025-05-01 PROCEDURE — 97112 NEUROMUSCULAR REEDUCATION: CPT | Performed by: PHYSICAL THERAPIST

## 2025-05-04 NOTE — PROGRESS NOTES
"Daily Note   Today's date: 2025  Patient name: Jay Jay cAuna  : 1955  MRN: [id number]  Referring provider: Carolyn Mata MD  Dx:   Encounter Diagnosis     ICD-10-CM    1. Lumbar radiculopathy, chronic  M54.16                     Subjective: Patient reports that he still wakes up with some low back stiffness.      Objective: See treatment diary below    Assessment: Tolerated treatment well.  Continued with lumbar mobility and self stretching following heat today.  PT Patient would benefit from continued PT    Plan: Continue per plan of care.     Precautions: PMH includes HIV, HTN, hiatal hernia, GERD and history of low back pain    Daily Treatment Log  Manuals 4/1 4/3 4/8 4/10 4/15 4/17 4/22 4/24 4/29   SL SI Oscillations        GRC    Breaking Bread        GRC    SL CG LAD             FMP Lumbar Pattern       GRC     Lumbar PA Mobs            RE            Neuro Re-Ed 4/1 4/3 4/8 4/10 4/15 4/17 4/22 4/24 4/29   Prone GS            PBKHE  20x5\"           Alt UE/Alt LE 20x5\"     W pball 20x5\" W pball 20x5\"     Bridges with TB 20x5\"  20x5\" 20x5\" pball 20x5\" pball 20x5\" w curl 20x5\" w curl     Hip Add Iso 20x5\"           Prone Hip ER Yoga Block 20x5\"            MF Press         F Green  30x ea F Green  30x ea   MF Twist         F Green  30x ea F Green  30x ea   STS with GS         MF emp  F Green  30x ea    Pball Crushers 20x5\"  20x5\" 20x5\" 20x5\"       Pball Diagonal 20x5\" ea  20x5\" 20x5\" 20x5\"       Sup HS Curls             Chops Down Cerulean  #12.5  3x10 ea Frankie  #12.5  3x10 ea Frankie  #12.5  3x10 ea [place]ulean  #12.5  3x10 ea [place]ulean  #12.5  3x10 ea [place]ulean  #12.5  3x10 ea Frankie  #12.5  3x10 ea     Chops Up Cerulean #12.5  3x10  ea [place]ulean  #12.5  3x10 ea Frankie  #12.5  3x10 ea Frankie  #12.5  3x10 ea Frankie  #12.5  3x10 ea Belt rotation chop ups 15# Belt rotation chop ups 15#                                         Ther Ex 4/1 4/3 4/8 4/10 4/15 4/17 4/22 4/24 4/29   Nu Step/Bike  10'  10'           SLR x 4   " "  2# 3x10 flx 2# 3x10 flex 2# 3x10 flex  3#   3x10 ea 3# 3x10 ea   Prayer Stretch  10x10\" ea 10x10\" 10x10\" 10x10\" 10x10\"      Seated sciatic nv glide            Modified good morning       #20KB  2x10 #20KB  2x10 #20KB  2x10   Rows  #18K  3x10 #18.5K  3x10 #18.5K  3x10 #18.5K  3x10 #18.5K  3x10 #20.5K  3x10 rope #20.5K  3x10 rope #20.5K  3x10 rope #20.5K  3x10 rope   Low Rows #18K  3x10 #18.5K  3x10 #18.5K  3x10 #18.5K  3x10 #18.5K  3x10 #20.5K rope  3x10 #20.5K rope  3x10 #20.5K  3x10 rope #20.5K  3x10 rope   Pulldowns  #18K  3x10 #18.5K  3x10 #18.5K 3x10 #18.5K 3x10 #18.5K  3x10 #18.5K  3x10 #18.5K  3x10 #18.5K  3x10 #18.5K  3x10   Leg Press #55  3x10 #55  3x10 #65 3x10 75# + 85# 3x10 ea 65# x10  75# 2x10 75# x10  85# 2x10 75# x10  85# 2x10 75# x10  85# 3x10 85# 3x10   LTR         10x10\" 10x10\"   DKC        10x10\" 10x10\"   SKC        10x10\" ea 10x10\" ea   Piriformis Stretch        10x10\" ea 10x10\" ea               Ther Activity                                    Gait Training                                    Modalities 4/1 4/3 4/8 4/10 4/15 4/17 4/22 4/24 4/29    MHP   10' pre MHP   10' pre MHP   10' pre MHP 10' pre MHP 10' Pre MHP 10' pre MHP 10' pre MHP 10' pre MHP 10' pre                  "

## 2025-05-05 ENCOUNTER — OFFICE VISIT (OUTPATIENT)
Age: 70
End: 2025-05-05
Attending: STUDENT IN AN ORGANIZED HEALTH CARE EDUCATION/TRAINING PROGRAM
Payer: MEDICARE

## 2025-05-05 DIAGNOSIS — M54.16 LUMBAR RADICULOPATHY, CHRONIC: Primary | ICD-10-CM

## 2025-05-05 PROCEDURE — 97140 MANUAL THERAPY 1/> REGIONS: CPT | Performed by: PHYSICAL THERAPIST

## 2025-05-05 PROCEDURE — 97110 THERAPEUTIC EXERCISES: CPT | Performed by: PHYSICAL THERAPIST

## 2025-05-05 PROCEDURE — 97112 NEUROMUSCULAR REEDUCATION: CPT | Performed by: PHYSICAL THERAPIST

## 2025-05-05 NOTE — PROGRESS NOTES
"Daily Note   Today's date: 25  Patient name: Jay Jay Acuna  : 1955  MRN: [id number]  Referring provider: Carolyn Mata MD  Dx:   Encounter Diagnosis     ICD-10-CM    1. Lumbar radiculopathy, chronic  M54.16         Start Time: 0830  Stop Time: 0915  Total time in clinic (min): 45 minutes     Subjective: Patient reports that he did a lot of walking this weekend.  Patient reports his R hip and low back are a little sore this AM.      Objective: See treatment diary below    Assessment: Tolerated treatment well.  PT continued with Pball  progression.  NV consider adding crunches.  NV PT to perform RE in preparation for MD visit on Friday.       Plan: Continue per plan of care.     Precautions: PMH includes HIV, HTN, hiatal hernia, GERD and history of low back pain    Daily Treatment Log  Manuals 4/1 4/3 4/8 4/10 4/15 4/17 4/22 4/24 4/29 5/1 5   SL SI Oscillations        GRC  GRC GRC   Breaking Bread        GRC  GRC GRC   SL CG LAD           GRC GRC   FMP Lumbar Pattern       GRC       Lumbar PA Mobs              RE              Neuro Re-Ed 4/1 4/3 4/8 4/10 4/15 4/17 4/22 4/24 4/29 5/1 5   Prone GS              PBKHE  20x5\"             Alt UE/Alt LE 20x5\"     W pball 20x5\" W pball 20x5\"       Bridges with TB 20x5\"  20x5\" 20x5\" pball 20x5\" pball 20x5\" w curl 20x5\" w curl       Hip Add Iso 20x5\"             Prone Hip ER Yoga Block 20x5\"              MF Press         F Green  30x ea F Green  30x ea     MF Twist         F Green  30x ea F Green  30x ea     STS with GS         MF emp  F Green  30x ea      Pball Crushers 20x5\"  20x5\" 20x5\" 20x5\"     20x5\"  20x5\"    Pball Diagonal 20x5\" ea  20x5\" 20x5\" 20x5\"     15x5\" ea 20x5\" ea   Dead Bug Crushers          15x5\" ea 15x5\" ea   Sup HS Curls               Chops Down Frankie  #12.5  3x10 ea [place]  #12.5  3x10 ea Frankie  #12.5  3x10 ea [place]  #12.5  3x10 ea [place]  #12.5  3x10 ea Frankie  #12.5  3x10 ea [place]  #12.5  3x10 ea       Chops Up Frankie " "#12.5  3x10  ea [place]  #12.5  3x10 ea [place]  #12.5  3x10 ea [place]  #12.5  3x10 ea Frankie  #12.5  3x10 ea Belt rotation chop ups 15# Belt rotation chop ups 15#                                                 Ther Ex 4/1 4/3 4/8 4/10 4/15 4/17 4/22 4/24 4/29 5/1 5/5   Nu Step/Bike  10'  10'             SLR x 4     2# 3x10 flx 2# 3x10 flex 2# 3x10 flex  3#   3x10 ea 3# 3x10 ea     Prayer Stretch  10x10\" ea 10x10\" 10x10\" 10x10\" 10x10\"        Seated sciatic nv glide              Modified good morning       #20KB  2x10 #20KB  2x10 #20KB  2x10     Rows  #18K  3x10 #18.5K  3x10 #18.5K  3x10 #18.5K  3x10 #18.5K  3x10 #20.5K  3x10 rope #20.5K  3x10 rope #20.5K  3x10 rope #20.5K  3x10 rope #20.5K  3x10 rope #20.5K  3x10 rope   Low Rows #18K  3x10 #18.5K  3x10 #18.5K  3x10 #18.5K  3x10 #18.5K  3x10 #20.5K rope  3x10 #20.5K rope  3x10 #20.5K  3x10 rope #20.5K  3x10 rope #20.5K  3x10 rope #20.5K  3x10 rope   Pulldowns  #18K  3x10 #18.5K  3x10 #18.5K 3x10 #18.5K 3x10 #18.5K  3x10 #18.5K  3x10 #18.5K  3x10 #18.5K  3x10 #18.5K  3x10 #20.5K  3x10 rope #20.5K  3x10 rope   Leg Press #55  3x10 #55  3x10 #65 3x10 75# + 85# 3x10 ea 65# x10  75# 2x10 75# x10  85# 2x10 75# x10  85# 2x10 75# x10  85# 3x10 85# 3x10     LTR         10x10\" 10x10\"     DKC        10x10\" 10x10\"     SKC        10x10\" ea 10x10\" ea     Piriformis Stretch        10x10\" ea 10x10\" ea                   Ther Activity                                          Gait Training                                          Modalities 4/1 4/3 4/8 4/10 4/15 4/17 4/22 4/24 4/29 5/1 5/5    MHP   10' pre MHP   10' pre MHP   10' pre MHP 10' pre MHP 10' Pre MHP 10' pre MHP 10' pre MHP 10' pre MHP 10' pre MHP 10' pre MHP 10' pre                    "

## 2025-05-05 NOTE — PROGRESS NOTES
Data: Pt completed the PHQ-9 screening, however, [facility] couldn't discuss the results at the time due to PTO. Pt scored was (PHQ-9=0) as a display of minimal depressive traits without SI or HI. Results will be discussed with pt to address multidimensional stability on a phone f/u, or direct pt contact.

## 2025-05-07 ENCOUNTER — OFFICE VISIT (OUTPATIENT)
Age: 70
End: 2025-05-07
Attending: STUDENT IN AN ORGANIZED HEALTH CARE EDUCATION/TRAINING PROGRAM
Payer: MEDICARE

## 2025-05-07 DIAGNOSIS — M54.16 LUMBAR RADICULOPATHY, CHRONIC: Primary | ICD-10-CM

## 2025-05-07 PROCEDURE — 97112 NEUROMUSCULAR REEDUCATION: CPT

## 2025-05-07 PROCEDURE — 97140 MANUAL THERAPY 1/> REGIONS: CPT

## 2025-05-07 PROCEDURE — 97110 THERAPEUTIC EXERCISES: CPT

## 2025-05-07 NOTE — PROGRESS NOTES
"Daily Note     Today's date: 2025  Patient name: Jay Jay Acuna  : 1955  MRN: [id number]  Referring provider: Carolyn Mata MD  Dx:   Encounter Diagnosis     ICD-10-CM    1. Lumbar radiculopathy, chronic  M54.16                      Subjective: Patient reports back is a bit sore today.      Objective: See treatment diary below      Assessment: Jay Jay Acuna tolerated treatment well. Good relief with manual today. Continued treatment indicated.      Plan: Continue per plan of care.      Plan: Continue per plan of care.     Precautions: PMH includes HIV, HTN, hiatal hernia, GERD and history of low back pain    Daily Treatment Log  Manuals 5/7 4/3 4/8 4/10 4/15 4/17 4/22 4/24 4/29 5/1 5/5   [facility]       [facility]   Breaking Bread S[facility]       [facility] GRC   SL CG LAD  SJ         [facility]   FMP Lumbar Pattern       GR       Lumbar PA Mobs              RE              Neuro Re-Ed 5/7 4/3 4/8 4/10 4/15 4/17 4/22 4/24 4/29 5/1 5/5   Prone GS              PBKHE               Alt UE/Alt LE      W pball 20x5\" W pball 20x5\"       Bridges with TB   20x5\" 20x5\" pball 20x5\" pball 20x5\" w curl 20x5\" w curl       Hip Add Iso              Prone Hip ER Yoga Block              MF Press         F Green  30x ea F Green  30x ea     MF Twist         F Green  30x ea F Green  30x ea     STS with GS         MF emp  F Green  30x ea      Pball Crushers 20x5\"   20x5\" 20x5\" 20x5\"     20x5\"  20x5\"    Pball Diagonal 20x5\" ea  20x5\" 20x5\" 20x5\"     15x5\" ea 20x5\" ea   Dead Bug Crushers 15x5\" ea         15x5\" ea 15x5\" ea   Sup HS Curls               Chops Down  [place]  #12.5  3x10 ea [place]  #12.5  3x10 ea Frankie  #12.5  3x10 ea Frankie  #12.5  3x10 ea [place]  #12.5  3x10 ea Frankie  #12.5  3x10 ea       Chops Up  Frankie  #12.5  3x10 ea Frankie  #12.5  3x10 ea [place]  #12.5  3x10 ea [place]  #12.5  3x10 ea Belt rotation chop ups 15# Belt rotation chop ups 15#                                                 Ther Ex 5/7 4/3 4/8 " "4/10 4/15 4/17 4/22 4/24 4/29 5/1 5/5   Nu Step/Bike   10'             SLR x 4     2# 3x10 flx 2# 3x10 flex 2# 3x10 flex  3#   3x10 ea 3# 3x10 ea     Prayer Stretch  10x10\" ea 10x10\" 10x10\" 10x10\" 10x10\"        Seated sciatic nv glide              Modified good morning       #20KB  2x10 #20KB  2x10 #20KB  2x10     Rows  #19 K  3x10 #18.5K  3x10 #18.5K  3x10 #18.5K  3x10 #18.5K  3x10 #20.5K  3x10 rope #20.5K  3x10 rope #20.5K  3x10 rope #20.5K  3x10 rope #20.5K  3x10 rope #20.5K  3x10 rope   Low Rows #19 K  3x10 #18.5K  3x10 #18.5K  3x10 #18.5K  3x10 #18.5K  3x10 #20.5K rope  3x10 #20.5K rope  3x10 #20.5K  3x10 rope #20.5K  3x10 rope #20.5K  3x10 rope #20.5K  3x10 rope   Pulldowns  #19 K  3x10 #18.5K  3x10 #18.5K 3x10 #18.5K 3x10 #18.5K  3x10 #18.5K  3x10 #18.5K  3x10 #18.5K  3x10 #18.5K  3x10 #20.5K  3x10 rope #20.5K  3x10 rope   Leg Press  #55  3x10 #65 3x10 75# + 85# 3x10 ea 65# x10  75# 2x10 75# x10  85# 2x10 75# x10  85# 2x10 75# x10  85# 3x10 85# 3x10     LTR         10x10\" 10x10\"     DKC        10x10\" 10x10\"     SKC        10x10\" ea 10x10\" ea     Piriformis Stretch        10x10\" ea 10x10\" ea                   Ther Activity                                          Gait Training                                          Modalities 5/7 4/3 4/8 4/10 4/15 4/17 4/22 4/24 4/29 5/1 5/5    MHP   10' pre MHP   10' pre MHP   10' pre MHP 10' pre MHP 10' Pre MHP 10' pre MHP 10' pre MHP 10' pre MHP 10' pre MHP 10' pre MHP 10' pre                         "

## 2025-05-09 ENCOUNTER — OFFICE VISIT (OUTPATIENT)
Age: 70
End: 2025-05-09
Payer: MEDICARE

## 2025-05-09 VITALS — HEIGHT: 73 IN | BODY MASS INDEX: 27.85 KG/M2 | WEIGHT: 210.1 LBS

## 2025-05-09 DIAGNOSIS — M54.50 CHRONIC BILATERAL LOW BACK PAIN WITHOUT SCIATICA: Primary | ICD-10-CM

## 2025-05-09 DIAGNOSIS — M51.360 DEGENERATION OF INTERVERTEBRAL DISC OF LUMBAR REGION WITH DISCOGENIC BACK PAIN: ICD-10-CM

## 2025-05-09 DIAGNOSIS — G89.29 CHRONIC BILATERAL LOW BACK PAIN WITHOUT SCIATICA: Primary | ICD-10-CM

## 2025-05-09 DIAGNOSIS — M47.816 LUMBAR SPONDYLOSIS: ICD-10-CM

## 2025-05-09 PROCEDURE — G2211 COMPLEX E/M VISIT ADD ON: HCPCS | Performed by: STUDENT IN AN ORGANIZED HEALTH CARE EDUCATION/TRAINING PROGRAM

## 2025-05-09 PROCEDURE — 99213 OFFICE O/P EST LOW 20 MIN: CPT | Performed by: STUDENT IN AN ORGANIZED HEALTH CARE EDUCATION/TRAINING PROGRAM

## 2025-05-09 NOTE — PROGRESS NOTES
Assessment:  1. Chronic bilateral low back pain without sciatica    2. Lumbar spondylosis    3. Degeneration of intervertebral disc of lumbar region with discogenic back pain        Plan:    Jay Jay Acuna is a 69 y.o. male who presents for follow up office visit in regards to low back pain c/w ddd and lumbar spondylosis. We discussed imaging, rehabilitation, optimization of medications and potential interventions. The following plan was formulated with the patient:    - Continue PT/HEP  - Meloxicam 15mg daily PRN. Advised not to take with other NSAIDS.   - Follow up as needed     Orders Placed This Encounter   Procedures    Ambulatory referral to Physical Therapy     Standing Status:   Future     Expiration Date:   5/9/2026     Referral Priority:   Routine     Referral Type:   Physical Therapy     Referral Reason:   Specialty Services Required     Requested Specialty:   Physical Therapy     Number of Visits Requested:   1     Expiration Date:   5/9/2026       No orders of the defined types were placed in this encounter.      My impressions and treatment recommendations were discussed in detail with the patient, who verbalized understanding and had no further questions.    History of Present Illness:  Jay Jay Acuna is a 69 y.o. male who presents for a follow up office visit in regards to low back pain. Since last visit pain is better . Current pain medications includes:  none     He describes the pain as intermittent sharp pain of moderate severity. Pain is rated 0/10 today and interferes with daily activities.    Diagnostic studies include MRI Lumbar Spine w/o contrast with mild multilevel degenerative disc disease, L3-4 small central disc extrusion with mild subarticular stenosis, L4-5 mild left foraminal stenosis. I have personally reviewed pertinent films in PACS.    Pain is causing significant functional limitation resulting in diminished quality of life and impaired age appropriate ADLs.  Denies fever, chills,  numbness/tingling, bowel/bladder dysfunction, motor weakness.     have personally reviewed and/or updated the patient's past medical history, past surgical history, family history, social history, current medications, allergies, and vital signs today.     Review of Systems   Constitutional:  Negative for chills and fever.   HENT:  Negative for ear pain and sore throat.    Eyes:  Negative for pain and visual disturbance.   Respiratory:  Negative for cough and shortness of breath.    Cardiovascular:  Negative for chest pain and palpitations.   Gastrointestinal:  Negative for abdominal pain and vomiting.   Genitourinary:  Negative for dysuria and hematuria.   Musculoskeletal:  Positive for arthralgias, back pain and myalgias.   Skin:  Negative for color change and rash.   Neurological:  Negative for seizures and syncope.   All other systems reviewed and are negative.      Patient Active Problem List   Diagnosis    HIV disease (HCC)    Family history of prostate cancer in father    Elevated PSA    Prostate nodule    HTN (hypertension)    Hyperlipidemia    Non-alcoholic fatty liver disease    GERD (gastroesophageal reflux disease)    Palpitations    Splenomegaly    Thrombocytopenia (HCC)    Polycythemia    Family history of substance abuse    H/O ETOH abuse    Lymphopenia    Locking of both knees    Degenerative cervical disc    Fluid level behind tympanic membrane of both ears    Bladder wall thickening    Right lower quadrant abdominal pain    Hiatal hernia    Weight loss    H/O anal fistulotomy    Impaired fasting glucose    Gastric wall thickening    Esophagitis    Periaortic lymphadenopathy    Syphilis    Transaminitis    Perianal cyst    Anal fistula    Prostate cancer (HCC)    BPH with urinary obstruction    Nephrolithiasis    Serum total bilirubin elevated    Aqueous tear deficiency    Lumbar radiculopathy, chronic       Past Medical History:   Diagnosis Date    Cancer (HCC)     Colon polyp     GERD  (gastroesophageal reflux disease)     HIV (human immunodeficiency virus infection) (HCC)     Hyperlipidemia     Hypertension     Kidney stone     Non-alcoholic fatty liver disease     PONV (postoperative nausea and vomiting)     Prostate cancer (HCC)     Sleep apnea        Past Surgical History:   Procedure Laterality Date    ANAL FISTULOTOMY N/A 05/07/2024    Procedure: FISTULOTOMY;  Surgeon: Librado Chamorro MD;  Location: [facility];  Service: Colorectal    COLONOSCOPY      HERNIA REPAIR      CA ANRCT XM SURG REQ ANES GENERAL SPI/EDRL DX N/A 05/07/2024    Procedure: EXAM UNDER ANESTHESIA (EUA);  Surgeon: Librado Chamorro MD;  Location: [facility];  Service: Colorectal    CA BX PROSTATE STRTCTC SATURATION SAMPLING IMG GID N/A 01/25/2022    Procedure: TRANSPERINEAL ULTRASOUND GUIDED BIOPSY PROSTATE;  Surgeon: Shon Beltrán MD;  Location: [facility];  Service: Urology    CA PROSTATE NEEDLE BIOPSY ANY APPROACH N/A 10/13/2020    Procedure: TRANSPERINEAL PROSTATE BIOPSY;  Surgeon: Shon Beltrán MD;  Location: [facility];  Service: Urology    ROOT CANAL      SINUS SURGERY      exploratory, larger sinus cavity on one side       Family History   Problem Relation Age of Onset    Prostate cancer Father     Esophageal cancer Father     Heart disease Father     Colon cancer Cousin         mothers side       Social History     Occupational History    Not on file   Tobacco Use    Smoking status: Never    Smokeless tobacco: Never   Vaping Use    Vaping status: Never Used   Substance and Sexual Activity    Alcohol use: Not Currently     Comment: used to be a heavy drinker, quit 15 years ago    Drug use: Not Currently     Types: Marijuana, Cocaine     Comment: used to use drugs, consisting of crystal meth, cocaine, and marijuana, denied any IV drug use in the past    Sexual activity: Not Currently     Partners: Male       Current Outpatient Medications on File Prior to Visit   Medication Sig    atorvastatin  "(LIPITOR) 40 mg tablet TAKE 1 TABLET BY MOUTH ONCE DAILY    benzoyl peroxide 5 % external wash Apply 237 g topically if needed (as needed)    bictegravir-emtricitab-tenofovir alafenamide (Biktarvy) -25 MG tablet TAKE 1 TABLET BY MOUTH DAILY WITH BREAKFAST    cholecalciferol (VITAMIN D3) 1,000 units tablet Take 1,000 Units by mouth daily    cyanocobalamin (VITAMIN B-12) 500 MCG tablet Take 1,000 mcg by mouth daily    cycloSPORINE, PF, (Cequa) 0.09 % SOLN Apply 1 1e11 Vector [employer]omes to eye in the morning    docusate sodium (COLACE) 100 mg capsule Take 100 mg by mouth 3 (three) times a day as needed for constipation    famotidine (PEPCID) 40 MG tablet Take 1 tablet (40 mg total) by mouth daily    lisinopril (ZESTRIL) 20 mg tablet Take 1 tablet (20 mg total) by mouth daily    metoprolol succinate (TOPROL-XL) 50 mg 24 hr tablet TAKE ONE TABLET BY MOUTH ONCE DAILY    Multiple Vitamin (multivitamin) capsule Take 1 capsule by mouth daily    mupirocin (BACTROBAN) 2 % ointment Apply 1 g topically if needed (as needed)    Omega-3 1000 MG CAPS Take 2 g by mouth daily    omeprazole (PriLOSEC) 40 MG capsule Take 1 capsule (40 mg total) by mouth daily    Perfluorohexyloctane (Miebo) 1.338 GM/ML SOLN Administer 1 Drop/m2 to both eyes 4 (four) times a day    potassium citrate (UROCIT-K) 5 MEQ (540 MG) Take 1 tablet (5 mEq total) by mouth daily    psyllium (METAMUCIL) 58.6 % packet Take 1 packet by mouth daily    meloxicam (MOBIC) 15 mg tablet Take 0.5 tablets (7.5 mg total) by mouth daily as needed for moderate pain     No current facility-administered medications on file prior to visit.       Allergies   Allergen Reactions    Epinephrine Palpitations and Shortness Of Breath    Sulfa Antibiotics Other (See Comments)     Eye irritation       Physical Exam:    Ht 6' 0.5\" (1.842 m)   Wt 95.3 kg (210 lb 1.6 oz)   BMI 28.10 kg/m²     Constitutional: normal, well developed, well nourished, alert, in no distress and non-toxic and " no overt pain behavior.  HEENT: grossly intact  Neck: supple, symmetric, trachea midline and no masses   Pulmonary:even and unlabored  Cardiovascular:No edema or pitting edema present  Skin:Normal without rashes or lesions and well hydrated  Psychiatric:Mood and affect appropriate  Neurologic:Cranial Nerves II-XII grossly intact    Lumbar Musculoskeletal and Neurologic Exam:                         Inspection: no atrophy of the LE musculature                      Gait: non-antalgic gait                   ROM: limited AROM of the lumbar spine in all planes; FROM at bilateral hips                       Sensation: SILT bilateral L2-S1 dermatomes                      Strength:                                                                  Right     Left  Hip Flexion                     5           5  Knee Extension              5           5  Ankle Dorsiflexion 5 5  Great Toe Extension 5 5  Ankle Plantar Flexion 5 5                                          Palpation: -TTP over bilateral greater trochanters and bilateral SI Joint.   Provocative tests:                                       Seated Slump negative bilaterally                Ordonez's: negative bilaterally                        Sacral Compression/thrust test, MARGARITA, and Gaenslen's tests: negative bilaterally                            FADIR: negative bilaterally    Imaging  MRI Lumbar Spine w/o contrast  FINDINGS:     VERTEBRAL BODIES:  There are 5 lumbar type vertebral bodies.  Normal alignment of the lumbar spine.  No spondylolysis or spondylolisthesis. No scoliosis.  No compression fracture.    Mixed type I and type II Modic endplate changes at L3-4 and L5-S1. No   suspicious focus of marrow signal abnormality. Small hemangiomas within the L1 and L3 vertebral bodies.     SACRUM:  Normal signal within the sacrum. No evidence of insufficiency or stress fracture. Mild dural ectasia in the sacrum with mild scalloping of the posterior S1 and S2 vertebral  cortices.     DISTAL CORD AND CONUS:  Normal size and signal within the distal cord and conus.     PARASPINAL SOFT TISSUES:  Paraspinal soft tissues are unremarkable.     LOWER THORACIC DISC SPACES:  Normal disc height and signal.  No disc herniation, canal stenosis or foraminal narrowing.     LUMBAR DISC SPACES: Mild disc height loss, multilevel disc desiccation, moderate disc height loss at L3-L4.     L1-L2: Schmorl's node deformity in the superior endplate of L2.     Tiny right central to foraminal protrusion. No spinal canal or foraminal stenosis.     L2-L3: Tiny Schmorl's node deformity in the superior endplate of L3. Mild right facet arthropathy. No spinal canal or foraminal stenosis.     L3-L4: Schmorl's node deformities, circumferential disc bulge and mild facet arthropathy. Small central disc extrusion with inferior migration. Mild subarticular recess stenosis. No significant foraminal stenosis.     L4-L5: Disc bulge with endplate osteophytic ridging and bilateral facet arthropathy. Mild bilateral subarticular recess stenosis. Mild left foraminal stenosis.     L5-S1: Disc bulge with bilateral facet arthropathy. No spinal canal stenosis. Mild bilateral foraminal narrowing.     OTHER FINDINGS: Small bilateral renal cysts.     IMPRESSION:     Mild multilevel noncompressive degenerative discogenic disease as described above.

## 2025-05-13 ENCOUNTER — APPOINTMENT (OUTPATIENT)
Age: 70
End: 2025-05-13
Attending: STUDENT IN AN ORGANIZED HEALTH CARE EDUCATION/TRAINING PROGRAM
Payer: MEDICARE

## 2025-05-15 ENCOUNTER — APPOINTMENT (OUTPATIENT)
Age: 70
End: 2025-05-15
Attending: STUDENT IN AN ORGANIZED HEALTH CARE EDUCATION/TRAINING PROGRAM
Payer: MEDICARE

## 2025-05-20 ENCOUNTER — OFFICE VISIT (OUTPATIENT)
Age: 70
End: 2025-05-20
Attending: STUDENT IN AN ORGANIZED HEALTH CARE EDUCATION/TRAINING PROGRAM
Payer: MEDICARE

## 2025-05-20 DIAGNOSIS — M54.16 LUMBAR RADICULOPATHY, CHRONIC: Primary | ICD-10-CM

## 2025-05-20 PROCEDURE — 97110 THERAPEUTIC EXERCISES: CPT

## 2025-05-20 PROCEDURE — 97140 MANUAL THERAPY 1/> REGIONS: CPT

## 2025-05-20 NOTE — PROGRESS NOTES
"Daily Note     Today's date: 2025  Patient name: Jay Jay Acuna  : 1955  MRN: [id number]  Referring provider: Carolyn Mata MD  Dx:   Encounter Diagnosis     ICD-10-CM    1. Lumbar radiculopathy, chronic  M54.16                      Subjective: Patient reports that he has been sick the last few weeks and is feeling a little bit better. Notes that his back is feeling more tight in the mornings but has been doing his HEP.       Objective: See treatment diary below      Assessment: Jay Jay Acuna tolerated treatment well. Good relief with manual today along with self stretches. Held any progressions since pt was out for a few weeks. Continued treatment indicated.      Plan: Continue per plan of care.      Plan: Continue per plan of care.     Precautions: PMH includes HIV, HTN, hiatal hernia, GERD and history of low back pain    Daily Treatment Log  Manuals 5/7 5/20   4/15 4/17 4/22 4/24 4/29 5/1 5/5   SL SI Oscillations SJ       GRC  GRC GRC   Breaking Bread SJ MM      GRC  GRC GRC   SL CG LAD  SJ MM        GR GRC   FMP Lumbar Pattern       GRC       Lumbar PA Mobs              RE              Neuro Re-Ed 5/7 5/20   4/15 4/17 4/22 4/24 4/29 5/1 5/5   Prone GS              PBKHE               Alt UE/Alt LE      W pball 20x5\" W pball 20x5\"       Bridges with TB     20x5\" pball 20x5\" w curl 20x5\" w curl       Hip Add Iso              Prone Hip ER Yoga Block              MF Press   F Green  30x ea      F Green  30x ea F Green  30x ea     MF Twist   F Green  30x ea      F Green  30x ea F Green  30x ea     STS with GS         MF emp  F Green  30x ea      Pball Crushers 20x5\"  20x5\"   20x5\"     20x5\"  20x5\"    Pball Diagonal 20x5\" ea 20x5\" ea   20x5\"     15x5\" ea 20x5\" ea   Dead Bug Crushers 15x5\" ea 15x5\" ea        15x5\" ea 15x5\" ea   Sup HS Curls               Chops Down     [place]  #12.5  3x10 ea [place]  #12.5  3x10 ea [place]  #12.5  3x10 ea       Chops Up     [place]  #12.5  3x10 ea Belt rotation chop ups " "15# Belt rotation chop ups 15#                                                 Ther Ex 5/7 5/20   4/15 4/17 4/22 4/24 4/29 5/1 5/5   Nu Step/Bike               SLR x 4      2# 3x10 flex 2# 3x10 flex  3#   3x10 ea 3# 3x10 ea     Prayer Stretch     10x10\" 10x10\"        Seated sciatic nv glide              Modified good morning       #20KB  2x10 #20KB  2x10 #20KB  2x10     Rows  #19 K  3x10 #19 K  3x10   #18.5K  3x10 #20.5K  3x10 rope #20.5K  3x10 rope #20.5K  3x10 rope #20.5K  3x10 rope #20.5K  3x10 rope #20.5K  3x10 rope   Low Rows #19 K  3x10 #19 K  3x10   #18.5K  3x10 #20.5K rope  3x10 #20.5K rope  3x10 #20.5K  3x10 rope #20.5K  3x10 rope #20.5K  3x10 rope #20.5K  3x10 rope   Pulldowns  #19 K  3x10 #19 K  3x10   #18.5K  3x10 #18.5K  3x10 #18.5K  3x10 #18.5K  3x10 #18.5K  3x10 #20.5K  3x10 rope #20.5K  3x10 rope   Leg Press  75# 3x10   65# x10  75# 2x10 75# x10  85# 2x10 75# x10  85# 2x10 75# x10  85# 3x10 85# 3x10     LTR   10x10\"      10x10\" 10x10\"     DKC        10x10\" 10x10\"     SKC        10x10\" ea 10x10\" ea     Piriformis Stretch        10x10\" ea 10x10\" ea                   Ther Activity                                          Gait Training                                          Modalities 5/7 5/20   4/15 4/17 4/22 4/24 4/29 5/1 5/5    MHP   10' pre MHP pre 10'    MHP 10' Pre MHP 10' pre MHP 10' pre MHP 10' pre MHP 10' pre MHP 10' pre MHP 10' pre                         "

## 2025-05-27 ENCOUNTER — OFFICE VISIT (OUTPATIENT)
Age: 70
End: 2025-05-27
Attending: STUDENT IN AN ORGANIZED HEALTH CARE EDUCATION/TRAINING PROGRAM
Payer: MEDICARE

## 2025-05-27 DIAGNOSIS — M54.16 LUMBAR RADICULOPATHY, CHRONIC: Primary | ICD-10-CM

## 2025-05-27 PROCEDURE — 97110 THERAPEUTIC EXERCISES: CPT

## 2025-05-27 PROCEDURE — 97140 MANUAL THERAPY 1/> REGIONS: CPT

## 2025-05-27 NOTE — PROGRESS NOTES
"Daily Note     Today's date: 2025  Patient name: Jay Jay Acuna  : 1955  MRN: [id number]  Referring provider: Carolyn Mata MD  Dx:   Encounter Diagnosis     ICD-10-CM    1. Lumbar radiculopathy, chronic  M54.16                      Subjective: Patient reports that he walked 4 miles, cut the grass and was lifting rocks over the holiday weekend. Notes that he is feeling tight this morning, but nowhere near what he used to feel like.       Objective: See treatment diary below      Assessment: Jay Jay Acuna tolerated treatment well. Good relief with manual today along with self stretches. Continued to hold any progressions today due to pt subjective. Continued treatment indicated.      Plan: Continue per plan of care.      Plan: Continue per plan of care.     Precautions: PMH includes HIV, HTN, hiatal hernia, GERD and history of low back pain    Daily Treatment Log  Manuals 5/7 5/20 5/27  4/15 4/17 4/22 4/24 4/29 5/1 5/5   SL SI Oscillations SJ       GRC  GRC GRC   Breaking Bread SJ MM MM     GRC  GRC GRC   SL CG LAD  SJ MM MM       GRC GRC   FMP Lumbar Pattern       GRC       Lumbar PA Mobs              RE              Neuro Re-Ed 5/7 5/20 5/27  4/15 4/17 4/22 4/24 4/29 5/1 5/5   Prone GS              PBKHE               Alt UE/Alt LE      W pball 20x5\" W pball 20x5\"       Bridges with TB     20x5\" pball 20x5\" w curl 20x5\" w curl       Hip Add Iso              Prone Hip ER Yoga Block              MF Press   F Green  30x ea F Green  30x ea     F Green  30x ea F Green  30x ea     MF Twist   F Green  30x ea F Green  30x ea     F Green  30x ea F Green  30x ea     STS with GS         MF emp  F Green  30x ea      Pball Crushers 20x5\"  20x5\" 20x5\"  20x5\"     20x5\"  20x5\"    Pball Diagonal 20x5\" ea 20x5\" ea 20x5\" ea  20x5\"     15x5\" ea 20x5\" ea   Dead Bug Crushers 15x5\" ea 15x5\" ea 15x5\" ea       15x5\" ea 15x5\" ea   Sup HS Curls               Chops Down     [place]  #12.5  3x10 ea [place]  #12.5  3x10 ea " "Frank[place]  #12.5  3x10 ea       Chops Up     Frankie  #12.5  3x10 ea Belt rotation chop ups 15# Belt rotation chop ups 15#                                                 Ther Ex 5/7 5/20 5/27  4/15 4/17 4/22 4/24 4/29 5/1 5/5   Nu Step/Bike               SLR x 4      2# 3x10 flex 2# 3x10 flex  3#   3x10 ea 3# 3x10 ea     Prayer Stretch     10x10\" 10x10\"        Seated sciatic nv glide              Modified good morning       #20KB  2x10 #20KB  2x10 #20KB  2x10     Rows  #19 K  3x10 #19 K  3x10 #19 K  3x10  #18.5K  3x10 #20.5K  3x10 rope #20.5K  3x10 rope #20.5K  3x10 rope #20.5K  3x10 rope #20.5K  3x10 rope #20.5K  3x10 rope   Low Rows #19 K  3x10 #19 K  3x10 #19 K  3x10  #18.5K  3x10 #20.5K rope  3x10 #20.5K rope  3x10 #20.5K  3x10 rope #20.5K  3x10 rope #20.5K  3x10 rope #20.5K  3x10 rope   Pulldowns  #19 K  3x10 #19 K  3x10 #19 K  3x10  #18.5K  3x10 #18.5K  3x10 #18.5K  3x10 #18.5K  3x10 #18.5K  3x10 #20.5K  3x10 rope #20.5K  3x10 rope   Leg Press  75# 3x10 75# 3x10  65# x10  75# 2x10 75# x10  85# 2x10 75# x10  85# 2x10 75# x10  85# 3x10 85# 3x10     LTR   10x10\" 10x10\"     10x10\" 10x10\"     DKC        10x10\" 10x10\"     SKC        10x10\" ea 10x10\" ea     Piriformis Stretch        10x10\" ea 10x10\" ea                   Ther Activity                                          Gait Training                                          Modalities 5/7 5/20 5/27  4/15 4/17 4/22 4/24 4/29 5/1 5/5    MHP   10' pre MHP pre 10'  MHP pre 10'  MHP 10' Pre MHP 10' pre MHP 10' pre MHP 10' pre MHP 10' pre MHP 10' pre MHP 10' pre                         "

## 2025-05-28 ENCOUNTER — APPOINTMENT (OUTPATIENT)
Dept: LAB | Facility: HOSPITAL | Age: 70
End: 2025-05-28
Payer: MEDICARE

## 2025-05-28 DIAGNOSIS — K21.00 GASTROESOPHAGEAL REFLUX DISEASE WITH ESOPHAGITIS WITHOUT HEMORRHAGE: ICD-10-CM

## 2025-05-28 LAB
ALBUMIN SERPL BCG-MCNC: 4.4 G/DL (ref 3.5–5)
ALP SERPL-CCNC: 50 U/L (ref 34–104)
ALT SERPL W P-5'-P-CCNC: 40 U/L (ref 7–52)
ANION GAP SERPL CALCULATED.3IONS-SCNC: 5 MMOL/L (ref 4–13)
AST SERPL W P-5'-P-CCNC: 33 U/L (ref 13–39)
BACTERIA UR QL AUTO: ABNORMAL /HPF
BASOPHILS # BLD AUTO: 0.03 THOUSANDS/ÂΜL (ref 0–0.1)
BASOPHILS NFR BLD AUTO: 1 % (ref 0–1)
BILIRUB SERPL-MCNC: 0.84 MG/DL (ref 0.2–1)
BILIRUB UR QL STRIP: NEGATIVE
BUN SERPL-MCNC: 18 MG/DL (ref 5–25)
CALCIUM SERPL-MCNC: 9.4 MG/DL (ref 8.4–10.2)
CHLORIDE SERPL-SCNC: 104 MMOL/L (ref 96–108)
CLARITY UR: CLEAR
CO2 SERPL-SCNC: 30 MMOL/L (ref 21–32)
COLOR UR: YELLOW
CREAT SERPL-MCNC: 1.03 MG/DL (ref 0.6–1.3)
EOSINOPHIL # BLD AUTO: 0.08 THOUSAND/ÂΜL (ref 0–0.61)
EOSINOPHIL NFR BLD AUTO: 2 % (ref 0–6)
ERYTHROCYTE [DISTWIDTH] IN BLOOD BY AUTOMATED COUNT: 11.9 % (ref 11.6–15.1)
GFR SERPL CREATININE-BSD FRML MDRD: 74 ML/MIN/1.73SQ M
GLUCOSE P FAST SERPL-MCNC: 109 MG/DL (ref 65–99)
GLUCOSE UR STRIP-MCNC: NEGATIVE MG/DL
HCT VFR BLD AUTO: 43.6 % (ref 36.5–49.3)
HGB BLD-MCNC: 15.8 G/DL (ref 12–17)
HGB UR QL STRIP.AUTO: NEGATIVE
IMM GRANULOCYTES # BLD AUTO: 0.01 THOUSAND/UL (ref 0–0.2)
IMM GRANULOCYTES NFR BLD AUTO: 0 % (ref 0–2)
KETONES UR STRIP-MCNC: NEGATIVE MG/DL
LEUKOCYTE ESTERASE UR QL STRIP: ABNORMAL
LYMPHOCYTES # BLD AUTO: 1.58 THOUSANDS/ÂΜL (ref 0.6–4.47)
LYMPHOCYTES NFR BLD AUTO: 43 % (ref 14–44)
MCH RBC QN AUTO: 35.3 PG (ref 26.8–34.3)
MCHC RBC AUTO-ENTMCNC: 36.2 G/DL (ref 31.4–37.4)
MCV RBC AUTO: 97 FL (ref 82–98)
MONOCYTES # BLD AUTO: 0.29 THOUSAND/ÂΜL (ref 0.17–1.22)
MONOCYTES NFR BLD AUTO: 8 % (ref 4–12)
MUCOUS THREADS UR QL AUTO: ABNORMAL
NEUTROPHILS # BLD AUTO: 1.73 THOUSANDS/ÂΜL (ref 1.85–7.62)
NEUTS SEG NFR BLD AUTO: 46 % (ref 43–75)
NITRITE UR QL STRIP: NEGATIVE
NON-SQ EPI CELLS URNS QL MICRO: ABNORMAL /HPF
NRBC BLD AUTO-RTO: 0 /100 WBCS
PH UR STRIP.AUTO: 6.5 [PH]
PLATELET # BLD AUTO: 116 THOUSANDS/UL (ref 149–390)
PMV BLD AUTO: 11.9 FL (ref 8.9–12.7)
POTASSIUM SERPL-SCNC: 4.2 MMOL/L (ref 3.5–5.3)
PROT SERPL-MCNC: 6.8 G/DL (ref 6.4–8.4)
PROT UR STRIP-MCNC: NEGATIVE MG/DL
PSA SERPL-MCNC: 5.14 NG/ML (ref 0–4)
RBC # BLD AUTO: 4.48 MILLION/UL (ref 3.88–5.62)
RBC #/AREA URNS AUTO: ABNORMAL /HPF
SODIUM SERPL-SCNC: 139 MMOL/L (ref 135–147)
SP GR UR STRIP.AUTO: 1.02 (ref 1–1.03)
UROBILINOGEN UR STRIP-ACNC: <2 MG/DL
WBC # BLD AUTO: 3.72 THOUSAND/UL (ref 4.31–10.16)
WBC #/AREA URNS AUTO: ABNORMAL /HPF

## 2025-05-28 PROCEDURE — 84153 ASSAY OF PSA TOTAL: CPT

## 2025-05-29 LAB
BASOPHILS # BLD AUTO: 0 X10E3/UL (ref 0–0.2)
BASOPHILS NFR BLD AUTO: 1 %
C TRACH DNA SPEC QL NAA+PROBE: NEGATIVE
CD3+CD4+ CELLS # BLD: 561 /UL (ref 359–1519)
CD3+CD4+ CELLS NFR BLD: 40.1 % (ref 30.8–58.5)
EOSINOPHIL # BLD AUTO: 0.1 X10E3/UL (ref 0–0.4)
EOSINOPHIL NFR BLD AUTO: 3 %
ERYTHROCYTE [DISTWIDTH] IN BLOOD BY AUTOMATED COUNT: 12.6 % (ref 11.6–15.4)
HCT VFR BLD AUTO: 42.5 % (ref 37.5–51)
HGB BLD-MCNC: 14.9 G/DL (ref 13–17.7)
IMM GRANULOCYTES # BLD: 0 X10E3/UL (ref 0–0.1)
IMM GRANULOCYTES NFR BLD: 0 %
LYMPHOCYTES # BLD AUTO: 1.4 X10E3/UL (ref 0.7–3.1)
LYMPHOCYTES NFR BLD AUTO: 38 %
MCH RBC QN AUTO: 35.4 PG (ref 26.6–33)
MCHC RBC AUTO-ENTMCNC: 35.1 G/DL (ref 31.5–35.7)
MCV RBC AUTO: 101 FL (ref 79–97)
MONOCYTES # BLD AUTO: 0.3 X10E3/UL (ref 0.1–0.9)
MONOCYTES NFR BLD AUTO: 8 %
N GONORRHOEA DNA SPEC QL NAA+PROBE: NEGATIVE
NEUTROPHILS # BLD AUTO: 1.8 X10E3/UL (ref 1.4–7)
NEUTROPHILS NFR BLD AUTO: 50 %
NRBC BLD AUTO-RTO: 1 % (ref 0–0)
PLATELET # BLD AUTO: 131 X10E3/UL (ref 150–450)
RBC # BLD AUTO: 4.21 X10E6/UL (ref 4.14–5.8)
WBC # BLD AUTO: 3.6 X10E3/UL (ref 3.4–10.8)

## 2025-05-29 RX ORDER — OMEPRAZOLE 40 MG/1
40 CAPSULE, DELAYED RELEASE ORAL DAILY
Qty: 90 CAPSULE | Refills: 1 | Status: SHIPPED | OUTPATIENT
Start: 2025-05-29

## 2025-05-30 LAB — HIV1 RNA # PLAS NAA DL=20: NOT DETECTED {COPIES}/ML

## 2025-06-02 ENCOUNTER — CONSULT (OUTPATIENT)
Dept: NEPHROLOGY | Facility: CLINIC | Age: 70
End: 2025-06-02
Attending: NURSE PRACTITIONER
Payer: MEDICARE

## 2025-06-02 VITALS
HEART RATE: 60 BPM | DIASTOLIC BLOOD PRESSURE: 74 MMHG | BODY MASS INDEX: 28.36 KG/M2 | SYSTOLIC BLOOD PRESSURE: 132 MMHG | WEIGHT: 212 LBS | OXYGEN SATURATION: 97 %

## 2025-06-02 DIAGNOSIS — N28.1 RENAL CYST: Primary | ICD-10-CM

## 2025-06-02 PROCEDURE — 99203 OFFICE O/P NEW LOW 30 MIN: CPT | Performed by: INTERNAL MEDICINE

## 2025-06-02 NOTE — ASSESSMENT & PLAN NOTE
The patient was discovered to have the comment of cysts in the kidney on an MRI that was done for his back.  When I look at back it CAT scans done a couple years ago we personally saw that there was 2 cysts in his right kidney and a cyst in his left kidney.    He did undergo renal ultrasound 12/10/2024 kidneys were normal size and there was a comment of a 10 mm cyst in the left kidney.  The cysts appear to be simple cysts so I explained to him that is very common for people over the age of 50 to have a cyst or 2 in their kidneys and we refer to this as acquired cysts.    These are not complex cyst so they do not really require further imaging and follow-up.    His kidney function is normal with a creatinine of 1.  There was no proteinuria in the past so he has no evidence of kidney disease.    Again I explained in detail that this is acquired cysts and there is nothing to do at this point and they should not really cause problems going forward.    I told him to call me if there is any problems or concerns.  He can follow-up with his family physician.    Again I explained to him that these were present on past imaging and there were not comments on some of the radiology reports.

## 2025-06-02 NOTE — PATIENT INSTRUCTIONS
You are here because on some imaging it was incidentally commented that there were cysts in your kidney.  I looked back at even a CAT scan with you and there was maybe 1 cyst in your right kidney and a small cyst in your left kidney there are some imaging in between that CAT scan and presently that did not comment on any and then you had a recent ultrasound that just noted 1 simple cyst.    Your kidney function is totally normal the creatinine is the blood test it is 1 and there is no protein in your urine on prior urine check so kidney functions normal.    These are likely just acquired cysts and nothing to worry about.  There is a genetic kidney disease where people have cysts throughout both of their kidneys but this is not that so these are just incidental findings and they do not have any clinical relevance for you and no worries at this point.    Of course if you ever have any issues give me a call.

## 2025-06-02 NOTE — PROGRESS NOTES
Name: Jay Jay Acuna      : 1955      MRN: [id number]  Encounter Provider: Thai Espinoza MD  Encounter Date: 2025   Encounter department: [facility] [place]  :  Assessment & Plan  Renal cyst    The patient was discovered to have the comment of cysts in the kidney on an MRI that was done for his back.  When I look at back it CAT scans done a couple years ago we personally saw that there was 2 cysts in his right kidney and a cyst in his left kidney.    He did undergo renal ultrasound 12/10/2024 kidneys were normal size and there was a comment of a 10 mm cyst in the left kidney.  The cysts appear to be simple cysts so I explained to him that is very common for people over the age of 50 to have a cyst or 2 in their kidneys and we refer to this as acquired cysts.    These are not complex cyst so they do not really require further imaging and follow-up.    His kidney function is normal with a creatinine of 1.  There was no proteinuria in the past so he has no evidence of kidney disease.    Again I explained in detail that this is acquired cysts and there is nothing to do at this point and they should not really cause problems going forward.    I told him to call me if there is any problems or concerns.  He can follow-up with his family physician.    Again I explained to him that these were present on past imaging and there were not comments on some of the radiology reports.             History of Present Illness   Hypertension  Pertinent negatives include no chest pain, headaches or shortness of breath.     Jay Jay Acuna is a 69 y.o. male who presents for his first visit.  Patient was experiencing significant back pain and he saw some specialists I did up getting some physical therapy which helped then was referred to us pain specialist and after physical therapy was being performed they did an MRI and there was a comment about cysts in his kidneys.  He is referred himself for evaluation  and recommendations.  History obtained from: patient    Review of Systems   Constitutional:  Negative for chills, diaphoresis and fever.   HENT: Negative.     Eyes: Negative.    Respiratory:  Negative for cough, chest tightness, shortness of breath and wheezing.    Cardiovascular:  Negative for chest pain and leg swelling.   Gastrointestinal:  Negative for abdominal pain, diarrhea, nausea and vomiting.   Genitourinary: Negative.    Musculoskeletal:         Back pain is improved.   Neurological:  Negative for dizziness and headaches.   Psychiatric/Behavioral:  Negative for agitation, behavioral problems, confusion and decreased concentration.      Medical History Reviewed by provider this encounter:     .  Past Medical History   Past Medical History[1]  Past Surgical History[2]  Family History[3]   reports that he has never smoked. He has never used smokeless tobacco. He reports that he does not currently use alcohol. He reports that he does not currently use drugs after having used the following drugs: Marijuana and Cocaine.  Current Outpatient Medications   Medication Instructions    atorvastatin (LIPITOR) 40 mg, Oral, Daily    benzoyl peroxide 237 g, As needed    bictegravir-emtricitab-tenofovir alafenamide (Biktarvy) -25 MG tablet 1 tablet, Oral, Daily with breakfast    cholecalciferol (VITAMIN D3) 1,000 Units, Daily    cyanocobalamin (VITAMIN B-12) 1,000 mcg, Daily    cycloSPORINE, PF, (Cequa) 0.09 % SOLN 1 1e11 Vector Genomes, Daily    docusate sodium (COLACE) 100 mg, 3 times daily PRN    famotidine (PEPCID) 40 mg, Oral, Daily    lisinopril (ZESTRIL) 20 mg, Oral, Daily    meloxicam (MOBIC) 7.5 mg, Oral, Daily PRN    metoprolol succinate (TOPROL-XL) 50 mg, Oral, Daily    Multiple Vitamin (multivitamin) capsule 1 capsule, Daily    mupirocin (BACTROBAN) 1 g, As needed    Omega-3 2 g, Daily    omeprazole (PRILOSEC) 40 mg, Oral, Daily    Perfluorohexyloctane (Miebo) 1.338 GM/ML SOLN 1 Drop/m2, 4 times daily     potassium citrate (UROCIT-K) 5 MEQ (540 MG) 5 mEq, Oral, Daily    psyllium (METAMUCIL) 58.6 % packet 1 packet, Daily   Allergies[4]   Medications Ordered Prior to Encounter[5]   Social History[6]     Objective   /74 (BP Location: Right arm, Patient Position: Sitting, Cuff Size: Standard)   Pulse 60   Wt 96.2 kg (212 lb)   SpO2 97%   BMI 28.36 kg/m²      Physical Exam  Constitutional:       General: He is not in acute distress.     Appearance: Normal appearance. He is not ill-appearing or toxic-appearing.   HENT:      Head: Normocephalic and atraumatic.      Nose: Nose normal.      Mouth/Throat:      Mouth: Mucous membranes are moist.     Eyes:      General: No scleral icterus.     Extraocular Movements: Extraocular movements intact.       Cardiovascular:      Rate and Rhythm: Normal rate and regular rhythm.      Heart sounds:      No gallop.   Pulmonary:      Effort: Pulmonary effort is normal. No respiratory distress.      Breath sounds: No wheezing or rales.   Abdominal:      General: There is no distension.      Palpations: Abdomen is soft.      Tenderness: There is no abdominal tenderness.     Neurological:      Mental Status: He is alert and oriented to person, place, and time. Mental status is at baseline.     Psychiatric:         Mood and Affect: Mood normal.         Behavior: Behavior normal.                [1]   Past Medical History:  Diagnosis Date    Cancer (HCC)     Colon polyp     GERD (gastroesophageal reflux disease)     HIV (human immunodeficiency virus infection) (HCC)     Hyperlipidemia     Hypertension     Kidney stone     Non-alcoholic fatty liver disease     PONV (postoperative nausea and vomiting)     Prostate cancer (HCC)     Sleep apnea    [2]   Past Surgical History:  Procedure Laterality Date    ANAL FISTULOTOMY N/A 05/07/2024    Procedure: FISTULOTOMY;  Surgeon: Librado Chamorro MD;  Location[facility];  Service: Colorectal    COLONOSCOPY      HERNIA REPAIR      WI ANRCT  XM SURG REQ ANES GENERAL SPI/EDRL DX N/A 05/07/2024    Procedure: EXAM UNDER ANESTHESIA (EUA);  Surgeon: Librado Chamorro MD;  [facility];  Service: Colorectal    SD BX PROSTATE STRTCTC SATURATION SAMPLING IMG GID N/A 01/25/2022    Procedure: TRANSPERINEAL ULTRASOUND GUIDED BIOPSY PROSTATE;  Surgeon: Shon Beltrán MD;  Location: [facility];  Service: Urology    SD PROSTATE NEEDLE BIOPSY ANY APPROACH N/A 10/13/2020    Procedure: TRANSPERINEAL PROSTATE BIOPSY;  Surgeon: Shon Beltrán MD;  Location: [facility];  Service: Urology    ROOT CANAL      SINUS SURGERY      exploratory, larger sinus cavity on one side   [3]   Family History  Problem Relation Name Age of Onset    Prostate cancer Father      Esophageal cancer Father      Heart disease Father      Colon cancer Cousin          mothers side   [4]   Allergies  Allergen Reactions    Epinephrine Palpitations and Shortness Of Breath    Sulfa Antibiotics Other (See Comments)     Eye irritation   [5]   Current Outpatient Medications on File Prior to Visit   Medication Sig Dispense Refill    atorvastatin (LIPITOR) 40 mg tablet TAKE 1 TABLET BY MOUTH ONCE DAILY 90 tablet 1    benzoyl peroxide 5 % external wash Apply 237 g topically if needed (as needed)      bictegravir-emtricitab-tenofovir alafenamide (Biktarvy) -25 MG tablet TAKE 1 TABLET BY MOUTH DAILY WITH BREAKFAST 90 tablet 1    cholecalciferol (VITAMIN D3) 1,000 units tablet Take 1,000 Units by mouth in the morning.      cyanocobalamin (VITAMIN B-12) 500 MCG tablet Take 1,000 mcg by mouth in the morning.      cycloSPORINE, PF, (Cequa) 0.09 % SOLN Apply 1 1e11 Vector Genomes to eye in the morning      docusate sodium (COLACE) 100 mg capsule Take 100 mg by mouth as needed in the morning and 100 mg as needed at noon and 100 mg as needed in the evening for constipation.      famotidine (PEPCID) 40 MG tablet Take 1 tablet (40 mg total) by mouth daily 30 tablet 4    lisinopril (ZESTRIL) 20  mg tablet Take 1 tablet (20 mg total) by mouth daily 90 tablet 2    meloxicam (MOBIC) 15 mg tablet Take 0.5 tablets (7.5 mg total) by mouth daily as needed for moderate pain 30 tablet 1    metoprolol succinate (TOPROL-XL) 50 mg 24 hr tablet TAKE ONE TABLET BY MOUTH ONCE DAILY 90 tablet 1    Multiple Vitamin (multivitamin) capsule Take 1 capsule by mouth in the morning.      mupirocin (BACTROBAN) 2 % ointment Apply 1 g topically if needed (as needed)      Omega-3 1000 MG CAPS Take 2 g by mouth in the morning.      omeprazole (PriLOSEC) 40 MG capsule Take 1 capsule (40 mg total) by mouth daily 90 capsule 1    Perfluorohexyloctane (Miebo) 1.338 GM/ML SOLN Administer 1 Drop/m2 to both eyes in the morning and 1 Drop/m2 at noon and 1 Drop/m2 in the evening and 1 Drop/m2 before bedtime.      potassium citrate (UROCIT-K) 5 MEQ (540 MG) Take 1 tablet (5 mEq total) by mouth daily 90 tablet 3    psyllium (METAMUCIL) 58.6 % packet Take 1 packet by mouth in the morning.       No current facility-administered medications on file prior to visit.   [6]   Social History  Tobacco Use    Smoking status: Never    Smokeless tobacco: Never   Vaping Use    Vaping status: Never Used   Substance and Sexual Activity    Alcohol use: Not Currently     Comment: used to be a heavy drinker, quit 15 years ago    Drug use: Not Currently     Types: Marijuana, Cocaine     Comment: used to use drugs, consisting of crystal meth, cocaine, and marijuana, denied any IV drug use in the past    Sexual activity: Not Currently     Partners: Male

## 2025-06-02 NOTE — LETTER
2025     ARINA Lantigua  [address] [facility] [address]    Patient: Jay Jay Acuna   YOB: 1955   Date of Visit: 2025       Dear ARINA Acosta:    Thank you for referring Jay Jay Acuna to me for evaluation. Below are my notes for this consultation.    If you have questions, please do not hesitate to call me. I look forward to following your patient along with you.         Sincerely,        Thai Espinoza MD        CC: No Recipients    Thai Espinoza MD  2025 11:30 AM  Sign when Signing Visit  Name: Jay Jay Acuna      : 1955      MRN: [id number]  Encounter Provider: Thai Espinoza MD  Encounter Date: 2025   Encounter department: [facility]  :  Assessment & Plan  Renal cyst    The patient was discovered to have the comment of cysts in the kidney on an MRI that was done for his back.  When I look at back it CAT scans done a couple years ago we personally saw that there was 2 cysts in his right kidney and a cyst in his left kidney.    He did undergo renal ultrasound 12/10/2024 kidneys were normal size and there was a comment of a 10 mm cyst in the left kidney.  The cysts appear to be simple cysts so I explained to him that is very common for people over the age of 50 to have a cyst or 2 in their kidneys and we refer to this as acquired cysts.    These are not complex cyst so they do not really require further imaging and follow-up.    His kidney function is normal with a creatinine of 1.  There was no proteinuria in the past so he has no evidence of kidney disease.    Again I explained in detail that this is acquired cysts and there is nothing to do at this point and they should not really cause problems going forward.    I told him to call me if there is any problems or concerns.  He can follow-up with his family physician.    Again I explained to him that these were present on past imaging and there were not comments on some  of the radiology reports.             History of Present Illness  Hypertension  Pertinent negatives include no chest pain, headaches or shortness of breath.     Jay Jay Acuna is a 69 y.o. male who presents for his first visit.  Patient was experiencing significant back pain and he saw some specialists I did up getting some physical therapy which helped then was referred to us pain specialist and after physical therapy was being performed they did an MRI and there was a comment about cysts in his kidneys.  He is referred himself for evaluation and recommendations.  History obtained from: patient    Review of Systems   Constitutional:  Negative for chills, diaphoresis and fever.   HENT: Negative.     Eyes: Negative.    Respiratory:  Negative for cough, chest tightness, shortness of breath and wheezing.    Cardiovascular:  Negative for chest pain and leg swelling.   Gastrointestinal:  Negative for abdominal pain, diarrhea, nausea and vomiting.   Genitourinary: Negative.    Musculoskeletal:         Back pain is improved.   Neurological:  Negative for dizziness and headaches.   Psychiatric/Behavioral:  Negative for agitation, behavioral problems, confusion and decreased concentration.      Medical History Reviewed by provider this encounter:     .  Past Medical History  Past Medical History[1]  Past Surgical History[2]  Family History[3]   reports that he has never smoked. He has never used smokeless tobacco. He reports that he does not currently use alcohol. He reports that he does not currently use drugs after having used the following drugs: Marijuana and Cocaine.  Current Outpatient Medications   Medication Instructions   • atorvastatin (LIPITOR) 40 mg, Oral, Daily   • benzoyl peroxide 237 g, As needed   • bictegravir-emtricitab-tenofovir alafenamide (Biktarvy) -25 MG tablet 1 tablet, Oral, Daily with breakfast   • cholecalciferol (VITAMIN D3) 1,000 Units, Daily   • cyanocobalamin (VITAMIN B-12) 1,000 mcg,  Daily   • cycloSPORINE, PF, (Cequa) 0.09 % SOLN 1 1e11 Vector Genomes, Daily   • docusate sodium (COLACE) 100 mg, 3 times daily PRN   • famotidine (PEPCID) 40 mg, Oral, Daily   • lisinopril (ZESTRIL) 20 mg, Oral, Daily   • meloxicam (MOBIC) 7.5 mg, Oral, Daily PRN   • metoprolol succinate (TOPROL-XL) 50 mg, Oral, Daily   • Multiple Vitamin (multivitamin) capsule 1 capsule, Daily   • mupirocin (BACTROBAN) 1 g, As needed   • Omega-3 2 g, Daily   • omeprazole (PRILOSEC) 40 mg, Oral, Daily   • Perfluorohexyloctane (Miebo) 1.338 GM/ML SOLN 1 Drop/m2, 4 times daily   • potassium citrate (UROCIT-K) 5 MEQ (540 MG) 5 mEq, Oral, Daily   • psyllium (METAMUCIL) 58.6 % packet 1 packet, Daily   Allergies[4]   Medications Ordered Prior to Encounter[5]   Social History[6]     Objective  /74 (BP Location: Right arm, Patient Position: Sitting, Cuff Size: Standard)   Pulse 60   Wt 96.2 kg (212 lb)   SpO2 97%   BMI 28.36 kg/m²      Physical Exam  Constitutional:       General: He is not in acute distress.     Appearance: Normal appearance. He is not ill-appearing or toxic-appearing.   HENT:      Head: Normocephalic and atraumatic.      Nose: Nose normal.      Mouth/Throat:      Mouth: Mucous membranes are moist.     Eyes:      General: No scleral icterus.     Extraocular Movements: Extraocular movements intact.       Cardiovascular:      Rate and Rhythm: Normal rate and regular rhythm.      Heart sounds:      No gallop.   Pulmonary:      Effort: Pulmonary effort is normal. No respiratory distress.      Breath sounds: No wheezing or rales.   Abdominal:      General: There is no distension.      Palpations: Abdomen is soft.      Tenderness: There is no abdominal tenderness.     Neurological:      Mental Status: He is alert and oriented to person, place, and time. Mental status is at baseline.     Psychiatric:         Mood and Affect: Mood normal.         Behavior: Behavior normal.                [1]   Past Medical  History:  Diagnosis Date   • Cancer (HCC)    • Colon polyp    • GERD (gastroesophageal reflux disease)    • HIV (human immunodeficiency virus infection) (HCC)    • Hyperlipidemia    • Hypertension    • Kidney stone    • Non-alcoholic fatty liver disease    • PONV (postoperative nausea and vomiting)    • Prostate cancer (HCC)    • Sleep apnea    [2]   Past Surgical History:  Procedure Laterality Date   • ANAL FISTULOTOMY N/A 05/07/2024    Procedure: FISTULOTOMY;  Surgeon: Librado Chamorro MD;  [facility];  Service: Colorectal   • COLONOSCOPY     • HERNIA REPAIR     • KS ANRCT XM SURG REQ ANES GENERAL SPI/EDRL DX N/A 05/07/2024    Procedure: EXAM UNDER ANESTHESIA (EUA);  Surgeon: Librado Chamorro MD;  Location: [facility];  Service: Colorectal   • KS BX PROSTATE STRTCTC SATURATION SAMPLING IMG GID N/A 01/25/2022    Procedure: TRANSPERINEAL ULTRASOUND GUIDED BIOPSY PROSTATE;  Surgeon: Shon Beltrán MD;  Location: [facility];  Service: Urology   • KS PROSTATE NEEDLE BIOPSY ANY APPROACH N/A 10/13/2020    Procedure: TRANSPERINEAL PROSTATE BIOPSY;  Surgeon: Shon Beltrán MD;  Location: [facility];  Service: Urology   • ROOT CANAL     • SINUS SURGERY      exploratory, larger sinus cavity on one side   [3]   Family History  Problem Relation Name Age of Onset   • Prostate cancer Father     • Esophageal cancer Father     • Heart disease Father     • Colon cancer Cousin          mothers side   [4]   Allergies  Allergen Reactions   • Epinephrine Palpitations and Shortness Of Breath   • Sulfa Antibiotics Other (See Comments)     Eye irritation   [5]   Current Outpatient Medications on File Prior to Visit   Medication Sig Dispense Refill   • atorvastatin (LIPITOR) 40 mg tablet TAKE 1 TABLET BY MOUTH ONCE DAILY 90 tablet 1   • benzoyl peroxide 5 % external wash Apply 237 g topically if needed (as needed)     • bictegravir-emtricitab-tenofovir alafenamide (Biktarvy) -25 MG tablet TAKE 1 TABLET  BY MOUTH DAILY WITH BREAKFAST 90 tablet 1   • cholecalciferol (VITAMIN D3) 1,000 units tablet Take 1,000 Units by mouth in the morning.     • cyanocobalamin (VITAMIN B-12) 500 MCG tablet Take 1,000 mcg by mouth in the morning.     • cycloSPORINE, PF, (Cequa) 0.09 % SOLN Apply 1 1e11 Vector Genomes to eye in the morning     • docusate sodium (COLACE) 100 mg capsule Take 100 mg by mouth as needed in the morning and 100 mg as needed at noon and 100 mg as needed in the evening for constipation.     • famotidine (PEPCID) 40 MG tablet Take 1 tablet (40 mg total) by mouth daily 30 tablet 4   • lisinopril (ZESTRIL) 20 mg tablet Take 1 tablet (20 mg total) by mouth daily 90 tablet 2   • meloxicam (MOBIC) 15 mg tablet Take 0.5 tablets (7.5 mg total) by mouth daily as needed for moderate pain 30 tablet 1   • metoprolol succinate (TOPROL-XL) 50 mg 24 hr tablet TAKE ONE TABLET BY MOUTH ONCE DAILY 90 tablet 1   • Multiple Vitamin (multivitamin) capsule Take 1 capsule by mouth in the morning.     • mupirocin (BACTROBAN) 2 % ointment Apply 1 g topically if needed (as needed)     • Omega-3 1000 MG CAPS Take 2 g by mouth in the morning.     • omeprazole (PriLOSEC) 40 MG capsule Take 1 capsule (40 mg total) by mouth daily 90 capsule 1   • Perfluorohexyloctane (Miebo) 1.338 GM/ML SOLN Administer 1 Drop/m2 to both eyes in the morning and 1 Drop/m2 at noon and 1 Drop/m2 in the evening and 1 Drop/m2 before bedtime.     • potassium citrate (UROCIT-K) 5 MEQ (540 MG) Take 1 tablet (5 mEq total) by mouth daily 90 tablet 3   • psyllium (METAMUCIL) 58.6 % packet Take 1 packet by mouth in the morning.       No current facility-administered medications on file prior to visit.   [6]   Social History  Tobacco Use   • Smoking status: Never   • Smokeless tobacco: Never   Vaping Use   • Vaping status: Never Used   Substance and Sexual Activity   • Alcohol use: Not Currently     Comment: used to be a heavy drinker, quit 15 years ago   • Drug use: Not  Currently     Types: Marijuana, Cocaine     Comment: used to use drugs, consisting of crystal meth, cocaine, and marijuana, denied any IV drug use in the past   • Sexual activity: Not Currently     Partners: Male

## 2025-06-03 ENCOUNTER — OFFICE VISIT (OUTPATIENT)
Age: 70
End: 2025-06-03
Attending: STUDENT IN AN ORGANIZED HEALTH CARE EDUCATION/TRAINING PROGRAM
Payer: MEDICARE

## 2025-06-03 DIAGNOSIS — M54.16 LUMBAR RADICULOPATHY, CHRONIC: Primary | ICD-10-CM

## 2025-06-03 PROCEDURE — 97110 THERAPEUTIC EXERCISES: CPT

## 2025-06-03 PROCEDURE — 97140 MANUAL THERAPY 1/> REGIONS: CPT

## 2025-06-03 NOTE — PROGRESS NOTES
"Daily Note     Today's date: 6/3/2025  Patient name: Jay Jay Acuna  : 1955  MRN: [id number]  Referring provider: Carolyn Mata MD  Dx:   Encounter Diagnosis     ICD-10-CM    1. Lumbar radiculopathy, chronic  M54.16                      Subjective: Patient reports that he continues to hike and does well overall but is tight towards the end of the night.       Objective: See treatment diary below      Assessment: Jay Jay Acuna tolerated treatment well. Pt continues to respond well with all exercises. Added in standing p ball press downs today to promote core stability. Progressed leg press with good tolerance. Continued treatment indicated.      Plan: Continue per plan of care.      Plan: Continue per plan of care.     Precautions: PMH includes HIV, HTN, hiatal hernia, GERD and history of low back pain    Daily Treatment Log  Manuals 5/7 5/20 5/27 6/3   4/22 4/24 4/29 5/1 5/5   SL SI Oscillations SJ       GRC  GRC GRC   Breaking Bread SJ MM MM MM    GRC  GRC GRC   SL CG LAD  SJ MM MM MM      GRC GRC   FMP Lumbar Pattern       GRC       Lumbar PA Mobs              RE              Neuro Re-Ed 5/7 5/20 5/27 6/3   4/22 4/24 4/29 5/1 5/5   Prone GS              PBKHE               Alt UE/Alt LE       W pball 20x5\"       Bridges with TB       20x5\" w curl       Hip Add Iso              Prone Hip ER Yoga Block              MF Press   F Green  30x ea F Green  30x ea F Green  30x ea    F Green  30x ea F Green  30x ea     MF Twist   F Green  30x ea F Green  30x ea F Green  30x ea    F Green  30x ea F Green  30x ea     STS with GS         MF emp  F Green  30x ea      Pball Crushers 20x5\"  20x5\" 20x5\" Standing 20x5\"      20x5\"  20x5\"    Pball Diagonal 20x5\" ea 20x5\" ea 20x5\" ea 20x5\"      15x5\" ea 20x5\" ea   Dead Bug Crushers 15x5\" ea 15x5\" ea 15x5\" ea 15x5\" ea      15x5\" ea 15x5\" ea   Sup HS Curls               Chops Down       [place]  #12.5  3x10 ea       Chops Up       Belt rotation chop ups 15#                      " "                           Ther Ex 5/7 5/20 5/27 6/3   4/22 4/24 4/29 5/1 5/5   Nu Step/Bike               SLR x 4         3#   3x10 ea 3# 3x10 ea     Prayer Stretch              Seated sciatic nv glide              Modified good morning       #20KB  2x10 #20KB  2x10 #20KB  2x10     Rows  #19 K  3x10 #19 K  3x10 #19 K  3x10 #19.5 K 3x10   #20.5K  3x10 rope #20.5K  3x10 rope #20.5K  3x10 rope #20.5K  3x10 rope #20.5K  3x10 rope   Low Rows #19 K  3x10 #19 K  3x10 #19 K  3x10 #19.5 K 3x10   #20.5K rope  3x10 #20.5K  3x10 rope #20.5K  3x10 rope #20.5K  3x10 rope #20.5K  3x10 rope   Pulldowns  #19 K  3x10 #19 K  3x10 #19 K  3x10 #19.5 K 3x10   #18.5K  3x10 #18.5K  3x10 #18.5K  3x10 #20.5K  3x10 rope #20.5K  3x10 rope   Leg Press  75# 3x10 75# 3x10 85# 3x10   75# x10  85# 2x10 75# x10  85# 3x10 85# 3x10     LTR   10x10\" 10x10\" 10x10\"    10x10\" 10x10\"     DKC        10x10\" 10x10\"     SKC        10x10\" ea 10x10\" ea     Piriformis Stretch        10x10\" ea 10x10\" ea                   Ther Activity                                          Gait Training                                          Modalities 5/7 5/20 5/27 6/3   4/22 4/24 4/29 5/1 5/5    MHP   10' pre MHP pre 10'  MHP pre 10' MHP pre 10'   MHP 10' pre MHP 10' pre MHP 10' pre MHP 10' pre MHP 10' pre                         "

## 2025-06-10 ENCOUNTER — APPOINTMENT (OUTPATIENT)
Age: 70
End: 2025-06-10
Attending: STUDENT IN AN ORGANIZED HEALTH CARE EDUCATION/TRAINING PROGRAM
Payer: MEDICARE

## 2025-06-17 ENCOUNTER — OFFICE VISIT (OUTPATIENT)
Age: 70
End: 2025-06-17
Attending: STUDENT IN AN ORGANIZED HEALTH CARE EDUCATION/TRAINING PROGRAM
Payer: MEDICARE

## 2025-06-17 DIAGNOSIS — M54.16 LUMBAR RADICULOPATHY, CHRONIC: Primary | ICD-10-CM

## 2025-06-17 PROCEDURE — 97110 THERAPEUTIC EXERCISES: CPT

## 2025-06-17 NOTE — PROGRESS NOTES
"Daily Note     Today's date: 2025  Patient name: Jay Jay Acuna  : 1955  MRN: [id number]  Referring provider: Carolyn Mata MD  Dx:   Encounter Diagnosis     ICD-10-CM    1. Lumbar radiculopathy, chronic  M54.16                      Subjective: Patient reports that he is doing really well. Notes that he got a massage in the mall yesterday which felt good.       Objective: See treatment diary below      Assessment: Jay Jay Acuna tolerated treatment well. Pt continues to respond well with all exercises. Continued with standing p ball press downs today to promote core stability. Progressed leg press with good tolerance. Continued treatment indicated.      Plan: Continue per plan of care.          Precautions: PMH includes HIV, HTN, hiatal hernia, GERD and history of low back pain    Daily Treatment Log  Manuals 5/7 5/20 5/27 6/3 6/17  4/22 4/24 4/29 5/1 5/5   SL SI Oscillations SJ       GRC  GRC GRC   Breaking Bread SJ MM MM MM    GRC  GRC GRC   SL CG LAD  SJ MM MM MM      GRC GRC   FMP Lumbar Pattern       GRC       Lumbar PA Mobs              RE              Neuro Re-Ed 5/7 5/20 5/27 6/3 6/17  4/22 4/24 4/29 5/1 5/5   Prone GS              PBKHE               Alt UE/Alt LE       W pball 20x5\"       Bridges with TB       20x5\" w curl       Hip Add Iso              Prone Hip ER Yoga Block              MF Press   F Green  30x ea F Green  30x ea F Green  30x ea F  Green  30xea   F Green  30x ea F Green  30x ea     MF Twist   F Green  30x ea F Green  30x ea F Green  30x ea F Green  30x ea   F Green  30x ea F Green  30x ea     STS with GS         MF emp  F Green  30x ea      Pball Crushers 20x5\"  20x5\" 20x5\" Standing 20x5\" Stand 20x5\"     20x5\"  20x5\"    Pball Diagonal 20x5\" ea 20x5\" ea 20x5\" ea 20x5\" 20x5\"     15x5\" ea 20x5\" ea   Dead Bug Crushers 15x5\" ea 15x5\" ea 15x5\" ea 15x5\" ea 15x5\" ea     15x5\" ea 15x5\" ea   Sup HS Curls               Chops Down       [place]  #12.5  3x10 ea       Chops Up       Belt " "rotation chop ups 15#                                                 Ther Ex 5/7 5/20 5/27 6/3 6/17  4/22 4/24 4/29 5/1 5/5   Nu Step/Bike               SLR x 4         3#   3x10 ea 3# 3x10 ea     Prayer Stretch              Seated sciatic nv glide              Modified good morning       #20KB  2x10 #20KB  2x10 #20KB  2x10     Rows  #19 K  3x10 #19 K  3x10 #19 K  3x10 #19.5 K 3x10 #19.5 K 3x10  #20.5K  3x10 rope #20.5K  3x10 rope #20.5K  3x10 rope #20.5K  3x10 rope #20.5K  3x10 rope   Low Rows #19 K  3x10 #19 K  3x10 #19 K  3x10 #19.5 K 3x10 #19.5  K 3x10  #20.5K rope  3x10 #20.5K  3x10 rope #20.5K  3x10 rope #20.5K  3x10 rope #20.5K  3x10 rope   Pulldowns  #19 K  3x10 #19 K  3x10 #19 K  3x10 #19.5 K 3x10 #19.5  K 3x10  #18.5K  3x10 #18.5K  3x10 #18.5K  3x10 #20.5K  3x10 rope #20.5K  3x10 rope   Leg Press  75# 3x10 75# 3x10 85# 3x10 #85 3x10  75# x10  85# 2x10 75# x10  85# 3x10 85# 3x10     LTR   10x10\" 10x10\" 10x10\" 10x10\"   10x10\" 10x10\"     DKC        10x10\" 10x10\"     SKC        10x10\" ea 10x10\" ea     Piriformis Stretch        10x10\" ea 10x10\" ea                   Ther Activity                                          Gait Training                                          Modalities 5/7 5/20 5/27 6/3 6/17  4/22 4/24 4/29 5/1 5/5    MHP   10' pre MHP pre 10'  MHP pre 10' MHP pre 10' MHP pre 10'  MHP 10' pre MHP 10' pre MHP 10' pre MHP 10' pre MHP 10' pre                         "

## 2025-06-19 ENCOUNTER — TELEPHONE (OUTPATIENT)
Dept: SURGERY | Facility: CLINIC | Age: 70
End: 2025-06-19

## 2025-06-20 ENCOUNTER — OFFICE VISIT (OUTPATIENT)
Dept: SURGERY | Facility: CLINIC | Age: 70
End: 2025-06-20
Payer: MEDICARE

## 2025-06-20 VITALS
BODY MASS INDEX: 27.75 KG/M2 | OXYGEN SATURATION: 98 % | DIASTOLIC BLOOD PRESSURE: 72 MMHG | SYSTOLIC BLOOD PRESSURE: 125 MMHG | TEMPERATURE: 98.3 F | HEIGHT: 73 IN | HEART RATE: 75 BPM | RESPIRATION RATE: 18 BRPM | WEIGHT: 209.4 LBS

## 2025-06-20 DIAGNOSIS — A53.9 SYPHILIS: ICD-10-CM

## 2025-06-20 DIAGNOSIS — D69.6 THROMBOCYTOPENIA (HCC): ICD-10-CM

## 2025-06-20 DIAGNOSIS — Z13.220 ENCOUNTER FOR LIPID SCREENING FOR CARDIOVASCULAR DISEASE: ICD-10-CM

## 2025-06-20 DIAGNOSIS — Z72.89 OTHER PROBLEMS RELATED TO LIFESTYLE: ICD-10-CM

## 2025-06-20 DIAGNOSIS — D72.89 OTHER SPECIFIED DISORDERS OF WHITE BLOOD CELLS: ICD-10-CM

## 2025-06-20 DIAGNOSIS — B20 HIV DISEASE (HCC): Primary | ICD-10-CM

## 2025-06-20 DIAGNOSIS — N20.0 NEPHROLITHIASIS: ICD-10-CM

## 2025-06-20 DIAGNOSIS — B20 HUMAN IMMUNODEFICIENCY VIRUS (HIV) DISEASE (HCC): Primary | ICD-10-CM

## 2025-06-20 DIAGNOSIS — D75.1 POLYCYTHEMIA: ICD-10-CM

## 2025-06-20 DIAGNOSIS — R16.1 SPLENOMEGALY: ICD-10-CM

## 2025-06-20 DIAGNOSIS — D72.810 LYMPHOPENIA: ICD-10-CM

## 2025-06-20 DIAGNOSIS — E78.2 MIXED HYPERLIPIDEMIA: ICD-10-CM

## 2025-06-20 DIAGNOSIS — R73.01 IMPAIRED FASTING GLUCOSE: ICD-10-CM

## 2025-06-20 DIAGNOSIS — Z13.6 ENCOUNTER FOR LIPID SCREENING FOR CARDIOVASCULAR DISEASE: ICD-10-CM

## 2025-06-20 DIAGNOSIS — C61 PROSTATE CANCER (HCC): ICD-10-CM

## 2025-06-20 DIAGNOSIS — Z20.2 CONTACT WITH AND (SUSPECTED) EXPOSURE TO INFECTIONS WITH A PREDOMINANTLY SEXUAL MODE OF TRANSMISSION: ICD-10-CM

## 2025-06-20 DIAGNOSIS — Z11.1 SCREENING-PULMONARY TB: ICD-10-CM

## 2025-06-20 DIAGNOSIS — Z11.3 ENCOUNTER FOR SCREENING FOR BACTERIAL SEXUALLY TRANSMITTED DISEASE: ICD-10-CM

## 2025-06-20 PROCEDURE — G2211 COMPLEX E/M VISIT ADD ON: HCPCS | Performed by: STUDENT IN AN ORGANIZED HEALTH CARE EDUCATION/TRAINING PROGRAM

## 2025-06-20 PROCEDURE — 99214 OFFICE O/P EST MOD 30 MIN: CPT | Performed by: STUDENT IN AN ORGANIZED HEALTH CARE EDUCATION/TRAINING PROGRAM

## 2025-06-20 NOTE — PROGRESS NOTES
"Name: Jay Jay Acuna      : 1955      MRN: [id number]  Encounter Provider: Antionette Barrett MD  Encounter Date: 2025   Encounter department: [facility]  :  Assessment & Plan  HIV disease (HCC)  Well controlled on Biktarvy with 100% adherence, no side effects. CD4 561, VL <20.               -continue Biktarvy              -Patient was provided medication, adherence and prevention education              -check labs in 5 months to assess for virologic control,drug toxicity, co-infections              -follow up in 6 months       Mixed hyperlipidemia  LDL at goal, continue atorvastatin and cardiology follow up    Prostate cancer (HCC)  Undergoing active surveillance with urology. PSA has been stable.              -ongoing urology follow up       Syphilis  History of syphilitic meningitis, patient also underwent treatment with 3 doses of benzathine penicillin G in .  RPR nonreactive.                -Continue to monitor RPR    Thrombocytopenia (HCC)  Chronic, stable.  Likely related to HIV.  Will continue to monitor CBC.       History of Present Illness   Routine follow-up for HIV.  Patient claims 100% adherence with Biktarvy. Patient denies any notable side effects. The patient denies any fever chills or sweats, denies any nausea vomiting or diarrhea, denies any cough or shortness of breath. He and his partner Antony had a viral like illness in May but he feels that he is recovering. He has been doing PT for his back pain which is helping.   ROS: A complete review of systems are negative other than that noted in the HPI      Objective   /72 (BP Location: Right arm, Patient Position: Sitting, Cuff Size: Large)   Pulse 75   Temp 98.3 °F (36.8 °C) (Tympanic)   Resp 18   Ht 6' 0.5\" (1.842 m)   Wt 95 kg (209 lb 6.4 oz)   SpO2 98%   BMI 28.01 kg/m²     General: Alert, interactive, appearing well, nontoxic, no acute distress.  Neck: Supple without lymphadenopathy, no thyromegaly or " masses  Throat: Oropharynx moist without lesions.   Lungs: Clear to auscultation bilaterally; no wheezes, rhonchi or rales; respirations unlabored  Heart: RRR; no murmur, rub or gallop  Abdomen: Soft, non-tender, non-distended, positive bowel sounds.    Extremities: No clubbing, cyanosis or edema  Skin: No new rashes or lesions. No draining wounds noted.    Lab Results: I have personally reviewed pertinent labs.  Lab Results   Component Value Date     12/16/2015    K 4.2 05/28/2025     05/28/2025    CO2 30 05/28/2025    ANIONGAP 6 12/16/2015    BUN 18 05/28/2025    CREATININE 1.03 05/28/2025    GLUCOSE 99 12/16/2015    GLUF 109 (H) 05/28/2025    CALCIUM 9.4 05/28/2025    AST 33 05/28/2025    ALT 40 05/28/2025    ALKPHOS 50 05/28/2025    PROT 7.2 12/16/2015    BILITOT 0.91 12/16/2015    EGFR 74 05/28/2025     Lab Results   Component Value Date    WBC 3.72 (L) 05/28/2025    WBC 3.6 05/28/2025    HGB 15.8 05/28/2025    HGB 14.9 05/28/2025    HCT 43.6 05/28/2025    HCT 42.5 05/28/2025    MCV 97 05/28/2025     (H) 05/28/2025     (L) 05/28/2025     (L) 05/28/2025     Lab Results   Component Value Date    HEPCAB Non-reactive 12/09/2024     Lab Results   Component Value Date    HAV Reactive (A) 11/21/2022    HEPBIGM Non-reactive 12/05/2022    HEPBCAB Non-reactive 12/05/2022    HEPCAB Non-reactive 12/09/2024     Lab Results   Component Value Date    RPR Non-Reactive 12/09/2024     CD4 ABS   Date/Time Value Ref Range Status   05/28/2025 07:05  359 - 1519 /uL Final     HIV-1 RNA by PCR, Qn   Date/Time Value Ref Range Status   06/12/2023 07:33 AM <20 copies/mL Final     Comment:     HIV-1 RNA detected  The reportable range for this assay is 20 to 10,000,000  copies HIV-1 RNA/mL.     HIV-1 TARGET   Date/Time Value Ref Range Status   05/28/2025 07:05 AM Not Detected Not Detected Final

## 2025-06-21 NOTE — ASSESSMENT & PLAN NOTE
History of syphilitic meningitis, patient also underwent treatment with 3 doses of benzathine penicillin G in 2016.  RPR nonreactive.                -Continue to monitor RPR

## 2025-06-21 NOTE — ASSESSMENT & PLAN NOTE
Well controlled on Biktarvy with 100% adherence, no side effects. CD4 561, VL <20.               -continue Biktarvy              -Patient was provided medication, adherence and prevention education              -check labs in 5 months to assess for virologic control,drug toxicity, co-infections              -follow up in 6 months

## 2025-06-21 NOTE — ASSESSMENT & PLAN NOTE
Undergoing active surveillance with urology. PSA has been stable.              -ongoing urology follow up

## 2025-06-24 ENCOUNTER — APPOINTMENT (OUTPATIENT)
Age: 70
End: 2025-06-24
Attending: STUDENT IN AN ORGANIZED HEALTH CARE EDUCATION/TRAINING PROGRAM
Payer: MEDICARE

## 2025-06-25 ENCOUNTER — OFFICE VISIT (OUTPATIENT)
Age: 70
End: 2025-06-25
Attending: STUDENT IN AN ORGANIZED HEALTH CARE EDUCATION/TRAINING PROGRAM
Payer: MEDICARE

## 2025-06-25 DIAGNOSIS — M54.16 LUMBAR RADICULOPATHY, CHRONIC: Primary | ICD-10-CM

## 2025-06-25 PROCEDURE — 97140 MANUAL THERAPY 1/> REGIONS: CPT

## 2025-06-25 PROCEDURE — 97110 THERAPEUTIC EXERCISES: CPT

## 2025-06-25 NOTE — PROGRESS NOTES
"Daily Note     Today's date: 2025  Patient name: Jay Jay Acuna  : 1955  MRN: [id number]  Referring provider: Carolyn Mata MD  Dx:   Encounter Diagnosis     ICD-10-CM    1. Lumbar radiculopathy, chronic  M54.16           Start Time: 830  Stop Time: 930  Total time in clinic (min): 60 minutes    Subjective: Patient reports having some pain in his low back this morning.  Patient states that over the weekend he went to  a bucket, and before he reached the bucket, he felt a sharp pain in his low back.  That bothered him the rest of the weekend.      Objective: See treatment diary below      Assessment: Tolerated treatment well.   Patient participated in skilled PT session focused on strengthening, stretching, and ROM.  Patient able to complete exercise program with a relief in low back pain.  Patient focused on core stabilization and postural strengthening this session.  Patient needed some v.c. for postural awareness with exercises and engaging core.  Patient would continue to benefit from skilled PT interventions to address strengthening, stretching, and ROM. Patient demonstrated fatigue post treatment      Plan: Continue per plan of care.      Precautions: PMH includes HIV, HTN, hiatal hernia, GERD and history of low back pain    Daily Treatment Log  Manuals 5/7 5/20 5/27 6/3 6/17 6/25 4/22 4/24 4/29 5/1 5/5   SL SI Oscillations SJ       GRC  GRC GRC   Breaking Bread SJ MM MM MM    GRC  GRC GRC   SL CG LAD  SJ MM MM MM      GRC GRC   FMP Lumbar Pattern       GRC       Lumbar PA Mobs      STM low back  CD        RE              Neuro Re-Ed 5/7 5/20 5/27 6/3 6/17 6/25 4/22 4/24 4/29 5/1 5/5   Prone GS              PBKHE               Alt UE/Alt LE       W pball 20x5\"       Bridges with TB       20x5\" w curl       Hip Add Iso              Prone Hip ER Yoga Block              MF Press   F Green  30x ea F Green  30x ea F Green  30x ea F  Green  30xea Double Grn   30x ea  F Green  30x ea F " "Green  30x ea     MF Twist   F Green  30x ea F Green  30x ea F Green  30x ea F Green  30x ea Double Grn  30x ea  F Green  30x ea F Green  30x ea     STS with GS         MF emp  F Green  30x ea      Pball Crushers 20x5\"  20x5\" 20x5\" Standing 20x5\" Stand 20x5\" Standing 5\" 20x    20x5\"  20x5\"    Pball Diagonal 20x5\" ea 20x5\" ea 20x5\" ea 20x5\" 20x5\" 5\" 20x    15x5\" ea 20x5\" ea   Dead Bug Crushers 15x5\" ea 15x5\" ea 15x5\" ea 15x5\" ea 15x5\" ea 5\" 15x ea    15x5\" ea 15x5\" ea   Sup HS Curls               Chops Down       Frankie  #12.5  3x10 ea       Chops Up       Belt rotation chop ups 15#                                                 Ther Ex 5/7 5/20 5/27 6/3 6/17 6/25 4/22 4/24 4/29 5/1 5/5   Nu Step/Bike               SLR x 4         3#   3x10 ea 3# 3x10 ea     Prayer Stretch              Seated sciatic nv glide              Modified good morning       #20KB  2x10 #20KB  2x10 #20KB  2x10     Rows  #19 K  3x10 #19 K  3x10 #19 K  3x10 #19.5 K 3x10 #19.5 K 3x10 #19.5  K 3x10 #20.5K  3x10 rope #20.5K  3x10 rope #20.5K  3x10 rope #20.5K  3x10 rope #20.5K  3x10 rope   Low Rows #19 K  3x10 #19 K  3x10 #19 K  3x10 #19.5 K 3x10 #19.5  K 3x10 #19.5  K 3x10 #20.5K rope  3x10 #20.5K  3x10 rope #20.5K  3x10 rope #20.5K  3x10 rope #20.5K  3x10 rope   Pulldowns  #19 K  3x10 #19 K  3x10 #19 K  3x10 #19.5 K 3x10 #19.5  K 3x10 #19.5  K 3x10 #18.5K  3x10 #18.5K  3x10 #18.5K  3x10 #20.5K  3x10 rope #20.5K  3x10 rope   Leg Press  75# 3x10 75# 3x10 85# 3x10 #85 3x10  75# x10  85# 2x10 75# x10  85# 3x10 85# 3x10     LTR   10x10\" 10x10\" 10x10\" 10x10\" 10\" 10x  10x10\" 10x10\"     DKC        10x10\" 10x10\"     SKC        10x10\" ea 10x10\" ea     Piriformis Stretch        10x10\" ea 10x10\" ea                   Ther Activity                                          Gait Training                                          Modalities 5/7 5/20 5/27 6/3 6/17 6/25 4/22 4/24 4/29 5/1 5/5    MHP   10' pre MHP pre 10'  MHP pre 10' MHP pre 10' MHP pre 10' " MHP  Pre 10; MHP 10' pre MHP 10' pre MHP 10' pre MHP 10' pre MHP 10' pre

## 2025-06-30 ENCOUNTER — TELEPHONE (OUTPATIENT)
Dept: SURGERY | Facility: CLINIC | Age: 70
End: 2025-06-30

## 2025-06-30 DIAGNOSIS — I47.10 SVT (SUPRAVENTRICULAR TACHYCARDIA) (HCC): ICD-10-CM

## 2025-06-30 DIAGNOSIS — R00.2 PALPITATIONS: ICD-10-CM

## 2025-07-01 ENCOUNTER — OFFICE VISIT (OUTPATIENT)
Age: 70
End: 2025-07-01
Attending: STUDENT IN AN ORGANIZED HEALTH CARE EDUCATION/TRAINING PROGRAM
Payer: MEDICARE

## 2025-07-01 DIAGNOSIS — M54.16 LUMBAR RADICULOPATHY, CHRONIC: Primary | ICD-10-CM

## 2025-07-01 PROCEDURE — 97140 MANUAL THERAPY 1/> REGIONS: CPT

## 2025-07-01 PROCEDURE — 97112 NEUROMUSCULAR REEDUCATION: CPT

## 2025-07-01 PROCEDURE — 97110 THERAPEUTIC EXERCISES: CPT

## 2025-07-01 RX ORDER — METOPROLOL SUCCINATE 50 MG/1
50 TABLET, EXTENDED RELEASE ORAL DAILY
Qty: 90 TABLET | Refills: 0 | Status: SHIPPED | OUTPATIENT
Start: 2025-07-01

## 2025-07-01 NOTE — PROGRESS NOTES
"Daily Note     Today's date: 2025  Patient name: Jay Jay Acuna  : 1955  MRN: [id number]  Referring provider: Carolyn Mata MD  Dx:   Encounter Diagnosis     ICD-10-CM    1. Lumbar radiculopathy, chronic  M54.16                      Subjective: Patient reports that he's not horrible today but noted some discomfort in his low back from trimming hedges.    Objective: See treatment diary below      Assessment: Tolerated treatment well.   Patient participated in skilled PT session focused on strengthening, stretching, and ROM.  Patient able to complete exercise program with a relief in low back pain.  Patient focused on core stabilization and postural strengthening this session.  Patient needed some v.c. for postural awareness with exercises and engaging core.  Patient would continue to benefit from skilled PT interventions to address strengthening, stretching, and ROM. Patient demonstrated fatigue post treatment      Plan: Continue per plan of care.      Precautions: PMH includes HIV, HTN, hiatal hernia, GERD and history of low back pain    Daily Treatment Log  Manuals 5/7 5/20 5/27 6/3 6/17 6/25 7/1   SL SI Oscillations SJ         Breaking Bread SJ MM MM MM      SL CG LAD  SJ MM MM MM      FMP Lumbar Pattern          Lumbar PA Mobs      STM low back  CD STM LB MM   RE          Neuro Re-Ed 5/7 5/20 5/27 6/3 6/17 6/25 7/1   Prone GS          PBKHE           Alt UE/Alt LE          Bridges with TB          Hip Add Iso          Prone Hip ER Yoga Block          MF Press   F Green  30x ea F Green  30x ea F Green  30x ea F  Green  30xea Double Grn   30x ea Double Grn  30x ea   MF Twist   F Green  30x ea F Green  30x ea F Green  30x ea F Green  30x ea Double Grn  30x ea Double Grn  30x ea   STS with GS           Pball Crushers 20x5\"  20x5\" 20x5\" Standing 20x5\" Stand 20x5\" Standing 5\" 20x Standing 5\" 20x   Pball Diagonal 20x5\" ea 20x5\" ea 20x5\" ea 20x5\" 20x5\" 5\" 20x 5\" 20x   Dead Bug Crushers 15x5\" ea 15x5\" ea " "15x5\" ea 15x5\" ea 15x5\" ea 5\" 15x ea 5\" 15x ea   Sup HS Curls           Chops Down          Chops Up                                        Ther Ex 5/7 5/20 5/27 6/3 6/17 6/25 7/1   Nu Step/Bike           SLR x 4           Prayer Stretch          Seated sciatic nv glide          Modified good morning          Rows  #19 K  3x10 #19 K  3x10 #19 K  3x10 #19.5 K 3x10 #19.5 K 3x10 #19.5  K 3x10 #19.5  K 3x10   Low Rows #19 K  3x10 #19 K  3x10 #19 K  3x10 #19.5 K 3x10 #19.5  K 3x10 #19.5  K 3x10 #19.5  K 3x10   Pulldowns  #19 K  3x10 #19 K  3x10 #19 K  3x10 #19.5 K 3x10 #19.5  K 3x10 #19.5  K 3x10 #19.5  K 3x10   Leg Press  75# 3x10 75# 3x10 85# 3x10 #85 3x10     LTR   10x10\" 10x10\" 10x10\" 10x10\" 10\" 10x 10\"x10   DKC          SKC          Piriformis Stretch                    Ther Activity                              Gait Training                              Modalities 5/7 5/20 5/27 6/3 6/17 6/25 7/1    MHP   10' pre MHP pre 10'  MHP pre 10' MHP pre 10' MHP pre 10' MHP  Pre 10; MHP  Pre 10;                       "

## 2025-07-02 ENCOUNTER — TELEPHONE (OUTPATIENT)
Dept: CARDIOLOGY CLINIC | Facility: CLINIC | Age: 70
End: 2025-07-02

## 2025-07-02 NOTE — TELEPHONE ENCOUNTER
Spoke to pt we need to r/s Dr Oconnor 10/28 as provider will be @ [facility] for rounds he was driving and will reschedule thru his My Chart

## 2025-07-11 ENCOUNTER — OFFICE VISIT (OUTPATIENT)
Age: 70
End: 2025-07-11
Attending: STUDENT IN AN ORGANIZED HEALTH CARE EDUCATION/TRAINING PROGRAM
Payer: MEDICARE

## 2025-07-11 DIAGNOSIS — M54.16 LUMBAR RADICULOPATHY, CHRONIC: Primary | ICD-10-CM

## 2025-07-11 PROCEDURE — 97112 NEUROMUSCULAR REEDUCATION: CPT

## 2025-07-11 PROCEDURE — 97110 THERAPEUTIC EXERCISES: CPT

## 2025-07-11 PROCEDURE — 97140 MANUAL THERAPY 1/> REGIONS: CPT

## 2025-07-11 NOTE — PROGRESS NOTES
"Daily Note     Today's date: 2025  Patient name: Jay Jay Acuna  : 1955  MRN: [id number]  Referring provider: Carolyn Mata MD  Dx:   Encounter Diagnosis     ICD-10-CM    1. Lumbar radiculopathy, chronic  M54.16           Start Time: 0845  Stop Time: 0940  Total time in clinic (min): 55 minutes    Subjective: Pt feels slight soreness in the low back.       Objective: See treatment diary below      Assessment: Added standing QL stretch that reduced symptoms and improved core engagement with remainder of interventions. Discussed proper scapular engagement form for lat recruitment. Tolerated treatment well. Patient would benefit from continued PT      Plan: Continue per plan of care.      Precautions: PMH includes HIV, HTN, hiatal hernia, GERD and history of low back pain    Daily Treatment Log  Manuals 5/7 5/20 5/27 6/3 6/17 6/25 7/1 7/11   SL SI Oscillations SJ       akc   Breaking Bread SJ MM MM MM    akc   SL CG LAD  SJ MM MM MM       FMP Lumbar Pattern           Lumbar PA Mobs      STM low back  CD STM LB MM akc   LAD SL Hip B        akc   RE           Neuro Re-Ed 5/7 5/20 5/27 6/3 6/17 6/25 7/1 7/11   Prone GS           PBKHE            Alt UE/Alt LE           Bridges with TB           Hip Add Iso           Prone Hip ER Yoga Block           MF Press   F Green  30x ea F Green  30x ea F Green  30x ea F  Green  30xea Double Grn   30x ea Double Grn  30x ea DBTB  30x ea   MF Twist   F Green  30x ea F Green  30x ea F Green  30x ea F Green  30x ea Double Grn  30x ea Double Grn  30x ea DBTB  30x ea   STS with GS            Pball Crushers 20x5\"  20x5\" 20x5\" Standing 20x5\" Stand 20x5\" Standing 5\" 20x Standing 5\" 20x 5\"x20   Pball Diagonal 20x5\" ea 20x5\" ea 20x5\" ea 20x5\" 20x5\" 5\" 20x 5\" 20x 5\"x20   Dead Bug Crushers 15x5\" ea 15x5\" ea 15x5\" ea 15x5\" ea 15x5\" ea 5\" 15x ea 5\" 15x ea 5\"x20   Sup HS Curls            Chops Down           Chops Up                                            Ther Ex 5/7 5/20 5/27 6/3 " "6/17 6/25 7/1 7/11   Nu Step/Bike            SLR x 4            Prayer Stretch           Seated sciatic nv glide           Modified good morning           Rows  #19 K  3x10 #19 K  3x10 #19 K  3x10 #19.5 K 3x10 #19.5 K 3x10 #19.5  K 3x10 #19.5  K 3x10 #19.5  K 3x15   Low Rows #19 K  3x10 #19 K  3x10 #19 K  3x10 #19.5 K 3x10 #19.5  K 3x10 #19.5  K 3x10 #19.5  K 3x10 #19.5  K 3x15   Pulldowns  #19 K  3x10 #19 K  3x10 #19 K  3x10 #19.5 K 3x10 #19.5  K 3x10 #19.5  K 3x10 #19.5  K 3x10 #19.5  K 3x15   Leg Press  75# 3x10 75# 3x10 85# 3x10 #85 3x10      LTR   10x10\" 10x10\" 10x10\" 10x10\" 10\" 10x 10\"x10 10x10\"   [facility]           SKC           Piriformis Stretch           Stand QL stretch        10x10\" B   Ther Activity                                 Gait Training                                 Modalities 5/7 5/20 5/27 6/3 6/17 6/25 7/1 7/11    MHP   10' pre MHP pre 10'  MHP pre 10' MHP pre 10' MHP pre 10' MHP  Pre 10; MHP  Pre 10; MHP                            "

## 2025-07-15 ENCOUNTER — OFFICE VISIT (OUTPATIENT)
Age: 70
End: 2025-07-15
Attending: STUDENT IN AN ORGANIZED HEALTH CARE EDUCATION/TRAINING PROGRAM
Payer: MEDICARE

## 2025-07-15 DIAGNOSIS — M54.16 LUMBAR RADICULOPATHY, CHRONIC: Primary | ICD-10-CM

## 2025-07-15 PROCEDURE — 97110 THERAPEUTIC EXERCISES: CPT

## 2025-07-15 PROCEDURE — 97010 HOT OR COLD PACKS THERAPY: CPT

## 2025-07-15 PROCEDURE — 97140 MANUAL THERAPY 1/> REGIONS: CPT

## 2025-07-22 ENCOUNTER — APPOINTMENT (OUTPATIENT)
Age: 70
End: 2025-07-22
Attending: STUDENT IN AN ORGANIZED HEALTH CARE EDUCATION/TRAINING PROGRAM
Payer: MEDICARE

## 2025-07-23 ENCOUNTER — OFFICE VISIT (OUTPATIENT)
Age: 70
End: 2025-07-23
Attending: STUDENT IN AN ORGANIZED HEALTH CARE EDUCATION/TRAINING PROGRAM
Payer: MEDICARE

## 2025-07-23 DIAGNOSIS — M54.16 LUMBAR RADICULOPATHY, CHRONIC: Primary | ICD-10-CM

## 2025-07-23 PROCEDURE — 97140 MANUAL THERAPY 1/> REGIONS: CPT

## 2025-07-23 PROCEDURE — 97110 THERAPEUTIC EXERCISES: CPT

## 2025-07-23 PROCEDURE — 97112 NEUROMUSCULAR REEDUCATION: CPT

## 2025-07-23 NOTE — PROGRESS NOTES
"Daily Note     Today's date: 2025  Patient name: Jay Jay Acuna  : 1955  MRN: [id number]  Referring provider: Carolyn Mata MD  Dx:   Encounter Diagnosis     ICD-10-CM    1. Lumbar radiculopathy, chronic  M54.16                      Subjective: Patient reports low back symptoms are decreasing.       Objective: See treatment diary below      Assessment: Jay Jay Acuna tolerated treatment well. Symptoms overall continued to decrease. Continued treatment indicated.       Plan: Continue per plan of care.      Precautions: PMH includes HIV, HTN, hiatal hernia, GERD and history of low back pain    Daily Treatment Log  Manuals 7/23 5/20 5/27 6/3 6/17 6/25 7/1 7/11 7/15   SL SI Oscillations SJ       akc akc   Breaking Bread SJ MM MM MM    akc akc   SL CG LAD   MM MM MM        FMP Lumbar Pattern            Lumbar PA Mobs SJ     STM low back  CD STM LB MM akc akc   LAD SL Hip B SJ       akc akc   RE            Neuro Re-Ed 7/23 5/20 5/27 6/3 6/17 6/25 7/1 7/11 7/15   Prone GS            PBKHE             Alt UE/Alt LE            Bridges with TB            Hip Add Iso            Prone Hip ER Yoga Block            MF Press  DBTB  15x3 F Green  30x ea F Green  30x ea F Green  30x ea F  Green  30xea Double Grn   30x ea Double Grn  30x ea DBTB  30x ea DBTB  15x3   MF Twist  DBTB  15x3 F Green  30x ea F Green  30x ea F Green  30x ea F Green  30x ea Double Grn  30x ea Double Grn  30x ea DBTB  30x ea DBTB  15x3   STS with GS             Pball Crushers 5\"x30 20x5\" 20x5\" Standing 20x5\" Stand 20x5\" Standing 5\" 20x Standing 5\" 20x 5\"x20 5\"x30   Pball Diagonal 5\"x30 20x5\" ea 20x5\" ea 20x5\" 20x5\" 5\" 20x 5\" 20x 5\"x20 5x30\"   Dead Bug Crushers 5\"x30 15x5\" ea 15x5\" ea 15x5\" ea 15x5\" ea 5\" 15x ea 5\" 15x ea 5\"x20 5x30\"   Sup HS Curls             Chops Down            Chops Up                                                Ther Ex 7/23 5/20 5/27 6/3 6/17 6/25 7/1 7/11 7/15   Nu Step/Bike             SLR x 4             Prayer " "Stretch            Seated sciatic nv glide            Modified good morning            Rows  #19.5  K 3x15 #19 K  3x10 #19 K  3x10 #19.5 K 3x10 #19.5 K 3x10 #19.5  K 3x10 #19.5  K 3x10 #19.5  K 3x15 #19.5  K 3x15   Low Rows #19.5  K 3x15 #19 K  3x10 #19 K  3x10 #19.5 K 3x10 #19.5  K 3x10 #19.5  K 3x10 #19.5  K 3x10 #19.5  K 3x15 #19.5  K 3x15   Pulldowns  #19.5  K 3x15 #19 K  3x10 #19 K  3x10 #19.5 K 3x10 #19.5  K 3x10 #19.5  K 3x10 #19.5  K 3x10 #19.5  K 3x15 #19.5  K 3x15   Leg Press  75# 3x10 75# 3x10 85# 3x10 #85 3x10       LTR  10\"x10 10x10\" 10x10\" 10x10\" 10x10\" 10\" 10x 10\"x10 10x10\" 10x10\"   [facility]            Piriformis Stretch            Stand QL stretch        10x10\" B    hep    Ther Activity                                    Gait Training                                    Modalities 7/23 5/20 5/27 6/3 6/17 6/25 7/1 7/11 7/15    Pre 10'  MHP MHP pre 10'  MHP pre 10' MHP pre 10' MHP pre 10' MHP  Pre 10; MHP  Pre 10; MHP   Pre 10'  MHP                               "

## 2025-07-30 DIAGNOSIS — B20 CURRENTLY ASYMPTOMATIC HIV INFECTION, WITH HISTORY OF HIV-RELATED ILLNESS (HCC): ICD-10-CM

## 2025-07-31 RX ORDER — BICTEGRAVIR SODIUM, EMTRICITABINE, AND TENOFOVIR ALAFENAMIDE FUMARATE 50; 200; 25 MG/1; MG/1; MG/1
1 TABLET ORAL
Qty: 90 TABLET | Refills: 1 | Status: SHIPPED | OUTPATIENT
Start: 2025-07-31

## 2025-08-02 DIAGNOSIS — I47.10 SVT (SUPRAVENTRICULAR TACHYCARDIA) (HCC): ICD-10-CM

## 2025-08-02 DIAGNOSIS — R00.2 PALPITATIONS: ICD-10-CM

## 2025-08-04 ENCOUNTER — TELEPHONE (OUTPATIENT)
Dept: SURGERY | Facility: CLINIC | Age: 70
End: 2025-08-04

## 2025-08-04 RX ORDER — METOPROLOL SUCCINATE 50 MG/1
50 TABLET, EXTENDED RELEASE ORAL DAILY
Qty: 90 TABLET | Refills: 1 | Status: SHIPPED | OUTPATIENT
Start: 2025-08-04

## 2025-08-05 DIAGNOSIS — N13.8 BPH WITH URINARY OBSTRUCTION: Primary | ICD-10-CM

## 2025-08-05 DIAGNOSIS — E78.5 DYSLIPIDEMIA: ICD-10-CM

## 2025-08-05 DIAGNOSIS — N40.1 BPH WITH URINARY OBSTRUCTION: Primary | ICD-10-CM

## 2025-08-05 RX ORDER — TADALAFIL 5 MG/1
5 TABLET ORAL DAILY
Qty: 90 TABLET | Refills: 0 | Status: SHIPPED | OUTPATIENT
Start: 2025-08-05

## 2025-08-06 ENCOUNTER — EVALUATION (OUTPATIENT)
Age: 70
End: 2025-08-06
Attending: STUDENT IN AN ORGANIZED HEALTH CARE EDUCATION/TRAINING PROGRAM
Payer: MEDICARE

## 2025-08-06 DIAGNOSIS — M54.16 LUMBAR RADICULOPATHY, CHRONIC: Primary | ICD-10-CM

## 2025-08-06 PROCEDURE — 97140 MANUAL THERAPY 1/> REGIONS: CPT | Performed by: PHYSICAL THERAPIST

## 2025-08-06 RX ORDER — ATORVASTATIN CALCIUM 40 MG/1
40 TABLET, FILM COATED ORAL DAILY
Qty: 90 TABLET | Refills: 1 | Status: SHIPPED | OUTPATIENT
Start: 2025-08-06

## 2025-08-12 ENCOUNTER — TELEPHONE (OUTPATIENT)
Dept: SURGERY | Facility: CLINIC | Age: 70
End: 2025-08-12

## 2025-08-12 ENCOUNTER — OFFICE VISIT (OUTPATIENT)
Dept: SURGERY | Facility: CLINIC | Age: 70
End: 2025-08-12
Payer: MEDICARE

## 2025-08-14 ENCOUNTER — OFFICE VISIT (OUTPATIENT)
Age: 70
End: 2025-08-14
Attending: STUDENT IN AN ORGANIZED HEALTH CARE EDUCATION/TRAINING PROGRAM
Payer: MEDICARE

## 2025-08-22 DIAGNOSIS — E55.9 VITAMIN D DEFICIENCY: Primary | ICD-10-CM

## 2025-08-22 RX ORDER — CHOLECALCIFEROL (VITAMIN D3) 25 MCG
2000 TABLET ORAL DAILY
Qty: 180 TABLET | Refills: 1 | Status: SHIPPED | OUTPATIENT
Start: 2025-08-22

## (undated) DEVICE — SPONGE STICK WITH PVP-I: Brand: KENDALL

## (undated) DEVICE — VESSEL LOOPS X-RAY DETECTABLE: Brand: DEROYAL

## (undated) DEVICE — INTENDED FOR TISSUE SEPARATION, AND OTHER PROCEDURES THAT REQUIRE A SHARP SURGICAL BLADE TO PUNCTURE OR CUT.: Brand: BARD-PARKER SAFETY BLADES SIZE 15, STERILE

## (undated) DEVICE — GAUZE SPONGES,16 PLY: Brand: CURITY

## (undated) DEVICE — LUBRICANT JELLY SURGILUBE TUBE 4OZ FLIP TOP

## (undated) DEVICE — MEDI-VAC YANK SUCT HNDL W/TPRD BULBOUS TIP: Brand: CARDINAL HEALTH

## (undated) DEVICE — DISPOSABLE BRIEF/UNDERWEAR

## (undated) DEVICE — ELECTRODE NEEDLE MOD E-Z CLEAN 2.75IN 7CM -0013M

## (undated) DEVICE — 3M™ DURAPORE™ SURGICAL TAPE 1538-3, 3 INCH X 10 YARD (7,5CM X 9,1M), 4 ROLLS/BOX: Brand: 3M™ DURAPORE™

## (undated) DEVICE — PLUMEPEN PRO 10FT

## (undated) DEVICE — TUBING SUCTION 5MM X 12 FT

## (undated) DEVICE — NEEDLE 25GA X 1 IN SAFETY GLIDE

## (undated) DEVICE — GLOVE INDICATOR PI UNDERGLOVE SZ 8.5 BLUE

## (undated) DEVICE — BETHLEHEM UNIVERSAL MINOR GEN: Brand: CARDINAL HEALTH

## (undated) DEVICE — SYSTEM TRANSPERINEAL ACCESS PRECISIONPOINT

## (undated) DEVICE — DECANTER: Brand: UNBRANDED

## (undated) DEVICE — BASIC SINGLE BASIN 2-LF: Brand: MEDLINE INDUSTRIES, INC.

## (undated) DEVICE — GLOVE SRG BIOGEL 8